# Patient Record
Sex: MALE | Race: ASIAN | NOT HISPANIC OR LATINO | URBAN - METROPOLITAN AREA
[De-identification: names, ages, dates, MRNs, and addresses within clinical notes are randomized per-mention and may not be internally consistent; named-entity substitution may affect disease eponyms.]

---

## 2016-01-28 RX ORDER — VENLAFAXINE HCL 75 MG
1 CAPSULE, EXT RELEASE 24 HR ORAL
Qty: 0 | Refills: 0 | DISCHARGE
Start: 2016-01-28

## 2017-02-08 ENCOUNTER — INPATIENT (INPATIENT)
Facility: HOSPITAL | Age: 67
LOS: 2 days | Discharge: HOME | End: 2017-02-11
Attending: INTERNAL MEDICINE | Admitting: INTERNAL MEDICINE
Payer: MEDICARE

## 2017-02-08 VITALS
SYSTOLIC BLOOD PRESSURE: 151 MMHG | HEART RATE: 105 BPM | RESPIRATION RATE: 20 BRPM | OXYGEN SATURATION: 99 % | DIASTOLIC BLOOD PRESSURE: 82 MMHG

## 2017-02-08 DIAGNOSIS — E11.9 TYPE 2 DIABETES MELLITUS WITHOUT COMPLICATIONS: ICD-10-CM

## 2017-02-08 DIAGNOSIS — K21.9 GASTRO-ESOPHAGEAL REFLUX DISEASE WITHOUT ESOPHAGITIS: ICD-10-CM

## 2017-02-08 DIAGNOSIS — F32.9 MAJOR DEPRESSIVE DISORDER, SINGLE EPISODE, UNSPECIFIED: ICD-10-CM

## 2017-02-08 DIAGNOSIS — R94.39 ABNORMAL RESULT OF OTHER CARDIOVASCULAR FUNCTION STUDY: ICD-10-CM

## 2017-02-08 DIAGNOSIS — E78.5 HYPERLIPIDEMIA, UNSPECIFIED: ICD-10-CM

## 2017-02-08 DIAGNOSIS — I65.09 OCCLUSION AND STENOSIS OF UNSPECIFIED VERTEBRAL ARTERY: Chronic | ICD-10-CM

## 2017-02-08 DIAGNOSIS — I10 ESSENTIAL (PRIMARY) HYPERTENSION: ICD-10-CM

## 2017-02-08 DIAGNOSIS — J84.9 INTERSTITIAL PULMONARY DISEASE, UNSPECIFIED: ICD-10-CM

## 2017-02-08 DIAGNOSIS — Z41.8 ENCOUNTER FOR OTHER PROCEDURES FOR PURPOSES OTHER THAN REMEDYING HEALTH STATE: ICD-10-CM

## 2017-02-08 LAB
ALBUMIN SERPL ELPH-MCNC: 4.2 G/DL — SIGNIFICANT CHANGE UP (ref 3.3–5)
ALP SERPL-CCNC: 48 U/L — SIGNIFICANT CHANGE UP (ref 40–120)
ALT FLD-CCNC: 36 U/L — SIGNIFICANT CHANGE UP (ref 4–41)
APTT BLD: 24.3 SEC — LOW (ref 27.5–37.4)
AST SERPL-CCNC: 32 U/L — SIGNIFICANT CHANGE UP (ref 4–40)
BASE EXCESS BLDV CALC-SCNC: 3.2 MMOL/L — SIGNIFICANT CHANGE UP
BASOPHILS # BLD AUTO: 0.02 K/UL — SIGNIFICANT CHANGE UP (ref 0–0.2)
BASOPHILS NFR BLD AUTO: 0.2 % — SIGNIFICANT CHANGE UP (ref 0–2)
BILIRUB SERPL-MCNC: 0.3 MG/DL — SIGNIFICANT CHANGE UP (ref 0.2–1.2)
BLOOD GAS VENOUS - CREATININE: 1.15 MG/DL — SIGNIFICANT CHANGE UP (ref 0.5–1.3)
BUN SERPL-MCNC: 19 MG/DL — SIGNIFICANT CHANGE UP (ref 7–23)
CALCIUM SERPL-MCNC: 9.2 MG/DL — SIGNIFICANT CHANGE UP (ref 8.4–10.5)
CHLORIDE BLDV-SCNC: 105 MMOL/L — SIGNIFICANT CHANGE UP (ref 96–108)
CHLORIDE SERPL-SCNC: 101 MMOL/L — SIGNIFICANT CHANGE UP (ref 98–107)
CK MB BLD-MCNC: 1.2 — SIGNIFICANT CHANGE UP (ref 0–2.5)
CK MB BLD-MCNC: 5.42 NG/ML — SIGNIFICANT CHANGE UP (ref 1–6.6)
CK SERPL-CCNC: 471 U/L — HIGH (ref 30–200)
CO2 SERPL-SCNC: 26 MMOL/L — SIGNIFICANT CHANGE UP (ref 22–31)
CREAT SERPL-MCNC: 1.21 MG/DL — SIGNIFICANT CHANGE UP (ref 0.5–1.3)
EOSINOPHIL # BLD AUTO: 0.08 K/UL — SIGNIFICANT CHANGE UP (ref 0–0.5)
EOSINOPHIL NFR BLD AUTO: 1 % — SIGNIFICANT CHANGE UP (ref 0–6)
GAS PNL BLDV: 134 MMOL/L — LOW (ref 136–146)
GLUCOSE BLDV-MCNC: 263 — HIGH (ref 70–99)
GLUCOSE SERPL-MCNC: 270 MG/DL — HIGH (ref 70–99)
HCO3 BLDV-SCNC: 27 MMOL/L — SIGNIFICANT CHANGE UP (ref 20–27)
HCT VFR BLD CALC: 33.6 % — LOW (ref 39–50)
HCT VFR BLDV CALC: 32.8 % — LOW (ref 39–51)
HGB BLD-MCNC: 10.4 G/DL — LOW (ref 13–17)
HGB BLDV-MCNC: 10.6 G/DL — LOW (ref 13–17)
IMM GRANULOCYTES NFR BLD AUTO: 0.5 % — SIGNIFICANT CHANGE UP (ref 0–1.5)
INR BLD: 0.99 — SIGNIFICANT CHANGE UP (ref 0.87–1.18)
LACTATE BLDV-MCNC: 1.8 MMOL/L — SIGNIFICANT CHANGE UP (ref 0.5–2)
LYMPHOCYTES # BLD AUTO: 1.89 K/UL — SIGNIFICANT CHANGE UP (ref 1–3.3)
LYMPHOCYTES # BLD AUTO: 23.1 % — SIGNIFICANT CHANGE UP (ref 13–44)
MCHC RBC-ENTMCNC: 20.7 PG — LOW (ref 27–34)
MCHC RBC-ENTMCNC: 31 % — LOW (ref 32–36)
MCV RBC AUTO: 66.9 FL — LOW (ref 80–100)
MONOCYTES # BLD AUTO: 0.54 K/UL — SIGNIFICANT CHANGE UP (ref 0–0.9)
MONOCYTES NFR BLD AUTO: 6.6 % — SIGNIFICANT CHANGE UP (ref 2–14)
NEUTROPHILS # BLD AUTO: 5.6 K/UL — SIGNIFICANT CHANGE UP (ref 1.8–7.4)
NEUTROPHILS NFR BLD AUTO: 68.6 % — SIGNIFICANT CHANGE UP (ref 43–77)
PCO2 BLDV: 45 MMHG — SIGNIFICANT CHANGE UP (ref 41–51)
PH BLDV: 7.41 PH — SIGNIFICANT CHANGE UP (ref 7.32–7.43)
PLATELET # BLD AUTO: 317 K/UL — SIGNIFICANT CHANGE UP (ref 150–400)
PMV BLD: 10.2 FL — SIGNIFICANT CHANGE UP (ref 7–13)
PO2 BLDV: 52 MMHG — HIGH (ref 35–40)
POTASSIUM BLDV-SCNC: 3.7 MMOL/L — SIGNIFICANT CHANGE UP (ref 3.4–4.5)
POTASSIUM SERPL-MCNC: 3.9 MMOL/L — SIGNIFICANT CHANGE UP (ref 3.5–5.3)
POTASSIUM SERPL-SCNC: 3.9 MMOL/L — SIGNIFICANT CHANGE UP (ref 3.5–5.3)
PROT SERPL-MCNC: 6.7 G/DL — SIGNIFICANT CHANGE UP (ref 6–8.3)
PROTHROM AB SERPL-ACNC: 11.3 SEC — SIGNIFICANT CHANGE UP (ref 10–13.1)
RBC # BLD: 5.02 M/UL — SIGNIFICANT CHANGE UP (ref 4.2–5.8)
RBC # FLD: 16.9 % — HIGH (ref 10.3–14.5)
SAO2 % BLDV: 83.2 % — SIGNIFICANT CHANGE UP (ref 60–85)
SODIUM SERPL-SCNC: 143 MMOL/L — SIGNIFICANT CHANGE UP (ref 135–145)
TROPONIN T SERPL-MCNC: < 0.06 NG/ML — SIGNIFICANT CHANGE UP (ref 0–0.06)
WBC # BLD: 8.17 K/UL — SIGNIFICANT CHANGE UP (ref 3.8–10.5)
WBC # FLD AUTO: 8.17 K/UL — SIGNIFICANT CHANGE UP (ref 3.8–10.5)

## 2017-02-08 RX ORDER — ASPIRIN/CALCIUM CARB/MAGNESIUM 324 MG
325 TABLET ORAL ONCE
Qty: 0 | Refills: 0 | Status: COMPLETED | OUTPATIENT
Start: 2017-02-08 | End: 2017-02-08

## 2017-02-08 RX ORDER — ASPIRIN/CALCIUM CARB/MAGNESIUM 324 MG
81 TABLET ORAL DAILY
Qty: 0 | Refills: 0 | Status: DISCONTINUED | OUTPATIENT
Start: 2017-02-08 | End: 2017-02-11

## 2017-02-08 RX ORDER — BUDESONIDE AND FORMOTEROL FUMARATE DIHYDRATE 160; 4.5 UG/1; UG/1
2 AEROSOL RESPIRATORY (INHALATION)
Qty: 0 | Refills: 0 | Status: DISCONTINUED | OUTPATIENT
Start: 2017-02-08 | End: 2017-02-11

## 2017-02-08 RX ORDER — DEXTROSE 50 % IN WATER 50 %
25 SYRINGE (ML) INTRAVENOUS ONCE
Qty: 0 | Refills: 0 | Status: DISCONTINUED | OUTPATIENT
Start: 2017-02-08 | End: 2017-02-11

## 2017-02-08 RX ORDER — DEXTROSE 50 % IN WATER 50 %
12.5 SYRINGE (ML) INTRAVENOUS ONCE
Qty: 0 | Refills: 0 | Status: DISCONTINUED | OUTPATIENT
Start: 2017-02-08 | End: 2017-02-11

## 2017-02-08 RX ORDER — CLOPIDOGREL BISULFATE 75 MG/1
75 TABLET, FILM COATED ORAL DAILY
Qty: 0 | Refills: 0 | Status: DISCONTINUED | OUTPATIENT
Start: 2017-02-08 | End: 2017-02-11

## 2017-02-08 RX ORDER — INSULIN LISPRO 100/ML
VIAL (ML) SUBCUTANEOUS
Qty: 0 | Refills: 0 | Status: DISCONTINUED | OUTPATIENT
Start: 2017-02-08 | End: 2017-02-11

## 2017-02-08 RX ORDER — SODIUM CHLORIDE 9 MG/ML
1000 INJECTION, SOLUTION INTRAVENOUS
Qty: 0 | Refills: 0 | Status: DISCONTINUED | OUTPATIENT
Start: 2017-02-08 | End: 2017-02-11

## 2017-02-08 RX ORDER — INSULIN LISPRO 100/ML
VIAL (ML) SUBCUTANEOUS AT BEDTIME
Qty: 0 | Refills: 0 | Status: DISCONTINUED | OUTPATIENT
Start: 2017-02-08 | End: 2017-02-11

## 2017-02-08 RX ORDER — ATORVASTATIN CALCIUM 80 MG/1
80 TABLET, FILM COATED ORAL AT BEDTIME
Qty: 0 | Refills: 0 | Status: DISCONTINUED | OUTPATIENT
Start: 2017-02-08 | End: 2017-02-11

## 2017-02-08 RX ORDER — GLUCAGON INJECTION, SOLUTION 0.5 MG/.1ML
1 INJECTION, SOLUTION SUBCUTANEOUS ONCE
Qty: 0 | Refills: 0 | Status: DISCONTINUED | OUTPATIENT
Start: 2017-02-08 | End: 2017-02-11

## 2017-02-08 RX ORDER — LEFLUNOMIDE 10 MG/1
20 TABLET ORAL DAILY
Qty: 0 | Refills: 0 | Status: DISCONTINUED | OUTPATIENT
Start: 2017-02-08 | End: 2017-02-11

## 2017-02-08 RX ORDER — FENOFIBRATE,MICRONIZED 130 MG
145 CAPSULE ORAL DAILY
Qty: 0 | Refills: 0 | Status: DISCONTINUED | OUTPATIENT
Start: 2017-02-08 | End: 2017-02-11

## 2017-02-08 RX ORDER — TAMSULOSIN HYDROCHLORIDE 0.4 MG/1
0.4 CAPSULE ORAL AT BEDTIME
Qty: 0 | Refills: 0 | Status: DISCONTINUED | OUTPATIENT
Start: 2017-02-08 | End: 2017-02-11

## 2017-02-08 RX ORDER — FAMOTIDINE 10 MG/ML
20 INJECTION INTRAVENOUS ONCE
Qty: 0 | Refills: 0 | Status: COMPLETED | OUTPATIENT
Start: 2017-02-08 | End: 2017-02-08

## 2017-02-08 RX ORDER — LOSARTAN POTASSIUM 100 MG/1
100 TABLET, FILM COATED ORAL DAILY
Qty: 0 | Refills: 0 | Status: DISCONTINUED | OUTPATIENT
Start: 2017-02-08 | End: 2017-02-11

## 2017-02-08 RX ORDER — AMLODIPINE BESYLATE 2.5 MG/1
2.5 TABLET ORAL DAILY
Qty: 0 | Refills: 0 | Status: DISCONTINUED | OUTPATIENT
Start: 2017-02-08 | End: 2017-02-11

## 2017-02-08 RX ORDER — PREGABALIN 225 MG/1
1000 CAPSULE ORAL DAILY
Qty: 0 | Refills: 0 | Status: DISCONTINUED | OUTPATIENT
Start: 2017-02-08 | End: 2017-02-11

## 2017-02-08 RX ORDER — HEPARIN SODIUM 5000 [USP'U]/ML
5000 INJECTION INTRAVENOUS; SUBCUTANEOUS EVERY 12 HOURS
Qty: 0 | Refills: 0 | Status: DISCONTINUED | OUTPATIENT
Start: 2017-02-08 | End: 2017-02-10

## 2017-02-08 RX ORDER — PANTOPRAZOLE SODIUM 20 MG/1
40 TABLET, DELAYED RELEASE ORAL
Qty: 0 | Refills: 0 | Status: DISCONTINUED | OUTPATIENT
Start: 2017-02-08 | End: 2017-02-11

## 2017-02-08 RX ORDER — VENLAFAXINE HCL 75 MG
75 CAPSULE, EXT RELEASE 24 HR ORAL DAILY
Qty: 0 | Refills: 0 | Status: DISCONTINUED | OUTPATIENT
Start: 2017-02-08 | End: 2017-02-11

## 2017-02-08 RX ORDER — DEXTROSE 50 % IN WATER 50 %
1 SYRINGE (ML) INTRAVENOUS ONCE
Qty: 0 | Refills: 0 | Status: DISCONTINUED | OUTPATIENT
Start: 2017-02-08 | End: 2017-02-11

## 2017-02-08 RX ADMIN — Medication 2: at 23:28

## 2017-02-08 RX ADMIN — Medication 325 MILLIGRAM(S): at 19:43

## 2017-02-08 RX ADMIN — Medication 5 MILLIGRAM(S): at 23:28

## 2017-02-08 RX ADMIN — TAMSULOSIN HYDROCHLORIDE 0.4 MILLIGRAM(S): 0.4 CAPSULE ORAL at 23:28

## 2017-02-08 RX ADMIN — FAMOTIDINE 20 MILLIGRAM(S): 10 INJECTION INTRAVENOUS at 19:43

## 2017-02-08 RX ADMIN — CLOPIDOGREL BISULFATE 75 MILLIGRAM(S): 75 TABLET, FILM COATED ORAL at 23:28

## 2017-02-08 RX ADMIN — ATORVASTATIN CALCIUM 80 MILLIGRAM(S): 80 TABLET, FILM COATED ORAL at 23:28

## 2017-02-08 NOTE — H&P ADULT. - PROBLEM SELECTOR PLAN 5
Continue with Prednisone 5mg daily, Arava and Symbicort daily   NC at 2L, continuous pulse oxygen  Consider Pulmonary consult in am with Dr. Kimball if warranted

## 2017-02-08 NOTE — H&P ADULT. - GENITOURINARY COMMENTS
Penile redness or itching Mild redness appreciated on the glans of the penis. No active drainage or bleeding noted

## 2017-02-08 NOTE — H&P ADULT. - PMH
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 Ansari  Constipation    Depression    Diabetes mellitus, type II    Dizziness    HTN (hypertension)    Hyperlipidemia    Interstitial lung disease

## 2017-02-08 NOTE — H&P ADULT. - ASSESSMENT
65 y/o M with PMH of HTN, DM, HLD, CVA x 3(s/p cerebral stents), Interstitial Lung disease presented to the ED s/p abnormal stress test from cardiologist office earlier today.

## 2017-02-08 NOTE — ED PROVIDER NOTE - ATTENDING CONTRIBUTION TO CARE
I performed a face to face evaluation of this patient and performed a history and physical examination on the patient.  I agree with the PA's history, physical examination, and plan of the patient. patient with abnormal stress test today. cv: rrr, resting comofrtably at rest. will admit for acs.

## 2017-02-08 NOTE — ED ADULT TRIAGE NOTE - CHIEF COMPLAINT QUOTE
Pt s/p stress test became tachycardiac and hypotensive with sob and lightheadedness. Pt now denies chest pain.

## 2017-02-08 NOTE — H&P ADULT. - NEGATIVE ENMT SYMPTOMS
no sinus symptoms/no ear pain/no nasal congestion/no nasal discharge/no hearing difficulty/no tinnitus/no vertigo

## 2017-02-08 NOTE — H&P ADULT. - PROBLEM SELECTOR PLAN 1
Admit to telemetry, serial EKG, serial Iain prn chest pain/SOB  f/u MD note   HgBA1C, TSH, lipid profile, CBC, CMP in am   Will make patient NPO after midnight for possible angiogram in am 2/9  Continue with Aspirin 81mg and Plavix 75mg daily

## 2017-02-08 NOTE — H&P ADULT. - NEGATIVE GASTROINTESTINAL SYMPTOMS
no nausea/no change in bowel habits/no melena/no diarrhea/no abdominal pain/no hematochezia/no vomiting/no constipation

## 2017-02-08 NOTE — H&P ADULT. - NEGATIVE NEUROLOGICAL SYMPTOMS
no focal seizures/no tremors/no vertigo/no difficulty walking/no hemiparesis/no generalized seizures/no loss of sensation/no weakness/no syncope/no transient paralysis/no loss of consciousness/no headache/no paresthesias/no confusion

## 2017-02-08 NOTE — ED PROVIDER NOTE - FAMILY HISTORY
<<-----Click on this checkbox to enter Family History Family history of asthma     Father  Still living? Unknown  Family history of heart disease, Age at diagnosis: Age Unknown

## 2017-02-08 NOTE — H&P ADULT. - HISTORY OF PRESENT ILLNESS
65 y/o M with PMH of HTN, DM, HLD, CVA x 3(s/p cerebral stents), Interstitial Lung disease presented to the ED s/p abnormal stress test from cardiologist office earlier today. As per the patient he was scheduled for a stress test at Dr. Forbes's office today and while he was on the treadmill, he developed lightheadedness and SOB. Patient stated that the tech stopped the stress test and when they checked his blood pressure "it was low". Patient stated that when he started walking on the treadmill he felt like his legs were going to give out. Patient stated that he was referred to cardiologist by his PMD because he had been having progressively worsening dyspnea on exertion for months. Patient stated that the PA and tech spoke with Dr. Forbes who decided that he should be sent to the ED for further workup. Patient denied any CP, fevers, chills, N/V/D/C, headaches, abdominal pain, melena, hematochezia, dysuria, recent travel, sick contact, pleuritic or positional chest pain. Of note patient did complain of penile pain, with itching and mild bleeding for which he went and saw a urologist and was prescribed a cream which he stated that "he has never gotten in his pharmacy".    On ED admission EKG revealed Sinus tachycardia at a rate of 102 with left anterior fascicular block, CE x 1: Trop: Negative, CK: 471, H&H: 10.4/33.6, Gluc: 270. When examined patient is resting in the stretcher and denied any current CP, SOB or lightheadedness.

## 2017-02-08 NOTE — PATIENT PROFILE ADULT. - FAMILY HISTORY
Mother  Still living? Unknown  Family history of asthma, Age at diagnosis: Age Unknown     Father  Still living? Unknown  Family history of heart disease, Age at diagnosis: Age Unknown

## 2017-02-08 NOTE — H&P ADULT. - NEGATIVE CARDIOVASCULAR SYMPTOMS
no palpitations/no orthopnea/no dyspnea on exertion/no paroxysmal nocturnal dyspnea/no peripheral edema/no chest pain

## 2017-02-08 NOTE — H&P ADULT. - RS GEN PE MLT RESP DETAILS PC
no rales/airway patent/no intercostal retractions/respirations non-labored/no chest wall tenderness/wheezes/no rhonchi/normal

## 2017-02-08 NOTE — ED PROVIDER NOTE - OBJECTIVE STATEMENT
66 year old male, PMHx of HTN, DM, HLD, CVA with cerebral stent; presents to the ED s/p abnormal stress test from Dr. Muniz's office who is also seen by Dr. Forbes. Patient states he was referred to cardiology because he had been having progressively worsening dyspnea on exertion for months. Today, he was having a stress test and he became short of breath, lightheaded, with a significant BP drop and incomplete LBBB. Patient is currently complaining only of acid reflux in which he gets when he does not take his pantoprazole (he did not take it this morning). He has had a complete resolution of the SOB, and lightheadedness. He did not experience any chest pain during the episode or at present. Denies fevers, chills, nausea, vomiting, or other complaints.

## 2017-02-08 NOTE — ED PROVIDER NOTE - CHPI ED SYMPTOMS POS
SHORTNESS OF BREATH/lightheadedness, change in ekg/PALPITATIONS SHORTNESS OF BREATH/DIZZINESS/lightheadedness, change in ekg/PALPITATIONS

## 2017-02-08 NOTE — PATIENT PROFILE ADULT. - PMH
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

## 2017-02-08 NOTE — H&P ADULT. - NEGATIVE MUSCULOSKELETAL SYMPTOMS
no arthralgia/no myalgia/no muscle cramps/no muscle weakness/no stiffness/no arthritis/no neck pain/no joint swelling

## 2017-02-09 LAB
ANISOCYTOSIS BLD QL: SLIGHT — SIGNIFICANT CHANGE UP
BUN SERPL-MCNC: 15 MG/DL — SIGNIFICANT CHANGE UP (ref 7–23)
CALCIUM SERPL-MCNC: 9.2 MG/DL — SIGNIFICANT CHANGE UP (ref 8.4–10.5)
CHLORIDE SERPL-SCNC: 98 MMOL/L — SIGNIFICANT CHANGE UP (ref 98–107)
CHOLEST SERPL-MCNC: 201 MG/DL — HIGH (ref 120–199)
CK MB BLD-MCNC: 1 — SIGNIFICANT CHANGE UP (ref 0–2.5)
CK MB BLD-MCNC: 4.14 NG/ML — SIGNIFICANT CHANGE UP (ref 1–6.6)
CK SERPL-CCNC: 311 U/L — HIGH (ref 30–200)
CK SERPL-CCNC: 395 U/L — HIGH (ref 30–200)
CO2 SERPL-SCNC: 22 MMOL/L — SIGNIFICANT CHANGE UP (ref 22–31)
CREAT SERPL-MCNC: 1.14 MG/DL — SIGNIFICANT CHANGE UP (ref 0.5–1.3)
D DIMER BLD IA.RAPID-MCNC: < 150 NG/ML — SIGNIFICANT CHANGE UP
ELLIPTOCYTES BLD QL SMEAR: SLIGHT — SIGNIFICANT CHANGE UP
FERRITIN SERPL-MCNC: 202.4 NG/ML — SIGNIFICANT CHANGE UP (ref 30–400)
GLUCOSE SERPL-MCNC: 309 MG/DL — HIGH (ref 70–99)
HBA1C BLD-MCNC: 10.4 % — HIGH (ref 4–5.6)
HCT VFR BLD CALC: 32.3 % — LOW (ref 39–50)
HDLC SERPL-MCNC: 38 MG/DL — SIGNIFICANT CHANGE UP (ref 35–55)
HGB BLD-MCNC: 9.9 G/DL — LOW (ref 13–17)
IRON SATN MFR SERPL: 384 UG/DL — SIGNIFICANT CHANGE UP (ref 155–535)
IRON SATN MFR SERPL: 69 UG/DL — SIGNIFICANT CHANGE UP (ref 45–165)
LG PLATELETS BLD QL AUTO: SLIGHT — SIGNIFICANT CHANGE UP
LIPID PNL WITH DIRECT LDL SERPL: 122 MG/DL — SIGNIFICANT CHANGE UP
MANUAL SMEAR VERIFICATION: SIGNIFICANT CHANGE UP
MCHC RBC-ENTMCNC: 20.4 PG — LOW (ref 27–34)
MCHC RBC-ENTMCNC: 30.7 % — LOW (ref 32–36)
MCV RBC AUTO: 66.6 FL — LOW (ref 80–100)
MICROCYTES BLD QL: SIGNIFICANT CHANGE UP
PLATELET # BLD AUTO: 311 K/UL — SIGNIFICANT CHANGE UP (ref 150–400)
PLATELET COUNT - ESTIMATE: NORMAL — SIGNIFICANT CHANGE UP
PMV BLD: 10.1 FL — SIGNIFICANT CHANGE UP (ref 7–13)
POIKILOCYTOSIS BLD QL AUTO: SIGNIFICANT CHANGE UP
POTASSIUM SERPL-MCNC: 4.2 MMOL/L — SIGNIFICANT CHANGE UP (ref 3.5–5.3)
POTASSIUM SERPL-SCNC: 4.2 MMOL/L — SIGNIFICANT CHANGE UP (ref 3.5–5.3)
RBC # BLD: 4.85 M/UL — SIGNIFICANT CHANGE UP (ref 4.2–5.8)
RBC # FLD: 16.7 % — HIGH (ref 10.3–14.5)
SCHISTOCYTES BLD QL AUTO: SLIGHT — SIGNIFICANT CHANGE UP
SODIUM SERPL-SCNC: 136 MMOL/L — SIGNIFICANT CHANGE UP (ref 135–145)
TRIGL SERPL-MCNC: 281 MG/DL — HIGH (ref 10–149)
TROPONIN T SERPL-MCNC: < 0.06 NG/ML — SIGNIFICANT CHANGE UP (ref 0–0.06)
TSH SERPL-MCNC: 2.27 UIU/ML — SIGNIFICANT CHANGE UP (ref 0.27–4.2)
UIBC SERPL-MCNC: 315 UG/DL — SIGNIFICANT CHANGE UP (ref 110–370)
WBC # BLD: 7.97 K/UL — SIGNIFICANT CHANGE UP (ref 3.8–10.5)
WBC # FLD AUTO: 7.97 K/UL — SIGNIFICANT CHANGE UP (ref 3.8–10.5)

## 2017-02-09 PROCEDURE — 71020: CPT | Mod: 26

## 2017-02-09 PROCEDURE — 70450 CT HEAD/BRAIN W/O DYE: CPT | Mod: 26

## 2017-02-09 RX ORDER — INSULIN GLARGINE 100 [IU]/ML
16 INJECTION, SOLUTION SUBCUTANEOUS AT BEDTIME
Qty: 0 | Refills: 0 | Status: DISCONTINUED | OUTPATIENT
Start: 2017-02-09 | End: 2017-02-11

## 2017-02-09 RX ORDER — INSULIN LISPRO 100/ML
7 VIAL (ML) SUBCUTANEOUS
Qty: 0 | Refills: 0 | Status: DISCONTINUED | OUTPATIENT
Start: 2017-02-09 | End: 2017-02-11

## 2017-02-09 RX ADMIN — Medication 4: at 13:32

## 2017-02-09 RX ADMIN — BUDESONIDE AND FORMOTEROL FUMARATE DIHYDRATE 2 PUFF(S): 160; 4.5 AEROSOL RESPIRATORY (INHALATION) at 22:15

## 2017-02-09 RX ADMIN — ATORVASTATIN CALCIUM 80 MILLIGRAM(S): 80 TABLET, FILM COATED ORAL at 22:15

## 2017-02-09 RX ADMIN — HEPARIN SODIUM 5000 UNIT(S): 5000 INJECTION INTRAVENOUS; SUBCUTANEOUS at 17:40

## 2017-02-09 RX ADMIN — PREGABALIN 1000 MICROGRAM(S): 225 CAPSULE ORAL at 13:33

## 2017-02-09 RX ADMIN — LEFLUNOMIDE 20 MILLIGRAM(S): 10 TABLET ORAL at 13:32

## 2017-02-09 RX ADMIN — Medication 1 DROP(S): at 13:33

## 2017-02-09 RX ADMIN — BUDESONIDE AND FORMOTEROL FUMARATE DIHYDRATE 2 PUFF(S): 160; 4.5 AEROSOL RESPIRATORY (INHALATION) at 09:17

## 2017-02-09 RX ADMIN — INSULIN GLARGINE 16 UNIT(S): 100 INJECTION, SOLUTION SUBCUTANEOUS at 22:15

## 2017-02-09 RX ADMIN — HEPARIN SODIUM 5000 UNIT(S): 5000 INJECTION INTRAVENOUS; SUBCUTANEOUS at 05:49

## 2017-02-09 RX ADMIN — Medication 145 MILLIGRAM(S): at 13:33

## 2017-02-09 RX ADMIN — LOSARTAN POTASSIUM 100 MILLIGRAM(S): 100 TABLET, FILM COATED ORAL at 05:49

## 2017-02-09 RX ADMIN — TAMSULOSIN HYDROCHLORIDE 0.4 MILLIGRAM(S): 0.4 CAPSULE ORAL at 22:15

## 2017-02-09 RX ADMIN — Medication 7 UNIT(S): at 17:39

## 2017-02-09 RX ADMIN — AMLODIPINE BESYLATE 2.5 MILLIGRAM(S): 2.5 TABLET ORAL at 05:49

## 2017-02-09 RX ADMIN — Medication 3: at 09:15

## 2017-02-09 RX ADMIN — Medication 81 MILLIGRAM(S): at 13:32

## 2017-02-09 RX ADMIN — PANTOPRAZOLE SODIUM 40 MILLIGRAM(S): 20 TABLET, DELAYED RELEASE ORAL at 06:28

## 2017-02-09 RX ADMIN — Medication 5 MILLIGRAM(S): at 05:49

## 2017-02-09 RX ADMIN — Medication 2: at 17:40

## 2017-02-09 RX ADMIN — CLOPIDOGREL BISULFATE 75 MILLIGRAM(S): 75 TABLET, FILM COATED ORAL at 13:33

## 2017-02-09 RX ADMIN — Medication 75 MILLIGRAM(S): at 13:33

## 2017-02-10 ENCOUNTER — TRANSCRIPTION ENCOUNTER (OUTPATIENT)
Age: 67
End: 2017-02-10

## 2017-02-10 LAB
BUN SERPL-MCNC: 16 MG/DL — SIGNIFICANT CHANGE UP (ref 7–23)
CALCIUM SERPL-MCNC: 8.7 MG/DL — SIGNIFICANT CHANGE UP (ref 8.4–10.5)
CHLORIDE SERPL-SCNC: 102 MMOL/L — SIGNIFICANT CHANGE UP (ref 98–107)
CO2 SERPL-SCNC: 23 MMOL/L — SIGNIFICANT CHANGE UP (ref 22–31)
CREAT SERPL-MCNC: 1.03 MG/DL — SIGNIFICANT CHANGE UP (ref 0.5–1.3)
GLUCOSE SERPL-MCNC: 215 MG/DL — HIGH (ref 70–99)
HCT VFR BLD CALC: 32.2 % — LOW (ref 39–50)
HGB BLD-MCNC: 10 G/DL — LOW (ref 13–17)
MCHC RBC-ENTMCNC: 20.8 PG — LOW (ref 27–34)
MCHC RBC-ENTMCNC: 31.1 % — LOW (ref 32–36)
MCV RBC AUTO: 67.1 FL — LOW (ref 80–100)
PLATELET # BLD AUTO: 306 K/UL — SIGNIFICANT CHANGE UP (ref 150–400)
PMV BLD: 10.2 FL — SIGNIFICANT CHANGE UP (ref 7–13)
POTASSIUM SERPL-MCNC: 3.6 MMOL/L — SIGNIFICANT CHANGE UP (ref 3.5–5.3)
POTASSIUM SERPL-SCNC: 3.6 MMOL/L — SIGNIFICANT CHANGE UP (ref 3.5–5.3)
RBC # BLD: 4.8 M/UL — SIGNIFICANT CHANGE UP (ref 4.2–5.8)
RBC # FLD: 16.7 % — HIGH (ref 10.3–14.5)
SODIUM SERPL-SCNC: 139 MMOL/L — SIGNIFICANT CHANGE UP (ref 135–145)
WBC # BLD: 7.7 K/UL — SIGNIFICANT CHANGE UP (ref 3.8–10.5)
WBC # FLD AUTO: 7.7 K/UL — SIGNIFICANT CHANGE UP (ref 3.8–10.5)

## 2017-02-10 RX ORDER — DIPHENHYDRAMINE HCL 50 MG
25 CAPSULE ORAL EVERY 6 HOURS
Qty: 0 | Refills: 0 | Status: DISCONTINUED | OUTPATIENT
Start: 2017-02-10 | End: 2017-02-11

## 2017-02-10 RX ORDER — SODIUM CHLORIDE 9 MG/ML
500 INJECTION INTRAMUSCULAR; INTRAVENOUS; SUBCUTANEOUS
Qty: 0 | Refills: 0 | Status: COMPLETED | OUTPATIENT
Start: 2017-02-10 | End: 2017-02-10

## 2017-02-10 RX ADMIN — Medication: at 13:36

## 2017-02-10 RX ADMIN — SODIUM CHLORIDE 75 MILLILITER(S): 9 INJECTION INTRAMUSCULAR; INTRAVENOUS; SUBCUTANEOUS at 19:43

## 2017-02-10 RX ADMIN — TAMSULOSIN HYDROCHLORIDE 0.4 MILLIGRAM(S): 0.4 CAPSULE ORAL at 22:32

## 2017-02-10 RX ADMIN — Medication 75 MILLIGRAM(S): at 20:15

## 2017-02-10 RX ADMIN — CLOPIDOGREL BISULFATE 75 MILLIGRAM(S): 75 TABLET, FILM COATED ORAL at 16:50

## 2017-02-10 RX ADMIN — LOSARTAN POTASSIUM 100 MILLIGRAM(S): 100 TABLET, FILM COATED ORAL at 20:15

## 2017-02-10 RX ADMIN — LEFLUNOMIDE 20 MILLIGRAM(S): 10 TABLET ORAL at 20:15

## 2017-02-10 RX ADMIN — Medication 145 MILLIGRAM(S): at 20:15

## 2017-02-10 RX ADMIN — Medication 25 MILLIGRAM(S): at 20:14

## 2017-02-10 RX ADMIN — HEPARIN SODIUM 5000 UNIT(S): 5000 INJECTION INTRAVENOUS; SUBCUTANEOUS at 06:10

## 2017-02-10 RX ADMIN — BUDESONIDE AND FORMOTEROL FUMARATE DIHYDRATE 2 PUFF(S): 160; 4.5 AEROSOL RESPIRATORY (INHALATION) at 10:10

## 2017-02-10 RX ADMIN — PREGABALIN 1000 MICROGRAM(S): 225 CAPSULE ORAL at 20:15

## 2017-02-10 RX ADMIN — ATORVASTATIN CALCIUM 80 MILLIGRAM(S): 80 TABLET, FILM COATED ORAL at 22:33

## 2017-02-10 RX ADMIN — Medication 81 MILLIGRAM(S): at 16:50

## 2017-02-10 RX ADMIN — Medication 2: at 10:08

## 2017-02-10 RX ADMIN — BUDESONIDE AND FORMOTEROL FUMARATE DIHYDRATE 2 PUFF(S): 160; 4.5 AEROSOL RESPIRATORY (INHALATION) at 20:15

## 2017-02-10 RX ADMIN — Medication 5 MILLIGRAM(S): at 20:15

## 2017-02-10 RX ADMIN — AMLODIPINE BESYLATE 2.5 MILLIGRAM(S): 2.5 TABLET ORAL at 20:16

## 2017-02-10 RX ADMIN — INSULIN GLARGINE 16 UNIT(S): 100 INJECTION, SOLUTION SUBCUTANEOUS at 22:32

## 2017-02-10 RX ADMIN — SODIUM CHLORIDE 75 MILLILITER(S): 9 INJECTION INTRAMUSCULAR; INTRAVENOUS; SUBCUTANEOUS at 18:36

## 2017-02-11 VITALS
DIASTOLIC BLOOD PRESSURE: 78 MMHG | OXYGEN SATURATION: 96 % | SYSTOLIC BLOOD PRESSURE: 126 MMHG | HEART RATE: 93 BPM | TEMPERATURE: 98 F | RESPIRATION RATE: 18 BRPM

## 2017-02-11 LAB
BUN SERPL-MCNC: 13 MG/DL — SIGNIFICANT CHANGE UP (ref 7–23)
CALCIUM SERPL-MCNC: 9 MG/DL — SIGNIFICANT CHANGE UP (ref 8.4–10.5)
CHLORIDE SERPL-SCNC: 100 MMOL/L — SIGNIFICANT CHANGE UP (ref 98–107)
CO2 SERPL-SCNC: 21 MMOL/L — LOW (ref 22–31)
CREAT SERPL-MCNC: 1 MG/DL — SIGNIFICANT CHANGE UP (ref 0.5–1.3)
GLUCOSE SERPL-MCNC: 230 MG/DL — HIGH (ref 70–99)
HCT VFR BLD CALC: 33.7 % — LOW (ref 39–50)
HGB BLD-MCNC: 10.5 G/DL — LOW (ref 13–17)
MCHC RBC-ENTMCNC: 21 PG — LOW (ref 27–34)
MCHC RBC-ENTMCNC: 31.2 % — LOW (ref 32–36)
MCV RBC AUTO: 67.4 FL — LOW (ref 80–100)
PLATELET # BLD AUTO: 328 K/UL — SIGNIFICANT CHANGE UP (ref 150–400)
PMV BLD: 10.1 FL — SIGNIFICANT CHANGE UP (ref 7–13)
POTASSIUM SERPL-MCNC: 4.3 MMOL/L — SIGNIFICANT CHANGE UP (ref 3.5–5.3)
POTASSIUM SERPL-SCNC: 4.3 MMOL/L — SIGNIFICANT CHANGE UP (ref 3.5–5.3)
RBC # BLD: 5 M/UL — SIGNIFICANT CHANGE UP (ref 4.2–5.8)
RBC # FLD: 17.1 % — HIGH (ref 10.3–14.5)
SODIUM SERPL-SCNC: 137 MMOL/L — SIGNIFICANT CHANGE UP (ref 135–145)
WBC # BLD: 8.08 K/UL — SIGNIFICANT CHANGE UP (ref 3.8–10.5)
WBC # FLD AUTO: 8.08 K/UL — SIGNIFICANT CHANGE UP (ref 3.8–10.5)

## 2017-02-11 RX ORDER — TAMSULOSIN HYDROCHLORIDE 0.4 MG/1
1 CAPSULE ORAL
Qty: 0 | Refills: 0 | DISCHARGE
Start: 2017-02-11

## 2017-02-11 RX ORDER — FEBUXOSTAT 40 MG/1
1 TABLET ORAL
Qty: 0 | Refills: 0 | COMMUNITY

## 2017-02-11 RX ORDER — GLYBURIDE 5 MG
1 TABLET ORAL
Qty: 0 | Refills: 0 | COMMUNITY

## 2017-02-11 RX ORDER — INSULIN GLARGINE 100 [IU]/ML
25 INJECTION, SOLUTION SUBCUTANEOUS AT BEDTIME
Qty: 0 | Refills: 0 | Status: DISCONTINUED | OUTPATIENT
Start: 2017-02-11 | End: 2017-02-11

## 2017-02-11 RX ORDER — INSULIN LISPRO 100/ML
10 VIAL (ML) SUBCUTANEOUS
Qty: 0 | Refills: 0 | Status: DISCONTINUED | OUTPATIENT
Start: 2017-02-11 | End: 2017-02-11

## 2017-02-11 RX ORDER — EZETIMIBE 10 MG/1
1 TABLET ORAL
Qty: 0 | Refills: 0 | COMMUNITY

## 2017-02-11 RX ADMIN — PANTOPRAZOLE SODIUM 40 MILLIGRAM(S): 20 TABLET, DELAYED RELEASE ORAL at 05:45

## 2017-02-11 RX ADMIN — Medication 3: at 11:13

## 2017-02-11 RX ADMIN — AMLODIPINE BESYLATE 2.5 MILLIGRAM(S): 2.5 TABLET ORAL at 05:45

## 2017-02-11 RX ADMIN — BUDESONIDE AND FORMOTEROL FUMARATE DIHYDRATE 2 PUFF(S): 160; 4.5 AEROSOL RESPIRATORY (INHALATION) at 13:19

## 2017-02-11 RX ADMIN — Medication 5 MILLIGRAM(S): at 05:45

## 2017-02-11 RX ADMIN — LOSARTAN POTASSIUM 100 MILLIGRAM(S): 100 TABLET, FILM COATED ORAL at 05:45

## 2017-02-11 RX ADMIN — Medication 10 UNIT(S): at 14:01

## 2017-02-11 RX ADMIN — CLOPIDOGREL BISULFATE 75 MILLIGRAM(S): 75 TABLET, FILM COATED ORAL at 11:22

## 2017-02-11 RX ADMIN — Medication 81 MILLIGRAM(S): at 11:23

## 2017-02-11 RX ADMIN — Medication 5: at 14:00

## 2017-02-11 RX ADMIN — PREGABALIN 1000 MICROGRAM(S): 225 CAPSULE ORAL at 11:23

## 2017-02-11 RX ADMIN — LEFLUNOMIDE 20 MILLIGRAM(S): 10 TABLET ORAL at 11:23

## 2017-02-11 RX ADMIN — Medication 145 MILLIGRAM(S): at 11:23

## 2017-02-11 RX ADMIN — Medication 75 MILLIGRAM(S): at 11:23

## 2017-02-11 NOTE — DISCHARGE NOTE ADULT - MEDICATION SUMMARY - MEDICATIONS TO STOP TAKING
I will STOP taking the medications listed below when I get home from the hospital:    metFORMIN 1000 mg oral tablet, extended release  -- 2 tab(s) by mouth once a day

## 2017-02-11 NOTE — DISCHARGE NOTE ADULT - MEDICATION SUMMARY - MEDICATIONS TO TAKE
I will START or STAY ON the medications listed below when I get home from the hospital:    predniSONE 5 mg oral tablet  -- 1 tab(s) by mouth once a day  -- Indication: For Interstitial lung disease    aspirin 81 mg oral tablet, chewable  -- 1 tab(s) by mouth once a day  -- Indication: For CAD    losartan 100 mg oral tablet  -- 1 tab(s) by mouth once a day  -- Indication: For HTN (hypertension)    tamsulosin 0.4 mg oral capsule  -- 1 cap(s) by mouth once a day (at bedtime)  -- Indication: For Urinary flow    venlafaxine 75 mg oral tablet  -- 1 tab(s) by mouth once a day  -- Indication: For Antidepressant    Tradjenta 5 mg oral tablet  -- 1 tab(s) by mouth once a day  -- Indication: For DM    glyBURIDE 5 mg oral tablet  -- 1 tab(s) by mouth 2 times a day starting on 2/12/17  -- Indication: For DM    fenofibrate 145 mg oral tablet  -- 1 tab(s) by mouth once a day  -- Indication: For Hyperlipidemia    atorvastatin 80 mg oral tablet  -- 1 tab(s) by mouth once a day (at bedtime)  -- Indication: For Hyperlipidemia    clopidogrel 75 mg oral tablet  -- 1 tab(s) by mouth once a day  -- Indication: For CAD    Symbicort 160 mcg-4.5 mcg/inh inhalation aerosol  -- 2 puff(s) inhaled 2 times a day  -- Indication: For Interstitial lung disease    amLODIPine 2.5 mg oral tablet  -- 1 tab(s) by mouth once a day  -- Indication: For HTN (hypertension)    leflunomide 20 mg oral tablet  -- 1 tab(s) by mouth once a day  -- Indication: For Immunosupressant    omeprazole 40 mg oral delayed release capsule  -- 1 cap(s) by mouth once a day  -- Indication: For GERD (gastroesophageal reflux disease)    Vitamin B-12 1000 mcg oral tablet  -- 1 tab(s) by mouth once a day  -- Indication: For vitamin

## 2017-02-11 NOTE — DISCHARGE NOTE ADULT - CARE PLAN
Principal Discharge DX:	Abnormal stress test  Secondary Diagnosis:	Diabetes mellitus, type II  Secondary Diagnosis:	HTN (hypertension) Principal Discharge DX:	Abnormal stress test  Goal:	Followup with PMD and take all medications prescribed.  Instructions for follow-up, activity and diet:	Followup with PMD and take all medications prescribed. Low salt, low fat, low cholesterol, diabetic diet.  Secondary Diagnosis:	Diabetes mellitus, type II  Goal:	Followup with PMD and take all medications prescribed.  Instructions for follow-up, activity and diet:	Followup with PMD and take all medications prescribed. Low salt, low fat, low cholesterol, diabetic diet.  Secondary Diagnosis:	HTN (hypertension)  Goal:	Followup with PMD and take all medications prescribed.  Instructions for follow-up, activity and diet:	Followup with PMD and take all medications prescribed. Low salt, low fat, low cholesterol, diabetic diet.

## 2017-02-11 NOTE — DISCHARGE NOTE ADULT - HOSPITAL COURSE
67 y/o M with PMH of HTN, DM, HLD, CVA x 3(s/p cerebral stents), Interstitial Lung disease presented to the ED s/p abnormal stress test from cardiologist office earlier today. As per the patient he was scheduled for a stress test at Dr. Forbes's office today and while he was on the treadmill, he developed lightheadedness and SOB. Patient stated that the tech stopped the stress test and when they checked his blood pressure "it was low". Patient stated that when he started walking on the treadmill he felt like his legs were going to give out. Patient stated that he was referred to cardiologist by his PMD because he had been having progressively worsening dyspnea on exertion for months. Patient stated that the PA and tech spoke with Dr. Forbes who decided that he should be sent to the ED for further workup. Patient denied any CP, fevers, chills, N/V/D/C, headaches, abdominal pain, melena, hematochezia, dysuria, recent travel, sick contact, pleuritic or positional chest pain.     Hospital Course:   Endocrine followed patient for diabetes and A1C of 10.4.  patient underwent cardiac cath which mLAD 30%, otherwise normal coronaries. RFA site stable.   Neurology consulted for vertigo.     INCOMPLETE> 65 y/o M with PMH of HTN, DM, HLD, CVA x 3(s/p cerebral stents), Interstitial Lung disease presented to the ED s/p abnormal stress test from cardiologist office earlier today. As per the patient he was scheduled for a stress test at Dr. Forbes's office today and while he was on the treadmill, he developed lightheadedness and SOB. Patient stated that the tech stopped the stress test and when they checked his blood pressure "it was low". Patient stated that when he started walking on the treadmill he felt like his legs were going to give out. Patient stated that he was referred to cardiologist by his PMD because he had been having progressively worsening dyspnea on exertion for months. Patient stated that the PA and tech spoke with Dr. Forbes who decided that he should be sent to the ED for further workup. Patient denied any CP, fevers, chills, N/V/D/C, headaches, abdominal pain, melena, hematochezia, dysuria, recent travel, sick contact, pleuritic or positional chest pain.     Hospital Course:   Endocrine followed patient for diabetes and A1C of 10.4.  patient underwent cardiac cath which mLAD 30%, otherwise normal coronaries. RFA site stable.   Neurology consulted for vertigo done.  As per attending, patient stable for discharge.

## 2017-02-11 NOTE — DISCHARGE NOTE ADULT - CARE PROVIDER_API CALL
Angelita Forbes), Cardiovascular Disease; Interventional Cardiology  [address] [facility] [address]  [place] [address]  Phone: [phone]  Fax: [phone] Angelita Forbes), Cardiovascular Disease; Interventional Cardiology  [address] [facility] [address]  [place] [address]  Phone: [phone]  Fax: [phone]    Rosemarie Adame), EndocrinologyMetabDiabetes; Internal Medicine  [address] [place] [address]  Phone: [phone]  Fax: [phone]

## 2017-02-11 NOTE — DISCHARGE NOTE ADULT - ADDITIONAL INSTRUCTIONS
Follow up with Cardiologist Dr Forbes on 2/17//17 at 3:15 pm and PMD within one week of discharge. Call for appointment. Return to ED for any concerning symptoms. Continue medications as prescribed. Low salt, low fat, low cholesterol diet. No heavy lifting x one week. No strenuous activity x 3 weeks. Monitor site of procedure and notify your doctor for any redness, swelling, discharge. No driving x 24 hours. You may shower but no baths or swimming x one week. Follow up with Cardiologist Dr Forbes on 2/17//17 at 3:15 pm, Dr Adame Endocrinologist [phone] and PMD within one week of discharge. Call for appointment. Return to ED for any concerning symptoms. Continue medications as prescribed. Low salt, low fat, low cholesterol diet. No heavy lifting x one week. No strenuous activity x 3 weeks. Monitor site of procedure and notify your doctor for any redness, swelling, discharge. No driving x 24 hours. You may shower but no baths or swimming x one week.

## 2017-02-11 NOTE — DISCHARGE NOTE ADULT - MEDICATION SUMMARY - MEDICATIONS TO CHANGE
I will SWITCH the dose or number of times a day I take the medications listed below when I get home from the hospital:    glyBURIDE 2.5 mg oral tablet  --  by mouth 2 times a day

## 2017-02-11 NOTE — DISCHARGE NOTE ADULT - PATIENT PORTAL LINK FT
“You can access the [facility] Patient Portal, offered by [facility], by registering with the following website: [email or web address]”

## 2017-02-11 NOTE — DISCHARGE NOTE ADULT - PLAN OF CARE
Followup with PMD and take all medications prescribed. Followup with PMD and take all medications prescribed. Low salt, low fat, low cholesterol, diabetic diet.

## 2017-06-22 ENCOUNTER — APPOINTMENT (OUTPATIENT)
Dept: CT IMAGING | Facility: IMAGING CENTER | Age: 67
End: 2017-06-22

## 2017-08-04 ENCOUNTER — OUTPATIENT (OUTPATIENT)
Dept: OUTPATIENT SERVICES | Facility: HOSPITAL | Age: 67
LOS: 1 days | End: 2017-08-04
Payer: MEDICARE

## 2017-08-04 ENCOUNTER — APPOINTMENT (OUTPATIENT)
Dept: CT IMAGING | Facility: IMAGING CENTER | Age: 67
End: 2017-08-04
Payer: MEDICARE

## 2017-08-04 DIAGNOSIS — Z00.8 ENCOUNTER FOR OTHER GENERAL EXAMINATION: ICD-10-CM

## 2017-08-04 DIAGNOSIS — I65.09 OCCLUSION AND STENOSIS OF UNSPECIFIED VERTEBRAL ARTERY: Chronic | ICD-10-CM

## 2017-08-04 PROCEDURE — 71250 CT THORAX DX C-: CPT | Mod: 26

## 2017-08-04 PROCEDURE — 71250 CT THORAX DX C-: CPT

## 2017-11-07 ENCOUNTER — APPOINTMENT (OUTPATIENT)
Dept: OTOLARYNGOLOGY | Facility: CLINIC | Age: 67
End: 2017-11-07

## 2018-07-01 ENCOUNTER — OUTPATIENT (OUTPATIENT)
Dept: OUTPATIENT SERVICES | Facility: HOSPITAL | Age: 68
LOS: 1 days | End: 2018-07-01
Payer: MEDICARE

## 2018-07-01 DIAGNOSIS — I65.09 OCCLUSION AND STENOSIS OF UNSPECIFIED VERTEBRAL ARTERY: Chronic | ICD-10-CM

## 2018-07-16 DIAGNOSIS — Z71.89 OTHER SPECIFIED COUNSELING: ICD-10-CM

## 2018-07-16 PROBLEM — J84.9 INTERSTITIAL PULMONARY DISEASE, UNSPECIFIED: Chronic | Status: ACTIVE | Noted: 2017-02-08

## 2019-04-01 PROCEDURE — G9005: CPT

## 2020-01-01 NOTE — H&P ADULT. - NEGATIVE OPHTHALMOLOGIC SYMPTOMS
106
no blurred vision R/no lacrimation R/no discharge R/no photophobia/no blurred vision L/no diplopia/no discharge L/no lacrimation L

## 2021-11-23 ENCOUNTER — INPATIENT (INPATIENT)
Facility: HOSPITAL | Age: 71
LOS: 2 days | Discharge: HOME | DRG: 149 | End: 2021-11-26
Attending: HOSPITALIST | Admitting: HOSPITALIST
Payer: MEDICARE

## 2021-11-23 VITALS
SYSTOLIC BLOOD PRESSURE: 101 MMHG | HEART RATE: 84 BPM | WEIGHT: 177.91 LBS | DIASTOLIC BLOOD PRESSURE: 58 MMHG | RESPIRATION RATE: 20 BRPM | HEIGHT: 67 IN | TEMPERATURE: 98 F

## 2021-11-23 DIAGNOSIS — I65.09 OCCLUSION AND STENOSIS OF UNSPECIFIED VERTEBRAL ARTERY: Chronic | ICD-10-CM

## 2021-11-23 LAB
ALBUMIN SERPL ELPH-MCNC: 4.6 G/DL — SIGNIFICANT CHANGE UP (ref 3.3–5)
ALP SERPL-CCNC: 88 U/L — SIGNIFICANT CHANGE UP (ref 40–120)
ALT FLD-CCNC: 26 U/L — SIGNIFICANT CHANGE UP (ref 10–45)
ANION GAP SERPL CALC-SCNC: 14 MMOL/L — SIGNIFICANT CHANGE UP (ref 5–17)
ANISOCYTOSIS BLD QL: SIGNIFICANT CHANGE UP
APTT BLD: 30.7 SEC — SIGNIFICANT CHANGE UP (ref 27.5–35.5)
AST SERPL-CCNC: 28 U/L — SIGNIFICANT CHANGE UP (ref 10–40)
BASE EXCESS BLDV CALC-SCNC: 2.8 MMOL/L — HIGH (ref -2–2)
BASOPHILS # BLD AUTO: 0.08 K/UL — SIGNIFICANT CHANGE UP (ref 0–0.2)
BASOPHILS NFR BLD AUTO: 0.9 % — SIGNIFICANT CHANGE UP (ref 0–2)
BILIRUB SERPL-MCNC: 0.3 MG/DL — SIGNIFICANT CHANGE UP (ref 0.2–1.2)
BUN SERPL-MCNC: 22 MG/DL — SIGNIFICANT CHANGE UP (ref 7–23)
BURR CELLS BLD QL SMEAR: PRESENT — SIGNIFICANT CHANGE UP
CA-I SERPL-SCNC: 1.19 MMOL/L — SIGNIFICANT CHANGE UP (ref 1.15–1.33)
CALCIUM SERPL-MCNC: 9.7 MG/DL — SIGNIFICANT CHANGE UP (ref 8.4–10.5)
CHLORIDE BLDV-SCNC: 98 MMOL/L — SIGNIFICANT CHANGE UP (ref 96–108)
CHLORIDE SERPL-SCNC: 98 MMOL/L — SIGNIFICANT CHANGE UP (ref 96–108)
CO2 BLDV-SCNC: 27 MMOL/L — HIGH (ref 22–26)
CO2 SERPL-SCNC: 20 MMOL/L — LOW (ref 22–31)
CREAT SERPL-MCNC: 0.99 MG/DL — SIGNIFICANT CHANGE UP (ref 0.5–1.3)
DACRYOCYTES BLD QL SMEAR: SLIGHT — SIGNIFICANT CHANGE UP
ELLIPTOCYTES BLD QL SMEAR: SLIGHT — SIGNIFICANT CHANGE UP
EOSINOPHIL # BLD AUTO: 0.23 K/UL — SIGNIFICANT CHANGE UP (ref 0–0.5)
EOSINOPHIL NFR BLD AUTO: 2.5 % — SIGNIFICANT CHANGE UP (ref 0–6)
GAS PNL BLDV: 131 MMOL/L — LOW (ref 136–145)
GAS PNL BLDV: SIGNIFICANT CHANGE UP
GAS PNL BLDV: SIGNIFICANT CHANGE UP
GLUCOSE BLDV-MCNC: 265 MG/DL — HIGH (ref 70–99)
GLUCOSE SERPL-MCNC: 255 MG/DL — HIGH (ref 70–99)
HCO3 BLDV-SCNC: 26 MMOL/L — SIGNIFICANT CHANGE UP (ref 22–29)
HCT VFR BLD CALC: 39.9 % — SIGNIFICANT CHANGE UP (ref 39–50)
HCT VFR BLDA CALC: 38 % — LOW (ref 39–51)
HGB BLD CALC-MCNC: 12.7 G/DL — SIGNIFICANT CHANGE UP (ref 12.6–17.4)
HGB BLD-MCNC: 12.5 G/DL — LOW (ref 13–17)
INR BLD: 0.91 RATIO — SIGNIFICANT CHANGE UP (ref 0.88–1.16)
LACTATE BLDV-MCNC: 1.9 MMOL/L — SIGNIFICANT CHANGE UP (ref 0.7–2)
LYMPHOCYTES # BLD AUTO: 2.9 K/UL — SIGNIFICANT CHANGE UP (ref 1–3.3)
LYMPHOCYTES # BLD AUTO: 32.2 % — SIGNIFICANT CHANGE UP (ref 13–44)
MACROCYTES BLD QL: SLIGHT — SIGNIFICANT CHANGE UP
MANUAL SMEAR VERIFICATION: SIGNIFICANT CHANGE UP
MCHC RBC-ENTMCNC: 20.6 PG — LOW (ref 27–34)
MCHC RBC-ENTMCNC: 31.3 GM/DL — LOW (ref 32–36)
MCV RBC AUTO: 65.6 FL — LOW (ref 80–100)
MICROCYTES BLD QL: SIGNIFICANT CHANGE UP
MONOCYTES # BLD AUTO: 0.38 K/UL — SIGNIFICANT CHANGE UP (ref 0–0.9)
MONOCYTES NFR BLD AUTO: 4.2 % — SIGNIFICANT CHANGE UP (ref 2–14)
NEUTROPHILS # BLD AUTO: 5.42 K/UL — SIGNIFICANT CHANGE UP (ref 1.8–7.4)
NEUTROPHILS NFR BLD AUTO: 60.2 % — SIGNIFICANT CHANGE UP (ref 43–77)
OVALOCYTES BLD QL SMEAR: SLIGHT — SIGNIFICANT CHANGE UP
PCO2 BLDV: 34 MMHG — LOW (ref 42–55)
PH BLDV: 7.49 — HIGH (ref 7.32–7.43)
PLAT MORPH BLD: NORMAL — SIGNIFICANT CHANGE UP
PLATELET # BLD AUTO: 305 K/UL — SIGNIFICANT CHANGE UP (ref 150–400)
PO2 BLDV: 75 MMHG — HIGH (ref 25–45)
POIKILOCYTOSIS BLD QL AUTO: SIGNIFICANT CHANGE UP
POLYCHROMASIA BLD QL SMEAR: SLIGHT — SIGNIFICANT CHANGE UP
POTASSIUM BLDV-SCNC: 4.1 MMOL/L — SIGNIFICANT CHANGE UP (ref 3.5–5.1)
POTASSIUM SERPL-MCNC: 4.1 MMOL/L — SIGNIFICANT CHANGE UP (ref 3.5–5.3)
POTASSIUM SERPL-SCNC: 4.1 MMOL/L — SIGNIFICANT CHANGE UP (ref 3.5–5.3)
PROT SERPL-MCNC: 7.9 G/DL — SIGNIFICANT CHANGE UP (ref 6–8.3)
PROTHROM AB SERPL-ACNC: 11 SEC — SIGNIFICANT CHANGE UP (ref 10.6–13.6)
RBC # BLD: 6.08 M/UL — HIGH (ref 4.2–5.8)
RBC # FLD: 18.7 % — HIGH (ref 10.3–14.5)
RBC BLD AUTO: ABNORMAL
SAO2 % BLDV: 96.5 % — HIGH (ref 67–88)
SARS-COV-2 RNA SPEC QL NAA+PROBE: SIGNIFICANT CHANGE UP
SCHISTOCYTES BLD QL AUTO: SLIGHT — SIGNIFICANT CHANGE UP
SODIUM SERPL-SCNC: 132 MMOL/L — LOW (ref 135–145)
TROPONIN T, HIGH SENSITIVITY RESULT: 10 NG/L — SIGNIFICANT CHANGE UP (ref 0–51)
WBC # BLD: 9 K/UL — SIGNIFICANT CHANGE UP (ref 3.8–10.5)
WBC # FLD AUTO: 9 K/UL — SIGNIFICANT CHANGE UP (ref 3.8–10.5)

## 2021-11-23 PROCEDURE — 70498 CT ANGIOGRAPHY NECK: CPT | Mod: 26,MA

## 2021-11-23 PROCEDURE — 70496 CT ANGIOGRAPHY HEAD: CPT | Mod: 26,MA

## 2021-11-23 PROCEDURE — 93010 ELECTROCARDIOGRAM REPORT: CPT

## 2021-11-23 PROCEDURE — 99220: CPT

## 2021-11-23 RX ORDER — LOSARTAN POTASSIUM 100 MG/1
100 TABLET, FILM COATED ORAL DAILY
Refills: 0 | Status: DISCONTINUED | OUTPATIENT
Start: 2021-11-23 | End: 2021-11-26

## 2021-11-23 RX ORDER — SODIUM CHLORIDE 9 MG/ML
1000 INJECTION, SOLUTION INTRAVENOUS
Refills: 0 | Status: DISCONTINUED | OUTPATIENT
Start: 2021-11-23 | End: 2021-11-26

## 2021-11-23 RX ORDER — DEXTROSE 50 % IN WATER 50 %
25 SYRINGE (ML) INTRAVENOUS ONCE
Refills: 0 | Status: DISCONTINUED | OUTPATIENT
Start: 2021-11-23 | End: 2021-11-26

## 2021-11-23 RX ORDER — MECLIZINE HCL 12.5 MG
25 TABLET ORAL ONCE
Refills: 0 | Status: COMPLETED | OUTPATIENT
Start: 2021-11-23 | End: 2021-11-23

## 2021-11-23 RX ORDER — ATORVASTATIN CALCIUM 80 MG/1
80 TABLET, FILM COATED ORAL AT BEDTIME
Refills: 0 | Status: DISCONTINUED | OUTPATIENT
Start: 2021-11-23 | End: 2021-11-26

## 2021-11-23 RX ORDER — DEXTROSE 50 % IN WATER 50 %
15 SYRINGE (ML) INTRAVENOUS ONCE
Refills: 0 | Status: DISCONTINUED | OUTPATIENT
Start: 2021-11-23 | End: 2021-11-26

## 2021-11-23 RX ORDER — GLUCAGON INJECTION, SOLUTION 0.5 MG/.1ML
1 INJECTION, SOLUTION SUBCUTANEOUS ONCE
Refills: 0 | Status: DISCONTINUED | OUTPATIENT
Start: 2021-11-23 | End: 2021-11-26

## 2021-11-23 RX ORDER — CLOPIDOGREL BISULFATE 75 MG/1
75 TABLET, FILM COATED ORAL DAILY
Refills: 0 | Status: DISCONTINUED | OUTPATIENT
Start: 2021-11-23 | End: 2021-11-26

## 2021-11-23 RX ORDER — AMLODIPINE BESYLATE 2.5 MG/1
2.5 TABLET ORAL DAILY
Refills: 0 | Status: DISCONTINUED | OUTPATIENT
Start: 2021-11-23 | End: 2021-11-26

## 2021-11-23 RX ORDER — ACETAMINOPHEN 500 MG
650 TABLET ORAL ONCE
Refills: 0 | Status: COMPLETED | OUTPATIENT
Start: 2021-11-23 | End: 2021-11-23

## 2021-11-23 RX ORDER — INSULIN LISPRO 100/ML
VIAL (ML) SUBCUTANEOUS
Refills: 0 | Status: DISCONTINUED | OUTPATIENT
Start: 2021-11-23 | End: 2021-11-26

## 2021-11-23 RX ORDER — PANTOPRAZOLE SODIUM 20 MG/1
40 TABLET, DELAYED RELEASE ORAL
Refills: 0 | Status: DISCONTINUED | OUTPATIENT
Start: 2021-11-23 | End: 2021-11-26

## 2021-11-23 RX ORDER — INSULIN LISPRO 100/ML
VIAL (ML) SUBCUTANEOUS AT BEDTIME
Refills: 0 | Status: DISCONTINUED | OUTPATIENT
Start: 2021-11-23 | End: 2021-11-26

## 2021-11-23 RX ORDER — DEXTROSE 50 % IN WATER 50 %
12.5 SYRINGE (ML) INTRAVENOUS ONCE
Refills: 0 | Status: DISCONTINUED | OUTPATIENT
Start: 2021-11-23 | End: 2021-11-26

## 2021-11-23 RX ADMIN — CLOPIDOGREL BISULFATE 75 MILLIGRAM(S): 75 TABLET, FILM COATED ORAL at 23:22

## 2021-11-23 RX ADMIN — Medication 650 MILLIGRAM(S): at 19:32

## 2021-11-23 RX ADMIN — Medication 650 MILLIGRAM(S): at 22:08

## 2021-11-23 RX ADMIN — Medication 25 MILLIGRAM(S): at 19:32

## 2021-11-23 RX ADMIN — ATORVASTATIN CALCIUM 80 MILLIGRAM(S): 80 TABLET, FILM COATED ORAL at 23:22

## 2021-11-23 NOTE — CONSULT NOTE ADULT - SUBJECTIVE AND OBJECTIVE BOX
CORTNEY SERNA  Male  MRN-[id number]    HPI: INCOMPLETE NOTE.      NIHSS:  MRS:     ROS: All negative except as mentioned in HPI.    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  Head: Normocephalic, atraumatic.  Extremities: No edema.    Neuro Exam:   MS: Alert, eyes open spontaneously. Oriented to self, age, location, month, year. Speech is fluent, not slurred. Able to name objects and repeat. Follows commands.  CN: PERRL. VFF. EOMI. V1-V3 intact. Face symmetric. Tongue midline.   Motor: All extremities antigravity without drift.   Sensory: Intact to LT throughout. No extinction.  Reflexes: 2+ throughout. Babinski absent bilaterally.   Coordination: No dysmetria on FNF or on HTS bilaterally.  Gait: Deferred.    LABS:               12.5   9.00  )-----------( 305      ( 23 Nov 2021 18:42 )             39.9     11-23    132<L>  |  98  |  22  ----------------------------<  255<H>  4.1   |  20<L>  |  0.99    Ca    9.7      23 Nov 2021 18:42    TPro  7.9  /  Alb  4.6  /  TBili  0.3  /  DBili  x   /  AST  28  /  ALT  26  /  AlkPhos  88  11-23    PT/INR - ( 23 Nov 2021 18:42 )   PT: 11.0 sec;   INR: 0.91 ratio      PTT - ( 23 Nov 2021 18:42 )  PTT:30.7 sec    RADIOLOGY:    -11/23 CTH: mild periventricular and moderate bifrontal subcortical and BILATERAL cerebellar white matter ischemic changes. 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  -11/23 CTA H: No significant vascular lesion.  -11/23 CTA N: No hemodynamically significant stenosis.           CORTNEY SERNA  Male  MRN-[id number]    HPI: 72 yo RH male with a PMHx of HTN, DM, HLD, multiple prior strokes (2015, 2016; bilateral cerebellum, no residual deficits), and bilateral vertebral artery stenosis (s/p R VA stent, on ASA/Plavix) who presents to the ED with episodic room-spinning dizziness for the past 4-5 days. LKW some time prior to that, exact time unknown. Patient states he has been experiencing short (<1 minute) episodes of room-spinning dizziness, multiple episodes per day, for the past 4-5 days. Denies nausea/vomiting. He does not think these episodes are triggered by positional changes. States this dizziness feels different from the dizziness he experienced with his prior strokes. Notes he has had a feeling of "head heaviness" and "brain fog" for the past ~5 months. Denies other symptoms including HA, tinnitus, vision changes, speech changes, numbness/tingling, weakness, cough, sore throat, congestion.    NIHSS: 0  MRS: 1    ROS: All negative except as mentioned in HPI.    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  Head: Normocephalic, atraumatic.  Extremities: No edema.    Neuro Exam:   MS: Alert, eyes open spontaneously. Oriented to self, age, location, month, year. Speech is fluent, not slurred. Able to name objects and repeat. Follows commands.  CN: PERRL. VFF. EOMI, no nystagmus. Head impulse test negative. Test of skew negative. V1-V3 intact. Face symmetric. Tongue midline.   Motor: All extremities antigravity without drift.   Sensory: Intact to LT throughout. No extinction.   Coordination: No dysmetria on FNF or on HTS bilaterally. Fine finger movements intact bilaterally.  Gait: Normal gait, normal pivot. Romberg neg. Fukuda Step Test negative.    LABS:               12.5   9.00  )-----------( 305      ( 23 Nov 2021 18:42 )             39.9     11-23    132<L>  |  98  |  22  ----------------------------<  255<H>  4.1   |  20<L>  |  0.99    Ca    9.7      23 Nov 2021 18:42    TPro  7.9  /  Alb  4.6  /  TBili  0.3  /  DBili  x   /  AST  28  /  ALT  26  /  AlkPhos  88  11-23    PT/INR - ( 23 Nov 2021 18:42 )   PT: 11.0 sec;   INR: 0.91 ratio      PTT - ( 23 Nov 2021 18:42 )  PTT:30.7 sec    RADIOLOGY:    -11/23 CTH: mild periventricular and moderate bifrontal subcortical and BILATERAL cerebellar white matter ischemic changes. 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  -11/23 CTA H: No significant vascular lesion.  -11/23 CTA N: No hemodynamically significant stenosis.

## 2021-11-23 NOTE — ED PROVIDER NOTE - ATTENDING CONTRIBUTION TO CARE
Patient with history of prior stroke and stenting of vessel in neck presenting complaining of room spinning dizziness.  Reporting has been having general feeling of being off balance for last few months, over last 3-4 days having episodes of sudden room spinning with loss of balance.  Began while he was lying in bed a few days ago, now occurring at various times, cannot identify specific triggers, doesn't seem to be related to position changes, denying associated chest pains, shortness of breath, palpitations.    A 14 point review of systems is negative except as in HPI or otherwise documented.    Exam:  General: Patient well appearing, vital signs within normal limits  HEENT: airway patent with moist mucous membranes  Cardiac: RRR S1/S2 with strong peripheral pulses  Respiratory: lungs clear without respiratory distress  GI: abdomen soft, non tender, non distended  Neuro: no gross neurologic deficits, CN II-XII intact, normal strength, sensation and coordination  Skin: warm, well perfused  Psych: normal mood and affect    Patients description of dizziness spells over last three days possibly peripheral vs central given sudden onset and resolution, however prior persisting dizziness concerning for central etiology, imaging noting old cerebellar strokes with patent vasculature - will consult neurology

## 2021-11-23 NOTE — ED CDU PROVIDER INITIAL DAY NOTE - OBJECTIVE STATEMENT
71y M pmhx HTN, DM, HLD, CVA x3 s/p cerebral stents on asa and plavix, interstitial lung dz, presents to ED c/o dizziness x 4 days. Sensation as if room is spinning. Not associated with positional changes. Today had severe episode while standing and needed assistance into chair to prevent from falling. Had another episode while seating in waiting room. Denies HA but endorses generalized head "heaviness" for last few months. Denies trauma, vision changes, chest pain.  In ED, patient had non actionable labs. CT Brain showed mild periventricular and moderate bifrontal subcortical and BILATERAL cerebellar white matter ischemic changes. 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 Pt was evaluated by Neurology who recommended CDU for MR brain w/o contrast, frequent reeval, vitals q 4hrs, tele, neuro checks.

## 2021-11-23 NOTE — ED CDU PROVIDER DISPOSITION NOTE - CLINICAL COURSE
71y M pmhx HTN, DM, HLD, CVA x3 s/p cerebral stents on asa and plavix, interstitial lung dz, presents to ED c/o dizziness x 4 days. Sensation as if room is spinning. Not associated with positional changes. Today had severe episode while standing and needed assistance into chair to prevent from falling. Had another episode while seating in waiting room. Denies HA but endorses generalized head "heaviness" for last few months. Denies trauma, vision changes, chest pain.  In ED, patient had non actionable labs. CT Brain showed mild periventricular and moderate bifrontal subcortical and BILATERAL cerebellar white matter ischemic changes. 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 Pt was evaluated by Neurology who recommended CDU for MR brain w/o contrast, frequent reeval, vitals q 4hrs, tele, neuro checks. 71y M pmhx HTN, DM, HLD, CVA x3 s/p cerebral stents on asa and plavix, interstitial lung dz, presents to ED c/o dizziness x 4 days. Sensation as if room is spinning. Not associated with positional changes. Today had severe episode while standing and needed assistance into chair to prevent from falling. Had another episode while seating in waiting room. Denies HA but endorses generalized head "heaviness" for last few months. Denies trauma, vision changes, chest pain.  In ED, patient had non actionable labs. CT Brain showed mild periventricular and moderate bifrontal subcortical and BILATERAL cerebellar white matter ischemic changes. 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 Pt was evaluated by Neurology who recommended CDU for MR brain w/o contrast, frequent reeval, vitals q 4hrs, tele, neuro checks.  Pt seen and evaluated by Neuro- MRI negative for acute infarct but pt unable to walk. Persistent dizziness and unsteady gait, unsafe discharge. Neuro recommending admission for possible subacute rehab. plan of care discussed with pt and Dr. Arauz.

## 2021-11-23 NOTE — ED CDU PROVIDER INITIAL DAY NOTE - PROGRESS NOTE DETAILS
Pt seen and evaluated by Neuro- MRI negative for acute infarct but pt unable to walk. Persistent dizziness and unsteady gait, unsafe discharge. Neuro recommending admission for possible subacute rehab. plan of care discussed with pt and Dr. Arauz.

## 2021-11-23 NOTE — ED CDU PROVIDER INITIAL DAY NOTE - ATTENDING CONTRIBUTION TO CARE
Intermittent dizziness question central vs peripheral, old posterior CVA noted on CTs - tele, neurochecks, MRI, neuro following.

## 2021-11-23 NOTE — CONSULT NOTE ADULT - ASSESSMENT
INCOMPLETE NOTE. Assessment: 72 yo RH male with a PMHx of HTN, DM, HLD, multiple prior strokes (2015, 2016; bilateral cerebellum, no residual deficits), and bilateral vertebral artery stenosis (s/p R VA stent, on ASA/Plavix) who presents to the ED with episodic room-spinning dizziness for the past 4-5 days. LKW some time prior to that, exact time unknown. Patient states he has been experiencing short (<1 minute) episodes of room-spinning dizziness, multiple episodes per day. Not associated with positional changes. Patient asymptomatic at time of neurology assessment, exam unremarkable.    Impression: Acute vestibular syndrome. Likely peripheral etiology but should rule out central cause in this patient with multiple prior cerebellar infarcts and bilateral VA stenosis, s/p R VA stent.    Recommendations:  [] CDU.  [] MRI Head w/o contrast.  [] c/w home dose ASA 81MG PO QD, Plavix 75MG PO QD.  [] c/w home dose statin, titrate for goal LDL < 70.  [] Send A1c, Lipid panel.  [] Patient can follow up with Dr. Domingo Black after discharge. Please instruct the patient to call [phone] to schedule an appointment within the next 1-2 weeks. Office is located at [address] [place] [address], [place] [address].    Patient discussed with stroke fellow Dr. Girard. Final recommendations regarding management to be confirmed upon review by stroke attending Dr. Black. Assessment: 70 yo RH male with a PMHx of HTN, DM, HLD, multiple prior strokes (2015, 2016; bilateral cerebellum, no residual deficits), and bilateral vertebral artery stenosis (s/p R VA stent, on ASA/Plavix) who presents to the ED with episodic room-spinning dizziness for the past 4-5 days. LKW some time prior to that, exact time unknown. Patient states he has been experiencing short (<1 minute) episodes of room-spinning dizziness, multiple episodes per day. Not associated with positional changes. Patient asymptomatic at time of neurology assessment, exam unremarkable.    Impression: Acute vestibular syndrome. Likely peripheral etiology but should rule out central cause in this patient with multiple prior cerebellar infarcts and bilateral VA stenosis, s/p R VA stent.    Recommendations:  [] CDU.  [] MRI Head w/o contrast.  [] c/w home dose ASA 81MG PO QD, Plavix 75MG PO QD.  [] c/w home dose statin, titrate for goal LDL < 70.  [] If dizziness reoccurs, then can trial Meclizine 25MG PO Q8HR PRN.   [] Send A1c, Lipid panel.  [] Patient can follow up with Dr. Domingo Black after discharge. Please instruct the patient to call [phone] to schedule an appointment within the next 1-2 weeks. Office is located at [address] [place] [address], [place] [address].    Patient discussed with stroke fellow Dr. Girard. Final recommendations regarding management to be confirmed upon review by stroke attending Dr. Black.

## 2021-11-23 NOTE — ED PROVIDER NOTE - RAPID ASSESSMENT
71 M with PMHx of HTN, DM, HLD, CVA x3 s/p cerebral stents, and interstitial lung dz presents with 3-4 days of dizziness described as room spinning. Reports for the past 3-4 months endorsed "head heaviness." Pt is nontoxic appearing.     Scribe Statement: Jasmin KNOX Tiffany, attest that this documentation has been prepared under the direction and in the presence of Luis Greer) 71 M with PMHx of HTN, DM, HLD, CVA x3 s/p cerebral stents, and interstitial lung dz presents with 3-4 days of dizziness described as room spinning. Reports for the past 3-4 months endorsed "head heaviness." Upon assessment, pt endorsed sudden onset episode of dizziness. Appears very uncomfortable, dizzy, sitting in chair. Will call to expedite pt's care to main ER. Not TPA candidate due to duration of 4 days.     Scribe Statement: Jasmin KNOX Tiffany, attest that this documentation has been prepared under the direction and in the presence of Luis Greer) 71 M with PMHx of HTN, DM, HLD, CVA x3 s/p cerebral stents, and interstitial lung dz presents with 3-4 days of dizziness described as room spinning. Reports for the past 3-4 months endorsed "head heaviness." Upon assessment, pt endorsed sudden onset episode of dizziness. Appears very uncomfortable, dizzy, sitting in chair. Will call to expedite pt's care to main ER. Not TPA candidate due to duration of 4 days.     ** mobile notified, pt being brought to CT for further evaluation of sx**    Scribe Statement: Jasmin KNOX Tiffany, attest that this documentation has been prepared under the direction and in the presence of Luis Greer) 71 M with PMHx of HTN, DM, HLD, CVA x3 s/p cerebral stents, and interstitial lung dz presents with 3-4 days of dizziness described as room spinning. Reports for the past 3-4 months endorsed "head heaviness." Upon assessment, pt endorsed sudden onset episode of dizziness. Appears very uncomfortable, dizzy, sitting in chair. Will call to expedite pt's care to main ER. Not TPA candidate due to duration of 4 days.     ** mobile notified, pt being brought to CT for further evaluation of sx**    Scribe Statement: I, Devika Castellanos, attest that this documentation has been prepared under the direction and in the presence of Luis Greer)  I, Dr. Greer, personally performed the service described in the documentation recorded by the scribe in my presence, and it accurately and completely records my words and actions.

## 2021-11-23 NOTE — CONSULT NOTE ADULT - ATTENDING COMMENTS
seen in ED.   Briefly  70 yo RH male with HTN, DM, HLD, multiple prior strokes (2015, 2016; bilateral cerebellum, no residual deficits), and bilateral vertebral artery stenosis (s/p R VA stent, on ASA/Plavix) who presents to the ED with episodic room-spinning dizziness for the past 4-5 days.   11/23 CTH: mild periventricular and moderate bifrontal subcortical and BILATERAL cerebellar white matter ischemic changes. 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  11/23 CTA H: No significant vascular lesion.  11/23 CTA N: No hemodynamically significant stenosis.  MRI brain with no new fidnings   A1c 7.6  LDL 53  TG elevated 235    Impression: Acute vestibular syndrome. Likely peripheral etiology on top of baseline dysequilibrium from prior strokes     Recommendations:  - likely needs admission for PT   - c/w DAPT and high dose statin for secondary stroke prevention  - check P2Y12  - no further neuro workup  - check orthostatics  - meclizine PRN  - PT/OT/VT  - check FS, glucose control <180  - GI/DVT ppx  - Counseling on diet, exercise, and medication adherence was done  - Counseling on smoking cessation and alcohol consumption offered when appropriate.  - Pain assessed and judicious use of narcotics when appropriate was discussed.    - Stroke education given when appropriate.  - Importance of fall prevention discussed.   - Differential diagnosis and plan of care discussed with patient and/or family and primary team  - Thank you for allowing me to participate in the care of this patient. Call with questions.   - spoke with u   Domingo Black MD  Vascular Neurology  Office: [phone]

## 2021-11-23 NOTE — ED CDU PROVIDER INITIAL DAY NOTE - DETAILS
71y M pmhx HTN, DM, HLD, CVA x3 s/p cerebral stents on asa and plavix, interstitial lung dz, presents to ED c/o dizziness x 4 days.  Plan: MR brain w/o contrast, frequent reeval, vitals q 4hrs, tele, neuro checks.

## 2021-11-23 NOTE — ED CDU PROVIDER DISPOSITION NOTE - NSFOLLOWUPINSTRUCTIONS_ED_ALL_ED_FT
(1) You will need to follow up with your PMD and our recommended neurologist (____) in 2-3 days for your _____. A copy of your results were given to you to bring to your appt.  (2) Continue your home medications as directed.  (3) Drink plenty of fluids to stay hydrated.  (4) Read attached discharge paperwork.  (5) Return to ER for headache, confusion, behavior/speech changes, numbness/tingling/weakness in your arms/legs, or any other concerns.

## 2021-11-23 NOTE — ED PROVIDER NOTE - OBJECTIVE STATEMENT
71y M pmhx HTN, DM, HLD, CVA x3 s/p cerebral stents on asa and plavix, interstitial lung dz, presents to ED c/o dizziness x 4 days. Sensation as if room is spinning. Not associated with positional changes. Today had severe episode while standing and needed assistance into chair to prevent from falling. Had another episode while seating in waiting room. Denies HA but endorses generalized head "heaviness" for last few months. Denies trauma, vision changes, chest pain.

## 2021-11-23 NOTE — ED PROVIDER NOTE - NSICDXFAMILYHX_GEN_ALL_CORE_FT
FAMILY HISTORY:  Father  Still living? Unknown  Family history of heart disease, Age at diagnosis: Age Unknown    Mother  Still living? Unknown  Family history of asthma, Age at diagnosis: Age Unknown

## 2021-11-23 NOTE — ED PROVIDER NOTE - PHYSICAL EXAMINATION
GEN: Pt non-toxic in NAD, A&Ox3.  PSYCH: Affect and mood appropriate.  EYES: Sclera white w/o injection, EOMI fatigable R gaze nystagmus, PERRLA.   ENT: Head NCAT. Neck supple FROM. Airway patent.  RESP: CTA b/l, no wheezes, rales, or rhonchi.   CARDIAC: RRR, clear distinct S1, S2, no appreciable murmurs.  ABD: Abdomen soft, non-tender.  MSK: Moving all extremities.  NEURO: CN 2-12 grossly intact. No focal motor or sensory deficits. Strength 5/5 throughout. Normal gait. Point to point movement intact. Heal to shin slide intact b/l.  VASC: Radial and dorsalis pedis pulses 2+ b/l. No edema or calf tenderness.  SKIN: No rashes or lesions.

## 2021-11-23 NOTE — ED PROVIDER NOTE - NSICDXPASTMEDICALHX_GEN_ALL_CORE_FT
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 Ansari    Constipation     Depression     Diabetes mellitus, type II     Dizziness     HTN (hypertension)     Hyperlipidemia     Interstitial lung disease

## 2021-11-23 NOTE — ED CDU PROVIDER DISPOSITION NOTE - ATTENDING CONTRIBUTION TO CARE
70 y/o m with pmhx HTN, HLD< CVA x 3, with cerebral stents on asa and plavix, interstitial lung dz, presented to the ED for 4 days of dizziness. Worse at times. He was treated with meclizine without relief. Denies trauma, vision changes, chest pain. Patient had CT scan which revealed mild periventricular and moderate bifrontal subcortical and BILATERAL cerebellar white matter ischemic changes. Pt was evaluated by Neurology who recommended CDU for MR brain w/o contrast. Overnight still having occ dizziness but able to sit up and ambulate. no fever no chills.  Gen.  no acute distress  HEENT:  perrl eomi. no facial asymmetry  Lungs:  b/l bs  CVS: S1S2   Abd;  soft non tender no distention  Ext: no edema or erythema  Neuro: aaox3, clear speech  MSK: strength 5/5 b/l upper and lower ext.

## 2021-11-23 NOTE — ED ADULT NURSE NOTE - OBJECTIVE STATEMENT
72 y/o male from home coming to the ED for dizziness for the past 4-5 days.  PMH DM, HTN, HLD, 2 strokes.  Pt denies deficients from strokes and states he came to ED for fear of another stroke.  Pt states dizziness with no falls or LOC in the past 5 days, pt denies CP, SOB, abd pain, fever/chills/n/v/d, Pt is A&Ox4, equal unlabored breathing, abd soft non tender, skin warm dry and intact

## 2021-11-24 DIAGNOSIS — E11.9 TYPE 2 DIABETES MELLITUS WITHOUT COMPLICATIONS: ICD-10-CM

## 2021-11-24 DIAGNOSIS — I63.9 CEREBRAL INFARCTION, UNSPECIFIED: ICD-10-CM

## 2021-11-24 DIAGNOSIS — I10 ESSENTIAL (PRIMARY) HYPERTENSION: ICD-10-CM

## 2021-11-24 DIAGNOSIS — E78.5 HYPERLIPIDEMIA, UNSPECIFIED: ICD-10-CM

## 2021-11-24 DIAGNOSIS — Z29.9 ENCOUNTER FOR PROPHYLACTIC MEASURES, UNSPECIFIED: ICD-10-CM

## 2021-11-24 DIAGNOSIS — Z79.899 OTHER LONG TERM (CURRENT) DRUG THERAPY: ICD-10-CM

## 2021-11-24 DIAGNOSIS — R42 DIZZINESS AND GIDDINESS: ICD-10-CM

## 2021-11-24 LAB
A1C WITH ESTIMATED AVERAGE GLUCOSE RESULT: 7.6 % — HIGH (ref 4–5.6)
CHOLEST SERPL-MCNC: 142 MG/DL — SIGNIFICANT CHANGE UP
ESTIMATED AVERAGE GLUCOSE: 171 MG/DL — HIGH (ref 68–114)
GLUCOSE BLDC GLUCOMTR-MCNC: 113 MG/DL — HIGH (ref 70–99)
GLUCOSE BLDC GLUCOMTR-MCNC: 153 MG/DL — HIGH (ref 70–99)
GLUCOSE BLDC GLUCOMTR-MCNC: 175 MG/DL — HIGH (ref 70–99)
GLUCOSE BLDC GLUCOMTR-MCNC: 273 MG/DL — HIGH (ref 70–99)
HDLC SERPL-MCNC: 42 MG/DL — SIGNIFICANT CHANGE UP
LIPID PNL WITH DIRECT LDL SERPL: 53 MG/DL — SIGNIFICANT CHANGE UP
NON HDL CHOLESTEROL: 100 MG/DL — SIGNIFICANT CHANGE UP
TRIGL SERPL-MCNC: 235 MG/DL — HIGH

## 2021-11-24 PROCEDURE — 99217: CPT

## 2021-11-24 PROCEDURE — 99223 1ST HOSP IP/OBS HIGH 75: CPT

## 2021-11-24 PROCEDURE — 70551 MRI BRAIN STEM W/O DYE: CPT | Mod: 26,MA

## 2021-11-24 RX ORDER — LEFLUNOMIDE 10 MG/1
1 TABLET ORAL
Qty: 0 | Refills: 0 | DISCHARGE

## 2021-11-24 RX ORDER — ASPIRIN/CALCIUM CARB/MAGNESIUM 324 MG
81 TABLET ORAL DAILY
Refills: 0 | Status: DISCONTINUED | OUTPATIENT
Start: 2021-11-24 | End: 2021-11-26

## 2021-11-24 RX ORDER — DIAZEPAM 5 MG
5 TABLET ORAL ONCE
Refills: 0 | Status: DISCONTINUED | OUTPATIENT
Start: 2021-11-24 | End: 2021-11-24

## 2021-11-24 RX ORDER — AMLODIPINE BESYLATE 2.5 MG/1
1 TABLET ORAL
Qty: 0 | Refills: 0 | DISCHARGE

## 2021-11-24 RX ORDER — PREGABALIN 225 MG/1
1000 CAPSULE ORAL DAILY
Refills: 0 | Status: DISCONTINUED | OUTPATIENT
Start: 2021-11-24 | End: 2021-11-26

## 2021-11-24 RX ORDER — ACETAMINOPHEN 500 MG
650 TABLET ORAL EVERY 6 HOURS
Refills: 0 | Status: DISCONTINUED | OUTPATIENT
Start: 2021-11-24 | End: 2021-11-26

## 2021-11-24 RX ORDER — SODIUM CHLORIDE 9 MG/ML
1000 INJECTION INTRAMUSCULAR; INTRAVENOUS; SUBCUTANEOUS
Refills: 0 | Status: DISCONTINUED | OUTPATIENT
Start: 2021-11-24 | End: 2021-11-25

## 2021-11-24 RX ORDER — ENOXAPARIN SODIUM 100 MG/ML
40 INJECTION SUBCUTANEOUS DAILY
Refills: 0 | Status: DISCONTINUED | OUTPATIENT
Start: 2021-11-24 | End: 2021-11-26

## 2021-11-24 RX ORDER — PREGABALIN 225 MG/1
1 CAPSULE ORAL
Qty: 0 | Refills: 0 | DISCHARGE

## 2021-11-24 RX ORDER — MECLIZINE HCL 12.5 MG
25 TABLET ORAL EVERY 8 HOURS
Refills: 0 | Status: DISCONTINUED | OUTPATIENT
Start: 2021-11-24 | End: 2021-11-26

## 2021-11-24 RX ORDER — OMEPRAZOLE 10 MG/1
1 CAPSULE, DELAYED RELEASE ORAL
Qty: 0 | Refills: 0 | DISCHARGE

## 2021-11-24 RX ORDER — INSULIN GLARGINE 100 [IU]/ML
15 INJECTION, SOLUTION SUBCUTANEOUS AT BEDTIME
Refills: 0 | Status: DISCONTINUED | OUTPATIENT
Start: 2021-11-24 | End: 2021-11-26

## 2021-11-24 RX ORDER — GLYBURIDE 5 MG
1 TABLET ORAL
Qty: 0 | Refills: 0 | DISCHARGE

## 2021-11-24 RX ADMIN — PANTOPRAZOLE SODIUM 40 MILLIGRAM(S): 20 TABLET, DELAYED RELEASE ORAL at 07:51

## 2021-11-24 RX ADMIN — Medication 5 MILLIGRAM(S): at 08:33

## 2021-11-24 RX ADMIN — Medication 3: at 12:43

## 2021-11-24 RX ADMIN — INSULIN GLARGINE 15 UNIT(S): 100 INJECTION, SOLUTION SUBCUTANEOUS at 21:53

## 2021-11-24 RX ADMIN — Medication 81 MILLIGRAM(S): at 17:40

## 2021-11-24 RX ADMIN — SODIUM CHLORIDE 75 MILLILITER(S): 9 INJECTION INTRAMUSCULAR; INTRAVENOUS; SUBCUTANEOUS at 16:53

## 2021-11-24 RX ADMIN — ATORVASTATIN CALCIUM 80 MILLIGRAM(S): 80 TABLET, FILM COATED ORAL at 21:53

## 2021-11-24 RX ADMIN — CLOPIDOGREL BISULFATE 75 MILLIGRAM(S): 75 TABLET, FILM COATED ORAL at 11:34

## 2021-11-24 RX ADMIN — Medication 25 MILLIGRAM(S): at 21:53

## 2021-11-24 RX ADMIN — Medication 25 MILLIGRAM(S): at 04:14

## 2021-11-24 RX ADMIN — Medication 25 MILLIGRAM(S): at 13:38

## 2021-11-24 RX ADMIN — Medication 1: at 09:48

## 2021-11-24 RX ADMIN — LOSARTAN POTASSIUM 100 MILLIGRAM(S): 100 TABLET, FILM COATED ORAL at 06:11

## 2021-11-24 RX ADMIN — ENOXAPARIN SODIUM 40 MILLIGRAM(S): 100 INJECTION SUBCUTANEOUS at 17:40

## 2021-11-24 RX ADMIN — AMLODIPINE BESYLATE 2.5 MILLIGRAM(S): 2.5 TABLET ORAL at 06:11

## 2021-11-24 NOTE — H&P ADULT - HISTORY OF PRESENT ILLNESS
71M PMHx of HTN, DM2, HLD, multiple prior strokes (2015, 2016; bilateral cerebellum, no residual deficits), and bilateral vertebral artery stenosis (s/p R VA stent, on ASA/Plavix) who presents to the ED with  room-spinning dizziness for the past 4-5 days. Pt reports he has been experiencing these episodes for the past 5 days. Reports every time he moves his head he feels dizzy. Reports this dizziness feels different from the dizziness he experienced with his prior strokes. Denies chest pain, sob, abdominal pain, headaches, fevers, chills, visual changes. Denies recent change in meds or diet. No sick contacts or travel history.

## 2021-11-24 NOTE — H&P ADULT - ASSESSMENT
71M PMHx of HTN, DM2, HLD, multiple prior strokes (2015, 2016; bilateral cerebellum, no residual deficits), and bilateral vertebral artery stenosis (s/p R VA stent, on ASA/Plavix) who presents to the ED with  room-spinning dizziness for the past 4-5 days. Likely found to have BPPV

## 2021-11-24 NOTE — ED CDU PROVIDER SUBSEQUENT DAY NOTE - HISTORY
CDU PROGRESS NOTE PA JAYY: Pt resting comfortably, feeling well without complaint. NAD, VSS. No events on telemetry. Pt is aox3, no focal neuro deficits on exam. Will continue to monitor, MRI brain w/o pending in am.

## 2021-11-24 NOTE — H&P ADULT - PROBLEM SELECTOR PLAN 1
Pt with acute vestibular syndrome, likely peripheral etiology on top of baseline dysequilibrium from prior strokes   Likely in the setting of BPPV  MRI brain: Age-appropriate involutional change and microvascular ischemic disease  involving the periventricular and subcortical white matter. 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 No evidence of acute infarction.  Trial of meclazine prn   Check orthostatics   Trops negative   EKG without st/t changes   PT eval

## 2021-11-24 NOTE — H&P ADULT - PROBLEM SELECTOR PLAN 6
Home meds verified with pharmacy   asprin 81mg, plavix 75mg, crestor 40mg, jardiance 25mg, pepcid 20mg bid, clobetasol bid, voltaren gel, norvasc 5mg, b12, ezetimibe 10mg, losartan 100mg

## 2021-11-24 NOTE — ED CDU PROVIDER SUBSEQUENT DAY NOTE - ENMT NEGATIVE STATEMENT, MLM
Ears: no ear pain and no hearing problems. Nose: no nasal congestion and no nasal drainage. Mouth/Throat: no dysphagia, no hoarseness and no throat pain. Neck: no lumps, no pain, no stiffness and no swollen glands None

## 2021-11-24 NOTE — ED ADULT NURSE REASSESSMENT NOTE - NS ED NURSE REASSESS COMMENT FT1
1822- patient had a sudden episodes of dizzy spell as witnessed by Dr. Greer. Stat labs done & iv for ct scan head.
Patient c/o of dizziness when sat up to ambulate to bathroom. Patient given urinal instead for safety. ACP made aware, PO Valium given per MAR. Patient transported to MRI via stretcher. Safety maintained.
Patient in NAD, VSS. Patient c/o dizziness when moving. No other complaints at this time. Will continue to monitor. Safety maintained.
Pt. resting in bed comfortably. No neuro deficits noted. Pt. ambulated to bathroom with no issues. While ambulating back to bed, pt. started to complain of dizziness, with the sensation of the room spinning. JAEL De La Torre at bedside assessing pt. PA ordered meclizine for dizziness. Medication administered as ordered.
Received pt. from ED nurse Stephanie. Pt. A&Ox4 resting in bed comfortably. Neuro check intact, no deficits noted. Pt. not complaining of dizziness at this time. VS stable, see flow sheet. Pt. ambulated to bathroom with minimal assistance, steady gait observed. Pt. NSR on tele monitor.

## 2021-11-25 LAB
ALBUMIN SERPL ELPH-MCNC: 4.1 G/DL — SIGNIFICANT CHANGE UP (ref 3.3–5)
ALP SERPL-CCNC: 75 U/L — SIGNIFICANT CHANGE UP (ref 40–120)
ALT FLD-CCNC: 21 U/L — SIGNIFICANT CHANGE UP (ref 10–45)
ANION GAP SERPL CALC-SCNC: 13 MMOL/L — SIGNIFICANT CHANGE UP (ref 5–17)
AST SERPL-CCNC: 21 U/L — SIGNIFICANT CHANGE UP (ref 10–40)
BILIRUB SERPL-MCNC: 0.2 MG/DL — SIGNIFICANT CHANGE UP (ref 0.2–1.2)
BUN SERPL-MCNC: 27 MG/DL — HIGH (ref 7–23)
CALCIUM SERPL-MCNC: 9 MG/DL — SIGNIFICANT CHANGE UP (ref 8.4–10.5)
CHLORIDE SERPL-SCNC: 97 MMOL/L — SIGNIFICANT CHANGE UP (ref 96–108)
CO2 SERPL-SCNC: 24 MMOL/L — SIGNIFICANT CHANGE UP (ref 22–31)
COVID-19 NUCLEOCAPSID GAM AB INTERP: POSITIVE
COVID-19 NUCLEOCAPSID TOTAL GAM ANTIBODY RESULT: 3.34 INDEX — HIGH
COVID-19 SPIKE DOMAIN AB INTERP: POSITIVE
COVID-19 SPIKE DOMAIN ANTIBODY RESULT: >250 U/ML — HIGH
CREAT SERPL-MCNC: 1.16 MG/DL — SIGNIFICANT CHANGE UP (ref 0.5–1.3)
GLUCOSE BLDC GLUCOMTR-MCNC: 121 MG/DL — HIGH (ref 70–99)
GLUCOSE BLDC GLUCOMTR-MCNC: 123 MG/DL — HIGH (ref 70–99)
GLUCOSE BLDC GLUCOMTR-MCNC: 191 MG/DL — HIGH (ref 70–99)
GLUCOSE BLDC GLUCOMTR-MCNC: 212 MG/DL — HIGH (ref 70–99)
GLUCOSE SERPL-MCNC: 130 MG/DL — HIGH (ref 70–99)
HCT VFR BLD CALC: 38.4 % — LOW (ref 39–50)
HCV AB S/CO SERPL IA: 0.12 S/CO — SIGNIFICANT CHANGE UP (ref 0–0.99)
HCV AB SERPL-IMP: SIGNIFICANT CHANGE UP
HGB BLD-MCNC: 11.9 G/DL — LOW (ref 13–17)
MAGNESIUM SERPL-MCNC: 2 MG/DL — SIGNIFICANT CHANGE UP (ref 1.6–2.6)
MCHC RBC-ENTMCNC: 20.3 PG — LOW (ref 27–34)
MCHC RBC-ENTMCNC: 31 GM/DL — LOW (ref 32–36)
MCV RBC AUTO: 65.6 FL — LOW (ref 80–100)
NRBC # BLD: 0 /100 WBCS — SIGNIFICANT CHANGE UP (ref 0–0)
PA ADP PRP-ACNC: 174 PRU — LOW (ref 194–417)
PHOSPHATE SERPL-MCNC: 3.1 MG/DL — SIGNIFICANT CHANGE UP (ref 2.5–4.5)
PLATELET # BLD AUTO: 289 K/UL — SIGNIFICANT CHANGE UP (ref 150–400)
POTASSIUM SERPL-MCNC: 3.5 MMOL/L — SIGNIFICANT CHANGE UP (ref 3.5–5.3)
POTASSIUM SERPL-SCNC: 3.5 MMOL/L — SIGNIFICANT CHANGE UP (ref 3.5–5.3)
PROT SERPL-MCNC: 7 G/DL — SIGNIFICANT CHANGE UP (ref 6–8.3)
RBC # BLD: 5.85 M/UL — HIGH (ref 4.2–5.8)
RBC # FLD: 18.4 % — HIGH (ref 10.3–14.5)
SARS-COV-2 IGG+IGM SERPL QL IA: 3.34 INDEX — HIGH
SARS-COV-2 IGG+IGM SERPL QL IA: >250 U/ML — HIGH
SARS-COV-2 IGG+IGM SERPL QL IA: POSITIVE
SARS-COV-2 IGG+IGM SERPL QL IA: POSITIVE
SODIUM SERPL-SCNC: 134 MMOL/L — LOW (ref 135–145)
TSH SERPL-MCNC: 3.6 UIU/ML — SIGNIFICANT CHANGE UP (ref 0.27–4.2)
WBC # BLD: 8.8 K/UL — SIGNIFICANT CHANGE UP (ref 3.8–10.5)
WBC # FLD AUTO: 8.8 K/UL — SIGNIFICANT CHANGE UP (ref 3.8–10.5)

## 2021-11-25 PROCEDURE — 99233 SBSQ HOSP IP/OBS HIGH 50: CPT

## 2021-11-25 RX ORDER — SODIUM CHLORIDE 9 MG/ML
1000 INJECTION INTRAMUSCULAR; INTRAVENOUS; SUBCUTANEOUS
Refills: 0 | Status: DISCONTINUED | OUTPATIENT
Start: 2021-11-25 | End: 2021-11-26

## 2021-11-25 RX ADMIN — Medication 25 MILLIGRAM(S): at 05:28

## 2021-11-25 RX ADMIN — SODIUM CHLORIDE 75 MILLILITER(S): 9 INJECTION INTRAMUSCULAR; INTRAVENOUS; SUBCUTANEOUS at 11:47

## 2021-11-25 RX ADMIN — Medication 2: at 11:47

## 2021-11-25 RX ADMIN — ENOXAPARIN SODIUM 40 MILLIGRAM(S): 100 INJECTION SUBCUTANEOUS at 11:46

## 2021-11-25 RX ADMIN — AMLODIPINE BESYLATE 2.5 MILLIGRAM(S): 2.5 TABLET ORAL at 05:28

## 2021-11-25 RX ADMIN — Medication 25 MILLIGRAM(S): at 14:12

## 2021-11-25 RX ADMIN — CLOPIDOGREL BISULFATE 75 MILLIGRAM(S): 75 TABLET, FILM COATED ORAL at 11:48

## 2021-11-25 RX ADMIN — Medication 81 MILLIGRAM(S): at 11:47

## 2021-11-25 RX ADMIN — LOSARTAN POTASSIUM 100 MILLIGRAM(S): 100 TABLET, FILM COATED ORAL at 05:28

## 2021-11-25 RX ADMIN — ATORVASTATIN CALCIUM 80 MILLIGRAM(S): 80 TABLET, FILM COATED ORAL at 22:27

## 2021-11-25 RX ADMIN — INSULIN GLARGINE 15 UNIT(S): 100 INJECTION, SOLUTION SUBCUTANEOUS at 22:25

## 2021-11-25 RX ADMIN — SODIUM CHLORIDE 75 MILLILITER(S): 9 INJECTION INTRAMUSCULAR; INTRAVENOUS; SUBCUTANEOUS at 22:27

## 2021-11-25 RX ADMIN — PANTOPRAZOLE SODIUM 40 MILLIGRAM(S): 20 TABLET, DELAYED RELEASE ORAL at 05:28

## 2021-11-25 RX ADMIN — Medication 25 MILLIGRAM(S): at 22:27

## 2021-11-25 RX ADMIN — PREGABALIN 1000 MICROGRAM(S): 225 CAPSULE ORAL at 11:47

## 2021-11-25 RX ADMIN — Medication 1: at 16:44

## 2021-11-25 NOTE — PHYSICAL THERAPY INITIAL EVALUATION ADULT - PERTINENT HX OF CURRENT PROBLEM, REHAB EVAL
71 y.o. M PMH HTN, DM2, HLD, multiple prior strokes (2015, 2016; bilateral cerebellum, no residual deficits), & b/l vertebral artery stenosis (s/p R VA stent) who p/w room-spinning dizziness for past 4-5 days. Reports every time he moves his head he feels dizzy. Feels different from the dizziness he experienced with his prior strokes.

## 2021-11-25 NOTE — PHYSICAL THERAPY INITIAL EVALUATION ADULT - GAIT TRAINING, PT EVAL
3. GOAL: In 3 weeks, pt will be able to ambulate 150 ft w/ least restrictive AD or no AD independently.

## 2021-11-25 NOTE — PHYSICAL THERAPY INITIAL EVALUATION ADULT - ADDITIONAL COMMENTS
Pt resides in a pvt home w/ family, no stairs to negotiate. PTA pt was independent w/ all functional mobility & did not use an AD for ambulation

## 2021-11-26 ENCOUNTER — TRANSCRIPTION ENCOUNTER (OUTPATIENT)
Age: 71
End: 2021-11-26

## 2021-11-26 VITALS
HEART RATE: 81 BPM | DIASTOLIC BLOOD PRESSURE: 73 MMHG | RESPIRATION RATE: 18 BRPM | TEMPERATURE: 98 F | SYSTOLIC BLOOD PRESSURE: 124 MMHG | OXYGEN SATURATION: 98 %

## 2021-11-26 LAB
GLUCOSE BLDC GLUCOMTR-MCNC: 111 MG/DL — HIGH (ref 70–99)
GLUCOSE BLDC GLUCOMTR-MCNC: 132 MG/DL — HIGH (ref 70–99)
GLUCOSE BLDC GLUCOMTR-MCNC: 155 MG/DL — HIGH (ref 70–99)

## 2021-11-26 PROCEDURE — 83735 ASSAY OF MAGNESIUM: CPT

## 2021-11-26 PROCEDURE — U0003: CPT

## 2021-11-26 PROCEDURE — 82330 ASSAY OF CALCIUM: CPT

## 2021-11-26 PROCEDURE — 93005 ELECTROCARDIOGRAM TRACING: CPT

## 2021-11-26 PROCEDURE — 99285 EMERGENCY DEPT VISIT HI MDM: CPT

## 2021-11-26 PROCEDURE — 86769 SARS-COV-2 COVID-19 ANTIBODY: CPT

## 2021-11-26 PROCEDURE — 84100 ASSAY OF PHOSPHORUS: CPT

## 2021-11-26 PROCEDURE — 85014 HEMATOCRIT: CPT

## 2021-11-26 PROCEDURE — 82435 ASSAY OF BLOOD CHLORIDE: CPT

## 2021-11-26 PROCEDURE — 36415 COLL VENOUS BLD VENIPUNCTURE: CPT

## 2021-11-26 PROCEDURE — 85730 THROMBOPLASTIN TIME PARTIAL: CPT

## 2021-11-26 PROCEDURE — 80061 LIPID PANEL: CPT

## 2021-11-26 PROCEDURE — 86803 HEPATITIS C AB TEST: CPT

## 2021-11-26 PROCEDURE — U0005: CPT

## 2021-11-26 PROCEDURE — 80053 COMPREHEN METABOLIC PANEL: CPT

## 2021-11-26 PROCEDURE — 85018 HEMOGLOBIN: CPT

## 2021-11-26 PROCEDURE — 85576 BLOOD PLATELET AGGREGATION: CPT

## 2021-11-26 PROCEDURE — 84443 ASSAY THYROID STIM HORMONE: CPT

## 2021-11-26 PROCEDURE — 85610 PROTHROMBIN TIME: CPT

## 2021-11-26 PROCEDURE — 85025 COMPLETE CBC W/AUTO DIFF WBC: CPT

## 2021-11-26 PROCEDURE — 83605 ASSAY OF LACTIC ACID: CPT

## 2021-11-26 PROCEDURE — 97161 PT EVAL LOW COMPLEX 20 MIN: CPT

## 2021-11-26 PROCEDURE — 82803 BLOOD GASES ANY COMBINATION: CPT

## 2021-11-26 PROCEDURE — 84132 ASSAY OF SERUM POTASSIUM: CPT

## 2021-11-26 PROCEDURE — 99239 HOSP IP/OBS DSCHRG MGMT >30: CPT

## 2021-11-26 PROCEDURE — 70498 CT ANGIOGRAPHY NECK: CPT | Mod: MA

## 2021-11-26 PROCEDURE — 70450 CT HEAD/BRAIN W/O DYE: CPT | Mod: MA

## 2021-11-26 PROCEDURE — G0378: CPT

## 2021-11-26 PROCEDURE — 84484 ASSAY OF TROPONIN QUANT: CPT

## 2021-11-26 PROCEDURE — 70496 CT ANGIOGRAPHY HEAD: CPT | Mod: MA

## 2021-11-26 PROCEDURE — 84295 ASSAY OF SERUM SODIUM: CPT

## 2021-11-26 PROCEDURE — 70551 MRI BRAIN STEM W/O DYE: CPT | Mod: MA

## 2021-11-26 PROCEDURE — 82947 ASSAY GLUCOSE BLOOD QUANT: CPT

## 2021-11-26 PROCEDURE — 83036 HEMOGLOBIN GLYCOSYLATED A1C: CPT

## 2021-11-26 PROCEDURE — 82962 GLUCOSE BLOOD TEST: CPT

## 2021-11-26 RX ORDER — MECLIZINE HCL 12.5 MG
1 TABLET ORAL
Qty: 90 | Refills: 0
Start: 2021-11-26 | End: 2021-12-25

## 2021-11-26 RX ORDER — LINAGLIPTIN 5 MG/1
1 TABLET, FILM COATED ORAL
Qty: 0 | Refills: 0 | DISCHARGE

## 2021-11-26 RX ORDER — LOSARTAN POTASSIUM 100 MG/1
1 TABLET, FILM COATED ORAL
Qty: 0 | Refills: 0 | DISCHARGE
Start: 2021-11-26

## 2021-11-26 RX ORDER — AMLODIPINE BESYLATE 2.5 MG/1
1 TABLET ORAL
Qty: 0 | Refills: 0 | DISCHARGE

## 2021-11-26 RX ORDER — BUDESONIDE AND FORMOTEROL FUMARATE DIHYDRATE 160; 4.5 UG/1; UG/1
2 AEROSOL RESPIRATORY (INHALATION)
Qty: 0 | Refills: 0 | DISCHARGE

## 2021-11-26 RX ORDER — CHLORTHALIDONE 50 MG
1 TABLET ORAL
Qty: 0 | Refills: 0 | DISCHARGE

## 2021-11-26 RX ORDER — CHLORHEXIDINE GLUCONATE 213 G/1000ML
1 SOLUTION TOPICAL DAILY
Refills: 0 | Status: DISCONTINUED | OUTPATIENT
Start: 2021-11-26 | End: 2021-11-26

## 2021-11-26 RX ORDER — AMLODIPINE BESYLATE 2.5 MG/1
1 TABLET ORAL
Qty: 30 | Refills: 0
Start: 2021-11-26 | End: 2021-12-25

## 2021-11-26 RX ADMIN — SODIUM CHLORIDE 75 MILLILITER(S): 9 INJECTION INTRAMUSCULAR; INTRAVENOUS; SUBCUTANEOUS at 05:54

## 2021-11-26 RX ADMIN — Medication 81 MILLIGRAM(S): at 12:18

## 2021-11-26 RX ADMIN — Medication 25 MILLIGRAM(S): at 05:55

## 2021-11-26 RX ADMIN — AMLODIPINE BESYLATE 2.5 MILLIGRAM(S): 2.5 TABLET ORAL at 05:55

## 2021-11-26 RX ADMIN — PREGABALIN 1000 MICROGRAM(S): 225 CAPSULE ORAL at 14:33

## 2021-11-26 RX ADMIN — LOSARTAN POTASSIUM 100 MILLIGRAM(S): 100 TABLET, FILM COATED ORAL at 05:55

## 2021-11-26 RX ADMIN — Medication 1: at 12:17

## 2021-11-26 RX ADMIN — CLOPIDOGREL BISULFATE 75 MILLIGRAM(S): 75 TABLET, FILM COATED ORAL at 12:18

## 2021-11-26 RX ADMIN — PANTOPRAZOLE SODIUM 40 MILLIGRAM(S): 20 TABLET, DELAYED RELEASE ORAL at 05:55

## 2021-11-26 RX ADMIN — Medication 25 MILLIGRAM(S): at 14:34

## 2021-11-26 RX ADMIN — ENOXAPARIN SODIUM 40 MILLIGRAM(S): 100 INJECTION SUBCUTANEOUS at 12:18

## 2021-11-26 RX ADMIN — SODIUM CHLORIDE 75 MILLILITER(S): 9 INJECTION INTRAMUSCULAR; INTRAVENOUS; SUBCUTANEOUS at 05:56

## 2021-11-26 NOTE — DISCHARGE NOTE NURSING/CASE MANAGEMENT/SOCIAL WORK - NSSCTYPOFSERV_GEN_ALL_CORE
Nurse and physical therapy to arrange visit to your home physical therapy to arrange visit to your home

## 2021-11-26 NOTE — PROGRESS NOTE ADULT - PROBLEM SELECTOR PLAN 2
C/w asprin 81mg and plavix 75mg qd. Check P2Y12  Lipitor 80mg   Elevated triglycerides  No further workup recommended by neuro  Close outpatient follow up with neurologist
C/w asprin 81mg and plavix 75mg qd. Check P2Y12  Lipitor 80mg   Elevated triglycerides  No further workup recommended by neuro  Close outpatient follow up with neurologist

## 2021-11-26 NOTE — DISCHARGE NOTE PROVIDER - NSDCCPCAREPLAN_GEN_ALL_CORE_FT
PRINCIPAL DISCHARGE DIAGNOSIS  Diagnosis: Dizziness  Assessment and Plan of Treatment: resolved, take Meclizine as prescribed , follow up with Neurologist and PCP      SECONDARY DISCHARGE DIAGNOSES  Diagnosis: Diabetes mellitus  Assessment and Plan of Treatment: controlled, cont current oral home meds, follow up with PCP    Diagnosis: Hypertension  Assessment and Plan of Treatment: controlled, Amlodipine dose decreased, follow up with PCP

## 2021-11-26 NOTE — DISCHARGE NOTE NURSING/CASE MANAGEMENT/SOCIAL WORK - PATIENT PORTAL LINK FT
You can access the FollowMyHealth Patient Portal offered by [facility] by registering at the following website: [email or web address]. By joining [employer]’s FollowMyHealth portal, you will also be able to view your health information using other applications (apps) compatible with our system.

## 2021-11-26 NOTE — DISCHARGE NOTE PROVIDER - CARE PROVIDER_API CALL
Domingo Black)  Neurology; Vascular Neurology  [address] [place] [address]  [place] [address]  Phone: [phone]  Fax: [phone]  Follow Up Time:     Roxanne Choudhury)  Internal Medicine  [address], [address]  [place] [address]  Phone: [phone]  Fax: [phone]  Follow Up Time:

## 2021-11-26 NOTE — PROGRESS NOTE ADULT - PROBLEM SELECTOR PLAN 6
Home meds verified with pharmacy   asprin 81mg, plavix 75mg, crestor 40mg, jardiance 25mg, pepcid 20mg bid, clobetasol bid, voltaren gel, norvasc 5mg, b12, ezetimibe 10mg, losartan 100mg
Home meds verified with pharmacy   asprin 81mg, plavix 75mg, crestor 40mg, jardiance 25mg, pepcid 20mg bid, clobetasol bid, voltaren gel, norvasc 5mg, b12, ezetimibe 10mg, losartan 100mg
[place] Instructions: By selecting yes to the question below the MAUC number will be added into the note.  This will be calculated automatically based on the diagnosis chosen, the size entered, the body zone selected (H,M,L) and the specific indications you chose. You will also have the option to override the Mohs AUC if you disagree with the automatically calculated number and this option is found in the Case Summary tab.

## 2021-11-26 NOTE — PROGRESS NOTE ADULT - PROBLEM SELECTOR PLAN 5
ISS   Continue lantus 15uqhs   A1C 7.6  Monitor FS
ISS   Continue lantus 15uqhs   A1C 7.6  Monitor FS

## 2021-11-26 NOTE — DISCHARGE NOTE PROVIDER - NSDCMRMEDTOKEN_GEN_ALL_CORE_FT
Amaryl 4 mg oral tablet: 2 tab(s) orally once a day in the morning  amLODIPine 5 mg oral tablet: 1 tab(s) orally once a day  aspirin 81 mg oral tablet, chewable: 1 tab(s) orally once a day  chlorthalidone 25 mg oral tablet: 1 tab(s) orally once a day  clopidogrel 75 mg oral tablet: 1 tab(s) orally once a day  Crestor 40 mg oral tablet: 1 tab(s) orally once a day  Jardiance 25 mg oral tablet: 1 tab(s) orally once a day (in the morning)  losartan 100 mg oral tablet: 1 tab(s) orally once a day  Pepcid 20 mg oral tablet: 1 tab(s) orally 2 times a day  Rolling walker:   Symbicort 160 mcg-4.5 mcg/inh inhalation aerosol: 2 puff(s) inhaled 2 times a day, As Needed  Tradjenta 5 mg oral tablet: 1 tab(s) orally once a day  venlafaxine 75 mg oral capsule, extended release: 1 cap(s) orally once a day    NOTE: [facility] LAST FILLED 11/2020 WITH NEW SCRIPTS ON HOLD - PATIENT STATES HE IS TAKING  Vestibular Rehab:   Vitamin B-12 1000 mcg oral tablet: 1 tab(s) orally once a day  Vitamin D3 50 mcg (2000 intl units) oral capsule: 1 cap(s) orally once a day  Whom to Concern with :   Zetia 10 mg oral tablet: 1 tab(s) orally once a day   Amaryl 4 mg oral tablet: 2 tab(s) orally once a day in the morning  amLODIPine 2.5 mg oral tablet: 1 tab(s) orally once a day  aspirin 81 mg oral tablet, chewable: 1 tab(s) orally once a day  clopidogrel 75 mg oral tablet: 1 tab(s) orally once a day  Crestor 40 mg oral tablet: 1 tab(s) orally once a day  Jardiance 25 mg oral tablet: 1 tab(s) orally once a day (in the morning)  losartan 100 mg oral tablet: 1 tab(s) orally once a day  meclizine 25 mg oral tablet: 1 tab(s) orally every 8 hours  Pepcid 20 mg oral tablet: 1 tab(s) orally 2 times a day  Vitamin B-12 1000 mcg oral tablet: 1 tab(s) orally once a day  Vitamin D3 50 mcg (2000 intl units) oral capsule: 1 cap(s) orally once a day  Zetia 10 mg oral tablet: 1 tab(s) orally once a day

## 2021-11-26 NOTE — DISCHARGE NOTE PROVIDER - HOSPITAL COURSE
71 M with PMHx of HTN, DM, HLD, CVA x3 s/p cerebral stents, and interstitial lung dz presents with 3-4 days of dizziness described as room spinning. Reports for the past 3-4 months endorsed "head heaviness." Upon assessment, pt endorsed sudden onset episode of dizziness. Appears very uncomfortable, dizzy, sitting in chair. Will call to expedite pt's care to main ER. Not TPA candidate due to duration of 4 days.   He was seen by Neurology and PT who recomm home PT. He is hemodynamically stable to be discharged home today, spoke to Attending. 71 M with PMHx of HTN, DM, HLD, CVA x3 s/p cerebral stents, and interstitial lung dz presents with 3-4 days of dizziness described as room spinning. Reports for the past 3-4 months endorsed "head heaviness." Upon assessment, pt endorsed sudden onset episode of dizziness. Appears very uncomfortable, dizzy, sitting in chair. Will call to expedite pt's care to main ER. Not TPA candidate due to duration of 4 days.   He was seen by Neurology and PT who recomm home PT. He is hemodynamically stable to be discharged home today, spoke to Attending.    Attending Addendum  Discharge Diagnoses  1) Orthostatic Hypotension - dizziness  2) DM  3) h/o CVA  4) ILD

## 2021-11-26 NOTE — PROGRESS NOTE ADULT - PROBLEM SELECTOR PLAN 1
Pt with acute vestibular syndrome, likely peripheral etiology on top of baseline dysequilibrium from prior strokes   Likely in the setting of BPPV  MRI brain: Age-appropriate involutional change and microvascular ischemic disease  involving the periventricular and subcortical white matter. 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 No evidence of acute infarction.    Patient reports no improvement in symptoms, he is anxious that this could be recurring stroke. MRI report and neuro recs discussed.   Continue Trial of meclazine  Orthostatic VS + per verbal report from PT/nursing. Patient symptomatic. Compression stockings to LE, abdominal binder. Gentle hydration with IVF. Recheck orthostats.   Lifestyle modifications discussed  Trops negative   EKG without st/t changes   PT eval: impaired balance. rec Home PT  Outpatient vestibular therapy and close follow up with PCP
Pt with acute vestibular syndrome, likely peripheral etiology on top of baseline dysequilibrium from prior strokes   Likely in the setting of BPPV  MRI brain: Age-appropriate involutional change and microvascular ischemic disease  involving the periventricular and subcortical white matter. 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 No evidence of acute infarction.    Patient reports no improvement in symptoms, he is anxious that this could be recurring stroke. MRI report and neuro recs discussed.   Continue Trial of meclazine  Orthostatic VS + per verbal report from PT/nursing. Patient symptomatic. Compression stockings to LE, abdominal binder. Orthostatics now neg after gentle hydration with IVF    Lifestyle modifications discussed by prior physician  Trops negative   EKG without st/t changes   PT eval: impaired balance. rec Home PT  Outpatient vestibular therapy and close follow up with PCP

## 2021-11-26 NOTE — PROGRESS NOTE ADULT - ASSESSMENT
72 yo RH male with HTN, DM, HLD, multiple prior strokes (2015, 2016; bilateral cerebellum, no residual deficits), and bilateral vertebral artery stenosis (s/p R VA stent, on ASA/Plavix) who presents to the ED with episodic room-spinning dizziness for the past 4-5 days.   11/23 CTH: mild periventricular and moderate bifrontal subcortical and BILATERAL cerebellar white matter ischemic changes. 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  11/23 CTA H: No significant vascular lesion.  11/23 CTA N: No hemodynamically significant stenosis.  MRI brain with no new fidnings   A1c 7.6  LDL 53  TG elevated 235  orthostatics + 11/25, improved 11/26  P2Y12 174 okay   Impression: Acute vestibular syndrome. Likely peripheral etiology on top of baseline dysequilibrium from prior strokes . was orthostatic now improved     Recommendations:  - c/w DAPT and high dose statin for secondary stroke prevention  - no further neuro workup  - check orthostatics + 11/25, 11/26 neg/improved   - meclizine PRN  - PT/OT/VT  - check FS, glucose control <180  - GI/DVT ppx  - Counseling on diet, exercise, and medication adherence was done  - Counseling on smoking cessation and alcohol consumption offered when appropriate.  - Pain assessed and judicious use of narcotics when appropriate was discussed.    - Stroke education given when appropriate.  - Importance of fall prevention discussed.   - Differential diagnosis and plan of care discussed with patient and/or family and primary team  - Thank you for allowing me to participate in the care of this patient. Call with questions.   Domingo Black MD  Vascular Neurology  
70 yo RH male with HTN, DM, HLD, multiple prior strokes (2015, 2016; bilateral cerebellum, no residual deficits), and bilateral vertebral artery stenosis (s/p R VA stent, on ASA/Plavix) who presents to the ED with episodic room-spinning dizziness for the past 4-5 days.   11/23 CTH: mild periventricular and moderate bifrontal subcortical and BILATERAL cerebellar white matter ischemic changes. 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  11/23 CTA H: No significant vascular lesion.  11/23 CTA N: No hemodynamically significant stenosis.  MRI brain with no new fidnings   A1c 7.6  LDL 53  TG elevated 235    Impression: Acute vestibular syndrome. Likely peripheral etiology on top of baseline dysequilibrium from prior strokes     Recommendations:  - c/w DAPT and high dose statin for secondary stroke prevention  - check P2Y12  - no further neuro workup  - check orthostatics  - meclizine PRN  - PT/OT/VT  - check FS, glucose control <180  - GI/DVT ppx  - Counseling on diet, exercise, and medication adherence was done  - Counseling on smoking cessation and alcohol consumption offered when appropriate.  - Pain assessed and judicious use of narcotics when appropriate was discussed.    - Stroke education given when appropriate.  - Importance of fall prevention discussed.   - Differential diagnosis and plan of care discussed with patient and/or family and primary team  - Thank you for allowing me to participate in the care of this patient. Call with questions.   Domingo Black MD  Vascular Neurology  
71M PMHx of HTN, DM2, HLD, multiple prior strokes (2015, 2016; bilateral cerebellum, no residual deficits), and bilateral vertebral artery stenosis (s/p R VA stent, on ASA/Plavix) who presents to the ED with  room-spinning dizziness for the past 4-5 days. Likely found to have BPPV  
71M PMHx of HTN, DM2, HLD, multiple prior strokes (2015, 2016; bilateral cerebellum, no residual deficits), and bilateral vertebral artery stenosis (s/p R VA stent, on ASA/Plavix) who presents to the ED with  room-spinning dizziness for the past 4-5 days. Likely found to have BPPV

## 2021-11-26 NOTE — DISCHARGE NOTE PROVIDER - CARE PROVIDERS DIRECT ADDRESSES
,[email or web address]

## 2021-11-26 NOTE — PROGRESS NOTE ADULT - PROVIDER SPECIALTY LIST ADULT
Called and reviewed the results of MRI.    Will need to scheduled a flex sig. WIll have office arrange    
Neurology
Neurology
Hospitalist
Hospitalist

## 2021-11-26 NOTE — PROGRESS NOTE ADULT - SUBJECTIVE AND OBJECTIVE BOX
Neurology Progress Note    S: Patient seen and examined. No new events overnight. patient denied CP, SOB, HA or pain.     Medication:  acetaminophen     Tablet .. 650 milliGRAM(s) Oral every 6 hours PRN  amLODIPine   Tablet 2.5 milliGRAM(s) Oral daily  aspirin enteric coated 81 milliGRAM(s) Oral daily  atorvastatin 80 milliGRAM(s) Oral at bedtime  clopidogrel Tablet 75 milliGRAM(s) Oral daily  cyanocobalamin 1000 MICROGram(s) Oral daily  dextrose 40% Gel 15 Gram(s) Oral once  dextrose 5%. 1000 milliLiter(s) IV Continuous <Continuous>  dextrose 5%. 1000 milliLiter(s) IV Continuous <Continuous>  dextrose 50% Injectable 25 Gram(s) IV Push once  dextrose 50% Injectable 12.5 Gram(s) IV Push once  dextrose 50% Injectable 25 Gram(s) IV Push once  enoxaparin Injectable 40 milliGRAM(s) SubCutaneous daily  glucagon  Injectable 1 milliGRAM(s) IntraMuscular once  insulin glargine Injectable (LANTUS) 15 Unit(s) SubCutaneous at bedtime  insulin lispro (ADMELOG) corrective regimen sliding scale   SubCutaneous three times a day before meals  insulin lispro (ADMELOG) corrective regimen sliding scale   SubCutaneous at bedtime  losartan 100 milliGRAM(s) Oral daily  meclizine 25 milliGRAM(s) Oral every 8 hours  pantoprazole    Tablet 40 milliGRAM(s) Oral before breakfast  sodium chloride 0.9%. 1000 milliLiter(s) IV Continuous <Continuous>      Vitals:  Vital Signs Last 24 Hrs  T(C): 36.9 (25 Nov 2021 09:40), Max: 36.9 (24 Nov 2021 16:29)  T(F): 98.4 (25 Nov 2021 09:40), Max: 98.5 (24 Nov 2021 16:29)  HR: 99 (25 Nov 2021 09:40) (76 - 99)  BP: 135/67 (25 Nov 2021 09:40) (99/65 - 135/71)  BP(mean): --  RR: 18 (25 Nov 2021 09:40) (18 - 18)  SpO2: 99% (25 Nov 2021 09:40) (94% - 99%)    General Exam:   General Appearance: Appropriately dressed and in no acute distress       Head: Normocephalic, atraumatic and no dysmorphic features  Ear, Nose, and Throat: Moist mucous membranes  CVS: S1S2+  Resp: No SOB, no wheeze or rhonchi  Abd: soft NTND  Extremities: No edema, no cyanosis  Skin: No bruises, no rashes     Neurological Exam:  Mental Status: Awake, alert and oriented x 3.  Able to follow simple and complex verbal commands. Able to name and repeat. fluent speech. No obvious aphasia or dysarthria noted.   Cranial Nerves: PERRL, EOMI, VFFC, sensation V1-V3 intact,  no obvious facial asymmetry , equal elevation of palate, scm/trap 5/5, tongue is midline on protrusion. no obvious papilledema on fundoscopic exam. Hearing is grossly intact.   Motor: Normal bulk, tone and strength throughout. Fine finger movements were intact and symmetric. no tremors or drift noted.    Sensation: Intact to light touch and pinprick throughout. no right/left confusion. no extinction to tactile on DSS.    Reflexes: 1+ throughout at biceps, brachioradialis, triceps, patellars and ankles bilaterally and equal. No clonus. R toe and L toe were both downgoing.  Coordination: No dysmetria on FNF or HKS  Gait: deferred     I personally reviewed the below data/images/labs:      CBC Full  -  ( 25 Nov 2021 06:37 )  WBC Count : 8.80 K/uL  RBC Count : 5.85 M/uL  Hemoglobin : 11.9 g/dL  Hematocrit : 38.4 %  Platelet Count - Automated : 289 K/uL  Mean Cell Volume : 65.6 fl  Mean Cell Hemoglobin : 20.3 pg  Mean Cell Hemoglobin Concentration : 31.0 gm/dL  Auto Neutrophil # : x  Auto Lymphocyte # : x  Auto Monocyte # : x  Auto Eosinophil # : x  Auto Basophil # : x  Auto Neutrophil % : x  Auto Lymphocyte % : x  Auto Monocyte % : x  Auto Eosinophil % : x  Auto Basophil % : x    11-25    134<L>  |  97  |  27<H>  ----------------------------<  130<H>  3.5   |  24  |  1.16    Ca    9.0      25 Nov 2021 06:37  Phos  3.1     11-25  Mg     2.0     11-25    TPro  7.0  /  Alb  4.1  /  TBili  0.2  /  DBili  x   /  AST  21  /  ALT  21  /  AlkPhos  75  11-25    LIVER FUNCTIONS - ( 25 Nov 2021 06:37 )  Alb: 4.1 g/dL / Pro: 7.0 g/dL / ALK PHOS: 75 U/L / ALT: 21 U/L / AST: 21 U/L / GGT: x           PT/INR - ( 23 Nov 2021 18:42 )   PT: 11.0 sec;   INR: 0.91 ratio         PTT - ( 23 Nov 2021 18:42 )  PTT:30.7 sec    < from: CT Angio Head w/ IV Cont (11.23.21 @ 18:46) >    EXAM:  CT ANGIO BRAIN (W)AW IC                          EXAM:  CT ANGIO NECK (W)AW IC                          EXAM:  CT BRAIN                            PROCEDURE DATE:  11/23/2021            INTERPRETATION:  Examinations were performed on this patient:  1. CT Angiography of the carotid arteries with IV contrast  2. CT Angiography of the intracranial circulation with IV contrast  3. CT Head without contrast      CLINICAL INFORMATION:  Carotid stenosis. Intracranial stenosis/aneurysm. TIA/stroke. 4 days of dizziness    TECHNIQUE:   Preceding intravenous contrast contiguous axial 4 mm sections were obtained through the head. CT angiography images at .5 mm thickness were acquired from the aortic arch to the vertex of the skull.   Images were acquired during rapid bolus intravenous administration of 90 mL of Omnipaque 350 contrast/ 10 mL discarded.  This data set was reconstructed axial 1 mm sections. Post processing maximum intensity projection angiographic reconstruction of images was performed.  This data set was reconstructed and reviewed in 2 mm sagittal and coronal reformatted images to evaluate carotid morphology and intracranial vessels.   The magnitude of stenosis was determined using NASCET criteria.   This scan was performedusing automatic exposure control (radiation dose reduction software) to obtain a diagnostic image quality scan with patient dose as low as reasonably achievable.    FINDINGS:   No previous examinations are available for review.    Focal noncalcified plaque is seen within the aortic arch. Mild plaque noted at the origin of the LEFT common carotid artery.    The carotid circulation shows minimal plaque at the bifurcations without hemodynamically significant stenosis.   The RIGHT vertebral artery isdominant. The LEFT vertebral artery is markedly hypoplastic.    The intracranial circulation is intact without aneurysm, vascular malformation or abnormal vessel termination.  Incidental fenestration of the LEFT anterior communicating artery is noted, an anatomic variant.    The brain demonstrates mild periventricular and moderate bifrontal subcortical and BILATERAL cerebellar white matter ischemic changes. 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  No acute cerebral cortical infarct is seen.  No intracranial hemorrhage is found.  The ventricles, sulci and basal cisterns appear unremarkable.    The orbits are unremarkable.  The paranasal sinuses are clear.  The nasal cavity remains intact.  The nasopharynx is symmetric.  The central skull base, petrous temporal bones and calvarium remain intact.        IMPRESSION:        1.   Right carotid system:  No hemodynamically significant stenosis.        2.   Left carotid system:  No hemodynamically significant stenosis.        3.  Intracranial circulation:  No significant vascular lesion.        4.   Brain:  mild periventricular and moderate bifrontal subcortical and BILATERAL cerebellar white matter ischemic changes. 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    --- End of Report ---                RIKKI ABDUL MD; Attending Radiologist  This document has been electronically signed. Nov 23 2021  6:59PM    < end of copied text >  < from: MR Head No Cont (11.24.21 @ 09:34) >    EXAM:  MR BRAIN                            PROCEDURE DATE:  11/24/2021            INTERPRETATION:  INDICATIONS:  Dizzy for 5 days.    TECHNIQUE:  Multiplanar imaging was performed using T1 weighted, T2 weighted and FLAIR sequences.  Diffusion weighted and SWI images were obtained.    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 Microvascular ischemic changes involving the periventricular and subcortical white matter  Extra-axial:  No mass or collection.  Visualized sinuses:  Normal.  Visualized mastoids:  Normal.  Calvarium:  Normal.  Carotid flow voids:  Present.  Miscellaneous:  Bilateral cataract surgery    IMPRESSION:  Age-appropriate involutional change and microvascular ischemic disease  involving the periventricular and subcortical white matter. 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 No evidence of acute infarction.    --- End of Report ---                ANTONETTE REAL MD; Attending Radiologist  This document has been electronically signed. Nov 24 2021  9:43AM    < end of copied text >  
Neurology Progress Note    S: Patient seen and examined. No new events overnight. patient denied CP, SOB, HA or pain. still dizzy at times     Medication:  MEDICATIONS  (STANDING):  amLODIPine   Tablet 2.5 milliGRAM(s) Oral daily  aspirin enteric coated 81 milliGRAM(s) Oral daily  atorvastatin 80 milliGRAM(s) Oral at bedtime  chlorhexidine 2% Cloths 1 Application(s) Topical daily  clopidogrel Tablet 75 milliGRAM(s) Oral daily  cyanocobalamin 1000 MICROGram(s) Oral daily  dextrose 40% Gel 15 Gram(s) Oral once  dextrose 5%. 1000 milliLiter(s) (50 mL/Hr) IV Continuous <Continuous>  dextrose 5%. 1000 milliLiter(s) (100 mL/Hr) IV Continuous <Continuous>  dextrose 50% Injectable 25 Gram(s) IV Push once  dextrose 50% Injectable 12.5 Gram(s) IV Push once  dextrose 50% Injectable 25 Gram(s) IV Push once  enoxaparin Injectable 40 milliGRAM(s) SubCutaneous daily  glucagon  Injectable 1 milliGRAM(s) IntraMuscular once  insulin glargine Injectable (LANTUS) 15 Unit(s) SubCutaneous at bedtime  insulin lispro (ADMELOG) corrective regimen sliding scale   SubCutaneous three times a day before meals  insulin lispro (ADMELOG) corrective regimen sliding scale   SubCutaneous at bedtime  losartan 100 milliGRAM(s) Oral daily  meclizine 25 milliGRAM(s) Oral every 8 hours  pantoprazole    Tablet 40 milliGRAM(s) Oral before breakfast  sodium chloride 0.9%. 1000 milliLiter(s) (75 mL/Hr) IV Continuous <Continuous>    MEDICATIONS  (PRN):  acetaminophen     Tablet .. 650 milliGRAM(s) Oral every 6 hours PRN Temp greater or equal to 38C (100.4F), Mild Pain (1 - 3)      Vitals:  Vital Signs Last 24 Hrs  T(C): 36.9 (11-26-21 @ 05:05), Max: 37.5 (11-25-21 @ 15:40)  T(F): 98.4 (11-26-21 @ 05:05), Max: 99.5 (11-25-21 @ 15:40)  HR: 86 (11-26-21 @ 05:05) (82 - 88)  BP: 124/73 (11-26-21 @ 05:05) (117/85 - 137/76)  BP(mean): --  RR: 17 (11-26-21 @ 05:05) (17 - 18)  SpO2: 96% (11-26-21 @ 05:05) (96% - 97%)    Orthostatic VS  11-26-21 @ 05:05  Lying BP: 124/73 HR: 86  Sitting BP: 133/79 HR: 99  Standing BP: 126/77 HR: 99  Site: --  Mode: --  Orthostatic VS  11-25-21 @ 14:43  Lying BP: 130/65 HR: 88  Sitting BP: 115/70 HR: 93  Standing BP: 112/76 HR: 101  Site: upper left arm  Mode: electronic      General Exam:   General Appearance: Appropriately dressed and in no acute distress       Head: Normocephalic, atraumatic and no dysmorphic features  Ear, Nose, and Throat: Moist mucous membranes  CVS: S1S2+  Resp: No SOB, no wheeze or rhonchi  Abd: soft NTND  Extremities: No edema, no cyanosis  Skin: No bruises, no rashes     Neurological Exam:  Mental Status: Awake, alert and oriented x 3.  Able to follow simple and complex verbal commands. Able to name and repeat. fluent speech. No obvious aphasia or dysarthria noted.   Cranial Nerves: PERRL, EOMI, VFFC, sensation V1-V3 intact,  no obvious facial asymmetry , equal elevation of palate, scm/trap 5/5, tongue is midline on protrusion. no obvious papilledema on fundoscopic exam. Hearing is grossly intact.   Motor: Normal bulk, tone and strength throughout. Fine finger movements were intact and symmetric. no tremors or drift noted.    Sensation: Intact to light touch and pinprick throughout. no right/left confusion. no extinction to tactile on DSS.    Reflexes: 1+ throughout at biceps, brachioradialis, triceps, patellars and ankles bilaterally and equal. No clonus. R toe and L toe were both downgoing.  Coordination: No dysmetria on FNF or HKS  Gait: deferred     I personally reviewed the below data/images/labs:                            11.9   8.80  )-----------( 289      ( 25 Nov 2021 06:37 )             38.4     11-25    134<L>  |  97  |  27<H>  ----------------------------<  130<H>  3.5   |  24  |  1.16    Ca    9.0      25 Nov 2021 06:37  Phos  3.1     11-25  Mg     2.0     11-25    TPro  7.0  /  Alb  4.1  /  TBili  0.2  /  DBili  x   /  AST  21  /  ALT  21  /  AlkPhos  75  11-25    LIVER FUNCTIONS - ( 25 Nov 2021 06:37 )  Alb: 4.1 g/dL / Pro: 7.0 g/dL / ALK PHOS: 75 U/L / ALT: 21 U/L / AST: 21 U/L / GGT: x           PT/INR - ( 23 Nov 2021 18:42 )   PT: 11.0 sec;   INR: 0.91 ratio         PTT - ( 23 Nov 2021 18:42 )  PTT:30.7 sec    < from: CT Angio Head w/ IV Cont (11.23.21 @ 18:46) >    EXAM:  CT ANGIO BRAIN (W)AW IC                          EXAM:  CT ANGIO NECK (W)AW IC                          EXAM:  CT BRAIN                            PROCEDURE DATE:  11/23/2021            INTERPRETATION:  Examinations were performed on this patient:  1. CT Angiography of the carotid arteries with IV contrast  2. CT Angiography of the intracranial circulation with IV contrast  3. CT Head without contrast      CLINICAL INFORMATION:  Carotid stenosis. Intracranial stenosis/aneurysm. TIA/stroke. 4 days of dizziness    TECHNIQUE:   Preceding intravenous contrast contiguous axial 4 mm sections were obtained through the head. CT angiography images at .5 mm thickness were acquired from the aortic arch to the vertex of the skull.   Images were acquired during rapid bolus intravenous administration of 90 mL of Omnipaque 350 contrast/ 10 mL discarded.  This data set was reconstructed axial 1 mm sections. Post processing maximum intensity projection angiographic reconstruction of images was performed.  This data set was reconstructed and reviewed in 2 mm sagittal and coronal reformatted images to evaluate carotid morphology and intracranial vessels.   The magnitude of stenosis was determined using NASCET criteria.   This scan was performedusing automatic exposure control (radiation dose reduction software) to obtain a diagnostic image quality scan with patient dose as low as reasonably achievable.    FINDINGS:   No previous examinations are available for review.    Focal noncalcified plaque is seen within the aortic arch. Mild plaque noted at the origin of the LEFT common carotid artery.    The carotid circulation shows minimal plaque at the bifurcations without hemodynamically significant stenosis.   The RIGHT vertebral artery isdominant. The LEFT vertebral artery is markedly hypoplastic.    The intracranial circulation is intact without aneurysm, vascular malformation or abnormal vessel termination.  Incidental fenestration of the LEFT anterior communicating artery is noted, an anatomic variant.    The brain demonstrates mild periventricular and moderate bifrontal subcortical and BILATERAL cerebellar white matter ischemic changes. 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  No acute cerebral cortical infarct is seen.  No intracranial hemorrhage is found.  The ventricles, sulci and basal cisterns appear unremarkable.    The orbits are unremarkable.  The paranasal sinuses are clear.  The nasal cavity remains intact.  The nasopharynx is symmetric.  The central skull base, petrous temporal bones and calvarium remain intact.        IMPRESSION:        1.   Right carotid system:  No hemodynamically significant stenosis.        2.   Left carotid system:  No hemodynamically significant stenosis.        3.  Intracranial circulation:  No significant vascular lesion.        4.   Brain:  mild periventricular and moderate bifrontal subcortical and BILATERAL cerebellar white matter ischemic changes. 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    --- End of Report ---                RIKKI ABDUL MD; Attending Radiologist  This document has been electronically signed. Nov 23 2021  6:59PM    < end of copied text >  < from: MR Head No Cont (11.24.21 @ 09:34) >    EXAM:  MR BRAIN                            PROCEDURE DATE:  11/24/2021            INTERPRETATION:  INDICATIONS:  Dizzy for 5 days.    TECHNIQUE:  Multiplanar imaging was performed using T1 weighted, T2 weighted and FLAIR sequences.  Diffusion weighted and SWI images were obtained.    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 Microvascular ischemic changes involving the periventricular and subcortical white matter  Extra-axial:  No mass or collection.  Visualized sinuses:  Normal.  Visualized mastoids:  Normal.  Calvarium:  Normal.  Carotid flow voids:  Present.  Miscellaneous:  Bilateral cataract surgery    IMPRESSION:  Age-appropriate involutional change and microvascular ischemic disease  involving the periventricular and subcortical white matter. 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 No evidence of acute infarction.    --- End of Report ---                ANTONETTE REAL MD; Attending Radiologist  This document has been electronically signed. Nov 24 2021  9:43AM    < end of copied text >  
[facility] Division of Hospital Medicine  Miguel A Isaac MD  Pager (M-F, 0D-5N): [phone]  Other Times:  [phone]    Patient is a 71y old  Male who presents with a chief complaint of Dizziness (25 Nov 2021 08:32)      SUBJECTIVE / OVERNIGHT EVENTS:    Patient was examined this morning. Discussed in gold. Continues to be symptomatic - reports dizziness, spinning sensation, intermittent blurry vision and inability to walk.     ADDITIONAL REVIEW OF SYSTEMS: rest of 10 point ROS neg    MEDICATIONS  (STANDING):  amLODIPine   Tablet 2.5 milliGRAM(s) Oral daily  aspirin enteric coated 81 milliGRAM(s) Oral daily  atorvastatin 80 milliGRAM(s) Oral at bedtime  clopidogrel Tablet 75 milliGRAM(s) Oral daily  cyanocobalamin 1000 MICROGram(s) Oral daily  dextrose 40% Gel 15 Gram(s) Oral once  dextrose 5%. 1000 milliLiter(s) (50 mL/Hr) IV Continuous <Continuous>  dextrose 5%. 1000 milliLiter(s) (100 mL/Hr) IV Continuous <Continuous>  dextrose 50% Injectable 25 Gram(s) IV Push once  dextrose 50% Injectable 12.5 Gram(s) IV Push once  dextrose 50% Injectable 25 Gram(s) IV Push once  enoxaparin Injectable 40 milliGRAM(s) SubCutaneous daily  glucagon  Injectable 1 milliGRAM(s) IntraMuscular once  insulin glargine Injectable (LANTUS) 15 Unit(s) SubCutaneous at bedtime  insulin lispro (ADMELOG) corrective regimen sliding scale   SubCutaneous three times a day before meals  insulin lispro (ADMELOG) corrective regimen sliding scale   SubCutaneous at bedtime  losartan 100 milliGRAM(s) Oral daily  meclizine 25 milliGRAM(s) Oral every 8 hours  pantoprazole    Tablet 40 milliGRAM(s) Oral before breakfast  sodium chloride 0.9%. 1000 milliLiter(s) (75 mL/Hr) IV Continuous <Continuous>    MEDICATIONS  (PRN):  acetaminophen     Tablet .. 650 milliGRAM(s) Oral every 6 hours PRN Temp greater or equal to 38C (100.4F), Mild Pain (1 - 3)      CAPILLARY BLOOD GLUCOSE      POCT Blood Glucose.: 212 mg/dL (25 Nov 2021 11:26)  POCT Blood Glucose.: 121 mg/dL (25 Nov 2021 07:29)  POCT Blood Glucose.: 175 mg/dL (24 Nov 2021 21:39)  POCT Blood Glucose.: 113 mg/dL (24 Nov 2021 19:40)    I&O's Summary    24 Nov 2021 07:01  -  25 Nov 2021 07:00  --------------------------------------------------------  IN: 0 mL / OUT: 1250 mL / NET: -1250 mL    25 Nov 2021 07:01  -  25 Nov 2021 15:57  --------------------------------------------------------  IN: 540 mL / OUT: 300 mL / NET: 240 mL        PHYSICAL EXAM:  Vital Signs Last 24 Hrs  T(C): 37.5 (25 Nov 2021 15:40), Max: 37.5 (25 Nov 2021 15:40)  T(F): 99.5 (25 Nov 2021 15:40), Max: 99.5 (25 Nov 2021 15:40)  HR: 82 (25 Nov 2021 15:40) (76 - 99)  BP: 117/85 (25 Nov 2021 15:40) (110/53 - 135/71)  BP(mean): --  RR: 18 (25 Nov 2021 15:40) (18 - 18)  SpO2: 97% (25 Nov 2021 15:40) (94% - 99%)      CONSTITUTIONAL: NAD, well-developed, well-groomed  EYES: PERRL; conjunctiva and sclera clear  ENMT: Moist oral mucosa,   NECK: Supple, no palpable masses;   RESPIRATORY: Normal respiratory effort; lungs are clear to auscultation bilaterally  CARDIOVASCULAR: Regular rate and rhythm, normal S1 and S2, no murmur/rub/gallop; No lower extremity edema; Peripheral pulses are 2+ bilaterally  ABDOMEN: Nontender to palpation, normoactive bowel sounds, no rebound/guarding;   MUSCULOSKELETAL: no clubbing or cyanosis of digits; no joint swelling or tenderness to palpation  PSYCH: A+O to person, place, and time; affect appropriate  NEUROLOGY: CN 2-12 are intact and symmetric; no gross sensory deficits. Reporting dizziness with change in head position from side to side   SKIN: No rashes; no palpable lesions      LABS:                        11.9   8.80  )-----------( 289      ( 25 Nov 2021 06:37 )             38.4     11-25    134<L>  |  97  |  27<H>  ----------------------------<  130<H>  3.5   |  24  |  1.16    Ca    9.0      25 Nov 2021 06:37  Phos  3.1     11-25  Mg     2.0     11-25    TPro  7.0  /  Alb  4.1  /  TBili  0.2  /  DBili  x   /  AST  21  /  ALT  21  /  AlkPhos  75  11-25    PT/INR - ( 23 Nov 2021 18:42 )   PT: 11.0 sec;   INR: 0.91 ratio         PTT - ( 23 Nov 2021 18:42 )  PTT:30.7 sec            RADIOLOGY & ADDITIONAL TESTS:  Results Reviewed:   Imaging Personally Reviewed:  Electrocardiogram Personally Reviewed:    COORDINATION OF CARE:  Care Discussed with Consultants/Other Providers [Y/N]:  Prior or Outpatient Records Reviewed [Y/N]:  
[facility] Division of Hospital Medicine  Clark Kaur MD  Pager (M-F, 2M-7Q): [phone]  Other Times:  [phone]    Patient is a 71y old  Male who presents with a chief complaint of Dizziness (26 Nov 2021 07:59)    SUBJECTIVE / OVERNIGHT EVENTS: feels less dizzy. questions answered  ADDITIONAL REVIEW OF SYSTEMS:    MEDICATIONS  (STANDING):  amLODIPine   Tablet 2.5 milliGRAM(s) Oral daily  aspirin enteric coated 81 milliGRAM(s) Oral daily  atorvastatin 80 milliGRAM(s) Oral at bedtime  chlorhexidine 2% Cloths 1 Application(s) Topical daily  clopidogrel Tablet 75 milliGRAM(s) Oral daily  cyanocobalamin 1000 MICROGram(s) Oral daily  dextrose 40% Gel 15 Gram(s) Oral once  dextrose 5%. 1000 milliLiter(s) (50 mL/Hr) IV Continuous <Continuous>  dextrose 5%. 1000 milliLiter(s) (100 mL/Hr) IV Continuous <Continuous>  dextrose 50% Injectable 25 Gram(s) IV Push once  dextrose 50% Injectable 12.5 Gram(s) IV Push once  dextrose 50% Injectable 25 Gram(s) IV Push once  enoxaparin Injectable 40 milliGRAM(s) SubCutaneous daily  glucagon  Injectable 1 milliGRAM(s) IntraMuscular once  insulin glargine Injectable (LANTUS) 15 Unit(s) SubCutaneous at bedtime  insulin lispro (ADMELOG) corrective regimen sliding scale   SubCutaneous three times a day before meals  insulin lispro (ADMELOG) corrective regimen sliding scale   SubCutaneous at bedtime  losartan 100 milliGRAM(s) Oral daily  meclizine 25 milliGRAM(s) Oral every 8 hours  pantoprazole    Tablet 40 milliGRAM(s) Oral before breakfast  sodium chloride 0.9%. 1000 milliLiter(s) (75 mL/Hr) IV Continuous <Continuous>    MEDICATIONS  (PRN):  acetaminophen     Tablet .. 650 milliGRAM(s) Oral every 6 hours PRN Temp greater or equal to 38C (100.4F), Mild Pain (1 - 3)      CAPILLARY BLOOD GLUCOSE      POCT Blood Glucose.: 155 mg/dL (26 Nov 2021 11:54)  POCT Blood Glucose.: 111 mg/dL (26 Nov 2021 07:42)  POCT Blood Glucose.: 123 mg/dL (25 Nov 2021 21:48)  POCT Blood Glucose.: 191 mg/dL (25 Nov 2021 16:29)    I&O's Summary    25 Nov 2021 07:01  -  26 Nov 2021 07:00  --------------------------------------------------------  IN: 1680 mL / OUT: 2300 mL / NET: -620 mL    26 Nov 2021 07:01  -  26 Nov 2021 13:16  --------------------------------------------------------  IN: 200 mL / OUT: 500 mL / NET: -300 mL    PHYSICAL EXAM:  Vital Signs Last 24 Hrs  T(C): 36.5 (26 Nov 2021 10:21), Max: 37.5 (25 Nov 2021 15:40)  T(F): 97.7 (26 Nov 2021 10:21), Max: 99.5 (25 Nov 2021 15:40)  HR: 83 (26 Nov 2021 10:21) (82 - 88)  BP: 118/72 (26 Nov 2021 10:21) (117/85 - 137/76)  BP(mean): --  RR: 18 (26 Nov 2021 10:21) (17 - 18)  SpO2: 98% (26 Nov 2021 10:21) (96% - 98%)  CONSTITUTIONAL: NAD, well-developed, well-groomed  EYES: PERRL; conjunctiva and sclera clear  ENMT: Moist oral mucosa,   NECK: Supple, no palpable masses;   RESPIRATORY: Normal respiratory effort; lungs are clear to auscultation bilaterally  CARDIOVASCULAR: Regular rate and rhythm, normal S1 and S2, no murmur/rub/gallop; No lower extremity edema; Peripheral pulses are 2+ bilaterally  ABDOMEN: Nontender to palpation, normoactive bowel sounds, no rebound/guarding;   MUSCULOSKELETAL: no clubbing or cyanosis of digits; no joint swelling or tenderness to palpation  PSYCH: A+O to person, place, and time; affect appropriate  NEUROLOGY: CN 2-12 are intact and symmetric; no gross sensory deficits. Reporting dizziness with change in head position from side to side   SKIN: No rashes; no palpable lesions    LABS:                        11.9   8.80  )-----------( 289      ( 25 Nov 2021 06:37 )             38.4     11-25    134<L>  |  97  |  27<H>  ----------------------------<  130<H>  3.5   |  24  |  1.16    Ca    9.0      25 Nov 2021 06:37  Phos  3.1     11-25  Mg     2.0     11-25    TPro  7.0  /  Alb  4.1  /  TBili  0.2  /  DBili  x   /  AST  21  /  ALT  21  /  AlkPhos  75  11-25    RADIOLOGY & ADDITIONAL TESTS:  Results Reviewed:   Imaging Personally Reviewed:  Electrocardiogram Personally Reviewed:    COORDINATION OF CARE: neuro apprec  Care Discussed with Consultants/Other Providers [Y/N]:  Prior or Outpatient Records Reviewed [Y/N]:

## 2022-07-25 NOTE — PATIENT PROFILE ADULT. - PRO PAIN EXPRESSION
Goal Outcome Evaluation:    Reason for Admission: Multiple ischemic strokes    Cognitive/Mentation: Alert, seems to be mostly oriented, answers yes/no with head shaking, does use notepad to express needs, patient is aware he is in the hospital.     Neuros/CMS: Significant left weakness (LUE 1/5, LLE 2/5), L droop, L neglect. Did not make any attempts to speak or make noise this evening.     VS: Stable on room air  Tele: n/a  GI: BS active, small BM this evening - incontinent.  : Large urine output, Cortez in place  Pulmonary: LS Clear, uses oral suctioning independently. Loose, nonproductive cough.  Pain: Denies    Drains/Lines: L hand PIV  Skin: Intact. Left upper gum with small amount of bleeding  Activity: Bedrest/Lift, q2h turns.  Diet: NPO. Tube feeding.      Therapies recs: TCU  Discharge: Pending placement    Aggression Stoplight Tool: Yellow    End of shift summary: Stable this shift, no changes. Tube feeding increased to goal 60cc/hr at 1600.      none

## 2023-10-16 ENCOUNTER — INPATIENT (INPATIENT)
Facility: HOSPITAL | Age: 73
LOS: 5 days | Discharge: HOME | DRG: 35 | End: 2023-10-22
Attending: NEUROLOGICAL SURGERY | Admitting: PSYCHIATRY & NEUROLOGY
Payer: MEDICARE

## 2023-10-16 VITALS
OXYGEN SATURATION: 98 % | SYSTOLIC BLOOD PRESSURE: 155 MMHG | WEIGHT: 164.91 LBS | RESPIRATION RATE: 16 BRPM | HEART RATE: 102 BPM | HEIGHT: 66 IN | TEMPERATURE: 98 F | DIASTOLIC BLOOD PRESSURE: 91 MMHG

## 2023-10-16 DIAGNOSIS — I65.09 OCCLUSION AND STENOSIS OF UNSPECIFIED VERTEBRAL ARTERY: Chronic | ICD-10-CM

## 2023-10-16 LAB
ALBUMIN SERPL ELPH-MCNC: 4.7 G/DL — SIGNIFICANT CHANGE UP (ref 3.3–5)
ALBUMIN SERPL ELPH-MCNC: 4.7 G/DL — SIGNIFICANT CHANGE UP (ref 3.3–5)
ALP SERPL-CCNC: 97 U/L — SIGNIFICANT CHANGE UP (ref 40–120)
ALP SERPL-CCNC: 97 U/L — SIGNIFICANT CHANGE UP (ref 40–120)
ALT FLD-CCNC: 23 U/L — SIGNIFICANT CHANGE UP (ref 10–45)
ALT FLD-CCNC: 23 U/L — SIGNIFICANT CHANGE UP (ref 10–45)
ANION GAP SERPL CALC-SCNC: 13 MMOL/L — SIGNIFICANT CHANGE UP (ref 5–17)
ANION GAP SERPL CALC-SCNC: 13 MMOL/L — SIGNIFICANT CHANGE UP (ref 5–17)
APTT BLD: 31.5 SEC — SIGNIFICANT CHANGE UP (ref 24.5–35.6)
APTT BLD: 31.5 SEC — SIGNIFICANT CHANGE UP (ref 24.5–35.6)
AST SERPL-CCNC: 18 U/L — SIGNIFICANT CHANGE UP (ref 10–40)
AST SERPL-CCNC: 18 U/L — SIGNIFICANT CHANGE UP (ref 10–40)
BASOPHILS # BLD AUTO: 0 K/UL — SIGNIFICANT CHANGE UP (ref 0–0.2)
BASOPHILS # BLD AUTO: 0 K/UL — SIGNIFICANT CHANGE UP (ref 0–0.2)
BASOPHILS NFR BLD AUTO: 0 % — SIGNIFICANT CHANGE UP (ref 0–2)
BASOPHILS NFR BLD AUTO: 0 % — SIGNIFICANT CHANGE UP (ref 0–2)
BILIRUB SERPL-MCNC: 0.3 MG/DL — SIGNIFICANT CHANGE UP (ref 0.2–1.2)
BILIRUB SERPL-MCNC: 0.3 MG/DL — SIGNIFICANT CHANGE UP (ref 0.2–1.2)
BUN SERPL-MCNC: 20 MG/DL — SIGNIFICANT CHANGE UP (ref 7–23)
BUN SERPL-MCNC: 20 MG/DL — SIGNIFICANT CHANGE UP (ref 7–23)
BURR CELLS BLD QL SMEAR: PRESENT — SIGNIFICANT CHANGE UP
BURR CELLS BLD QL SMEAR: PRESENT — SIGNIFICANT CHANGE UP
CALCIUM SERPL-MCNC: 9.7 MG/DL — SIGNIFICANT CHANGE UP (ref 8.4–10.5)
CALCIUM SERPL-MCNC: 9.7 MG/DL — SIGNIFICANT CHANGE UP (ref 8.4–10.5)
CHLORIDE SERPL-SCNC: 105 MMOL/L — SIGNIFICANT CHANGE UP (ref 96–108)
CHLORIDE SERPL-SCNC: 105 MMOL/L — SIGNIFICANT CHANGE UP (ref 96–108)
CO2 SERPL-SCNC: 25 MMOL/L — SIGNIFICANT CHANGE UP (ref 22–31)
CO2 SERPL-SCNC: 25 MMOL/L — SIGNIFICANT CHANGE UP (ref 22–31)
CREAT SERPL-MCNC: 0.94 MG/DL — SIGNIFICANT CHANGE UP (ref 0.5–1.3)
CREAT SERPL-MCNC: 0.94 MG/DL — SIGNIFICANT CHANGE UP (ref 0.5–1.3)
DACRYOCYTES BLD QL SMEAR: SLIGHT — SIGNIFICANT CHANGE UP
DACRYOCYTES BLD QL SMEAR: SLIGHT — SIGNIFICANT CHANGE UP
EGFR: 85 ML/MIN/1.73M2 — SIGNIFICANT CHANGE UP
EGFR: 85 ML/MIN/1.73M2 — SIGNIFICANT CHANGE UP
ELLIPTOCYTES BLD QL SMEAR: SLIGHT — SIGNIFICANT CHANGE UP
ELLIPTOCYTES BLD QL SMEAR: SLIGHT — SIGNIFICANT CHANGE UP
EOSINOPHIL # BLD AUTO: 0.57 K/UL — HIGH (ref 0–0.5)
EOSINOPHIL # BLD AUTO: 0.57 K/UL — HIGH (ref 0–0.5)
EOSINOPHIL NFR BLD AUTO: 5.1 % — SIGNIFICANT CHANGE UP (ref 0–6)
EOSINOPHIL NFR BLD AUTO: 5.1 % — SIGNIFICANT CHANGE UP (ref 0–6)
GLUCOSE SERPL-MCNC: 114 MG/DL — HIGH (ref 70–99)
GLUCOSE SERPL-MCNC: 114 MG/DL — HIGH (ref 70–99)
HCT VFR BLD CALC: 44 % — SIGNIFICANT CHANGE UP (ref 39–50)
HCT VFR BLD CALC: 44 % — SIGNIFICANT CHANGE UP (ref 39–50)
HGB BLD-MCNC: 13.4 G/DL — SIGNIFICANT CHANGE UP (ref 13–17)
HGB BLD-MCNC: 13.4 G/DL — SIGNIFICANT CHANGE UP (ref 13–17)
INR BLD: 0.97 RATIO — SIGNIFICANT CHANGE UP (ref 0.85–1.18)
INR BLD: 0.97 RATIO — SIGNIFICANT CHANGE UP (ref 0.85–1.18)
LYMPHOCYTES # BLD AUTO: 3.57 K/UL — HIGH (ref 1–3.3)
LYMPHOCYTES # BLD AUTO: 3.57 K/UL — HIGH (ref 1–3.3)
LYMPHOCYTES # BLD AUTO: 32.2 % — SIGNIFICANT CHANGE UP (ref 13–44)
LYMPHOCYTES # BLD AUTO: 32.2 % — SIGNIFICANT CHANGE UP (ref 13–44)
MANUAL SMEAR VERIFICATION: SIGNIFICANT CHANGE UP
MANUAL SMEAR VERIFICATION: SIGNIFICANT CHANGE UP
MCHC RBC-ENTMCNC: 20.4 PG — LOW (ref 27–34)
MCHC RBC-ENTMCNC: 20.4 PG — LOW (ref 27–34)
MCHC RBC-ENTMCNC: 30.5 GM/DL — LOW (ref 32–36)
MCHC RBC-ENTMCNC: 30.5 GM/DL — LOW (ref 32–36)
MCV RBC AUTO: 66.9 FL — LOW (ref 80–100)
MCV RBC AUTO: 66.9 FL — LOW (ref 80–100)
MONOCYTES # BLD AUTO: 0.38 K/UL — SIGNIFICANT CHANGE UP (ref 0–0.9)
MONOCYTES # BLD AUTO: 0.38 K/UL — SIGNIFICANT CHANGE UP (ref 0–0.9)
MONOCYTES NFR BLD AUTO: 3.4 % — SIGNIFICANT CHANGE UP (ref 2–14)
MONOCYTES NFR BLD AUTO: 3.4 % — SIGNIFICANT CHANGE UP (ref 2–14)
NEUTROPHILS # BLD AUTO: 6.58 K/UL — SIGNIFICANT CHANGE UP (ref 1.8–7.4)
NEUTROPHILS # BLD AUTO: 6.58 K/UL — SIGNIFICANT CHANGE UP (ref 1.8–7.4)
NEUTROPHILS NFR BLD AUTO: 59.3 % — SIGNIFICANT CHANGE UP (ref 43–77)
NEUTROPHILS NFR BLD AUTO: 59.3 % — SIGNIFICANT CHANGE UP (ref 43–77)
OVALOCYTES BLD QL SMEAR: SLIGHT — SIGNIFICANT CHANGE UP
OVALOCYTES BLD QL SMEAR: SLIGHT — SIGNIFICANT CHANGE UP
PLAT MORPH BLD: NORMAL — SIGNIFICANT CHANGE UP
PLAT MORPH BLD: NORMAL — SIGNIFICANT CHANGE UP
PLATELET # BLD AUTO: 344 K/UL — SIGNIFICANT CHANGE UP (ref 150–400)
PLATELET # BLD AUTO: 344 K/UL — SIGNIFICANT CHANGE UP (ref 150–400)
POIKILOCYTOSIS BLD QL AUTO: SLIGHT — SIGNIFICANT CHANGE UP
POIKILOCYTOSIS BLD QL AUTO: SLIGHT — SIGNIFICANT CHANGE UP
POLYCHROMASIA BLD QL SMEAR: SLIGHT — SIGNIFICANT CHANGE UP
POLYCHROMASIA BLD QL SMEAR: SLIGHT — SIGNIFICANT CHANGE UP
POTASSIUM SERPL-MCNC: 4.3 MMOL/L — SIGNIFICANT CHANGE UP (ref 3.5–5.3)
POTASSIUM SERPL-MCNC: 4.3 MMOL/L — SIGNIFICANT CHANGE UP (ref 3.5–5.3)
POTASSIUM SERPL-SCNC: 4.3 MMOL/L — SIGNIFICANT CHANGE UP (ref 3.5–5.3)
POTASSIUM SERPL-SCNC: 4.3 MMOL/L — SIGNIFICANT CHANGE UP (ref 3.5–5.3)
PROT SERPL-MCNC: 7.9 G/DL — SIGNIFICANT CHANGE UP (ref 6–8.3)
PROT SERPL-MCNC: 7.9 G/DL — SIGNIFICANT CHANGE UP (ref 6–8.3)
PROTHROM AB SERPL-ACNC: 10.2 SEC — SIGNIFICANT CHANGE UP (ref 9.5–13)
PROTHROM AB SERPL-ACNC: 10.2 SEC — SIGNIFICANT CHANGE UP (ref 9.5–13)
RBC # BLD: 6.58 M/UL — HIGH (ref 4.2–5.8)
RBC # BLD: 6.58 M/UL — HIGH (ref 4.2–5.8)
RBC # FLD: 18.6 % — HIGH (ref 10.3–14.5)
RBC # FLD: 18.6 % — HIGH (ref 10.3–14.5)
RBC BLD AUTO: ABNORMAL
RBC BLD AUTO: ABNORMAL
SCHISTOCYTES BLD QL AUTO: SLIGHT — SIGNIFICANT CHANGE UP
SCHISTOCYTES BLD QL AUTO: SLIGHT — SIGNIFICANT CHANGE UP
SODIUM SERPL-SCNC: 143 MMOL/L — SIGNIFICANT CHANGE UP (ref 135–145)
SODIUM SERPL-SCNC: 143 MMOL/L — SIGNIFICANT CHANGE UP (ref 135–145)
TARGETS BLD QL SMEAR: SLIGHT — SIGNIFICANT CHANGE UP
TARGETS BLD QL SMEAR: SLIGHT — SIGNIFICANT CHANGE UP
WBC # BLD: 11.1 K/UL — HIGH (ref 3.8–10.5)
WBC # BLD: 11.1 K/UL — HIGH (ref 3.8–10.5)
WBC # FLD AUTO: 11.1 K/UL — HIGH (ref 3.8–10.5)
WBC # FLD AUTO: 11.1 K/UL — HIGH (ref 3.8–10.5)

## 2023-10-16 PROCEDURE — 70450 CT HEAD/BRAIN W/O DYE: CPT | Mod: 26,MH,XU

## 2023-10-16 PROCEDURE — 70496 CT ANGIOGRAPHY HEAD: CPT | Mod: 26,MH

## 2023-10-16 PROCEDURE — 99285 EMERGENCY DEPT VISIT HI MDM: CPT

## 2023-10-16 PROCEDURE — 70498 CT ANGIOGRAPHY NECK: CPT | Mod: 26,MH

## 2023-10-16 NOTE — ED ADULT NURSE NOTE - OBJECTIVE STATEMENT
71y/o male walked into ED a&ox4 c/o visual changes. Patient reports change in vision to right eye xmonths. States "it feels like im looking through an x-ray and the bottom part of my eye is almost black". States this has been going on since December of last year and has been getting worse. Went to eye doctor recently who sent him here for further eval. Denies headache, changes in speech, weakness, numbness/tingling, dizziness, cp, sob or any other symptoms. Neuro intact. PERRL. Full ROM, equal in strength b/l.

## 2023-10-16 NOTE — ED PROVIDER NOTE - RAPID ASSESSMENT
72-year-old gentleman with a history of diabetes, prior CVA.  rheumatoid arthritis, hypertension presents to the emergency department at the advice of his ophthalmologist for evaluation of a possible stroke.  Patient is reporting over the last 2 to 3 weeks he has noted significant visual changes on the right side he feels that there is dark spots in his visual field.  Per the ophthalmology note patient with exam findings concerning for embolic retinal ischemia.  Advised to report to the emergency department for stroke evaluation. patient with no focal weakness/numbness/tingling. no trouble walking or talking. 72-year-old gentleman with a history of diabetes, prior CVA.  rheumatoid arthritis, hypertension presents to the emergency department at the advice of his ophthalmologist for evaluation of a possible stroke.  Patient is reporting over the last 2 to 3 weeks he has noted significant visual changes on the right side he feels that there is dark spots in his visual field.  Per the ophthalmology note patient with exam findings concerning for embolic retinal ischemia.  Advised to report to the emergency department for stroke evaluation. patient with no focal weakness/numbness/tingling. no trouble walking or talking.    I was the supervising attending and available for real-time consultation. -Mia Stephen MD (Attending) 72-year-old gentleman with a history of diabetes, prior CVA.  rheumatoid arthritis, hypertension presents to the emergency department at the advice of his ophthalmologist for evaluation of a possible stroke.  Patient is reporting over the last 2 to 3 weeks he has noted significant visual changes on the right side he feels that there is dark spots in his visual field.  Per the ophthalmology note patient with exam findings concerning for embolic retinal ischemia.  Advised to report to the emergency department for stroke evaluation. patient with no focal weakness/numbness/tingling. no trouble walking or talking.    Assessment above performed by the QPA. I was the supervising attending and available for real-time consultation. -Mia Stephen MD (Attending)

## 2023-10-16 NOTE — ED PROVIDER NOTE - PHYSICAL EXAMINATION
Gen: AAOx3, non-toxic  Head: NCAT  HEENT: EOMI, oral mucosa moist, normal conjunctiva. No conjunctival discharge. OD 20/25, OS 20/20  Lung: CTAB, no respiratory distress, no wheezes/rhonchi/rales B/L,   CV: RRR, no murmurs, rubs or gallops  Abd: soft, NTND, no guarding, no CVA tenderness  MSK: no visible deformities  Neuro: No focal sensory or motor deficits. Muscle strength testing intact, sensation intact.   Skin: Warm, well perfused, no rash  Psych: normal affect.

## 2023-10-16 NOTE — ED ADULT NURSE NOTE - NSFALLUNIVINTERV_ED_ALL_ED
Bed/Stretcher in lowest position, wheels locked, appropriate side rails in place/Call bell, personal items and telephone in reach/Instruct patient to call for assistance before getting out of bed/chair/stretcher/Non-slip footwear applied when patient is off stretcher/[place] to call system/Physically safe environment - no spills, clutter or unnecessary equipment/Purposeful proactive rounding/Room/bathroom lighting operational, light cord in reach

## 2023-10-16 NOTE — ED PROVIDER NOTE - OTHER FINDINGS
ECG recorded at 2156 independently interpreted by me at 2204 and shows normal sinus rhythm left axis deviation, right bundle branch block no ST elevation.  Compared with ECG 11/2021, no interval changes.

## 2023-10-16 NOTE — ED ADULT NURSE NOTE - NS ED NURSE LEVEL OF CONSCIOUSNESS ORIENTATION
ERROR FOR ASSIGN TO JENNIFER Cavanaugh, MARIA C  04/20/17 1457     Oriented - self; Oriented - place; Oriented - time

## 2023-10-16 NOTE — ED ADULT TRIAGE NOTE - CHIEF COMPLAINT QUOTE
20 days of right sided peripheral vision loss sent in by MD Lawrence, patient states the symptoms have not gotten worse but was sent in to r/o a possible stroke   Denies numbness/tingling, speech changes

## 2023-10-16 NOTE — ED PROVIDER NOTE - CLINICAL SUMMARY MEDICAL DECISION MAKING FREE TEXT BOX
71 yo M w PMHx of DM, CVA, RA, HTN, presents to ED sent by opthamologist for potential stroke. Pt states for the last 3 weeks has been having R sided eye deficits with decreased vision and dark spots in visual field. Pt saw the optho today and reported concerns for embolic retinal ischemia, and told to report to ED for stroke evaluation. Denies any eye pain, discharge. Denies fevers, chills, nausea, vomiting, dizziness, chest pain, SOB, abdominal pain, dysuria, hematuria. VSS. Clinically stable. Physical exam remarkable for 20/25 OD, 20/20 OS, no focal neuro deficits, motor strength testing and sensation intact. Suspicion for CRVO vs. central retinal artery occlusion. Will get cbc, cmp, ct angio head/neck, consult neuro/optho

## 2023-10-16 NOTE — ED PROVIDER NOTE - OBJECTIVE STATEMENT
71 yo M w PMHx of DM, CVA, RA, HTN, presents to ED sent by opthamologist for potential stroke. Pt states for the last 3 weeks has been having R sided eye deficits with decreased vision and dark spots in visual field. Pt saw the optho today and reported concerns for embolic retinal ischemia, and told to report to ED for stroke evaluation. Denies any eye pain, discharge. Denies fevers, chills, nausea, vomiting, dizziness, chest pain, SOB, abdominal pain, dysuria, hematuria.

## 2023-10-16 NOTE — ED PROVIDER NOTE - ATTENDING CONTRIBUTION TO CARE
Attending MD Dejesus:  I personally have seen and examined this patient. I have performed a substantive portion of the visit including all aspects of the medical decision making.  Resident note reviewed and agree on plan of care and except where noted.      History of hypertension diabetes hyperlipidemia multiple prior CVAs in 2015 and 2016 on aspirin and Plavix currently presenting for evaluation of 3 to 4 weeks of right eye vision loss.  Patient states that he was seen at an outpatient ophthalmologists (Luis Lawrence) last week Wednesday and was told that he needed to present to the emergency department for stroke work-up at that time however he was unable to due to family obligations.  He presents today for evaluation.  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  Advised of the increased risk of repeat CVA in view of today's eye findings and I urged him to immediately go to the [facility] stroke center to be evaluated.  He needs carotid Doppler, echocardiogram and further embolic work-up".  Patient denies any other symptoms specifically no head pain no double vision no speech difficulty no numbness or tingling of the upper or lower extremities.    Patient's vital signs are nonactionable.  He is sitting in the stretcher in no apparent distress.  Awake and alert. Symmetric eyebrow raise, symmetric eyelid closure. PERRL b/l, EOMI b/l, symmetric smile, tongue midline.  5/5  strength bilaterally, 5/5 elbow flexion bilaterally, 5/5 elbow extension bilaterally, 5/5 shoulder shrug b/l.  5/5 plantar and dorsiflexion b/l, 5/5 knee flexion and extension b/l, 5/5 hip flexion and extension b/l. Sensation intact and symmetric grossly to light touch throughout face and bilateral upper and lower extremities,  Finger to nose normal bilaterally. Steady gait.    Plan for CT angiogram head and neck neurology consultation for CVA work-up.      *The above represents an initial assessment/impression. Please refer to progress notes for potential changes in patient clinical course*

## 2023-10-17 DIAGNOSIS — I65.29 OCCLUSION AND STENOSIS OF UNSPECIFIED CAROTID ARTERY: ICD-10-CM

## 2023-10-17 LAB
A1C WITH ESTIMATED AVERAGE GLUCOSE RESULT: 7.4 % — HIGH (ref 4–5.6)
A1C WITH ESTIMATED AVERAGE GLUCOSE RESULT: 7.4 % — HIGH (ref 4–5.6)
ANION GAP SERPL CALC-SCNC: 11 MMOL/L — SIGNIFICANT CHANGE UP (ref 5–17)
ANION GAP SERPL CALC-SCNC: 11 MMOL/L — SIGNIFICANT CHANGE UP (ref 5–17)
BUN SERPL-MCNC: 18 MG/DL — SIGNIFICANT CHANGE UP (ref 7–23)
BUN SERPL-MCNC: 18 MG/DL — SIGNIFICANT CHANGE UP (ref 7–23)
CALCIUM SERPL-MCNC: 9.2 MG/DL — SIGNIFICANT CHANGE UP (ref 8.4–10.5)
CALCIUM SERPL-MCNC: 9.2 MG/DL — SIGNIFICANT CHANGE UP (ref 8.4–10.5)
CHLORIDE SERPL-SCNC: 106 MMOL/L — SIGNIFICANT CHANGE UP (ref 96–108)
CHLORIDE SERPL-SCNC: 106 MMOL/L — SIGNIFICANT CHANGE UP (ref 96–108)
CHOLEST SERPL-MCNC: 141 MG/DL — SIGNIFICANT CHANGE UP
CHOLEST SERPL-MCNC: 141 MG/DL — SIGNIFICANT CHANGE UP
CO2 SERPL-SCNC: 24 MMOL/L — SIGNIFICANT CHANGE UP (ref 22–31)
CO2 SERPL-SCNC: 24 MMOL/L — SIGNIFICANT CHANGE UP (ref 22–31)
CREAT SERPL-MCNC: 0.92 MG/DL — SIGNIFICANT CHANGE UP (ref 0.5–1.3)
CREAT SERPL-MCNC: 0.92 MG/DL — SIGNIFICANT CHANGE UP (ref 0.5–1.3)
EGFR: 87 ML/MIN/1.73M2 — SIGNIFICANT CHANGE UP
EGFR: 87 ML/MIN/1.73M2 — SIGNIFICANT CHANGE UP
ESTIMATED AVERAGE GLUCOSE: 166 MG/DL — HIGH (ref 68–114)
ESTIMATED AVERAGE GLUCOSE: 166 MG/DL — HIGH (ref 68–114)
GLUCOSE BLDC GLUCOMTR-MCNC: 113 MG/DL — HIGH (ref 70–99)
GLUCOSE BLDC GLUCOMTR-MCNC: 113 MG/DL — HIGH (ref 70–99)
GLUCOSE BLDC GLUCOMTR-MCNC: 119 MG/DL — HIGH (ref 70–99)
GLUCOSE BLDC GLUCOMTR-MCNC: 119 MG/DL — HIGH (ref 70–99)
GLUCOSE BLDC GLUCOMTR-MCNC: 123 MG/DL — HIGH (ref 70–99)
GLUCOSE BLDC GLUCOMTR-MCNC: 123 MG/DL — HIGH (ref 70–99)
GLUCOSE BLDC GLUCOMTR-MCNC: 161 MG/DL — HIGH (ref 70–99)
GLUCOSE BLDC GLUCOMTR-MCNC: 161 MG/DL — HIGH (ref 70–99)
GLUCOSE SERPL-MCNC: 108 MG/DL — HIGH (ref 70–99)
GLUCOSE SERPL-MCNC: 108 MG/DL — HIGH (ref 70–99)
HCT VFR BLD CALC: 39 % — SIGNIFICANT CHANGE UP (ref 39–50)
HCT VFR BLD CALC: 39 % — SIGNIFICANT CHANGE UP (ref 39–50)
HDLC SERPL-MCNC: 47 MG/DL — SIGNIFICANT CHANGE UP
HDLC SERPL-MCNC: 47 MG/DL — SIGNIFICANT CHANGE UP
HGB BLD-MCNC: 12 G/DL — LOW (ref 13–17)
HGB BLD-MCNC: 12 G/DL — LOW (ref 13–17)
LIPID PNL WITH DIRECT LDL SERPL: 65 MG/DL — SIGNIFICANT CHANGE UP
LIPID PNL WITH DIRECT LDL SERPL: 65 MG/DL — SIGNIFICANT CHANGE UP
MCHC RBC-ENTMCNC: 20.2 PG — LOW (ref 27–34)
MCHC RBC-ENTMCNC: 20.2 PG — LOW (ref 27–34)
MCHC RBC-ENTMCNC: 30.8 GM/DL — LOW (ref 32–36)
MCHC RBC-ENTMCNC: 30.8 GM/DL — LOW (ref 32–36)
MCV RBC AUTO: 65.7 FL — LOW (ref 80–100)
MCV RBC AUTO: 65.7 FL — LOW (ref 80–100)
NON HDL CHOLESTEROL: 95 MG/DL — SIGNIFICANT CHANGE UP
NON HDL CHOLESTEROL: 95 MG/DL — SIGNIFICANT CHANGE UP
NRBC # BLD: 0 /100 WBCS — SIGNIFICANT CHANGE UP (ref 0–0)
NRBC # BLD: 0 /100 WBCS — SIGNIFICANT CHANGE UP (ref 0–0)
PLATELET # BLD AUTO: 287 K/UL — SIGNIFICANT CHANGE UP (ref 150–400)
PLATELET # BLD AUTO: 287 K/UL — SIGNIFICANT CHANGE UP (ref 150–400)
POTASSIUM SERPL-MCNC: 4.2 MMOL/L — SIGNIFICANT CHANGE UP (ref 3.5–5.3)
POTASSIUM SERPL-MCNC: 4.2 MMOL/L — SIGNIFICANT CHANGE UP (ref 3.5–5.3)
POTASSIUM SERPL-SCNC: 4.2 MMOL/L — SIGNIFICANT CHANGE UP (ref 3.5–5.3)
POTASSIUM SERPL-SCNC: 4.2 MMOL/L — SIGNIFICANT CHANGE UP (ref 3.5–5.3)
RBC # BLD: 5.94 M/UL — HIGH (ref 4.2–5.8)
RBC # BLD: 5.94 M/UL — HIGH (ref 4.2–5.8)
RBC # FLD: 17.9 % — HIGH (ref 10.3–14.5)
RBC # FLD: 17.9 % — HIGH (ref 10.3–14.5)
SODIUM SERPL-SCNC: 141 MMOL/L — SIGNIFICANT CHANGE UP (ref 135–145)
SODIUM SERPL-SCNC: 141 MMOL/L — SIGNIFICANT CHANGE UP (ref 135–145)
TRIGL SERPL-MCNC: 179 MG/DL — HIGH
TRIGL SERPL-MCNC: 179 MG/DL — HIGH
WBC # BLD: 8.05 K/UL — SIGNIFICANT CHANGE UP (ref 3.8–10.5)
WBC # BLD: 8.05 K/UL — SIGNIFICANT CHANGE UP (ref 3.8–10.5)
WBC # FLD AUTO: 8.05 K/UL — SIGNIFICANT CHANGE UP (ref 3.8–10.5)
WBC # FLD AUTO: 8.05 K/UL — SIGNIFICANT CHANGE UP (ref 3.8–10.5)

## 2023-10-17 PROCEDURE — 93880 EXTRACRANIAL BILAT STUDY: CPT | Mod: 26

## 2023-10-17 PROCEDURE — 99221 1ST HOSP IP/OBS SF/LOW 40: CPT

## 2023-10-17 PROCEDURE — 99223 1ST HOSP IP/OBS HIGH 75: CPT

## 2023-10-17 PROCEDURE — 93306 TTE W/DOPPLER COMPLETE: CPT | Mod: 26

## 2023-10-17 RX ORDER — TICAGRELOR 90 MG/1
90 TABLET ORAL ONCE
Refills: 0 | Status: COMPLETED | OUTPATIENT
Start: 2023-10-17 | End: 2023-10-17

## 2023-10-17 RX ORDER — DEXTROSE 50 % IN WATER 50 %
15 SYRINGE (ML) INTRAVENOUS ONCE
Refills: 0 | Status: DISCONTINUED | OUTPATIENT
Start: 2023-10-17 | End: 2023-10-22

## 2023-10-17 RX ORDER — INSULIN LISPRO 100/ML
VIAL (ML) SUBCUTANEOUS AT BEDTIME
Refills: 0 | Status: DISCONTINUED | OUTPATIENT
Start: 2023-10-17 | End: 2023-10-22

## 2023-10-17 RX ORDER — VENLAFAXINE HCL 75 MG
37.5 CAPSULE, EXT RELEASE 24 HR ORAL DAILY
Refills: 0 | Status: DISCONTINUED | OUTPATIENT
Start: 2023-10-17 | End: 2023-10-22

## 2023-10-17 RX ORDER — ATORVASTATIN CALCIUM 80 MG/1
80 TABLET, FILM COATED ORAL AT BEDTIME
Refills: 0 | Status: DISCONTINUED | OUTPATIENT
Start: 2023-10-17 | End: 2023-10-18

## 2023-10-17 RX ORDER — INSULIN LISPRO 100/ML
VIAL (ML) SUBCUTANEOUS
Refills: 0 | Status: DISCONTINUED | OUTPATIENT
Start: 2023-10-17 | End: 2023-10-22

## 2023-10-17 RX ORDER — PANTOPRAZOLE SODIUM 20 MG/1
40 TABLET, DELAYED RELEASE ORAL
Refills: 0 | Status: DISCONTINUED | OUTPATIENT
Start: 2023-10-17 | End: 2023-10-22

## 2023-10-17 RX ORDER — TICAGRELOR 90 MG/1
180 TABLET ORAL ONCE
Refills: 0 | Status: DISCONTINUED | OUTPATIENT
Start: 2023-10-17 | End: 2023-10-17

## 2023-10-17 RX ORDER — PREGABALIN 225 MG/1
1000 CAPSULE ORAL DAILY
Refills: 0 | Status: DISCONTINUED | OUTPATIENT
Start: 2023-10-17 | End: 2023-10-22

## 2023-10-17 RX ORDER — ASPIRIN/CALCIUM CARB/MAGNESIUM 324 MG
81 TABLET ORAL DAILY
Refills: 0 | Status: DISCONTINUED | OUTPATIENT
Start: 2023-10-17 | End: 2023-10-19

## 2023-10-17 RX ORDER — VENLAFAXINE HCL 75 MG
1 CAPSULE, EXT RELEASE 24 HR ORAL
Refills: 0 | DISCHARGE

## 2023-10-17 RX ORDER — LEFLUNOMIDE 10 MG/1
1 TABLET ORAL
Refills: 0 | DISCHARGE

## 2023-10-17 RX ORDER — DEXTROSE 50 % IN WATER 50 %
25 SYRINGE (ML) INTRAVENOUS ONCE
Refills: 0 | Status: DISCONTINUED | OUTPATIENT
Start: 2023-10-17 | End: 2023-10-22

## 2023-10-17 RX ORDER — SODIUM CHLORIDE 9 MG/ML
1000 INJECTION, SOLUTION INTRAVENOUS
Refills: 0 | Status: DISCONTINUED | OUTPATIENT
Start: 2023-10-17 | End: 2023-10-22

## 2023-10-17 RX ORDER — ROSUVASTATIN CALCIUM 5 MG/1
1 TABLET ORAL
Qty: 0 | Refills: 0 | DISCHARGE

## 2023-10-17 RX ORDER — OMEPRAZOLE 10 MG/1
1 CAPSULE, DELAYED RELEASE ORAL
Refills: 0 | DISCHARGE

## 2023-10-17 RX ORDER — GLIMEPIRIDE 1 MG
2 TABLET ORAL
Qty: 0 | Refills: 0 | DISCHARGE

## 2023-10-17 RX ORDER — EMPAGLIFLOZIN 10 MG/1
1 TABLET, FILM COATED ORAL
Qty: 0 | Refills: 0 | DISCHARGE

## 2023-10-17 RX ORDER — HYDROXYZINE HCL 10 MG
1 TABLET ORAL
Refills: 0 | DISCHARGE

## 2023-10-17 RX ORDER — FAMOTIDINE 10 MG/ML
1 INJECTION INTRAVENOUS
Refills: 0 | DISCHARGE

## 2023-10-17 RX ORDER — FAMOTIDINE 10 MG/ML
1 INJECTION INTRAVENOUS
Qty: 0 | Refills: 0 | DISCHARGE

## 2023-10-17 RX ORDER — EZETIMIBE 10 MG/1
1 TABLET ORAL
Qty: 0 | Refills: 0 | DISCHARGE

## 2023-10-17 RX ORDER — GLIMEPIRIDE 1 MG
2 TABLET ORAL
Refills: 0 | DISCHARGE

## 2023-10-17 RX ORDER — HYDROXYZINE HCL 10 MG
50 TABLET ORAL DAILY
Refills: 0 | Status: DISCONTINUED | OUTPATIENT
Start: 2023-10-17 | End: 2023-10-22

## 2023-10-17 RX ORDER — GLUCAGON INJECTION, SOLUTION 0.5 MG/.1ML
1 INJECTION, SOLUTION SUBCUTANEOUS ONCE
Refills: 0 | Status: DISCONTINUED | OUTPATIENT
Start: 2023-10-17 | End: 2023-10-22

## 2023-10-17 RX ORDER — FAMOTIDINE 10 MG/ML
20 INJECTION INTRAVENOUS DAILY
Refills: 0 | Status: DISCONTINUED | OUTPATIENT
Start: 2023-10-17 | End: 2023-10-22

## 2023-10-17 RX ORDER — LINAGLIPTIN 5 MG/1
1 TABLET, FILM COATED ORAL
Refills: 0 | DISCHARGE

## 2023-10-17 RX ORDER — ENOXAPARIN SODIUM 100 MG/ML
40 INJECTION SUBCUTANEOUS EVERY 24 HOURS
Refills: 0 | Status: DISCONTINUED | OUTPATIENT
Start: 2023-10-17 | End: 2023-10-22

## 2023-10-17 RX ORDER — TICAGRELOR 90 MG/1
90 TABLET ORAL EVERY 12 HOURS
Refills: 0 | Status: DISCONTINUED | OUTPATIENT
Start: 2023-10-17 | End: 2023-10-19

## 2023-10-17 RX ORDER — EMPAGLIFLOZIN 10 MG/1
1 TABLET, FILM COATED ORAL
Refills: 0 | DISCHARGE

## 2023-10-17 RX ORDER — FOLIC ACID 0.8 MG
1 TABLET ORAL DAILY
Refills: 0 | Status: DISCONTINUED | OUTPATIENT
Start: 2023-10-17 | End: 2023-10-22

## 2023-10-17 RX ORDER — CHOLECALCIFEROL (VITAMIN D3) 125 MCG
1 CAPSULE ORAL
Qty: 0 | Refills: 0 | DISCHARGE

## 2023-10-17 RX ORDER — SITAGLIPTIN 50 MG/1
1 TABLET, FILM COATED ORAL
Refills: 0 | DISCHARGE

## 2023-10-17 RX ORDER — DEXTROSE 50 % IN WATER 50 %
12.5 SYRINGE (ML) INTRAVENOUS ONCE
Refills: 0 | Status: DISCONTINUED | OUTPATIENT
Start: 2023-10-17 | End: 2023-10-22

## 2023-10-17 RX ADMIN — Medication 37.5 MILLIGRAM(S): at 12:43

## 2023-10-17 RX ADMIN — Medication 81 MILLIGRAM(S): at 12:46

## 2023-10-17 RX ADMIN — PREGABALIN 1000 MICROGRAM(S): 225 CAPSULE ORAL at 12:54

## 2023-10-17 RX ADMIN — Medication 1 MILLIGRAM(S): at 12:47

## 2023-10-17 RX ADMIN — Medication 50 MILLIGRAM(S): at 12:43

## 2023-10-17 RX ADMIN — PANTOPRAZOLE SODIUM 40 MILLIGRAM(S): 20 TABLET, DELAYED RELEASE ORAL at 05:51

## 2023-10-17 RX ADMIN — ATORVASTATIN CALCIUM 80 MILLIGRAM(S): 80 TABLET, FILM COATED ORAL at 21:28

## 2023-10-17 RX ADMIN — TICAGRELOR 90 MILLIGRAM(S): 90 TABLET ORAL at 15:13

## 2023-10-17 RX ADMIN — Medication 1: at 11:48

## 2023-10-17 RX ADMIN — ENOXAPARIN SODIUM 40 MILLIGRAM(S): 100 INJECTION SUBCUTANEOUS at 05:51

## 2023-10-17 RX ADMIN — FAMOTIDINE 20 MILLIGRAM(S): 10 INJECTION INTRAVENOUS at 12:47

## 2023-10-17 RX ADMIN — TICAGRELOR 90 MILLIGRAM(S): 90 TABLET ORAL at 03:41

## 2023-10-17 NOTE — H&P ADULT - NSHPLABSRESULTS_GEN_ALL_CORE
< from: CT Head No Cont (10.16.23 @ 21:51) >    Noncontrast CT Head: No acute intracranal hemorrhage, mass effect, or   evidence of acute vascular territorial infarct. Ifclinical symptoms   persist or worsen, more sensitive evaluation with brain MRI may be   obtained, if no contraindications exist.    CTA Neck: Severe (greater than 70%) luminal stenosis of the proximal   right internal carotid artery by NASCET criteria, secondary to   predominantly noncalcified plaque. Redemonstration of nonopacification of   the proximal and mid left vertebral artery with intermittent   opacification of the mid to distal cervical left vertebral artery.    Noncalcified atherosclerotic plaque along the aortic arch, protruding   into the lumen, similar to prior examination.    CTA Head: No proximal large vessel occlusion. Stable moderate and severe   stenoses of the left vertebral artery.      < end of copied text >

## 2023-10-17 NOTE — PROGRESS NOTE ADULT - SUBJECTIVE AND OBJECTIVE BOX
[facility] DEPARTMENT OF OPHTHALMOLOGY  ------------------------------------------------------------------------------  Suzy Gonzales MD, PGY-3  Contact: TEAMS  ------------------------------------------------------------------------------    Interval History: No acute events overnight. Today patient denying changes in vision.   MEDICATIONS  (STANDING):  aspirin  chewable 81 milliGRAM(s) Oral daily  atorvastatin 80 milliGRAM(s) Oral at bedtime  cyanocobalamin 1000 MICROGram(s) Oral daily  dextrose 5%. 1000 milliLiter(s) (100 mL/Hr) IV Continuous <Continuous>  dextrose 5%. 1000 milliLiter(s) (50 mL/Hr) IV Continuous <Continuous>  dextrose 50% Injectable 25 Gram(s) IV Push once  dextrose 50% Injectable 25 Gram(s) IV Push once  dextrose 50% Injectable 12.5 Gram(s) IV Push once  enoxaparin Injectable 40 milliGRAM(s) SubCutaneous every 24 hours  famotidine    Tablet 20 milliGRAM(s) Oral daily  folic acid 1 milliGRAM(s) Oral daily  glucagon  Injectable 1 milliGRAM(s) IntraMuscular once  hydrOXYzine hydrochloride 50 milliGRAM(s) Oral daily  insulin lispro (ADMELOG) corrective regimen sliding scale   SubCutaneous three times a day before meals  insulin lispro (ADMELOG) corrective regimen sliding scale   SubCutaneous at bedtime  pantoprazole    Tablet 40 milliGRAM(s) Oral before breakfast  ticagrelor 90 milliGRAM(s) Oral every 12 hours  venlafaxine 37.5 milliGRAM(s) Oral daily    MEDICATIONS  (PRN):  artificial  tears Solution 1 Drop(s) Both EYES three times a day PRN for dry eyes  dextrose Oral Gel 15 Gram(s) Oral once PRN Blood Glucose LESS THAN 70 milliGRAM(s)/deciliter      VITALS: T(C): 37.1 (10-17-23 @ 12:29)  T(F): 98.7 (10-17-23 @ 12:29), Max: 98.8 (10-16-23 @ 18:32)  HR: 76 (10-17-23 @ 12:29) (75 - 102)  BP: 155/75 (10-17-23 @ 12:29) (144/84 - 160/82)  RR:  (16 - 18)  SpO2:  (95% - 100%)  Wt(kg): --  General: AAO x 3, appropriate mood and affect    Ophthalmology Exam:  Visual acuity (sc): 20/20- OD, 20/20 OS   Pupils: PERRLA OU. Trace APD OD  Ttono: 12 OD, 12 OS  Extraocular movements (EOMs): Full OU, no pain, no diplopia  Confrontational Visual Field (CVF): Full OD, Full OS using moving red target.  Color Plates: 12/12 OD, 12 OS    Pen Light Exam (PLE)  External: Flat OU  Lids/Lashes/Lacrimal Ducts: Flat OU    Sclera/Conjunctiva: W+Q OU  Cornea: Cl OU  Anterior Chamber: D+F OU    Iris: Flat OU, no NVI   Lens: PCIOL OU    Fundus Exam: dilated with 1% tropicamide and 2.5% phenylephrine  Approval obtained from primary team for dilation  Patient aware that pupils can remained dilated for at least 4-6 hours  Exam performed with 20D lens    Vitreous: Caldwell ring OD. Floaters OS.   Disc, cup/disc: sharp and pink, 0.4 OU. No disc pallor OU   Macula: Large CWS inferiorly OD, small splinter hemorrhage OD. OS wnl.   Vessels: Embolus noted in distal vasculature temporally OD. OS wnl.   Periphery: Few, small scattered nasal/perpapillary splinter hemorrhages OD. No overt retina ischemia OD. OS wnl.    [facility] DEPARTMENT OF OPHTHALMOLOGY  ------------------------------------------------------------------------------  Suzy Gonzales MD, PGY-3  Contact: TEAMS  ------------------------------------------------------------------------------    Interval History: No acute events overnight. Today patient denying changes in vision.     MEDICATIONS  (STANDING):  aspirin  chewable 81 milliGRAM(s) Oral daily  atorvastatin 80 milliGRAM(s) Oral at bedtime  cyanocobalamin 1000 MICROGram(s) Oral daily  dextrose 5%. 1000 milliLiter(s) (100 mL/Hr) IV Continuous <Continuous>  dextrose 5%. 1000 milliLiter(s) (50 mL/Hr) IV Continuous <Continuous>  dextrose 50% Injectable 25 Gram(s) IV Push once  dextrose 50% Injectable 25 Gram(s) IV Push once  dextrose 50% Injectable 12.5 Gram(s) IV Push once  enoxaparin Injectable 40 milliGRAM(s) SubCutaneous every 24 hours  famotidine    Tablet 20 milliGRAM(s) Oral daily  folic acid 1 milliGRAM(s) Oral daily  glucagon  Injectable 1 milliGRAM(s) IntraMuscular once  hydrOXYzine hydrochloride 50 milliGRAM(s) Oral daily  insulin lispro (ADMELOG) corrective regimen sliding scale   SubCutaneous three times a day before meals  insulin lispro (ADMELOG) corrective regimen sliding scale   SubCutaneous at bedtime  pantoprazole    Tablet 40 milliGRAM(s) Oral before breakfast  ticagrelor 90 milliGRAM(s) Oral every 12 hours  venlafaxine 37.5 milliGRAM(s) Oral daily    MEDICATIONS  (PRN):  artificial  tears Solution 1 Drop(s) Both EYES three times a day PRN for dry eyes  dextrose Oral Gel 15 Gram(s) Oral once PRN Blood Glucose LESS THAN 70 milliGRAM(s)/deciliter      VITALS: T(C): 37.1 (10-17-23 @ 12:29)  T(F): 98.7 (10-17-23 @ 12:29), Max: 98.8 (10-16-23 @ 18:32)  HR: 76 (10-17-23 @ 12:29) (75 - 102)  BP: 155/75 (10-17-23 @ 12:29) (144/84 - 160/82)  RR:  (16 - 18)  SpO2:  (95% - 100%)  Wt(kg): --  General: AAO x 3, appropriate mood and affect    Ophthalmology Exam:  Visual acuity (sc): 20/20- OD, 20/20 OS   Pupils: PERRLA OU. Trace APD OD  Ttono: 12 OD, 12 OS  Extraocular movements (EOMs): Full OU, no pain, no diplopia  Confrontational Visual Field (CVF): Full OD, Full OS using moving red target.  Color Plates: 12/12 OD, 12 OS    Pen Light Exam (PLE)  External: Flat OU  Lids/Lashes/Lacrimal Ducts: Flat OU    Sclera/Conjunctiva: W+Q OU  Cornea: Cl OU  Anterior Chamber: D+F OU    Iris: Flat OU, no NVI   Lens: PCIOL OU    Fundus Exam: dilated with 1% tropicamide and 2.5% phenylephrine  Approval obtained from primary team for dilation  Patient aware that pupils can remained dilated for at least 4-6 hours  Exam performed with 20D lens    Vitreous: Caldwell ring OD. Floaters OS.   Disc, cup/disc: sharp and pink, 0.4 OU. No disc pallor OU   Macula: Large CWS inferiorly OD, small splinter hemorrhage OD. OS wnl.   Vessels: Embolus noted in distal vasculature temporally OD. OS wnl.   Periphery: Few, small scattered nasal/perpapillary splinter hemorrhages OD. No overt retina ischemia OD. OS wnl.     < from: CT Head No Cont (10.16.23 @ 21:51) >  IMPRESSION:    Noncontrast CT Head: No acute intracranal hemorrhage, mass effect, or   evidence of acute vascular territorial infarct. Ifclinical symptoms   persist or worsen, more sensitive evaluation with brain MRI may be   obtained, if no contraindications exist.    CTA Neck: Severe (greater than 70%) luminal stenosis of the proximal   right internal carotid artery by NASCET criteria, secondary to   predominantly noncalcified plaque. Redemonstration of nonopacification of   the proximal and mid left vertebral artery with intermittent   opacification of the mid to distal cervical left vertebral artery.    Noncalcified atherosclerotic plaque along the aortic arch, protruding   into the lumen, similar to prior examination.    CTA Head: No proximal large vessel occlusion. Stable moderate and severe   stenoses of the left vertebral artery.    --- End of Report ---      < end of copied text >

## 2023-10-17 NOTE — CONSULT NOTE ADULT - SUBJECTIVE AND OBJECTIVE BOX
[facility] DEPARTMENT OF OPHTHALMOLOGY - INITIAL ADULT CONSULT  ----------------------------------------------------------------------------------------------------  Teresa Prajapati MD, PGY-2  -------------------------------------------------------------------------------------------------    HPI:  71 y/o RHM PMHx prior b/l cerebellar infarcts with no residual deficitds, b/l VA stenosis (s/p R VA stent at [facility] in 2016 on ASA/Plavix), DM2, HLD, HTN, cataracts in both eyes (2012) depression, rheumatoid arthritis presenting from outpatient optho office for right eye vision changes. About 3 weeks ago, patient gradually started noticing that his right eye's lower visual field is seeing dark. Denies HA, weakness, numbness, dizziness. Went to see optho Dr. Dawit Lawrence. who performed formal visual field testing in the office, and documented (paperwork provided by patient) that there is partial inferior altitudinal defect nasally and possible small SN defect. Said no diabetic retinopathy in both eyes. However, believes the right eye visual field deficit found on VF testing is possibly due to embolic retinal ischemia and recommended patient come to [facility] for stroke work up. At the time of neurological exam, patient was not complaining of significant visual field loss, and on visual field testing, did not have gross visual field cut.     Of note, neurology had seen patient in 11/2021 for acute vestibular syndrome. Neuroimaging at the time did not show new findings, including MRI brain.       Interval History: Patient reports that last December, he started notable visual changes in the right eye that were unimproved with glasses. However, over the past 3-4 weeks, he developed dark area in visual field inferiorly in the right eye. Denies any headache, jaw claudication, scalp tenderness, or preceding episodes of transient visual loss.     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 No flashes, floaters, FBS, erythema, discharge, double vision, OU  Neuro: No tremors  Cardiovascular: No chest pain, palpitations  Respiratory: No SOB, no cough  GI: No nausea, vomiting, abdominal pain  : No dysuria  Skin: no rash  Psych: no depression  Endocrine: no polyuria, polydipsia  Heme/lymph: no swelling    VITALS: T(C): 36.9 (10-17-23 @ 01:37)  T(F): 98.5 (10-17-23 @ 01:37), Max: 98.8 (10-16-23 @ 18:32)  HR: 82 (10-17-23 @ 01:37) (82 - 102)  BP: 150/86 (10-17-23 @ 01:37) (144/84 - 155/91)  RR:  (16 - 18)  SpO2:  (95% - 99%)  Wt(kg): --  General: AAO x 3, appropriate mood and affect    Ophthalmology Exam:  Visual acuity (sc): 20/30 OD, 20/20 OS   Pupils: PERRLA OU. Trace APD OD  Ttono: 10 OD, 12 OS  Extraocular movements (EOMs): Full OU, no pain, no diplopia  Confrontational Visual Field (CVF): Full OU (however, with noted fluctuation superonasally OD).  Color Plates: 12/12 OU    Pen Light Exam (PLE)  External: Flat OU  Lids/Lashes/Lacrimal Ducts: Flat OU    Sclera/Conjunctiva: W+Q OU  Cornea: Cl OU  Anterior Chamber: D+F OU    Iris: Flat OU, no NVI   Lens: PCIOL OU    Fundus Exam: dilated with 1% tropicamide and 2.5% phenylephrine  Approval obtained from primary team for dilation  Patient aware that pupils can remained dilated for at least 4-6 hours  Exam performed with 20D lens    Vitreous: Caldwell ring OD. Floaters OS.   Disc, cup/disc: sharp and pink, 0.4 OU. No disc pallor OD.   Macula: Large CWS involving inferior arcade with small flame hemorrhage OD. OS wnl.   Vessels: Embolus noted in distal vasculature temporally. OS wnl.   Periphery: Few, small scattered nasal flame hemorrhages OD. No overt retina ischemia OD. OS wnl.     Labs/Imaging:  < from: CT Head No Cont (10.16.23 @ 21:51) >    IMPRESSION:    Noncontrast CT Head: No acute intracranal hemorrhage, mass effect, or   evidence of acute vascular territorial infarct. Ifclinical symptoms   persist or worsen, more sensitive evaluation with brain MRI may be   obtained, if no contraindications exist.    CTA Neck: Severe (greater than 70%) luminal stenosis of the proximal   right internal carotid artery by NASCET criteria, secondary to   predominantly noncalcified plaque. Redemonstration of nonopacification of   the proximal and mid left vertebral artery with intermittent   opacification of the mid to distal cervical left vertebral artery.    Noncalcified atherosclerotic plaque along the aortic arch, protruding   into the lumen, similar to prior examination.    CTA Head: No proximal large vessel occlusion. Stable moderate and severe   stenoses of the left vertebral artery.    --- End of Report ---    < end of copied text >     [facility] DEPARTMENT OF OPHTHALMOLOGY - INITIAL ADULT CONSULT  ----------------------------------------------------------------------------------------------------  Teresa Prajapati MD, PGY-2  -------------------------------------------------------------------------------------------------    HPI:  71 y/o RHM PMHx prior b/l cerebellar infarcts with no residual deficitds, b/l VA stenosis (s/p R VA stent at [facility] in 2016 on ASA/Plavix), DM2, HLD, HTN, cataracts in both eyes (2012) depression, rheumatoid arthritis presenting from outpatient optho office for right eye vision changes. About 3 weeks ago, patient gradually started noticing that his right eye's lower visual field is seeing dark. Denies HA, weakness, numbness, dizziness. Went to see optho Dr. Dawit Lawrence. who performed formal visual field testing in the office, and documented (paperwork provided by patient) that there is partial inferior altitudinal defect nasally and possible small SN defect. Said no diabetic retinopathy in both eyes. However, believes the right eye visual field deficit found on VF testing is possibly due to embolic retinal ischemia and recommended patient come to [facility] for stroke work up. At the time of neurological exam, patient was not complaining of significant visual field loss, and on visual field testing, did not have gross visual field cut.     Of note, neurology had seen patient in 11/2021 for acute vestibular syndrome. Neuroimaging at the time did not show new findings, including MRI brain.       Interval History: Patient reports that last December, he started notable visual changes in the right eye that were unimproved with glasses. However, over the past 3-4 weeks, he developed dark area in visual field inferiorly in the right eye. Denies any headache, jaw claudication, scalp tenderness, or preceding episodes of transient visual loss.     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 No flashes, floaters, FBS, erythema, discharge, double vision, OU  Neuro: No tremors  Cardiovascular: No chest pain, palpitations  Respiratory: No SOB, no cough  GI: No nausea, vomiting, abdominal pain  : No dysuria  Skin: no rash  Psych: no depression  Endocrine: no polyuria, polydipsia  Heme/lymph: no swelling    VITALS: T(C): 36.9 (10-17-23 @ 01:37)  T(F): 98.5 (10-17-23 @ 01:37), Max: 98.8 (10-16-23 @ 18:32)  HR: 82 (10-17-23 @ 01:37) (82 - 102)  BP: 150/86 (10-17-23 @ 01:37) (144/84 - 155/91)  RR:  (16 - 18)  SpO2:  (95% - 99%)  Wt(kg): --  General: AAO x 3, appropriate mood and affect    Ophthalmology Exam:  Visual acuity (sc): 20/30 OD, 20/20 OS   Pupils: PERRLA OU. Trace APD OD  Ttono: 10 OD, 12 OS  Extraocular movements (EOMs): Full OU, no pain, no diplopia  Confrontational Visual Field (CVF): Full OU (however, with noted fluctuation superonasally OD).  Color Plates: 12/12 OU    Pen Light Exam (PLE)  External: Flat OU  Lids/Lashes/Lacrimal Ducts: Flat OU    Sclera/Conjunctiva: W+Q OU  Cornea: Cl OU  Anterior Chamber: D+F OU    Iris: Flat OU, no NVI   Lens: PCIOL OU    Fundus Exam: dilated with 1% tropicamide and 2.5% phenylephrine  Approval obtained from primary team for dilation  Patient aware that pupils can remained dilated for at least 4-6 hours  Exam performed with 20D lens    Vitreous: Caldwell ring OD. Floaters OS.   Disc, cup/disc: sharp and pink, 0.4 OU. No disc pallor OD.   Macula: Large CWS involving inferior arcade with small splinter hemorrhage OD. OS wnl.   Vessels: Embolus noted in distal vasculature temporally. OS wnl.   Periphery: Few, small scattered nasal/perpapillary splinter hemorrhages OD. No overt retina ischemia OD. OS wnl.     Labs/Imaging:  < from: CT Head No Cont (10.16.23 @ 21:51) >    IMPRESSION:    Noncontrast CT Head: No acute intracranal hemorrhage, mass effect, or   evidence of acute vascular territorial infarct. Ifclinical symptoms   persist or worsen, more sensitive evaluation with brain MRI may be   obtained, if no contraindications exist.    CTA Neck: Severe (greater than 70%) luminal stenosis of the proximal   right internal carotid artery by NASCET criteria, secondary to   predominantly noncalcified plaque. Redemonstration of nonopacification of   the proximal and mid left vertebral artery with intermittent   opacification of the mid to distal cervical left vertebral artery.    Noncalcified atherosclerotic plaque along the aortic arch, protruding   into the lumen, similar to prior examination.    CTA Head: No proximal large vessel occlusion. Stable moderate and severe   stenoses of the left vertebral artery.    --- End of Report ---    < end of copied text >

## 2023-10-17 NOTE — SPEECH LANGUAGE PATHOLOGY EVALUATION - SLP DIAGNOSIS
Pt presents with grossly functional receptive/expressive language skills, speech/voice, and cognitive-linguistic skills. No ST services appear warranted at this time.

## 2023-10-17 NOTE — OCCUPATIONAL THERAPY INITIAL EVALUATION ADULT - PERTINENT HX OF CURRENT PROBLEM, REHAB EVAL
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 Reports periods of blackness in R lower visual field. CTA w/ severe (70%) BHANU stenosis. Exam: AOx3, RONDON 5/5. SILT. No appreciated visual field cut.  -MR brain pending  -ARU/PRU  -pre-op for R carotid artery stent on Thursday, 10/19/23

## 2023-10-17 NOTE — PHYSICAL THERAPY INITIAL EVALUATION ADULT - PERTINENT HX OF CURRENT PROBLEM, REHAB EVAL
73 y/o male with multiple vascular risk factors presenting with month long hx of right visual disturbances, particular describing seeing black in right lower visual fields. Saw optho outpatient, performed formal visual field testing, confirming right partial inferior altitudinal defect nasally and possible small SN defect. Recommended to come to [facility] for embolic work up, was found to have R visual field cut 2/2 to emboli from BHANU. CTH: (-) for acute changes. CTA neck: Severe (greater than 70%) luminal stenosis of the proximal right internal carotid artery by NASCET criteria, secondary to predominantly noncalcified plaque. Readministration of nonopacification of the proximal and mid left vertebral artery with intermittent opacification of the mid to distal cervical left vertebral artery. Noncalcified atherosclerotic plaque along the aortic arch, protruding into the lumen, similar to prior examination. CTA Head: No proximal large vessel occlusion. Stable moderate and severe stenoses of the left vertebral artery. TTE: Normal LV systolic function w/ EF: 52%, normal RV function, mild aortic stenosis, no LV thrombus.

## 2023-10-17 NOTE — SPEECH LANGUAGE PATHOLOGY EVALUATION - COMMENTS
Opthalmology consult - 71 y/o RHM PMHx prior b/l cerebellar infarcts with no residual deficitds, b/l VA stenosis (s/p R VA stent at [facility] in 2016 on ASA/Plavix), DM2, HLD, HTN, cataracts in both eyes (2012) depression, rheumatoid arthritis presenting from outpatient optho office for right eye vision changes. About 3 weeks ago, patient gradually started noticing that his right eye's lower visual field is seeing dark. Denies HA, weakness, numbness, dizziness. Went to see optho Dr. Dawit Lawrence. who performed formal visual field testing in the office, and documented (paperwork provided by patient) that there is partial inferior altitudinal defect nasally and possible small SN defect. Said no diabetic retinopathy in both eyes. However, believes the right eye visual field deficit found on VF testing is possibly due to embolic retinal ischemia and recommended patient come to [facility] for stroke work up. At the time of neurological exam, patient was not complaining of significant visual field loss, and on visual field testing, did not have gross visual field cut. 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 Differential also includes ocular ischemic syndrome. -Recommend full stroke work-up, including TTE and MRI brain & orbits w/ and w/o contrast    10/16: CT Head - Noncontrast CT Head: No acute intracranal hemorrhage, mass effect, or   evidence of acute vascular territorial infarct. Ifclinical symptoms   persist or worsen, more sensitive evaluation with brain MRI may be   obtained, if no contraindications exist.    Impression per Neuro: Right visual field cut 2/2 to emboli from BHANU. No ST services appear warranted at this time  Discussed recommendation with Neuro JAEL Peña Adequate vocal quality with appropriate volume during conversational dyad. Visual impairment does not negatively communication, reading, or writing skills Expressive language skills deemed WFL. Pt able to express complex thoughts and ideas with accurate grammatical and syntactical form. Intact at the sentence level. Cognitive-linguistic skills intact. Pt demonstrates good sustained attention/concentration throughout exam. Memory intact. Pt able to verbally problem-solve hypothetical scenarios independently. Pt verbally sequences activities of daily living independently. Clock organization accurate with good spacing of numbers and adequate placement of hands given a specific time.  Subtest 6: Understanding Medicine Labels of Assessment of Language-Related Functional Activities (SOUTH) performed. The patient scored 9 out of 10. This indicates a:   - high probability of independent functioning on this task  Neuro team made aware of findings Pt does not utilize AAC/gestures as primary means of communication Receptive language skills intact. Pt answers questions relating to a complex paragraph without difficulty. Articulatory precision deemed WFL. Good speech intelligibility during known and unknown contexts. Intact at the simple paragraph level

## 2023-10-17 NOTE — CONSULT NOTE ADULT - ASSESSMENT
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     #Visual Loss, Right Eye   -Patient reporting 3-4 weeks of darkening of inferior visual field OD. Seen by private outpatient ophthalmologist who performed VF testing that showed possible inferior altitudinal defect OD with potential superonasal defect. Patient subsequently sent to ED for stroke work-up given concern for potential atheroembolic disease  -GCA ROS negative   -VA 20/30 OD, 20/20 OS. IOP wnl OU. PERRLA OU with trace APD OD. CVF grossly full OD, with noted fluctuation in superonasal quadrant. CVF full OS. Color plates full/symmetric.   -Anterior segment exam negative for NVI  -DFE significant for large CWS involving inferior arcade OD with small flame hemorrhage. Also noted to have few, peripheral nasal hemorr  #   - Findings and plan discussed with patient and primary team.    Patient seen and discussed with  ***    Outpatient follow-up: Patient should follow-up with his/her ophthalmologist or with [facility] Department of Ophthalmology at the address below     [address] [place] [address]. [address]  [place] [address]  [phone]     Assessment and Recommendations  71 y/o RHM PMHx prior b/l cerebellar infarcts with no residual deficitds, b/l VA stenosis (s/p R VA stent at [facility] in 2016 on ASA/Plavix), DM2, HLD, HTN, cataracts in both eyes (2012) depression, rheumatoid arthritis sent to ED by outpatient ophthalmologist for stroke work-up given 3-4 weeks of visual changes OD with noted inferior altitudinal defect OD on VF testing. Ophthalmology consulted for further evaluation, found to have cotton-wool spot, few scattered flame hemorrhages, and distal embolus OD concerning for carotid artery stenosis with subsequent atheroembolic disease and NAION.       #Visual Loss, Right Eye   -Patient reporting 3-4 weeks of darkening of inferior visual field OD. Seen by private outpatient ophthalmologist who performed VF testing that showed possible inferior altitudinal defect OD with potential superonasal defect. Patient subsequently sent to ED for stroke work-up given concern for potential atheroembolic disease  -GCA ROS negative   -VA 20/30 OD, 20/20 OS. IOP wnl OU. PERRLA OU with trace APD OD. CVF grossly full OD, with noted fluctuation in superonasal quadrant. CVF full OS. Color plates full/symmetric.   -Anterior segment exam negative for NVI  -DFE significant for large CWS involving inferior arcade OD with small flame hemorrhage. Also noted to have few, peripheral nasal flame hemorrhages OD. Embolus noted in distal, temporal vessel OD. No surrounding overt retinal whitening or ischemia. OS wnl.   -CTA significant for severe luminal stenosis of the proximal right ICA   -Overall, patient's VF abnormality, trace APD, and embolus noted on fundus examination OD concerning for atheroembolic disease with subsequent NAION. Additionally, patient with noted retinopathy OD involving cotton-wool spot and few flame hemorrhages. Given asymmetry of retinopathy, suspect potentially related to ocular ischemic syndrome given known R-ICA stenosis, vs secondary to HTN.   -Recommend full stroke work-up with remainder of care per neurology   -Ophthalmology to follow       Outpatient follow-up: Patient should follow-up with his/her ophthalmologist or with [facility] Department of Ophthalmology at the address below     [address] [place] [address]. [address]  [place] [address]  [phone]     Assessment and Recommendations  71 y/o RHM PMHx prior b/l cerebellar infarcts with no residual deficitds, b/l VA stenosis (s/p R VA stent at [facility] in 2016 on ASA/Plavix), DM2, HLD, HTN, cataracts in both eyes (2012) depression, rheumatoid arthritis sent to ED by outpatient ophthalmologist for stroke work-up given 3-4 weeks of visual changes OD with noted inferior altitudinal defect OD on VF testing. Ophthalmology consulted for further evaluation, found to have cotton-wool spot, few scattered flame hemorrhages, and distal embolus OD concerning for carotid artery stenosis with subsequent atheroembolic disease and NAION.       #Visual Loss, Right Eye   -Patient reporting 3-4 weeks of darkening of inferior visual field OD. Seen by private outpatient ophthalmologist who performed VF testing that showed possible inferior altitudinal defect OD with potential superonasal defect. Patient subsequently sent to ED for stroke work-up given concern for potential atheroembolic disease  -GCA ROS negative   -VA 20/30 OD, 20/20 OS. IOP wnl OU. PERRLA OU with trace APD OD. CVF grossly full OD, with noted fluctuation in superonasal quadrant. CVF full OS. Color plates full/symmetric.   -Anterior segment exam negative for NVI  -DFE significant for large CWS involving inferior arcade OD with small flame hemorrhage. Also noted to have few, peripheral nasal flame hemorrhages OD. Embolus noted in distal, temporal vessel OD. No surrounding overt retinal whitening or ischemia. OS wnl.   -CTA significant for severe luminal stenosis of the proximal right ICA   -Overall, patient's VF abnormality, trace APD, and embolus noted on fundus examination OD concerning for atheroembolic disease with subsequent NAION. Additionally, patient with noted retinopathy OD involving cotton-wool spot and few flame hemorrhages. Given asymmetry of retinopathy, suspect potentially related to ocular ischemic syndrome given known R-ICA stenosis, vs secondary to HTN.   -Recommend full stroke work-up, including TTE and MRI brain & orbits w/ and w/o contrast  -Remainder of care per neurology   -Ophthalmology to follow       Outpatient follow-up: Patient should follow-up with his/her ophthalmologist or with [facility] Department of Ophthalmology at the address below     [address] [place] [address]. [address]  [place] [address]  [phone]     Assessment and Recommendations  71 y/o RHM PMHx prior b/l cerebellar infarcts with no residual deficitds, b/l VA stenosis (s/p R VA stent at [facility] in 2016 on ASA/Plavix), DM2, HLD, HTN, cataracts in both eyes (2012) depression, rheumatoid arthritis sent to ED by outpatient ophthalmologist for stroke work-up given 3-4 weeks of visual changes OD with noted inferior altitudinal defect OD on VF testing. Ophthalmology consulted for further evaluation, found to have cotton-wool spot, few scattered flame hemorrhages, and distal embolus OD concerning for carotid artery stenosis with subsequent atheroembolic disease and NAION.       #Visual Loss, Right Eye   -Patient reporting 3-4 weeks of darkening of inferior visual field OD. Seen by private outpatient ophthalmologist who performed VF testing that showed possible inferior altitudinal defect OD with potential superonasal defect. Patient subsequently sent to ED for stroke work-up given concern for potential atheroembolic disease  -GCA ROS negative   -VA 20/30 OD, 20/20 OS. IOP wnl OU. PERRLA OU with trace APD OD. CVF grossly full OD, with noted fluctuation in superonasal quadrant. CVF full OS. Color plates full/symmetric.   -Anterior segment exam negative for NVI  -DFE significant for large CWS involving inferior arcade OD with small flame hemorrhage. Also noted to have few, peripheral nasal flame hemorrhages OD. Embolus noted in distal, temporal vessel OD. No surrounding overt retinal whitening or ischemia. OS wnl.   -CTA significant for severe luminal stenosis of the proximal right ICA   -Overall, patient's VF abnormality, trace APD, and embolus noted on fundus examination OD concerning for atheroembolic disease with subsequent NAION. Additionally, patient with noted retinopathy OD involving cotton-wool spot and few flame hemorrhages. Given asymmetry of retinopathy, suspect potentially related to ocular ischemic syndrome given known R-ICA stenosis, vs secondary to HTN.   -Recommend full stroke work-up, including TTE and MRI brain & orbits w/ and w/o contrast  -Remainder of care per neurology and primary team  -Ophthalmology to follow       Outpatient follow-up: Patient should follow-up with his/her ophthalmologist or with [facility] Department of Ophthalmology at the address below     [address] [place] [address]. [address]  [place] [address]  [phone]     Assessment and Recommendations  73 y/o RHM PMHx prior b/l cerebellar infarcts with no residual deficitds, b/l VA stenosis (s/p R VA stent at [facility] in 2016 on ASA/Plavix), DM2, HLD, HTN, cataracts in both eyes (2012) depression, rheumatoid arthritis sent to ED by outpatient ophthalmologist for stroke work-up given 3-4 weeks of visual changes OD with noted inferior altitudinal defect OD on VF testing. Ophthalmology consulted for further evaluation, found to have cotton-wool spot, few scattered flame hemorrhages, and distal embolus OD concerning for carotid artery stenosis with subsequent atheroembolic disease and NAION.       #Visual Loss, Right Eye   -Patient reporting 3-4 weeks of darkening of inferior visual field OD. Seen by private outpatient ophthalmologist who performed VF testing that showed possible inferior altitudinal defect OD with potential superonasal defect. Patient subsequently sent to ED for stroke work-up given concern for potential atheroembolic disease  -GCA ROS negative   -VA 20/30 OD, 20/20 OS. IOP wnl OU. PERRLA OU with trace APD OD. CVF grossly full OD, with noted fluctuation in superonasal quadrant. CVF full OS. Color plates full/symmetric.   -Anterior segment exam negative for NVI  -DFE significant for large CWS involving inferior arcade OD with small splinter hemorrhage. Also noted to have few, peripheral nasal/perpapillary splinter hemorrhages OD. Embolus noted in distal, temporal vessel OD. No surrounding overt retinal whitening or ischemia. OS wnl.   -CTA significant for severe luminal stenosis of the proximal right ICA   -Overall, patient's VF abnormality, trace APD, cotton wool spot, splinter hemorrhages, and embolus noted on fundus examination OD in conjunction with know, severe R-ICA stenosis concerning for atheroembolic disease with subsequent NAION. Differential also includes ocular ischemic syndrome. -Recommend full stroke work-up, including TTE and MRI brain & orbits w/ and w/o contrast  -Remainder of care per neurology and primary team  -Ophthalmology to follow       Outpatient follow-up: Patient should follow-up with his/her ophthalmologist or with [facility] Department of Ophthalmology at the address below     [address]. [address]  [place] [address]  [phone]

## 2023-10-17 NOTE — CONSULT NOTE ADULT - ASSESSMENT
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 Reports periods of blackness in R lower visual field. CTA w/ severe (70%) BHANU stenosis. Exam: AOx3, RONDON 5/5. SILT. No appreciated visual field cut.  -MR brain pending  -ARU/PRU  -pre-op for R carotid artery stent on Thursday, 10/19/23  -please document medical clearance for procedure

## 2023-10-17 NOTE — SPEECH LANGUAGE PATHOLOGY EVALUATION - SLP GENERAL OBSERVATIONS
Pt encountered bedside, AA&Ox4, pleasant and cooperative. Pt follows mx-step commands and answers complex yes/no questions independently.

## 2023-10-17 NOTE — H&P ADULT - ATTENDING COMMENTS
I reviewed available diagnostic studies, and reviewed images personally. I agree with resident's history, exam, orders placed, and plan of care. Medical issues needing to be addressed include: R monocular vision impairment and vision blurring, suspected BRAO/AION, hx of stroke, RA, hx of cataracts s/p surgery. CTA noted severe R ICA stenosis, which is worse compared to prior imaging. NSGY c/s for possible intervention. Otherwise exam stable and pt doing well. MRI pending. I reviewed available diagnostic studies, and reviewed images personally. I agree with resident's history, exam, orders placed, and plan of care. Medical issues needing to be addressed include: R monocular vision impairment and vision blurring, suspected BRAO/NAION, hx of stroke, RA, hx of cataracts s/p surgery. CTA noted severe R ICA stenosis, which is worse compared to prior imaging. NSGY c/s for possible intervention. Otherwise exam stable and pt doing well. MRI pending.

## 2023-10-17 NOTE — SPEECH LANGUAGE PATHOLOGY EVALUATION - H & P REVIEW
73 y/o RHM PMHx prior b/l cerebellar infarcts with no residual deficitds, b/l VA stenosis (s/p R VA stent at [facility] in 2016 on ASA/Plavix), DM2, HLD, HTN, cataracts in both eyes (2012) depression, rheumatoid arthritis presenting from outpatient optho office for right eye vision changes. About 3 weeks ago, patient gradually started noticing that his right eye's lower visual field is seeing dark. Denies HA, weakness, numbness, dizziness. Went to see optho Dr. Dawit Lawrence. who performed formal visual field testing in the office, and documented (paperwork provided by patient) that there is partial inferior altitudinal defect nasally and possible small SN defect. Said no diabetic retinopathy in both eyes. However, believes the right eye visual field deficit found on VF testing is possibly due to embolic retinal ischemia and recommended patient come to [facility] for stroke work up. At the time of neurological exam, patient was not complaining of significant visual field loss, and on visual field testing, did not have gross visual field cut./yes

## 2023-10-17 NOTE — OCCUPATIONAL THERAPY INITIAL EVALUATION ADULT - ADDITIONAL COMMENTS
Pt reports he resides with family +steps, PTA pt was independent in all adl's  and functional, mobility Pt reports he resides with son and family in a [place]  No steps PTA pt was independent in all adl's  and functional, mobility

## 2023-10-17 NOTE — H&P ADULT - ASSESSMENT
71 y/o male with multiple vascular risk factors presenting with month long hx of right visual disturbances, particular describing seeing black in right lower visual fields. Saw optho outpatient, performed formal visual field testing, confirming right partial inferior altitudinal defect nasally and possible small SN defect. Recommended to come to [facility] for embolic work up. Neuro exam did not reveal gross visual field cuts. However, at the time of exam, patient denying visual difficulties. On neuroimaging, new high grade (70%) BHANU stenosis from prior imaging. B/l VA stenosis stable as well as atherosclerotic plaque in aortic arch.     Impression: Right visual field cut 2/2 to emboli from BHANU.     Recommendations:   [] Admit to stroke floor   []  c/w Aspirin 81   [] DC Plavix. Start Brilinta 90 mg bid. Give one dose of Brilinta tonight.   []  Atorvastatin 80, titrate to LDL<70  []  MRI brain w/o contrast   [] Carotid Dopplers   [] TTE and telemetry   []  DVT prophylaxis   []  Telemonitoring;  Neurochecks q4  []  Permissive HTN up to 180   []  Please send HbA1C and Lipid Panel  []  PT/OT evaluation  []  NPO until clears Dysphagia screen, otherwise Swallow evaluation  []  Stroke education provided     Case d/w stroke fellow under supervision of stroke attending.  71 y/o male with multiple vascular risk factors presenting with month long hx of right visual disturbances, particular describing seeing black in right lower visual fields. Saw optho outpatient, performed formal visual field testing, confirming right partial inferior altitudinal defect nasally and possible small SN defect. Recommended to come to [facility] for embolic work up. Neuro exam did not reveal gross visual field cuts. However, at the time of exam, patient denying visual difficulties. On neuroimaging, new high grade (70%) BHANU stenosis from prior imaging. B/l VA stenosis stable as well as atherosclerotic plaque in aortic arch.     Impression: Right visual field cut 2/2 to emboli from BHANU.     Recommendations:   [] Admit to stroke floor   []  c/w Aspirin 81   [] DC Plavix. Start Brilinta 90 mg bid. Give one dose of Brilinta tonight.   []  Atorvastatin 80, titrate to LDL<70  []  MRI brain w/o contrast   [] Carotid Dopplers   [] TTE and telemetry   []  DVT prophylaxis   []  Telemonitoring;  Neurochecks q4  []  Permissive HTN up to 180   []  Please send HbA1C and Lipid Panel  []  PT/OT evaluation  []  NPO until clears Dysphagia screen, otherwise Swallow evaluation  []  Stroke education provided   [] Optho consulted in the ED. F/u recommendations and findings     Case d/w stroke fellow under supervision of stroke attending.

## 2023-10-17 NOTE — PROGRESS NOTE ADULT - ASSESSMENT
Assessment and Recommendations  73 y/o RHM PMHx prior b/l cerebellar infarcts with no residual deficitds, b/l VA stenosis (s/p R VA stent at [facility] in 2016 on ASA/Plavix), DM2, HLD, HTN, cataracts in both eyes (2012) depression, rheumatoid arthritis sent to ED by outpatient ophthalmologist for stroke work-up given 3-4 weeks of visual changes OD with noted inferior altitudinal defect OD on VF testing. Ophthalmology consulted for further evaluation, found to have cotton-wool spot, few scattered flame hemorrhages, and distal embolus OD concerning for carotid artery stenosis with subsequent atheroembolic disease and NAION.       #Visual Loss, Right Eye   -Patient reporting 3-4 weeks of darkening of inferior visual field OD. Seen by private outpatient ophthalmologist who performed VF testing that showed possible inferior altitudinal defect OD with potential superonasal defect. Patient subsequently sent to ED for stroke work-up given concern for potential atheroembolic disease  -GCA ROS negative   -VA 20/20- OD, 20/20 OS. IOP wnl OU. PERRLA OU with trace APD OD. CVF grossly full OD. Color plates full/symmetric.   -Anterior segment exam negative for NVI  -DFE significant for large CWS in right macula, few peripheral nasal/perpapillary splinter hemorrhages OD. Embolus noted in distal, temporal vessel OD. No surrounding overt retinal whitening or ischemia. OS wnl.   -CTA significant for severe luminal stenosis of the proximal right ICA   -Overall, patient's VF abnormality, trace APD, cotton wool spot, splinter hemorrhages, and embolus noted on fundus examination OD in conjunction with know, severe R-ICA stenosis concerning for atheroembolic disease with subsequent NAION. Differential also includes ocular ischemic syndrome.  - Appreciate full stroke work-up, including TTE and MRI brain & orbits w/ and w/o contrast  - Recommend ESR and CRP despite negative GCA ROS given vascular embolus and patient age  -Remainder of care per neurology and primary team      DW Dr. Whatley, attending.      Outpatient follow-up: Patient should follow-up with his/her ophthalmologist or with [facility] Department of Ophthalmology at the address below     [address] [place] [address]. [address]  [place] [address]  [phone]     Assessment and Recommendations  71 y/o RHM PMHx prior b/l cerebellar infarcts with no residual deficitds, b/l VA stenosis (s/p R VA stent at [facility] in 2016 on ASA/Plavix), DM2, HLD, HTN, cataracts in both eyes (2012) depression, rheumatoid arthritis sent to ED by outpatient ophthalmologist for stroke work-up given 3-4 weeks of visual changes OD with noted inferior altitudinal defect OD on VF testing. Ophthalmology consulted for further evaluation, found to have cotton-wool spot, few scattered flame hemorrhages, and distal embolus OD concerning for carotid artery stenosis with subsequent atheroembolic disease and NAION.       #Visual Loss, Right Eye   -Patient reporting 3-4 weeks of darkening of inferior visual field OD. Seen by private outpatient ophthalmologist who performed VF testing that showed possible inferior altitudinal defect OD with potential superonasal defect. Patient subsequently sent to ED for stroke work-up given concern for potential atheroembolic disease  -GCA ROS negative   -VA 20/20- OD, 20/20 OS. IOP wnl OU. PERRLA OU with trace APD OD. CVF grossly full OD. Color plates full/symmetric.   -Anterior segment exam negative for NVI  -DFE significant for large CWS in right macula, few peripheral nasal/perpapillary splinter hemorrhages OD. Embolus noted in distal, temporal vessel OD. No surrounding overt retinal whitening or ischemia. OS wnl.   -CTA significant for severe luminal stenosis of the proximal right ICA   -Overall, patient's VF abnormality, trace APD, cotton wool spot, splinter hemorrhages, and embolus noted on fundus examination OD in conjunction with know, severe R-ICA stenosis concerning for atheroembolic disease with subsequent NAION. Differential also includes ocular ischemic syndrome.  - Appreciate full stroke work-up, including TTE and MRI brain & orbits w/ and w/o contrast  - Recommend ESR and CRP despite negative GCA ROS given vascular embolus and patient age  -Remainder of care per neurology and primary team      DW Dr. Whatley, attending.      Outpatient follow-up: Patient should follow-up with his/her ophthalmologist or with [facility] Department of Ophthalmology at the address below     [address] [place] [address]. [address]  [place] [address]  [phone]

## 2023-10-17 NOTE — H&P ADULT - NSHPPHYSICALEXAM_GEN_ALL_CORE
Neurological Exam:  Mental Status: Orientated to self, date and place.  Attention intact.  No dysarthria. Speech fluent.  Cranial Nerves:   PERRL, EOMI, VFF, no nystagmus.    CN V1-3 intact to light touch .  No facial asymmetry.  Hearing intact to finger rub bilaterally.  Tongue, uvula and palate midline.  Sternocleidomastoid and Trapezius intact bilaterally.    Motor:   Tone: normal.                  Strength:     [] Upper extremity                      Delt       Bicep    Tricep                                                  R         5/5        5/5        5/5       5/5                                               L          5/5        5/5        5/5       5/5  [] Lower extremity                       HF          KE          KF        DF         PF                                               R        5/5        5/5        5/5       5/5       5/5                                               L         5/5        5/5       5/5       5/5        5/5  Pronator drift: none                 Dysmetria: None to finger-nose-finger     Sensation: intact to light touch, pinprick    Deep Tendon Reflexes:     Did not assess given focused neurological exam.   Gait: FRANCIS given c/f safety

## 2023-10-18 DIAGNOSIS — I10 ESSENTIAL (PRIMARY) HYPERTENSION: ICD-10-CM

## 2023-10-18 DIAGNOSIS — E11.9 TYPE 2 DIABETES MELLITUS WITHOUT COMPLICATIONS: ICD-10-CM

## 2023-10-18 DIAGNOSIS — I63.9 CEREBRAL INFARCTION, UNSPECIFIED: ICD-10-CM

## 2023-10-18 LAB
ANION GAP SERPL CALC-SCNC: 12 MMOL/L — SIGNIFICANT CHANGE UP (ref 5–17)
ANION GAP SERPL CALC-SCNC: 12 MMOL/L — SIGNIFICANT CHANGE UP (ref 5–17)
BLD GP AB SCN SERPL QL: NEGATIVE — SIGNIFICANT CHANGE UP
BLD GP AB SCN SERPL QL: NEGATIVE — SIGNIFICANT CHANGE UP
BUN SERPL-MCNC: 16 MG/DL — SIGNIFICANT CHANGE UP (ref 7–23)
BUN SERPL-MCNC: 16 MG/DL — SIGNIFICANT CHANGE UP (ref 7–23)
CALCIUM SERPL-MCNC: 9.1 MG/DL — SIGNIFICANT CHANGE UP (ref 8.4–10.5)
CALCIUM SERPL-MCNC: 9.1 MG/DL — SIGNIFICANT CHANGE UP (ref 8.4–10.5)
CHLORIDE SERPL-SCNC: 103 MMOL/L — SIGNIFICANT CHANGE UP (ref 96–108)
CHLORIDE SERPL-SCNC: 103 MMOL/L — SIGNIFICANT CHANGE UP (ref 96–108)
CO2 SERPL-SCNC: 23 MMOL/L — SIGNIFICANT CHANGE UP (ref 22–31)
CO2 SERPL-SCNC: 23 MMOL/L — SIGNIFICANT CHANGE UP (ref 22–31)
CREAT SERPL-MCNC: 0.92 MG/DL — SIGNIFICANT CHANGE UP (ref 0.5–1.3)
CREAT SERPL-MCNC: 0.92 MG/DL — SIGNIFICANT CHANGE UP (ref 0.5–1.3)
CRP SERPL-MCNC: <3 MG/L — SIGNIFICANT CHANGE UP (ref 0–4)
CRP SERPL-MCNC: <3 MG/L — SIGNIFICANT CHANGE UP (ref 0–4)
EGFR: 87 ML/MIN/1.73M2 — SIGNIFICANT CHANGE UP
EGFR: 87 ML/MIN/1.73M2 — SIGNIFICANT CHANGE UP
ERYTHROCYTE [SEDIMENTATION RATE] IN BLOOD: 19 MM/HR — SIGNIFICANT CHANGE UP (ref 0–20)
ERYTHROCYTE [SEDIMENTATION RATE] IN BLOOD: 19 MM/HR — SIGNIFICANT CHANGE UP (ref 0–20)
GLUCOSE BLDC GLUCOMTR-MCNC: 129 MG/DL — HIGH (ref 70–99)
GLUCOSE BLDC GLUCOMTR-MCNC: 129 MG/DL — HIGH (ref 70–99)
GLUCOSE BLDC GLUCOMTR-MCNC: 137 MG/DL — HIGH (ref 70–99)
GLUCOSE BLDC GLUCOMTR-MCNC: 137 MG/DL — HIGH (ref 70–99)
GLUCOSE BLDC GLUCOMTR-MCNC: 144 MG/DL — HIGH (ref 70–99)
GLUCOSE BLDC GLUCOMTR-MCNC: 144 MG/DL — HIGH (ref 70–99)
GLUCOSE BLDC GLUCOMTR-MCNC: 150 MG/DL — HIGH (ref 70–99)
GLUCOSE BLDC GLUCOMTR-MCNC: 150 MG/DL — HIGH (ref 70–99)
GLUCOSE SERPL-MCNC: 127 MG/DL — HIGH (ref 70–99)
GLUCOSE SERPL-MCNC: 127 MG/DL — HIGH (ref 70–99)
HCT VFR BLD CALC: 40.5 % — SIGNIFICANT CHANGE UP (ref 39–50)
HCT VFR BLD CALC: 40.5 % — SIGNIFICANT CHANGE UP (ref 39–50)
HGB BLD-MCNC: 12.4 G/DL — LOW (ref 13–17)
HGB BLD-MCNC: 12.4 G/DL — LOW (ref 13–17)
MCHC RBC-ENTMCNC: 20.1 PG — LOW (ref 27–34)
MCHC RBC-ENTMCNC: 20.1 PG — LOW (ref 27–34)
MCHC RBC-ENTMCNC: 30.6 GM/DL — LOW (ref 32–36)
MCHC RBC-ENTMCNC: 30.6 GM/DL — LOW (ref 32–36)
MCV RBC AUTO: 65.6 FL — LOW (ref 80–100)
MCV RBC AUTO: 65.6 FL — LOW (ref 80–100)
NRBC # BLD: 0 /100 WBCS — SIGNIFICANT CHANGE UP (ref 0–0)
NRBC # BLD: 0 /100 WBCS — SIGNIFICANT CHANGE UP (ref 0–0)
PA ADP PRP-ACNC: 35 PRU — LOW (ref 194–417)
PA ADP PRP-ACNC: 35 PRU — LOW (ref 194–417)
PLATELET # BLD AUTO: 274 K/UL — SIGNIFICANT CHANGE UP (ref 150–400)
PLATELET # BLD AUTO: 274 K/UL — SIGNIFICANT CHANGE UP (ref 150–400)
PLATELET RESPONSE ASPIRIN RESULT: 385 ARU — SIGNIFICANT CHANGE UP
PLATELET RESPONSE ASPIRIN RESULT: 385 ARU — SIGNIFICANT CHANGE UP
POTASSIUM SERPL-MCNC: 3.7 MMOL/L — SIGNIFICANT CHANGE UP (ref 3.5–5.3)
POTASSIUM SERPL-MCNC: 3.7 MMOL/L — SIGNIFICANT CHANGE UP (ref 3.5–5.3)
POTASSIUM SERPL-SCNC: 3.7 MMOL/L — SIGNIFICANT CHANGE UP (ref 3.5–5.3)
POTASSIUM SERPL-SCNC: 3.7 MMOL/L — SIGNIFICANT CHANGE UP (ref 3.5–5.3)
RBC # BLD: 6.17 M/UL — HIGH (ref 4.2–5.8)
RBC # BLD: 6.17 M/UL — HIGH (ref 4.2–5.8)
RBC # FLD: 17.8 % — HIGH (ref 10.3–14.5)
RBC # FLD: 17.8 % — HIGH (ref 10.3–14.5)
RH IG SCN BLD-IMP: NEGATIVE — SIGNIFICANT CHANGE UP
RH IG SCN BLD-IMP: NEGATIVE — SIGNIFICANT CHANGE UP
SODIUM SERPL-SCNC: 138 MMOL/L — SIGNIFICANT CHANGE UP (ref 135–145)
SODIUM SERPL-SCNC: 138 MMOL/L — SIGNIFICANT CHANGE UP (ref 135–145)
WBC # BLD: 7.96 K/UL — SIGNIFICANT CHANGE UP (ref 3.8–10.5)
WBC # BLD: 7.96 K/UL — SIGNIFICANT CHANGE UP (ref 3.8–10.5)
WBC # FLD AUTO: 7.96 K/UL — SIGNIFICANT CHANGE UP (ref 3.8–10.5)
WBC # FLD AUTO: 7.96 K/UL — SIGNIFICANT CHANGE UP (ref 3.8–10.5)

## 2023-10-18 PROCEDURE — 70553 MRI BRAIN STEM W/O & W/DYE: CPT | Mod: 26

## 2023-10-18 PROCEDURE — 70543 MRI ORBT/FAC/NCK W/O &W/DYE: CPT | Mod: 26

## 2023-10-18 PROCEDURE — 99232 SBSQ HOSP IP/OBS MODERATE 35: CPT

## 2023-10-18 PROCEDURE — 99233 SBSQ HOSP IP/OBS HIGH 50: CPT

## 2023-10-18 RX ORDER — ATORVASTATIN CALCIUM 80 MG/1
20 TABLET, FILM COATED ORAL AT BEDTIME
Refills: 0 | Status: DISCONTINUED | OUTPATIENT
Start: 2023-10-18 | End: 2023-10-22

## 2023-10-18 RX ORDER — SODIUM CHLORIDE 9 MG/ML
1000 INJECTION INTRAMUSCULAR; INTRAVENOUS; SUBCUTANEOUS
Refills: 0 | Status: DISCONTINUED | OUTPATIENT
Start: 2023-10-18 | End: 2023-10-19

## 2023-10-18 RX ORDER — SODIUM CHLORIDE 9 MG/ML
1000 INJECTION INTRAMUSCULAR; INTRAVENOUS; SUBCUTANEOUS
Refills: 0 | Status: DISCONTINUED | OUTPATIENT
Start: 2023-10-18 | End: 2023-10-18

## 2023-10-18 RX ADMIN — ENOXAPARIN SODIUM 40 MILLIGRAM(S): 100 INJECTION SUBCUTANEOUS at 05:50

## 2023-10-18 RX ADMIN — PANTOPRAZOLE SODIUM 40 MILLIGRAM(S): 20 TABLET, DELAYED RELEASE ORAL at 05:50

## 2023-10-18 RX ADMIN — TICAGRELOR 90 MILLIGRAM(S): 90 TABLET ORAL at 17:09

## 2023-10-18 RX ADMIN — Medication 1 MILLIGRAM(S): at 13:00

## 2023-10-18 RX ADMIN — ATORVASTATIN CALCIUM 20 MILLIGRAM(S): 80 TABLET, FILM COATED ORAL at 21:34

## 2023-10-18 RX ADMIN — FAMOTIDINE 20 MILLIGRAM(S): 10 INJECTION INTRAVENOUS at 13:00

## 2023-10-18 RX ADMIN — Medication 50 MILLIGRAM(S): at 13:01

## 2023-10-18 RX ADMIN — TICAGRELOR 90 MILLIGRAM(S): 90 TABLET ORAL at 03:02

## 2023-10-18 RX ADMIN — Medication 81 MILLIGRAM(S): at 13:01

## 2023-10-18 RX ADMIN — PREGABALIN 1000 MICROGRAM(S): 225 CAPSULE ORAL at 13:00

## 2023-10-18 RX ADMIN — Medication 37.5 MILLIGRAM(S): at 13:01

## 2023-10-18 NOTE — CONSULT NOTE ADULT - ASSESSMENT
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 Reports periods of blackness in R lower visual field. CTA w/ severe (70%) BHANU stenosis. Exam: AOx3, RONDON 5/5. SILT. No appreciated visual field cut.

## 2023-10-18 NOTE — PROGRESS NOTE ADULT - NS ATTEND AMEND GEN_ALL_CORE FT
I saw and examined the patient, reviewed diagnostic studies, and reviewed images personally. I agree with PA/np’s history, exam, orders placed, and plan of care. Medical issues needing to be addressed include: R monocular vision impairment and vision blurring, suspected BRAO/NAION, hx of stroke, RA, hx of cataracts s/p surgery. CTA noted severe R ICA stenosis, which is worse compared to prior imaging. NSGY planning stent tomorrow. Exam stable. MRI brain and orbits still pending. I saw and examined the patient, reviewed diagnostic studies, and reviewed images personally. I agree with PA/np’s history, exam, orders placed, and plan of care. Medical issues needing to be addressed include: R monocular vision impairment and vision blurring, suspected BRAO/NAION, hx of stroke, RA, hx of cataracts s/p surgery. CTA noted severe R ICA stenosis, which is worse compared to prior imaging. NSGY planning stent tomorrow. Exam stable. MRI brain and orbits still pending. OK for home statin dose of 20mg, pt LDL at goal <70.

## 2023-10-18 NOTE — PROGRESS NOTE ADULT - ASSESSMENT
ASSESSMENT:     NEURO: Continue close monitoring for neurologic deterioration, permissive HTN, titrate statin to LDL goal less than 70, MRI Brain w/o, MRA Head w/o and Neck w/contrast. Physical therapy/OT/Speech eval/treatment.     ANTITHROMBOTIC THERAPY:     PULMONARY: CXR clear, protecting airway, saturating well     CARDIOVASCULAR: check TTE, cardiac monitoring                              SBP goal:     GASTROINTESTINAL:  dysphagia screen       Diet:     RENAL: BUN/Cr stable, good urine output      Na Goal: Greater than 135     [place]:    HEMATOLOGY: H/H stable, Platelets normal      DVT ppx: Heparin s.c [] LMWH []     ID: afebrile, no leukocytosis     OTHER:     DISPOSITION: Rehab or home depending on PT eval once stable and workup is complete      CORE MEASURES:        Admission NIHSS:      TPA: [] YES [] NO      LDL/HDL:     Depression Screen:      Statin Therapy:     Dysphagia Screen: [] PASS [] FAIL     Smoking [] YES [] NO      Afib [] YES [] NO     Stroke Education [] YES [] NO ASSESSMENT:   71 y/o male with multiple vascular risk factors presenting with month long hx of right visual disturbances, particular describing seeing black in right lower visual fields. Saw optho outpatient, performed formal visual field testing, confirming right partial inferior altitudinal defect nasally and possible small SN defect. Recommended to come to [facility] for embolic work up. Neuro exam did not reveal gross visual field cuts. However, at the time of exam, patient denying visual difficulties. On neuroimaging, new high grade (70%) BHANU stenosis from prior imaging. B/l VA stenosis stable as well as atherosclerotic plaque in aortic arch.     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     NEURO: Continue close monitoring for neurologic deterioration, permissive HTN, titrate statin to LDL goal less than 70, MRI Brain w/o, MRA Head w/o and Neck w/contrast. Physical therapy/OT/Speech eval/treatment. NPO at midnight     ANTITHROMBOTIC THERAPY: Aspirin 81mg PO Qd, Brilinta 90mg PO BID      PULMONARY: CXR clear, protecting airway, saturating well     CARDIOVASCULAR: check TTE, cardiac monitoring                              SBP goal: Up to     GASTROINTESTINAL:  dysphagia screen       Diet: Regular CC diet. NPO before midnight     RENAL: BUN/Cr stable, good urine output      Na Goal: Greater than 135     Oreilly: none    HEMATOLOGY: H/H stable, Platelets normal      DVT ppx: LMWH [x]     ID: afebrile, no leukocytosis     OTHER:     DISPOSITION: home once stable and workup is complete      CORE MEASURES:        Admission NIHSS: 0     TPA: [] YES [x] NO      LDL/HDL: 65     Depression Screen: No depression      Statin Therapy: Atorvastatin 20mg PO Qhs. No need for high dose statins because he is at goal      Dysphagia Screen: [x] PASS [] FAIL     Smoking [] YES [x] NO      Afib [] YES [x] NO     Stroke Education [x] YES [] NO

## 2023-10-18 NOTE — CONSULT NOTE ADULT - SUBJECTIVE AND OBJECTIVE BOX
CHIEF COMPLAINT:    HISTORY OF PRESENT ILLNESS:  71 y/o RHM PMHx prior b/l cerebellar infarcts with no residual deficitds, b/l VA stenosis (s/p R VA stent at [facility] in 2016 on ASA/Plavix), DM2, HLD, HTN, cataracts in both eyes (2012) depression, rheumatoid arthritis presenting from outpatient optho office for right eye vision changes. About 3 weeks ago, patient gradually started noticing that his right eye's lower visual field is seeing dark. Denies HA, weakness, numbness, dizziness. Went to see optho Dr. Dawit Lawrence. who performed formal visual field testing in the office, and documented (paperwork provided by patient) that there is partial inferior altitudinal defect nasally and possible small SN defect. Said no diabetic retinopathy in both eyes. However, believes the right eye visual field deficit found on VF testing is possibly due to embolic retinal ischemia and recommended patient come to [facility] for stroke work up. At the time of neurological exam, patient was not complaining of significant visual field loss, and on visual field testing, did not have gross visual field cut.   Of note, neurology had seen patient in 11/2021 for acute vestibular syndrome. Neuroimaging at the time did not show new findings, including MRI brain.   NIHSS:0  preMRS:0   NO known cardiac issues. 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 Ansari      Depression      Interstitial lung disease      Vertebral artery stenosis              MEDICATIONS:  aspirin  chewable 81 milliGRAM(s) Oral daily  enoxaparin Injectable 40 milliGRAM(s) SubCutaneous every 24 hours  ticagrelor 90 milliGRAM(s) Oral every 12 hours        hydrOXYzine hydrochloride 50 milliGRAM(s) Oral daily  venlafaxine 37.5 milliGRAM(s) Oral daily    famotidine    Tablet 20 milliGRAM(s) Oral daily  pantoprazole    Tablet 40 milliGRAM(s) Oral before breakfast    atorvastatin 80 milliGRAM(s) Oral at bedtime  dextrose 50% Injectable 25 Gram(s) IV Push once  dextrose 50% Injectable 12.5 Gram(s) IV Push once  dextrose 50% Injectable 25 Gram(s) IV Push once  dextrose Oral Gel 15 Gram(s) Oral once PRN  glucagon  Injectable 1 milliGRAM(s) IntraMuscular once  insulin lispro (ADMELOG) corrective regimen sliding scale   SubCutaneous three times a day before meals  insulin lispro (ADMELOG) corrective regimen sliding scale   SubCutaneous at bedtime    artificial  tears Solution 1 Drop(s) Both EYES three times a day PRN  cyanocobalamin 1000 MICROGram(s) Oral daily  dextrose 5%. 1000 milliLiter(s) IV Continuous <Continuous>  dextrose 5%. 1000 milliLiter(s) IV Continuous <Continuous>  folic acid 1 milliGRAM(s) Oral daily      FAMILY HISTORY:  Family history of asthma (Mother)    Family history of heart disease (Father)  father passed from heart attack        SOCIAL HISTORY:    [ ] Non-smoker  [ ] Smoker  [ ] Alcohol    Allergies    No Known Allergies    Intolerances      REVIEW OF SYSTEMS:  CONSTITUTIONAL: No fever, weight loss, o+fatigue  EYES: No eye pain, visual disturbances, or discharge  ENMT:  No difficulty hearing, tinnitus, vertigo; No sinus or throat pain  NECK: No pain or stiffness  RESPIRATORY: No cough, wheezing, chills or hemoptysis; No Shortness of Breath  CARDIOVASCULAR: No chest pain, palpitations, passing out, dizziness, or leg swelling  GASTROINTESTINAL: No abdominal or epigastric pain. No nausea, vomiting, or hematemesis; No diarrhea or constipation. No melena or hematochezia.  GENITOURINARY: No dysuria, frequency, hematuria, or incontinence  NEUROLOGICAL: No headaches, memory loss, loss of strength, numbness, or tremors  SKIN: No itching, burning, rashes, or lesions   LYMPH Nodes: No enlarged glands  ENDOCRINE: No heat or cold intolerance; No hair loss  MUSCULOSKELETAL: No joint pain or swelling; No muscle, back, or extremity pain  PSYCHIATRIC: No depression, anxiety, mood swings, or difficulty sleeping  HEME/LYMPH: No easy bruising, or bleeding gums  ALLERY AND IMMUNOLOGIC: No hives or eczema	    [ ] All others negative	  [ ] Unable to obtain    PHYSICAL EXAM:  T(C): 36.6 (10-18-23 @ 04:52), Max: 37.1 (10-17-23 @ 12:29)  HR: 65 (10-18-23 @ 04:52) (64 - 85)  BP: 140/84 (10-18-23 @ 04:52) (126/78 - 160/82)  RR: 18 (10-18-23 @ 04:52) (18 - 18)  SpO2: 97% (10-18-23 @ 04:52) (96% - 99%)  Wt(kg): --  I&O's Summary    Appearance: NAD	  HEENT:   Normal oral mucosa, PERRL, EOMI	  Lymphatic: No lymphadenopathy  Cardiovascular: Normal S1 S2, No JVD, No murmurs, No edema  Respiratory: Lungs clear to auscultation	  Psychiatry: A & O x 3, Mood & affect appropriate  Gastrointestinal:  Soft, Non-tender, + BS	  Skin: No rashes, No ecchymoses, No cyanosis	  NEUROLOGICAL EXAM:  Mental status: Awake, alert, oriented x3, no aphasia, no neglect, normal memory   Cranial Nerves: No facial asymmetry, no nystagmus, no dysarthria,  tongue midline  Motor exam: Normal tone, no drift, 5/5 RUE, 5/5 RLE, 5/5 LUE, 5/5 LLE, normal fine finger movements.  Sensation: Intact to light touch   Coordination/ Gait: No dysmetria, OCTAVIO intact and symmetric bilaterally  Extremities: Normal range of motion, No clubbing, cyanosis or edema  Vascular: Peripheral pulses palpable 2+ bilaterally    TELEMETRY: 	SR     ECG:  	  RADIOLOGY:  < from: CT Head No Cont (10.16.23 @ 21:51) >    ACC: [id number] EXAM:  CT ANGIO BRAIN (W)AW IC   ORDERED BY:  BERHANE HAN     ACC: [id number] EXAM:  CT ANGIO NECK (W)AW IC   ORDERED BY:  BERHANE HAN     ACC: [id number] EXAM:  CT BRAIN   ORDERED BY:  BERHANE HAN     PROCEDURE DATE:  10/16/2023          INTERPRETATION:  CLINICAL HISTORY: Stroke workup.    TECHNIQUE:  Noncontrast head CT was followed by CT images acquired through the  neck   and head  during the arterial phase.  Three-dimensional MIP reformats were generated.    CONTRAST/COMPLICATIONS:  IV Contrast: NONE (accession [id number]), IV contrast documented in   unlinked concurrent exam (accession [id number]), Omnipaque 300 (accession   [id number])  70 cc administered   30 cc discarded  Oral Contrast: NONE  Complications: None reported at time of study completion    COMPARISON STUDY: None.    FINDINGS:    NONCONTRAST CT HEAD:    There is no acute intracranial hemorrhage, mass effect, midline shift,   extra-axial collection, hydrocephalus, or evidence of acute vascular   territorial infarction. Mild patchy hypodensities within the   periventricular and subcortical white matter, although nonspecific,   likely reflect chronic microvascular disease. Cerebral volume loss   contributes to prominence of the ventricles and sulci. Chronic right   inferior cerebellar infarct.    The visualized paranasal sinuses and mastoid air cells are clear. Status   post bilateral ocular lens replacement. Visualized osseous structures are   intact.    CT ANGIOGRAPHY NECK:    Thoracic aorta and branch vessels: Patent. Partially imaged probably   noncalcified atheromatous plaque, projecting into the aortic lumen.  No   flow-limiting stenosis.  No evidence of dissection.    Right carotid system: Patent. Predominantly noncalcified atherosclerotic   plaque at the internal carotid artery origin contributing to severe   (approximately 78%) luminal  stenosis using NASCET criteria.  No evidence   of dissection.    Left carotid system: Patent. Mild atherosclerosis at the proximal   internal carotid artery.  No hemodynamically significant stenosis using   NASCET criteria.  No evidence of dissection.    Vertebral arteries: Dominant right vertebral artery is patent throughout   its cervical course. Left A1 and proximal V2 segmentsare unopacified   with intermittent opacification of the distal V2 and V3 segments, similar   to prior examination.    Soft tissues of the neck: Unremarkable.    Visualized spine: Multilevel degenerative change.    Visualized upper chest: Unremarkable.    CT ANGIOGRAPHY BRAIN:    Internal carotid arteries: Patent bilaterally.  No flow limiting stenosis.    Anterior cerebral arteries: Patent bilaterally without flow limiting   stenosis.    Middle cerebral arteries: Patent bilaterally without flow limiting    stenosis.    Anterior communicating artery: Visualized, partially duplicated on the   left.    Right posterior communicating artery: Visualized.    Left posterior communicating artery: Visualized.    Posterior cerebral arteries: Patent bilaterally without stenosis.    Vertebrobasilar: Similar moderate and severe stenoses of the left   intradural vertebral artery. Right intradural vertebral artery stent   Patent without stenosis. The distal vertebral arteries are similar in   caliber.  Bilateral posterior inferior cerebellar arteries, bilateral   anterior inferior cerebellar arteries and bilateral superior cerebellar   arteries are visualized.    Vascular lesions: No evidence of intracranial aneurysm within limits of   CTA technique.  Tiny aneurysms may be beyond the resolution of this   examination.    Dural venous sinuses: Grossly patent.    IMPRESSION:    Noncontrast CT Head: No acute intracranal hemorrhage, mass effect, or   evidence of acute vascular territorial infarct. Ifclinical symptoms   persist or worsen, more sensitive evaluation with brain MRI may be   obtained, if no contraindications exist.    CTA Neck: Severe (greater than 70%) luminal stenosis of the proximal   right internal carotid artery by NASCET criteria, secondary to   predominantly noncalcified plaque. Redemonstration of nonopacification of   the proximal and mid left vertebral artery with intermittent   opacification of the mid to distal cervical left vertebral artery.    Noncalcified atherosclerotic plaque along the aortic arch, protruding   into the lumen, similar to prior examination.    CTA Head: No proximal large vessel occlusion. Stable moderate and severe   stenoses of the left vertebral artery.    --- End of Report ---          < end of copied text >  < from: MR Head No Cont (11.24.21 @ 09:34) >  EXAM:  MR BRAIN                            PROCEDURE DATE:  11/24/2021            INTERPRETATION:  INDICATIONS:  Dizzy for 5 days.    TECHNIQUE:  Multiplanar imaging was performed using T1 weighted, T2 weighted and FLAIR sequences.  Diffusion weighted and SWI images were obtained.    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 Microvascular ischemic changes involving the periventricular and subcortical white matter  Extra-axial:  No mass or collection.  Visualized sinuses:  Normal.  Visualized mastoids:  Normal.  Calvarium:  Normal.  Carotid flow voids:  Present.  Miscellaneous:  Bilateral cataract surgery    IMPRESSION:  Age-appropriate involutional change and microvascular ischemic disease  involving the periventricular and subcortical white matter. 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 No evidence of acute infarction.    --- End of Report ---      < end of copied text >  < from: TTE W or WO Ultrasound Enhancing Agent (10.17.23 @ 08:23) >    TRANSTHORACIC ECHOCARDIOGRAM REPORT  ________________________________________________________________________________                                      _______       Pt. Name:       CORTNEY SERNA   Study Date:    10/17/2023  MRN:            [id number]   YOB: 1950  Accession #:    [id number]    Age:           72 years  Account#:       [id number] Gender:        M  Heart Rate:                  Height:        65.00 in (165.10 cm)  Rhythm:                      Weight:        164.00 lb (74.39 kg)  Blood Pressure: 157/92 mmHg  BSA/BMI:       1.82 m² / 27.29 kg/m²  ________________________________________________________________________________________  Referring Physician:    BRITTANI GODDARD  Interpreting Physician: Chago Cartagena M.D.  Primary Sonographer:    Yudi Nolan RDCS    CPT:                ECHO TTE WITH CON COMP W DOPP - .m;DEFINITY ECHO                      CONTRAST PER ML - .m;DEFINITY ECHO CONTRAST PER ML                      WASTED - .m  Indication(s):      Cerebral infarction, unspecified - I63.9  Procedure:          Transthoracic echocardiogram with 2-D, M-mode and complete                      spectral and color flow Doppler.  Ordering Location:  [facility]  Contrast Injection: Verbal consent was obtained for injection of Ultrasonic                      Enhancing Agent following a discussion of risks and                      benefits.                      Endocardial visualization enhanced with 2 ml of Definity                      Ultrasound enhancing agent (Lot#:[id number] Discarded Dose:8ml).  UEA Reaction:       Patient had no adverse reaction after injection of                      Ultrasound Enhancing Agent.  Study Information:  Image quality for this study is less than ideal.    _______________________________________________________________________________________     CONCLUSIONS:      1. Left ventricular systolic function is normal with an ejection fraction of 52 % by Rivera's method of disks.   2. Normal right ventricular cavity size, wall thickness, and systolic function. Tricuspid annular plane systolic excursion (TAPSE) is 2.2 cm (normal >=1.7 cm).   3. No pericardial effusion seen.   4. Mild aortic stenosis.   5. No prior echocardiogram is available for comparison.   6. There is no evidence of a left ventricular thrombus.    ________________________________________________________________________________________    < end of copied text >    OTHER: 	  	  LABS:	 	    CARDIAC MARKERS:                                  12.4   7.96  )-----------( 274      ( 18 Oct 2023 05:57 )             40.5     10-18    138  |  103  |  16  ----------------------------<  127<H>  3.7   |  23  |  0.92    Ca    9.1      18 Oct 2023 05:57    TPro  7.9  /  Alb  4.7  /  TBili  0.3  /  DBili  x   /  AST  18  /  ALT  23  /  AlkPhos  97  10-16    proBNP:   Lipid Profile:   HgA1c:   TSH:

## 2023-10-18 NOTE — CONSULT NOTE ADULT - ASSESSMENT
Patient is a 72 year old male with a PMHx of prior B/L cerebellar infarcts, without any residual deficits, B/L VA stenosis (s/p R VA stent at [facility] in 2016 on ASA/Plavix), Type II DM, HLD, HTN, cataracts in both eyes (2012), depression, rheumatoid arthritis who presented from his outpatient opthomologists' office for right eye visual changes. Per chart review, about 3-4 weeks ago, patient reportedly began to notice gradually that his right eye's lower visual field is becoming/ seeing dark. Patient went to see his opthomologist, Dr. Dawit Lawrence who performed formal visual field testing in the office, and advised for patient to come in as it was reportedly noted that patient has "partial inferior altitudinal defect nasally and possible small SN defect." Patient denies history of diabetic retinopathy. Patient was sent in for stroke work up as per documentation to evaluate if right eye visual field deficit found on VF testing is possibly due to embolic retinal ischemia. Internal Medicine has been consulted on Mr. Barrera's care for medical management. Patient denies history of MI/ DVT / PE in the past. Denies any allergies. Denies headache, chest pain, palpitations, shortness of breath or dyspnea. Denies numbness or tingling.       Right Eye Visual Deficits Due to CVA  - Pt presented with 3-4 weeks of seeing "black" in his lower visual field   - CT Head/ CT Angio H/N w/ Severe > 70% BHANU stenosis, Moderate to severe stenosis of L vertebral artery   - MR head pending   - Planned for   - On ASA, Brilinta and Statin   - Monitor on tele   - Planned for right carotid artery stent placement with NSGY tomorrow, NPO at midnight   - Consider in versus outpatient CT C/A/P, Hypercoag work up, ILR placement   - Fall, Seizure, Aspiration precautions   - PT / PT / S+S   - Care per Neuro/ NSGY appreciated    Risk Stratification   - TTE w/ LVSF of 52%, Mild AS, Neg for LV thrombus or pericardial effusion   - Pt is medically optimized for stent placement. Moderate-high risk candidate. Cardiac clearance per cardiology team.     Visual changes  - MR orbits pending  - ESR/ CRP negative   - Optho eval appreciated; F/u recs    Type II DM  - A1C of 7.4  - Sliding scale   - Diet and lifestyle modifications  - Diabetic DASH diet     HTN   - BP parameter as per neurology   - Monitor BP, VS and patient closely    HLD  - C/w Statin therapy     PPX  - PPI and Lovenox     Discussed with Attending.  Patient is a 72 year old male with a PMHx of prior B/L cerebellar infarcts, without any residual deficits, B/L VA stenosis (s/p R VA stent at [facility] in 2016 on ASA/Plavix), Type II DM, HLD, HTN, cataracts in both eyes (2012), depression, rheumatoid arthritis who presented from his outpatient opthomologists' office for right eye visual changes. Per chart review, about 3-4 weeks ago, patient reportedly began to notice gradually that his right eye's lower visual field is becoming/ seeing dark. Patient went to see his opthomologist, Dr. Dawit Lawrence who performed formal visual field testing in the office, and advised for patient to come in as it was reportedly noted that patient has "partial inferior altitudinal defect nasally and possible small SN defect." Patient denies history of diabetic retinopathy. Patient was sent in for stroke work up as per documentation to evaluate if right eye visual field deficit found on VF testing is possibly due to embolic retinal ischemia. Internal Medicine has been consulted on Mr. Barrera's care for medical management. Patient denies history of MI/ DVT / PE in the past. Denies any allergies. Denies headache, chest pain, palpitations, shortness of breath or dyspnea. Denies numbness or tingling.       Right Eye Visual Deficits Due to CVA  - Pt presented with 3-4 weeks of seeing "black" in his lower visual field   - CT Head/ CT Angio H/N w/ Severe > 70% BHANU stenosis, Moderate to severe stenosis of L vertebral artery   - MR head pending   - On ASA, Brilinta and Statin   - Monitor on tele   - Planned for right carotid artery stent placement with NSGY tomorrow, NPO at midnight   - Consider in versus outpatient CT C/A/P, Hypercoag work up, ILR placement if found to be embolic on MR   - Fall, Seizure, Aspiration precautions   - PT / PT / S+S   - Care per Neuro/ NSGY appreciated    Risk Stratification   - TTE w/ LVSF of 52%, Mild AS, Neg for LV thrombus or pericardial effusion   - Pt is medically optimized for stent placement. Moderate risk candidate. Cardiac clearance per cardiology team.     Visual changes  - MR orbits pending  - ESR/ CRP negative   - Optho eval appreciated; F/u recs    Type II DM  - A1C of 7.4  - Sliding scale   - Diet and lifestyle modifications  - Diabetic DASH diet     HTN   - BP parameter as per neurology   - Monitor BP, VS and patient closely    HLD  - C/w Statin therapy     PPX  - PPI and Lovenox     Discussed with Attending.

## 2023-10-18 NOTE — PROGRESS NOTE ADULT - SUBJECTIVE AND OBJECTIVE BOX
Patient seen and examined at bedside.    --Anticoagulation--  aspirin  chewable 81 milliGRAM(s) Oral daily  enoxaparin Injectable 40 milliGRAM(s) SubCutaneous every 24 hours  ticagrelor 90 milliGRAM(s) Oral every 12 hours    T(C): 36.6 (10-18-23 @ 04:52), Max: 37.1 (10-17-23 @ 12:29)  HR: 65 (10-18-23 @ 04:52) (64 - 85)  BP: 140/84 (10-18-23 @ 04:52) (126/78 - 160/82)  RR: 18 (10-18-23 @ 04:52) (18 - 18)  SpO2: 97% (10-18-23 @ 04:52) (96% - 100%)  Wt(kg): --    Exam: AOx3, RONDON 5/5. SILT. No appreciated visual field cut.

## 2023-10-18 NOTE — CONSULT NOTE ADULT - PROBLEM SELECTOR RECOMMENDATION 9
MR brain pending  -ARU/PRU - pending, f/u   -pre-op for R carotid artery stent on Thursday, 10/19/23  Acceptable cardiac risk to proceed   METS > 4 and no anginal symptoms   DAPT   Statin

## 2023-10-18 NOTE — CONSULT NOTE ADULT - SUBJECTIVE AND OBJECTIVE BOX
Full consult note to follow.  HPI: Patient is a 72 year old male with a PMHx of prior B/L cerebellar infarcts, without any residual deficits, B/L VA stenosis (s/p R VA stent at [facility] in 2016 on ASA/Plavix), Type II DM, HLD, HTN, cataracts in both eyes (2012), depression, rheumatoid arthritis who presented from his outpatient opthomologists' office for right eye visual changes. Per chart review, about 3-4 weeks ago, patient reportedly began to notice gradually that his right eye's lower visual field is becoming/ seeing dark. Patient went to see his opthomologist, Dr. Dawit Lawrence who performed formal visual field testing in the office, and advised for patient to come in as it was reportedly noted that patient has "partial inferior altitudinal defect nasally and possible small SN defect." Patient denies history of diabetic retinopathy. Patient was sent in for stroke work up as per documentation to evaluate if right eye visual field deficit found on VF testing is possibly due to embolic retinal ischemia. Internal Medicine has been consulted on Mr. Barrera's care for medical management. Patient denies history of MI/ DVT / PE in the past. Denies any allergies. Denies headache, chest pain, palpitations, shortness of breath or dyspnea. Denies numbness or tingling.         REVIEW OF SYSTEMS:    CONSTITUTIONAL: Generalized weakness; Visual changes   EYES/ENT: Rt eye visual disturbances X 3-4 weeks, describes seeing "black" in Right lower visual fields   NECK: No pain or stiffness  RESPIRATORY: No cough, wheezing, hemoptysis; No shortness of breath  CARDIOVASCULAR: No chest pain or palpitations  GASTROINTESTINAL: No abdominal or epigastric pain. No nausea, vomiting, or hematemesis; No diarrhea or constipation. No melena or hematochezia.  GENITOURINARY: No dysuria, frequency or hematuria  NEUROLOGICAL: + Visual changes; Generalized weakness   SKIN: No itching, burning, rashes, or lesions   All other review of systems is negative unless indicated above.      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 1000 milliLiter(s) (100 mL/Hr) IV Continuous <Continuous>  dextrose 5%. 1000 milliLiter(s) (50 mL/Hr) IV Continuous <Continuous>  dextrose 50% Injectable 25 Gram(s) IV Push once  dextrose 50% Injectable 25 Gram(s) IV Push once  dextrose 50% Injectable 12.5 Gram(s) IV Push once  enoxaparin Injectable 40 milliGRAM(s) SubCutaneous every 24 hours  famotidine    Tablet 20 milliGRAM(s) Oral daily  folic acid 1 milliGRAM(s) Oral daily  glucagon  Injectable 1 milliGRAM(s) IntraMuscular once  hydrOXYzine hydrochloride 50 milliGRAM(s) Oral daily  insulin lispro (ADMELOG) corrective regimen sliding scale   SubCutaneous three times a day before meals  insulin lispro (ADMELOG) corrective regimen sliding scale   SubCutaneous at bedtime  pantoprazole    Tablet 40 milliGRAM(s) Oral before breakfast  sodium chloride 0.9%. 1000 milliLiter(s) (75 mL/Hr) IV Continuous <Continuous>  ticagrelor 90 milliGRAM(s) Oral every 12 hours  venlafaxine 37.5 milliGRAM(s) Oral daily          Allergies    No Known Allergies    Intolerances        Vital Signs Last 24 Hrs  T(C): 36.5 (18 Oct 2023 09:15), Max: 36.7 (17 Oct 2023 17:00)  T(F): 97.7 (18 Oct 2023 09:15), Max: 98.1 (17 Oct 2023 17:00)  HR: 76 (18 Oct 2023 09:15) (64 - 82)  BP: 133/77 (18 Oct 2023 09:15) (126/78 - 144/80)  BP(mean): --  RR: 20 (18 Oct 2023 09:15) (18 - 20)  SpO2: 96% (18 Oct 2023 09:15) (96% - 97%)    Parameters below as of 18 Oct 2023 09:15  Patient On (Oxygen Delivery Method): room air        Appearance: Awake, Generalized weakness, Sitting OOB TC   HEENT:   Normal oral mucosa, Eyes are open   Lymphatic: No lymphadenopathy , no edema  Cardiovascular: Normal S1 S2, No JVD, No murmurs   Respiratory: Lungs clear to auscultation, normal effort 	  Gastrointestinal:  Soft, Non-tender, + BS	  Skin: No rashes, No ecchymoses, No cyanosis, warm to touch  Musculoskeletal: Normal range of motion, normal strength  Psychiatry:  Mood & affect appropriate  Ext: No edema                            12.4   7.96  )-----------( 274      ( 18 Oct 2023 05:57 )             40.5               10-18    138  |  103  |  16  ----------------------------<  127<H>  3.7   |  23  |  0.92    Ca    9.1      18 Oct 2023 05:57    TPro  7.9  /  Alb  4.7  /  TBili  0.3  /  DBili  x   /  AST  18  /  ALT  23  /  AlkPhos  97  10-16    PT/INR - ( 16 Oct 2023 19:19 )   PT: 10.2 sec;   INR: 0.97 ratio         PTT - ( 16 Oct 2023 19:19 )  PTT:31.5 sec                   Urinalysis Basic - ( 18 Oct 2023 05:57 )    Color: x / Appearance: x / SG: x / pH: x  Gluc: 127 mg/dL / Ketone: x  / Bili: x / Urobili: x   Blood: x / Protein: x / Nitrite: x   Leuk Esterase: x / RBC: x / WBC x   Sq Epi: x / Non Sq Epi: x / Bacteria: x    < from: CT Head No Cont (10.16.23 @ 21:51) >  IMPRESSION:    Noncontrast CT Head: No acute intracranal hemorrhage, mass effect, or   evidence of acute vascular territorial infarct. Ifclinical symptoms   persist or worsen, more sensitive evaluation with brain MRI may be   obtained, if no contraindications exist.    CTA Neck: Severe (greater than 70%) luminal stenosis of the proximal   right internal carotid artery by NASCET criteria, secondary to   predominantly noncalcified plaque. Redemonstration of nonopacification of   the proximal and mid left vertebral artery with intermittent   opacification of the mid to distal cervical left vertebral artery.    Noncalcified atherosclerotic plaque along the aortic arch, protruding   into the lumen, similar to prior examination.    CTA Head: No proximal large vessel occlusion. Stable moderate and severe   stenoses of the left vertebral artery.      < end of copied text >      < from: TTE W or WO Ultrasound Enhancing Agent (10.17.23 @ 08:23) >  CONCLUSIONS:      1. Left ventricular systolic function is normal with an ejection fraction of 52 % by Rivera's method of disks.   2. Normal right ventricular cavity size, wall thickness, and systolic function. Tricuspid annular plane systolic excursion (TAPSE) is 2.2 cm (normal >=1.7 cm).   3. No pericardial effusion seen.   4. Mild aortic stenosis.   5. No prior echocardiogram is available for comparison.   6. There is no evidence of a left ventricular thrombus.    < end of copied text >        < from: VA Duplex Emma Henriquez (10.17.23 @ 09:33) >  IMPRESSION:    This examination confirms the presence of a severe, greater than 70%   stenosis of the right internal carotid artery.    A flow-limiting stenosis is not identified on the left.    < end of copied text >

## 2023-10-18 NOTE — PROGRESS NOTE ADULT - ASSESSMENT
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 Reports periods of blackness in R lower visual field. CTA w/ severe (70%) BHANU stenosis. Exam: AOx3, RONDON 5/5. SILT. No appreciated visual field cut.  -MR brain pending  -ARU/PRU - pending, f/u   -pre-op for R carotid artery stent on Thursday, 10/19/23  -please document medical/cardiac clearance for procedure

## 2023-10-18 NOTE — PROGRESS NOTE ADULT - SUBJECTIVE AND OBJECTIVE BOX
THE PATIENT WAS SEEN AND EXAMINED BY ME WITH THE HOUSESTAFF AND STROKE TEAM DURING MORNING ROUNDS.   HPI:  73 y/o RHM PMHx prior b/l cerebellar infarcts with no residual deficitds, b/l VA stenosis (s/p R VA stent at [facility] in 2016 on ASA/Plavix), DM2, HLD, HTN, cataracts in both eyes (2012) depression, rheumatoid arthritis presenting from outpatient optho office for right eye vision changes. About 3 weeks ago, patient gradually started noticing that his right eye's lower visual field is seeing dark. Denies HA, weakness, numbness, dizziness. Went to see optho Dr. Dawit Lawrence. who performed formal visual field testing in the office, and documented (paperwork provided by patient) that there is partial inferior altitudinal defect nasally and possible small SN defect. Said no diabetic retinopathy in both eyes. However, believes the right eye visual field deficit found on VF testing is possibly due to embolic retinal ischemia and recommended patient come to [facility] for stroke work up. At the time of neurological exam, patient was not complaining of significant visual field loss, and on visual field testing, did not have gross visual field cut.     Of note, neurology had seen patient in 11/2021 for acute vestibular syndrome. Neuroimaging at the time did not show new findings, including MRI brain.     NIHSS:0  preMRS:0       SUBJECTIVE: No events overnight.  No new neurologic complaints.      artificial  tears Solution 1 Drop(s) Both EYES three times a day PRN  aspirin  chewable 81 milliGRAM(s) Oral daily  atorvastatin 80 milliGRAM(s) Oral at bedtime  cyanocobalamin 1000 MICROGram(s) Oral daily  dextrose 5%. 1000 milliLiter(s) IV Continuous <Continuous>  dextrose 5%. 1000 milliLiter(s) IV Continuous <Continuous>  dextrose 50% Injectable 25 Gram(s) IV Push once  dextrose 50% Injectable 25 Gram(s) IV Push once  dextrose 50% Injectable 12.5 Gram(s) IV Push once  dextrose Oral Gel 15 Gram(s) Oral once PRN  enoxaparin Injectable 40 milliGRAM(s) SubCutaneous every 24 hours  famotidine    Tablet 20 milliGRAM(s) Oral daily  folic acid 1 milliGRAM(s) Oral daily  glucagon  Injectable 1 milliGRAM(s) IntraMuscular once  hydrOXYzine hydrochloride 50 milliGRAM(s) Oral daily  insulin lispro (ADMELOG) corrective regimen sliding scale   SubCutaneous three times a day before meals  insulin lispro (ADMELOG) corrective regimen sliding scale   SubCutaneous at bedtime  pantoprazole    Tablet 40 milliGRAM(s) Oral before breakfast  ticagrelor 90 milliGRAM(s) Oral every 12 hours  venlafaxine 37.5 milliGRAM(s) Oral daily      PHYSICAL EXAM:   Vital Signs Last 24 Hrs  T(C): 36.6 (18 Oct 2023 04:52), Max: 37.1 (17 Oct 2023 12:29)  T(F): 97.9 (18 Oct 2023 04:52), Max: 98.7 (17 Oct 2023 12:29)  HR: 65 (18 Oct 2023 04:52) (64 - 85)  BP: 140/84 (18 Oct 2023 04:52) (126/78 - 160/82)  RR: 18 (18 Oct 2023 04:52) (18 - 18)  SpO2: 97% (18 Oct 2023 04:52) (96% - 99%)    Parameters below as of 18 Oct 2023 04:52  Patient On (Oxygen Delivery Method): room air        General: No acute distress  HEENT: EOM intact, visual fields full  Abdomen: Soft, nontender, nondistended   Extremities: No edema    NEUROLOGICAL EXAM:  Mental status: Awake, alert, oriented x3, no aphasia, no neglect, normal memory   Cranial Nerves: No facial asymmetry, no nystagmus, no dysarthria,  tongue midline  Motor exam: Normal tone, no drift, 5/5 RUE, 5/5 RLE, 5/5 LUE, 5/5 LLE, normal fine finger movements.  Sensation: Intact to light touch   Coordination/ Gait: No dysmetria, OCTAVIO intact and symmetric bilaterally    LABS:                        12.4   7.96  )-----------( 274      ( 18 Oct 2023 05:57 )             40.5    10-18    138  |  103  |  16  ----------------------------<  127<H>  3.7   |  23  |  0.92    Ca    9.1      18 Oct 2023 05:57    TPro  7.9  /  Alb  4.7  /  TBili  0.3  /  DBili  x   /  AST  18  /  ALT  23  /  AlkPhos  97  10-16  PT/INR - ( 16 Oct 2023 19:19 )   PT: 10.2 sec;   INR: 0.97 ratio         PTT - ( 16 Oct 2023 19:19 )  PTT:31.5 sec      IMAGING: Reviewed by me.   10/16/23:  Noncontrast CT Head: No acute intracranal hemorrhage, mass effect, or   evidence of acute vascular territorial infarct. If clinical symptoms   persist or worsen, more sensitive evaluation with brain MRI may be   obtained, if no contraindications exist.    CTA Neck: Severe (greater than 70%) luminal stenosis of the proximal   right internal carotid artery by NASCET criteria, secondary to   predominantly noncalcified plaque. Redemonstration of nonopacification of   the proximal and mid left vertebral artery with intermittent   opacification of the mid to distal cervical left vertebral artery.    Noncalcified atherosclerotic plaque along the aortic arch, protruding   into the lumen, similar to prior examination.    CTA Head: No proximal large vessel occlusion. Stable moderate and severe   stenoses of the left vertebral artery.   THE PATIENT WAS SEEN AND EXAMINED BY ME WITH THE HOUSESTAFF AND STROKE TEAM DURING MORNING ROUNDS.   HPI:  71 y/o RHM PMHx prior b/l cerebellar infarcts with no residual deficitds, b/l VA stenosis (s/p R VA stent at [facility] in 2016 on ASA/Plavix), DM2, HLD, HTN, cataracts in both eyes (2012) depression, rheumatoid arthritis presenting from outpatient optho office for right eye vision changes. About 3 weeks ago, patient gradually started noticing that his right eye's lower visual field is seeing dark. Denies HA, weakness, numbness, dizziness. Went to see optho Dr. Dawit Lawrence. who performed formal visual field testing in the office, and documented (paperwork provided by patient) that there is partial inferior altitudinal defect nasally and possible small SN defect. Said no diabetic retinopathy in both eyes. However, believes the right eye visual field deficit found on VF testing is possibly due to embolic retinal ischemia and recommended patient come to [facility] for stroke work up. At the time of neurological exam, patient was not complaining of significant visual field loss, and on visual field testing, did not have gross visual field cut.     Of note, neurology had seen patient in 11/2021 for acute vestibular syndrome. Neuroimaging at the time did not show new findings, including MRI brain.     NIHSS:0  preMRS:0       SUBJECTIVE: No events overnight.  Was observed sitting on chair with no acute complaints. Expressed awareness that he will be undergoing a procedure with the neurosurgery team tomorrow.     artificial  tears Solution 1 Drop(s) Both EYES three times a day PRN  aspirin  chewable 81 milliGRAM(s) Oral daily  atorvastatin 80 milliGRAM(s) Oral at bedtime  cyanocobalamin 1000 MICROGram(s) Oral daily  dextrose 5%. 1000 milliLiter(s) IV Continuous <Continuous>  dextrose 5%. 1000 milliLiter(s) IV Continuous <Continuous>  dextrose 50% Injectable 25 Gram(s) IV Push once  dextrose 50% Injectable 25 Gram(s) IV Push once  dextrose 50% Injectable 12.5 Gram(s) IV Push once  dextrose Oral Gel 15 Gram(s) Oral once PRN  enoxaparin Injectable 40 milliGRAM(s) SubCutaneous every 24 hours  famotidine    Tablet 20 milliGRAM(s) Oral daily  folic acid 1 milliGRAM(s) Oral daily  glucagon  Injectable 1 milliGRAM(s) IntraMuscular once  hydrOXYzine hydrochloride 50 milliGRAM(s) Oral daily  insulin lispro (ADMELOG) corrective regimen sliding scale   SubCutaneous three times a day before meals  insulin lispro (ADMELOG) corrective regimen sliding scale   SubCutaneous at bedtime  pantoprazole    Tablet 40 milliGRAM(s) Oral before breakfast  ticagrelor 90 milliGRAM(s) Oral every 12 hours  venlafaxine 37.5 milliGRAM(s) Oral daily      PHYSICAL EXAM:   Vital Signs Last 24 Hrs  T(C): 36.6 (18 Oct 2023 04:52), Max: 37.1 (17 Oct 2023 12:29)  T(F): 97.9 (18 Oct 2023 04:52), Max: 98.7 (17 Oct 2023 12:29)  HR: 65 (18 Oct 2023 04:52) (64 - 85)  BP: 140/84 (18 Oct 2023 04:52) (126/78 - 160/82)  RR: 18 (18 Oct 2023 04:52) (18 - 18)  SpO2: 97% (18 Oct 2023 04:52) (96% - 99%)    Parameters below as of 18 Oct 2023 04:52  Patient On (Oxygen Delivery Method): room air      NEUROLOGICAL EXAM:  Mental status: Awake, alert, oriented x3, no aphasia, no neglect, normal memory   Cranial Nerves: No facial asymmetry, no nystagmus, no dysarthria,  tongue midline  Motor exam: Normal tone, no drift, 5/5 RUE, 5/5 RLE, 5/5 LUE, 5/5 LLE, normal fine finger movements.  Sensation: Intact to light touch   Coordination/ Gait: not assessed    LABS:                        12.4   7.96  )-----------( 274      ( 18 Oct 2023 05:57 )             40.5    10-18    138  |  103  |  16  ----------------------------<  127<H>  3.7   |  23  |  0.92    Ca    9.1      18 Oct 2023 05:57    TPro  7.9  /  Alb  4.7  /  TBili  0.3  /  DBili  x   /  AST  18  /  ALT  23  /  AlkPhos  97  10-16  PT/INR - ( 16 Oct 2023 19:19 )   PT: 10.2 sec;   INR: 0.97 ratio         PTT - ( 16 Oct 2023 19:19 )  PTT:31.5 sec      IMAGING: Reviewed by me.   10/16/23:  Noncontrast CT Head: No acute intracranal hemorrhage, mass effect, or   evidence of acute vascular territorial infarct. If clinical symptoms   persist or worsen, more sensitive evaluation with brain MRI may be   obtained, if no contraindications exist.    CTA Neck: Severe (greater than 70%) luminal stenosis of the proximal   right internal carotid artery by NASCET criteria, secondary to   predominantly noncalcified plaque. Redemonstration of nonopacification of   the proximal and mid left vertebral artery with intermittent   opacification of the mid to distal cervical left vertebral artery.    Noncalcified atherosclerotic plaque along the aortic arch, protruding   into the lumen, similar to prior examination.    CTA Head: No proximal large vessel occlusion. Stable moderate and severe   stenoses of the left vertebral artery.

## 2023-10-19 ENCOUNTER — APPOINTMENT (OUTPATIENT)
Dept: NEUROSURGERY | Facility: HOSPITAL | Age: 73
End: 2023-10-19

## 2023-10-19 LAB
ANION GAP SERPL CALC-SCNC: 13 MMOL/L — SIGNIFICANT CHANGE UP (ref 5–17)
ANION GAP SERPL CALC-SCNC: 13 MMOL/L — SIGNIFICANT CHANGE UP (ref 5–17)
ANION GAP SERPL CALC-SCNC: 9 MMOL/L — SIGNIFICANT CHANGE UP (ref 5–17)
ANION GAP SERPL CALC-SCNC: 9 MMOL/L — SIGNIFICANT CHANGE UP (ref 5–17)
BUN SERPL-MCNC: 15 MG/DL — SIGNIFICANT CHANGE UP (ref 7–23)
BUN SERPL-MCNC: 15 MG/DL — SIGNIFICANT CHANGE UP (ref 7–23)
BUN SERPL-MCNC: 16 MG/DL — SIGNIFICANT CHANGE UP (ref 7–23)
BUN SERPL-MCNC: 16 MG/DL — SIGNIFICANT CHANGE UP (ref 7–23)
CALCIUM SERPL-MCNC: 8.2 MG/DL — LOW (ref 8.4–10.5)
CALCIUM SERPL-MCNC: 8.2 MG/DL — LOW (ref 8.4–10.5)
CALCIUM SERPL-MCNC: 9 MG/DL — SIGNIFICANT CHANGE UP (ref 8.4–10.5)
CALCIUM SERPL-MCNC: 9 MG/DL — SIGNIFICANT CHANGE UP (ref 8.4–10.5)
CHLORIDE SERPL-SCNC: 105 MMOL/L — SIGNIFICANT CHANGE UP (ref 96–108)
CHLORIDE SERPL-SCNC: 105 MMOL/L — SIGNIFICANT CHANGE UP (ref 96–108)
CHLORIDE SERPL-SCNC: 109 MMOL/L — HIGH (ref 96–108)
CHLORIDE SERPL-SCNC: 109 MMOL/L — HIGH (ref 96–108)
CO2 SERPL-SCNC: 21 MMOL/L — LOW (ref 22–31)
CO2 SERPL-SCNC: 21 MMOL/L — LOW (ref 22–31)
CO2 SERPL-SCNC: 22 MMOL/L — SIGNIFICANT CHANGE UP (ref 22–31)
CO2 SERPL-SCNC: 22 MMOL/L — SIGNIFICANT CHANGE UP (ref 22–31)
CREAT SERPL-MCNC: 0.82 MG/DL — SIGNIFICANT CHANGE UP (ref 0.5–1.3)
CREAT SERPL-MCNC: 0.82 MG/DL — SIGNIFICANT CHANGE UP (ref 0.5–1.3)
CREAT SERPL-MCNC: 0.95 MG/DL — SIGNIFICANT CHANGE UP (ref 0.5–1.3)
CREAT SERPL-MCNC: 0.95 MG/DL — SIGNIFICANT CHANGE UP (ref 0.5–1.3)
EGFR: 84 ML/MIN/1.73M2 — SIGNIFICANT CHANGE UP
EGFR: 84 ML/MIN/1.73M2 — SIGNIFICANT CHANGE UP
EGFR: 92 ML/MIN/1.73M2 — SIGNIFICANT CHANGE UP
EGFR: 92 ML/MIN/1.73M2 — SIGNIFICANT CHANGE UP
GLUCOSE BLDC GLUCOMTR-MCNC: 104 MG/DL — HIGH (ref 70–99)
GLUCOSE BLDC GLUCOMTR-MCNC: 104 MG/DL — HIGH (ref 70–99)
GLUCOSE BLDC GLUCOMTR-MCNC: 111 MG/DL — HIGH (ref 70–99)
GLUCOSE BLDC GLUCOMTR-MCNC: 111 MG/DL — HIGH (ref 70–99)
GLUCOSE BLDC GLUCOMTR-MCNC: 146 MG/DL — HIGH (ref 70–99)
GLUCOSE BLDC GLUCOMTR-MCNC: 146 MG/DL — HIGH (ref 70–99)
GLUCOSE BLDC GLUCOMTR-MCNC: 154 MG/DL — HIGH (ref 70–99)
GLUCOSE BLDC GLUCOMTR-MCNC: 154 MG/DL — HIGH (ref 70–99)
GLUCOSE SERPL-MCNC: 114 MG/DL — HIGH (ref 70–99)
GLUCOSE SERPL-MCNC: 114 MG/DL — HIGH (ref 70–99)
GLUCOSE SERPL-MCNC: 134 MG/DL — HIGH (ref 70–99)
GLUCOSE SERPL-MCNC: 134 MG/DL — HIGH (ref 70–99)
HCT VFR BLD CALC: 34.1 % — LOW (ref 39–50)
HCT VFR BLD CALC: 34.1 % — LOW (ref 39–50)
HCT VFR BLD CALC: 38.7 % — LOW (ref 39–50)
HCT VFR BLD CALC: 38.7 % — LOW (ref 39–50)
HGB BLD-MCNC: 10.9 G/DL — LOW (ref 13–17)
HGB BLD-MCNC: 10.9 G/DL — LOW (ref 13–17)
HGB BLD-MCNC: 12.1 G/DL — LOW (ref 13–17)
HGB BLD-MCNC: 12.1 G/DL — LOW (ref 13–17)
MAGNESIUM SERPL-MCNC: 1.8 MG/DL — SIGNIFICANT CHANGE UP (ref 1.6–2.6)
MAGNESIUM SERPL-MCNC: 1.8 MG/DL — SIGNIFICANT CHANGE UP (ref 1.6–2.6)
MCHC RBC-ENTMCNC: 20.3 PG — LOW (ref 27–34)
MCHC RBC-ENTMCNC: 20.3 PG — LOW (ref 27–34)
MCHC RBC-ENTMCNC: 20.8 PG — LOW (ref 27–34)
MCHC RBC-ENTMCNC: 20.8 PG — LOW (ref 27–34)
MCHC RBC-ENTMCNC: 31.3 GM/DL — LOW (ref 32–36)
MCHC RBC-ENTMCNC: 31.3 GM/DL — LOW (ref 32–36)
MCHC RBC-ENTMCNC: 32 GM/DL — SIGNIFICANT CHANGE UP (ref 32–36)
MCHC RBC-ENTMCNC: 32 GM/DL — SIGNIFICANT CHANGE UP (ref 32–36)
MCV RBC AUTO: 64.9 FL — LOW (ref 80–100)
MCV RBC AUTO: 64.9 FL — LOW (ref 80–100)
MCV RBC AUTO: 65.1 FL — LOW (ref 80–100)
MCV RBC AUTO: 65.1 FL — LOW (ref 80–100)
NRBC # BLD: 0 /100 WBCS — SIGNIFICANT CHANGE UP (ref 0–0)
PHOSPHATE SERPL-MCNC: 4.1 MG/DL — SIGNIFICANT CHANGE UP (ref 2.5–4.5)
PHOSPHATE SERPL-MCNC: 4.1 MG/DL — SIGNIFICANT CHANGE UP (ref 2.5–4.5)
PLATELET # BLD AUTO: 272 K/UL — SIGNIFICANT CHANGE UP (ref 150–400)
PLATELET # BLD AUTO: 272 K/UL — SIGNIFICANT CHANGE UP (ref 150–400)
PLATELET # BLD AUTO: 277 K/UL — SIGNIFICANT CHANGE UP (ref 150–400)
PLATELET # BLD AUTO: 277 K/UL — SIGNIFICANT CHANGE UP (ref 150–400)
POTASSIUM SERPL-MCNC: 3.8 MMOL/L — SIGNIFICANT CHANGE UP (ref 3.5–5.3)
POTASSIUM SERPL-MCNC: 3.8 MMOL/L — SIGNIFICANT CHANGE UP (ref 3.5–5.3)
POTASSIUM SERPL-MCNC: 4.1 MMOL/L — SIGNIFICANT CHANGE UP (ref 3.5–5.3)
POTASSIUM SERPL-MCNC: 4.1 MMOL/L — SIGNIFICANT CHANGE UP (ref 3.5–5.3)
POTASSIUM SERPL-SCNC: 3.8 MMOL/L — SIGNIFICANT CHANGE UP (ref 3.5–5.3)
POTASSIUM SERPL-SCNC: 3.8 MMOL/L — SIGNIFICANT CHANGE UP (ref 3.5–5.3)
POTASSIUM SERPL-SCNC: 4.1 MMOL/L — SIGNIFICANT CHANGE UP (ref 3.5–5.3)
POTASSIUM SERPL-SCNC: 4.1 MMOL/L — SIGNIFICANT CHANGE UP (ref 3.5–5.3)
RBC # BLD: 5.24 M/UL — SIGNIFICANT CHANGE UP (ref 4.2–5.8)
RBC # BLD: 5.24 M/UL — SIGNIFICANT CHANGE UP (ref 4.2–5.8)
RBC # BLD: 5.96 M/UL — HIGH (ref 4.2–5.8)
RBC # BLD: 5.96 M/UL — HIGH (ref 4.2–5.8)
RBC # FLD: 16.9 % — HIGH (ref 10.3–14.5)
RBC # FLD: 16.9 % — HIGH (ref 10.3–14.5)
RBC # FLD: 17.5 % — HIGH (ref 10.3–14.5)
RBC # FLD: 17.5 % — HIGH (ref 10.3–14.5)
SODIUM SERPL-SCNC: 139 MMOL/L — SIGNIFICANT CHANGE UP (ref 135–145)
SODIUM SERPL-SCNC: 139 MMOL/L — SIGNIFICANT CHANGE UP (ref 135–145)
SODIUM SERPL-SCNC: 140 MMOL/L — SIGNIFICANT CHANGE UP (ref 135–145)
SODIUM SERPL-SCNC: 140 MMOL/L — SIGNIFICANT CHANGE UP (ref 135–145)
WBC # BLD: 10.22 K/UL — SIGNIFICANT CHANGE UP (ref 3.8–10.5)
WBC # BLD: 10.22 K/UL — SIGNIFICANT CHANGE UP (ref 3.8–10.5)
WBC # BLD: 9.04 K/UL — SIGNIFICANT CHANGE UP (ref 3.8–10.5)
WBC # BLD: 9.04 K/UL — SIGNIFICANT CHANGE UP (ref 3.8–10.5)
WBC # FLD AUTO: 10.22 K/UL — SIGNIFICANT CHANGE UP (ref 3.8–10.5)
WBC # FLD AUTO: 10.22 K/UL — SIGNIFICANT CHANGE UP (ref 3.8–10.5)
WBC # FLD AUTO: 9.04 K/UL — SIGNIFICANT CHANGE UP (ref 3.8–10.5)
WBC # FLD AUTO: 9.04 K/UL — SIGNIFICANT CHANGE UP (ref 3.8–10.5)

## 2023-10-19 PROCEDURE — 36223 PLACE CATH CAROTID/INOM ART: CPT | Mod: 59,LT

## 2023-10-19 PROCEDURE — 99233 SBSQ HOSP IP/OBS HIGH 50: CPT

## 2023-10-19 PROCEDURE — 37215 TRANSCATH STENT CCA W/EPS: CPT | Mod: RT

## 2023-10-19 PROCEDURE — 36226 PLACE CATH VERTEBRAL ART: CPT | Mod: RT

## 2023-10-19 RX ORDER — TICAGRELOR 90 MG/1
90 TABLET ORAL
Refills: 0 | Status: DISCONTINUED | OUTPATIENT
Start: 2023-10-19 | End: 2023-10-22

## 2023-10-19 RX ORDER — PHENYLEPHRINE HYDROCHLORIDE 10 MG/ML
0.1 INJECTION INTRAVENOUS
Qty: 40 | Refills: 0 | Status: DISCONTINUED | OUTPATIENT
Start: 2023-10-19 | End: 2023-10-20

## 2023-10-19 RX ORDER — ASPIRIN/CALCIUM CARB/MAGNESIUM 324 MG
81 TABLET ORAL
Refills: 0 | Status: DISCONTINUED | OUTPATIENT
Start: 2023-10-20 | End: 2023-10-22

## 2023-10-19 RX ADMIN — PHENYLEPHRINE HYDROCHLORIDE 2.81 MICROGRAM(S)/KG/MIN: 10 INJECTION INTRAVENOUS at 19:12

## 2023-10-19 RX ADMIN — ATORVASTATIN CALCIUM 20 MILLIGRAM(S): 80 TABLET, FILM COATED ORAL at 21:35

## 2023-10-19 RX ADMIN — TICAGRELOR 90 MILLIGRAM(S): 90 TABLET ORAL at 19:12

## 2023-10-19 RX ADMIN — FAMOTIDINE 20 MILLIGRAM(S): 10 INJECTION INTRAVENOUS at 13:11

## 2023-10-19 RX ADMIN — Medication 37.5 MILLIGRAM(S): at 13:09

## 2023-10-19 RX ADMIN — ENOXAPARIN SODIUM 40 MILLIGRAM(S): 100 INJECTION SUBCUTANEOUS at 05:36

## 2023-10-19 RX ADMIN — TICAGRELOR 90 MILLIGRAM(S): 90 TABLET ORAL at 05:36

## 2023-10-19 RX ADMIN — Medication 50 MILLIGRAM(S): at 13:09

## 2023-10-19 RX ADMIN — PREGABALIN 1000 MICROGRAM(S): 225 CAPSULE ORAL at 13:09

## 2023-10-19 RX ADMIN — Medication 81 MILLIGRAM(S): at 13:09

## 2023-10-19 RX ADMIN — Medication 1 MILLIGRAM(S): at 13:10

## 2023-10-19 NOTE — PROGRESS NOTE ADULT - SUBJECTIVE AND OBJECTIVE BOX
Name of Patient : CORTNEY SERNA  MRN: [id number]  Date of visit: 10-19-23        Subjective: Patient seen and examined. No new events except as noted.   Patient seen earlier this AM. Lying down in bed, offers no new complaints.  S/P MR yesterday. Planned for stent placement with NSGY today.   Denies chest pain, palpitations, shortness of breath or dyspnea.     REVIEW OF SYSTEMS:    CONSTITUTIONAL:  Generalized weakness   EYES/ENT: Rt eye visual disturbances X 3-4 weeks, describes seeing "black" in Right lower visual fields   NECK: No pain or stiffness  RESPIRATORY: No cough, wheezing, hemoptysis; No shortness of breath  CARDIOVASCULAR: No chest pain or palpitations  GASTROINTESTINAL: No abdominal or epigastric pain. No nausea, vomiting, or hematemesis; No diarrhea or constipation. No melena or hematochezia.  GENITOURINARY: No dysuria, frequency or hematuria  NEUROLOGICAL: + Weakness   SKIN: No itching, burning, rashes, or lesions   All other review of systems is negative unless indicated above.    MEDICATIONS:  MEDICATIONS  (STANDING):  aspirin  chewable 81 milliGRAM(s) Oral daily  atorvastatin 20 milliGRAM(s) Oral at bedtime  cyanocobalamin 1000 MICROGram(s) Oral daily  dextrose 5%. 1000 milliLiter(s) (50 mL/Hr) IV Continuous <Continuous>  dextrose 5%. 1000 milliLiter(s) (100 mL/Hr) IV Continuous <Continuous>  dextrose 50% Injectable 25 Gram(s) IV Push once  dextrose 50% Injectable 25 Gram(s) IV Push once  dextrose 50% Injectable 12.5 Gram(s) IV Push once  enoxaparin Injectable 40 milliGRAM(s) SubCutaneous every 24 hours  famotidine    Tablet 20 milliGRAM(s) Oral daily  folic acid 1 milliGRAM(s) Oral daily  glucagon  Injectable 1 milliGRAM(s) IntraMuscular once  hydrOXYzine hydrochloride 50 milliGRAM(s) Oral daily  insulin lispro (ADMELOG) corrective regimen sliding scale   SubCutaneous three times a day before meals  insulin lispro (ADMELOG) corrective regimen sliding scale   SubCutaneous at bedtime  pantoprazole    Tablet 40 milliGRAM(s) Oral before breakfast  sodium chloride 0.9%. 1000 milliLiter(s) (75 mL/Hr) IV Continuous <Continuous>  ticagrelor 90 milliGRAM(s) Oral every 12 hours  venlafaxine 37.5 milliGRAM(s) Oral daily      PHYSICAL EXAM:  T(C): 36.8 (10-19-23 @ 14:19), Max: 36.9 (10-18-23 @ 17:54)  HR: 74 (10-19-23 @ 14:19) (66 - 83)  BP: 155/76 (10-19-23 @ 14:19) (121/82 - 155/76)  RR: 18 (10-19-23 @ 14:19) (18 - 20)  SpO2: 97% (10-19-23 @ 14:19) (96% - 98%)  Wt(kg): --  I&O's Summary    18 Oct 2023 07:01  -  19 Oct 2023 07:00  --------------------------------------------------------  IN: 1300 mL / OUT: 0 mL / NET: 1300 mL    19 Oct 2023 07:01  -  19 Oct 2023 14:34  --------------------------------------------------------  IN: 0 mL / OUT: 0 mL / NET: 0 mL          Appearance: Awake, generalized weakness, lying down in bed   HEENT: Eyes are open   Lymphatic: No lymphadenopathy   Cardiovascular: Normal    Respiratory: normal effort , clear  Gastrointestinal:  Soft, Non-tender  Skin: No rashes,  warm to touch  Psychiatry:  Mood & affect appropriate  Musculoskeletal: No edema          10-18-23 @ 07:01  -  10-19-23 @ 07:00  --------------------------------------------------------  IN: 1300 mL / OUT: 0 mL / NET: 1300 mL    10-19-23 @ 07:01  -  10-19-23 @ 14:34  --------------------------------------------------------  IN: 0 mL / OUT: 0 mL / NET: 0 mL                                12.1   9.04  )-----------( 272      ( 19 Oct 2023 06:00 )             38.7               10-19    139  |  105  |  16  ----------------------------<  134<H>  3.8   |  21<L>  |  0.95    Ca    9.0      19 Oct 2023 05:59                         Urinalysis Basic - ( 19 Oct 2023 05:59 )    Color: x / Appearance: x / SG: x / pH: x  Gluc: 134 mg/dL / Ketone: x  / Bili: x / Urobili: x   Blood: x / Protein: x / Nitrite: x   Leuk Esterase: x / RBC: x / WBC x   Sq Epi: x / Non Sq Epi: x / Bacteria: x        < from: MR Orbits w/wo IV Cont (10.18.23 @ 21:48) >  IMPRESSION:    MRI brain: No evidence of acute infarct, hemorrhage, or mass lesion.   There is a chronic lacunar infarct in the medial right cerebellum and in   the left perisylvian cortex. Multiple small foci of T2/FLAIR hyperintense   signal in the periventricular and subcortical white matter of the   bilateral cerebri, suggestive of moderate chronic microvascular ischemic   changes.    MRI orbits: Unremarkable    --- End of Report ---      < end of copied text >

## 2023-10-19 NOTE — PROGRESS NOTE ADULT - SUBJECTIVE AND OBJECTIVE BOX
THE PATIENT WAS SEEN AND EXAMINED BY ME WITH THE HOUSESTAFF AND STROKE TEAM DURING MORNING ROUNDS.   HPI:  73 y/o RHM PMHx prior b/l cerebellar infarcts with no residual deficitds, b/l VA stenosis (s/p R VA stent at [facility] in 2016 on ASA/Plavix), DM2, HLD, HTN, cataracts in both eyes (2012) depression, rheumatoid arthritis presenting from outpatient optho office for right eye vision changes. About 3 weeks ago, patient gradually started noticing that his right eye's lower visual field is seeing dark. Denies HA, weakness, numbness, dizziness. Went to see optho Dr. Dawit Lawrence. who performed formal visual field testing in the office, and documented (paperwork provided by patient) that there is partial inferior altitudinal defect nasally and possible small SN defect. Said no diabetic retinopathy in both eyes. However, believes the right eye visual field deficit found on VF testing is possibly due to embolic retinal ischemia and recommended patient come to [facility] for stroke work up. At the time of neurological exam, patient was not complaining of significant visual field loss, and on visual field testing, did not have gross visual field cut.     Of note, neurology had seen patient in 11/2021 for acute vestibular syndrome. Neuroimaging at the time did not show new findings, including MRI brain.     NIHSS:0  preMRS:0       SUBJECTIVE: No events overnight.  No new neurologic complaints.      artificial  tears Solution 1 Drop(s) Both EYES three times a day PRN  aspirin  chewable 81 milliGRAM(s) Oral daily  atorvastatin 20 milliGRAM(s) Oral at bedtime  cyanocobalamin 1000 MICROGram(s) Oral daily  dextrose 5%. 1000 milliLiter(s) IV Continuous <Continuous>  dextrose 5%. 1000 milliLiter(s) IV Continuous <Continuous>  dextrose 50% Injectable 25 Gram(s) IV Push once  dextrose 50% Injectable 12.5 Gram(s) IV Push once  dextrose 50% Injectable 25 Gram(s) IV Push once  dextrose Oral Gel 15 Gram(s) Oral once PRN  enoxaparin Injectable 40 milliGRAM(s) SubCutaneous every 24 hours  famotidine    Tablet 20 milliGRAM(s) Oral daily  folic acid 1 milliGRAM(s) Oral daily  glucagon  Injectable 1 milliGRAM(s) IntraMuscular once  hydrOXYzine hydrochloride 50 milliGRAM(s) Oral daily  insulin lispro (ADMELOG) corrective regimen sliding scale   SubCutaneous three times a day before meals  insulin lispro (ADMELOG) corrective regimen sliding scale   SubCutaneous at bedtime  pantoprazole    Tablet 40 milliGRAM(s) Oral before breakfast  sodium chloride 0.9%. 1000 milliLiter(s) IV Continuous <Continuous>  ticagrelor 90 milliGRAM(s) Oral every 12 hours  venlafaxine 37.5 milliGRAM(s) Oral daily      PHYSICAL EXAM:   Vital Signs Last 24 Hrs  T(C): 36.6 (19 Oct 2023 04:43), Max: 36.9 (18 Oct 2023 17:54)  T(F): 97.8 (19 Oct 2023 04:43), Max: 98.5 (18 Oct 2023 21:37)  HR: 66 (19 Oct 2023 04:43) (66 - 83)  BP: 121/82 (19 Oct 2023 04:43) (121/82 - 153/75)  RR: 18 (19 Oct 2023 04:43) (18 - 20)  SpO2: 98% (19 Oct 2023 04:43) (96% - 98%)    Parameters below as of 19 Oct 2023 04:43  Patient On (Oxygen Delivery Method): room air        General: No acute distress  HEENT: EOM intact, visual fields full  Abdomen: Soft, nontender, nondistended   Extremities: No edema    NEUROLOGICAL EXAM:  Mental status: Awake, alert, oriented x3, no aphasia, no neglect, normal memory   Cranial Nerves: No facial asymmetry, no nystagmus, no dysarthria,  tongue midline  Motor exam: Normal tone, no drift, 5/5 RUE, 5/5 RLE, 5/5 LUE, 5/5 LLE, normal fine finger movements.  Sensation: Intact to light touch   Coordination/ Gait: No dysmetria, OCTAVIO intact and symmetric bilaterally    LABS:                        12.1   9.04  )-----------( 272      ( 19 Oct 2023 06:00 )             38.7    10-19    139  |  105  |  16  ----------------------------<  134<H>  3.8   |  21<L>  |  0.95    Ca    9.0      19 Oct 2023 05:59          IMAGING: Reviewed by me.   Carotid Duplex: This examination confirms the presence of a severe, greater than 70%   stenosis of the right internal carotid artery.    A flow-limiting stenosis is not identified on the left.    10/16/23:  Noncontrast CT Head: No acute intracranal hemorrhage, mass effect, or   evidence of acute vascular territorial infarct. If clinical symptoms   persist or worsen, more sensitive evaluation with brain MRI may be   obtained, if no contraindications exist.    CTA Neck: Severe (greater than 70%) luminal stenosis of the proximal   right internal carotid artery by NASCET criteria, secondary to   predominantly noncalcified plaque. Redemonstration of nonopacification of   the proximal and mid left vertebral artery with intermittent   opacification of the mid to distal cervical left vertebral artery.    Noncalcified atherosclerotic plaque along the aortic arch, protruding   into the lumen, similar to prior examination.    CTA Head: No proximal large vessel occlusion. Stable moderate and severe   stenoses of the left vertebral artery.

## 2023-10-19 NOTE — PROGRESS NOTE ADULT - ASSESSMENT
71 y/o male with multiple vascular risk factors presenting with month long hx of right visual disturbances, particular describing seeing black in right lower visual fields. Saw optho outpatient, performed formal visual field testing, confirming right partial inferior altitudinal defect nasally and possible small SN defect. Recommended to come to [facility] for embolic work up. Neuro exam did not reveal gross visual field cuts. However, at the time of exam, patient denying visual difficulties. On neuroimaging, new high grade (70%) BHANU stenosis from prior imaging. B/l VA stenosis stable as well as atherosclerotic plaque in aortic arch.     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     NEURO: Continue close monitoring for neurologic deterioration, permissive HTN, titrate statin to LDL goal less than 70, MRI Brain w/  done pending read, Neurosurgeruy consuslted: plan for cerebral angiogram +/- stent. Physical therapy/OT/Speech eval/treatment. NPO at midnight     ANTITHROMBOTIC THERAPY: Aspirin 81mg PO Qd, Brilinta 90mg PO BID      PULMONARY: CXR clear, protecting airway, saturating well     CARDIOVASCULAR: check TTE, cardiac monitoring                              SBP goal: Up to     GASTROINTESTINAL:  dysphagia screen- passed       Diet: Regular CC diet. NPO before midnight     RENAL: BUN/Cr stable, good urine output      Na Goal: Greater than 135     Oreilly: none    HEMATOLOGY: H/H stable, Platelets normal      DVT ppx: LMWH [x]     ID: afebrile, no leukocytosis     OTHER: Plan of care discussed with patient at bedside.     DISPOSITION: home once stable and workup is complete      CORE MEASURES:        Admission NIHSS: 0     TPA: [] YES [x] NO      LDL/HDL: 65     Depression Screen: No depression      Statin Therapy: Atorvastatin 20mg PO Qhs. No need for high dose statins because he is at goal      Dysphagia Screen: [x] PASS [] FAIL     Smoking [] YES [x] NO      Afib [] YES [x] NO     Stroke Education [x] YES [] NO    Obtain screening lower extremity venous ultrasound in patients who meet 1 or more of the following criteria as patient is high risk for DVT/PE on admission:   [] History of DVT/PE  []Hypercoagulable states (Factor V Leiden, Cancer, OCP, etc. )  []Prolonged immobility (hemiplegia/hemiparesis/post operative or any other extended immobilization)  [] Transferred from outside facility (Rehab or Long term care)  [] Age </= to 50. 73 y/o male with multiple vascular risk factors presenting with month long hx of right visual disturbances, particular describing seeing black in right lower visual fields. Saw optho outpatient, performed formal visual field testing, confirming right partial inferior altitudinal defect nasally and possible small SN defect. Recommended to come to [facility] for embolic work up. Neuro exam did not reveal gross visual field cuts. However, at the time of exam, patient denying visual difficulties. On neuroimaging, new high grade (70%) BHANU stenosis from prior imaging. B/l VA stenosis stable as well as atherosclerotic plaque in aortic arch.     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     NEURO: Continue close monitoring for neurologic deterioration, permissive HTN, titrate statin to LDL goal less than 70, MRI Brain w/  done pending read, Neurosurgeruy consuslted: plan for cerebral angiogram +/- stent. Physical therapy/OT: home with no needs    ANTITHROMBOTIC THERAPY: Aspirin 81mg PO Qd, Brilinta 90mg PO BID      PULMONARY: CXR clear, protecting airway, saturating well     CARDIOVASCULAR:  TTE: Ef 52%, There is no evidence of a left ventricular thrombus, The left atrium is normal in size,   cardiac monitoring                              SBP goal: Up to     GASTROINTESTINAL:  dysphagia screen- passed       Diet: Regular CC diet. NPO before midnight     RENAL: BUN/Cr stable, good urine output      Na Goal: Greater than 135     Oreilly: none    HEMATOLOGY: H/H stable, Platelets 272     DVT ppx: LMWH [x]     ID: afebrile, no leukocytosis     OTHER: Plan of care discussed with patient at bedside.     DISPOSITION: home once stable and workup is complete      CORE MEASURES:        Admission NIHSS: 0     TPA: [] YES [x] NO      LDL/HDL: 65     Depression Screen: No depression      Statin Therapy: Atorvastatin 20mg PO Qhs. No need for high dose statins because he is at goal      Dysphagia Screen: [x] PASS [] FAIL     Smoking [] YES [x] NO      Afib [] YES [x] NO     Stroke Education [x] YES [] NO    Obtain screening lower extremity venous ultrasound in patients who meet 1 or more of the following criteria as patient is high risk for DVT/PE on admission:   [] History of DVT/PE  []Hypercoagulable states (Factor V Leiden, Cancer, OCP, etc. )  []Prolonged immobility (hemiplegia/hemiparesis/post operative or any other extended immobilization)  [] Transferred from outside facility (Rehab or Long term care)  [] Age </= to 50.

## 2023-10-19 NOTE — PROGRESS NOTE ADULT - NS ATTEND AMEND GEN_ALL_CORE FT
I saw and examined the patient, reviewed diagnostic studies, and reviewed images personally. I agree with PA/np’s history, exam, orders placed, and plan of care. Medical issues needing to be addressed include: R monocular vision impairment and vision blurring, suspected BRAO/NAION, hx of stroke, RA, hx of cataracts s/p surgery. CTA noted severe R ICA stenosis, which is worse compared to prior imaging. OK for home statin dose of 20mg, pt LDL at goal <70. MRI (-) for acute cerebral infarction. angio today w/ possible stent placement. Exam stable and nonfocal except some persistent blurred R monocular vision changes.

## 2023-10-19 NOTE — PROGRESS NOTE ADULT - SUBJECTIVE AND OBJECTIVE BOX
Subjective: Patient seen and examined. No new events except as noted.     REVIEW OF SYSTEMS:    CONSTITUTIONAL:+ weakness, fevers or chills  EYES/ENT: No visual changes;  No vertigo or throat pain   NECK: No pain or stiffness  RESPIRATORY: No cough, wheezing, hemoptysis; No shortness of breath  CARDIOVASCULAR: No chest pain or palpitations  GASTROINTESTINAL: No abdominal or epigastric pain. No nausea, vomiting, or hematemesis; No diarrhea or constipation. No melena or hematochezia.  GENITOURINARY: No dysuria, frequency or hematuria  NEUROLOGICAL: No numbness or weakness  SKIN: No itching, burning, rashes, or lesions   All other review of systems is negative unless indicated above.    MEDICATIONS:  MEDICATIONS  (STANDING):  aspirin  chewable 81 milliGRAM(s) Oral daily  atorvastatin 20 milliGRAM(s) Oral at bedtime  cyanocobalamin 1000 MICROGram(s) Oral daily  dextrose 5%. 1000 milliLiter(s) (100 mL/Hr) IV Continuous <Continuous>  dextrose 5%. 1000 milliLiter(s) (50 mL/Hr) IV Continuous <Continuous>  dextrose 50% Injectable 25 Gram(s) IV Push once  dextrose 50% Injectable 25 Gram(s) IV Push once  dextrose 50% Injectable 12.5 Gram(s) IV Push once  enoxaparin Injectable 40 milliGRAM(s) SubCutaneous every 24 hours  famotidine    Tablet 20 milliGRAM(s) Oral daily  folic acid 1 milliGRAM(s) Oral daily  glucagon  Injectable 1 milliGRAM(s) IntraMuscular once  hydrOXYzine hydrochloride 50 milliGRAM(s) Oral daily  insulin lispro (ADMELOG) corrective regimen sliding scale   SubCutaneous three times a day before meals  insulin lispro (ADMELOG) corrective regimen sliding scale   SubCutaneous at bedtime  pantoprazole    Tablet 40 milliGRAM(s) Oral before breakfast  sodium chloride 0.9%. 1000 milliLiter(s) (75 mL/Hr) IV Continuous <Continuous>  ticagrelor 90 milliGRAM(s) Oral every 12 hours  venlafaxine 37.5 milliGRAM(s) Oral daily      PHYSICAL EXAM:  T(C): 36.6 (10-19-23 @ 09:22), Max: 36.9 (10-18-23 @ 17:54)  HR: 67 (10-19-23 @ 09:22) (66 - 83)  BP: 131/74 (10-19-23 @ 09:22) (121/82 - 153/75)  RR: 20 (10-19-23 @ 09:22) (18 - 20)  SpO2: 96% (10-19-23 @ 09:22) (96% - 98%)  Wt(kg): --  I&O's Summary    18 Oct 2023 07:01  -  19 Oct 2023 07:00  --------------------------------------------------------  IN: 1300 mL / OUT: 0 mL / NET: 1300 mL          Appearance: NAD	  HEENT:   Normal oral mucosa, PERRL, EOMI	  Lymphatic: No lymphadenopathy  Cardiovascular: Normal S1 S2, No JVD, No murmurs, No edema  Respiratory: Lungs clear to auscultation	  Psychiatry: A & O x 3, Mood & affect appropriate  Gastrointestinal:  Soft, Non-tender, + BS	  Skin: No rashes, No ecchymoses, No cyanosis	  NEUROLOGICAL EXAM:  Mental status: Awake, alert, oriented x3, no aphasia, no neglect, normal memory   Cranial Nerves: No facial asymmetry, no nystagmus, no dysarthria,  tongue midline  Motor exam: Normal tone, no drift, 5/5 RUE, 5/5 RLE, 5/5 LUE, 5/5 LLE, normal fine finger movements.  Sensation: Intact to light touch   Coordination/ Gait: No dysmetria, OCTAVIO intact and symmetric bilaterally  Extremities: Normal range of motion, No clubbing, cyanosis or edema  Vascular: Peripheral pulses palpable 2+ bilaterally    LABS:    CARDIAC MARKERS:                                12.1   9.04  )-----------( 272      ( 19 Oct 2023 06:00 )             38.7     10-19    139  |  105  |  16  ----------------------------<  134<H>  3.8   |  21<L>  |  0.95    Ca    9.0      19 Oct 2023 05:59      proBNP:   Lipid Profile:   HgA1c:   TSH:             TELEMETRY: 	 SR   ECG:  	  RADIOLOGY: < from: MR Orbits w/wo IV Cont (10.18.23 @ 21:48) >    ACC: [id number] EXAM:  MR ORBITS ONLY WAWIC   ORDERED BY: SANDRA HAY     ACC: [id number] EXAM:  MR BRAIN WAW IC   ORDERED BY: SANDRA HAY     PROCEDURE DATE:  10/18/2023          INTERPRETATION:  Exam Date: 10/18/2023 9:47 PM    MR brain and orbits with and without gadolinium    CLINICAL INFORMATION: Right visual field cut Stroke and addn't   opthalmology workup    TECHNIQUE:   Multiplanar imaging of the brain and orbits was performed   pre- and post-IV contrast.   7.5 cc of Gadavist was administered. 0 cc   was discarded    COMPARISON: CT/CTA head neck 10/16/2023 and MR brain 11/24/2021.    FINDINGS:    MRI brain:    The ventricles, sulci and basal cisterns appear unremarkable. There is no   evidence of acute infarct, hemorrhage, or mass lesion. There is a chronic   lacunar infarct in the medial right cerebellum and in the left   perisylvian cortex. Multiple small foci of T2/FLAIR hyperintense signal   in the periventricular and subcortical white matter of the bilateral   cerebri, suggestive of moderate chronic microvascular ischemic changes.   There is no evidence of abnormal enhancement. There is no evidence of   midline shift or herniation pattern. No mass effect is found in the brain.    The vertebral and internal carotid arteries demonstrate expected flow   voids indicating their patency.    The paranasal sinuses are clear.    MRI orbits:    Bilateral globes, optic nerves, extraocular muscles, and retrobulbar   regions appear unremarkable. Bilateral ophthalmic veins are unremarkable.   Optic chiasm appears unremarkable. Cavernous sinus is unremarkable.    Sella and pituitary stalk appear unremarkable.    Periorbital soft tissues are unremarkable.    IMPRESSION:    MRI brain: No evidence of acute infarct, hemorrhage, or mass lesion.   There is a chronic lacunar infarct in the medial right cerebellum and in   the left perisylvian cortex. Multiple small foci of T2/FLAIR hyperintense   signal in the periventricular and subcortical white matter of the   bilateral cerebri, suggestive of moderate chronic microvascular ischemic   changes.    MRI orbits: Unremarkable    --- End of Report ---            ALTON GUILLERMO MD; Attending Radiologist  This document has been electronically signed. Oct 19 2023  9:51AM    < end of copied text >    DIAGNOSTIC TESTING:  [ ] Echocardiogram:  [ ]  Catheterization:  [ ] Stress Test:    OTHER:

## 2023-10-19 NOTE — CHART NOTE - NSCHARTNOTEFT_GEN_A_CORE
Interventional Neuro Radiology  Pre-Procedure Note PA-C        This is a 72 year old right hand dominant male with a past medial history significant for b/l cerebellar infarcts with no residual deficits, b/l VA stenosis (s/p R VA stent at [facility] in 2016 on ASA/Plavix), DM2, HLD, HTN, cataracts in both eyes (2012) depression, rheumatoid arthritis presenting from outpatient optho office for right eye vision changes. About 3 weeks ago, patient gradually started noticing that his right eye's lower visual field is seeing dark. Denies HA, weakness, numbness, dizziness. Went to see optho Dr. Dawit Lawrence. who performed formal visual field testing in the office, and documented (paperwork provided by patient) that there is partial inferior altitudinal defect nasally and possible small SN defect. Said no diabetic retinopathy in both eyes. Patient is transported to Neuro IR for a catheter cerebral angiogram and possible carotid stent placement.       NIHSS:0  preMRS:0 (17 Oct 2023 01:05)    Allergies: No Known Allergies  PMHX: Hypertension, Diabetes mellitus, type 2, Constipation, Hyperlipidemia, Cerebral infarction, Dizziness, Chronic cough, Depression, Interstitial lung disease, Vertebral artery stenosis  PSHX:   Social History:   FAMILY HISTORY:      Current Medications: artificial  tears Solution 1 Drop(s) Both EYES three times a day PRN  aspirin  chewable 81 milliGRAM(s) Oral daily  atorvastatin 20 milliGRAM(s) Oral at bedtime  cyanocobalamin 1000 MICROGram(s) Oral daily  dextrose 5%. 1000 milliLiter(s) IV Continuous   dextrose 5%. 1000 milliLiter(s) IV Continuous   dextrose 50% Injectable 25 Gram(s) IV Push once  dextrose 50% Injectable 25 Gram(s) IV Push once  dextrose 50% Injectable 12.5 Gram(s) IV Push once  dextrose Oral Gel 15 Gram(s) Oral once PRN  enoxaparin Injectable 40 milliGRAM(s) SubCutaneous every 24 hours  famotidine    Tablet 20 milliGRAM(s) Oral daily  folic acid 1 milliGRAM(s) Oral daily  glucagon  Injectable 1 milliGRAM(s) IntraMuscular once  hydrOXYzine hydrochloride 50 milliGRAM(s) Oral daily  insulin lispro (ADMELOG) corrective regimen sliding scale   SubCutaneous three times a day before meals  insulin lispro (ADMELOG) corrective regimen sliding scale   SubCutaneous at bedtime  pantoprazole    Tablet 40 milliGRAM(s) Oral before breakfast  sodium chloride 0.9%. 1000 milliLiter(s) IV Continuous   ticagrelor 90 milliGRAM(s) Oral every 12 hours  venlafaxine 37.5 milliGRAM(s) Oral daily      Labs:                         12.1   9.04  )-----------( 272      ( 19 Oct 2023 06:00 )             38.7       10-19    139  |  105  |  16  ----------------------------<  134<H>  3.8   |  21<L>  |  0.95    Ca    9.0      19 Oct 2023 05:59    Blood Bank: B negative         Assessment/Plan:   This is a 72 year old right hand dominant right carotid stenosis, who presents to Neuro IR for a catheter cerebral angiogram and possible stent placement. Procedure, goals, risks, benefits and alternatives were discussed with patient and patient's family. All questions were answered. Risks include but are not limited to stroke, vessel injury, hemorrhage, and or right groin hematoma. Patient demonstrates understanding of all risks involved with this procedure and wishes to continue. Appropriate consent was obtained from patient and consent is in the patient's chart.

## 2023-10-19 NOTE — PROGRESS NOTE ADULT - SUBJECTIVE AND OBJECTIVE BOX
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  CTA w/ severe (70%) BHANU stenosis.       Admission Scores  GCS:   HH:   MF:   NIHSS:   RASS:    CAM-ICU:   ICH:    24 hour Events:     10/19: r CAROTID WALL STENT     Allergies    No Known Allergies    Intolerances        REVIEW OF SYSTEMS: [ ] Unable to Assess due to neurologic exam   [ ] All ROS addressed below are non-contributory, except:  Neuro: [ ] Headache [ ] Back pain [ ] Numbness [ ] Weakness [ ] Ataxia [ ] Dizziness [ ] Aphasia [ ] Dysarthria [ ] Visual disturbance  Resp: [ ] Shortness of breath/dyspnea, [ ] Orthopnea [ ] Cough  CV: [ ] Chest pain [ ] Palpitation [ ] Lightheadedness [ ] Syncope  Renal: [ ] Thirst [ ] Edema  GI: [ ] Nausea [ ] Emesis [ ] Abdominal pain [ ] Constipation [ ] Diarrhea  Hem: [ ] Hematemesis [ ] bright red blood per rectum  ID: [ ] Fever [ ] Chills [ ] Dysuria  ENT: [ ] Rhinorrhea      DEVICES:   [ ] Restraints [ ] ET tube [ ] central line [ ] arterial line [ ] du [ ] NGT/OGT [ ] EVD [ ] LD [ ] CALI/HMV [ ] Trach [ ] PEG [ ] Chest Tube     VITALS:   Vital Signs Last 24 Hrs  T(C): 36.2 (19 Oct 2023 17:15), Max: 36.9 (18 Oct 2023 21:37)  T(F): 97.2 (19 Oct 2023 17:15), Max: 98.5 (18 Oct 2023 21:37)  HR: 64 (19 Oct 2023 18:30) (64 - 77)  BP: 146/71 (19 Oct 2023 18:30) (84/50 - 155/76)  BP(mean): 102 (19 Oct 2023 18:30) (62 - 109)  RR: 17 (19 Oct 2023 18:15) (16 - 20)  SpO2: 99% (19 Oct 2023 18:30) (96% - 100%)    Parameters below as of 19 Oct 2023 18:15  Patient On (Oxygen Delivery Method): room air      CAPILLARY BLOOD GLUCOSE      POCT Blood Glucose.: 104 mg/dL (19 Oct 2023 17:46)  POCT Blood Glucose.: 154 mg/dL (19 Oct 2023 11:46)  POCT Blood Glucose.: 146 mg/dL (19 Oct 2023 07:48)  POCT Blood Glucose.: 129 mg/dL (18 Oct 2023 21:07)    I&O's Summary    18 Oct 2023 07:01  -  19 Oct 2023 07:00  --------------------------------------------------------  IN: 1300 mL / OUT: 0 mL / NET: 1300 mL    19 Oct 2023 07:01  -  19 Oct 2023 18:43  --------------------------------------------------------  IN: 0 mL / OUT: 0 mL / NET: 0 mL        Respiratory:        LABS:                        10.9   10.22 )-----------( 277      ( 19 Oct 2023 17:57 )             34.1     10-19    140  |  109<H>  |  15  ----------------------------<  114<H>  4.1   |  22  |  0.82             MEDICATION LEVELS:     IVF FLUIDS/MEDICATIONS:   MEDICATIONS  (STANDING):  atorvastatin 20 milliGRAM(s) Oral at bedtime  cyanocobalamin 1000 MICROGram(s) Oral daily  dextrose 5%. 1000 milliLiter(s) (100 mL/Hr) IV Continuous <Continuous>  dextrose 5%. 1000 milliLiter(s) (50 mL/Hr) IV Continuous <Continuous>  dextrose 50% Injectable 25 Gram(s) IV Push once  dextrose 50% Injectable 25 Gram(s) IV Push once  dextrose 50% Injectable 12.5 Gram(s) IV Push once  enoxaparin Injectable 40 milliGRAM(s) SubCutaneous every 24 hours  famotidine    Tablet 20 milliGRAM(s) Oral daily  folic acid 1 milliGRAM(s) Oral daily  glucagon  Injectable 1 milliGRAM(s) IntraMuscular once  hydrOXYzine hydrochloride 50 milliGRAM(s) Oral daily  insulin lispro (ADMELOG) corrective regimen sliding scale   SubCutaneous three times a day before meals  insulin lispro (ADMELOG) corrective regimen sliding scale   SubCutaneous at bedtime  pantoprazole    Tablet 40 milliGRAM(s) Oral before breakfast  phenylephrine    Infusion 0.1 MICROgram(s)/kG/Min (2.81 mL/Hr) IV Continuous <Continuous>  sodium chloride 0.9%. 1000 milliLiter(s) (75 mL/Hr) IV Continuous <Continuous>  ticagrelor 90 milliGRAM(s) Oral <User Schedule>  venlafaxine 37.5 milliGRAM(s) Oral daily    MEDICATIONS  (PRN):  artificial  tears Solution 1 Drop(s) Both EYES three times a day PRN for dry eyes  dextrose Oral Gel 15 Gram(s) Oral once PRN Blood Glucose LESS THAN 70 milliGRAM(s)/deciliter        IMAGING:      EXAMINATION:  PHYSICAL EXAM:    Constitutional: No Acute Distress     Neurological: Awake, alert oriented to person, place and time, Following Commands, PERRL, EOMI, No Gaze Preference, Face Symmetrical, Speech Fluent, No dysmetria, No ataxia, No nystagmus     Motor exam:          Upper extremity                         Delt     Bicep     Tricep    HG                                                 R         5/5        5/5        5/5       5/5                                               L          5/5        5/5        5/5       5/5          Lower extremity                        HF         KF        KE       DF         PF                                                  R        5/5        5/5        5/5       5/5         5/5                                               L         5/5        5/5       5/5       5/5          5/5    Pulmonary: Clear to Auscultation, No rales, No rhonchi, No wheezes     Cardiovascular: S1, S2, Regular rate and rhythm     Gastrointestinal: Soft, Non-tender, Non-distended     Extremities: No calf tenderness

## 2023-10-19 NOTE — PROGRESS NOTE ADULT - ASSESSMENT
Patient is a 72 year old male with a PMHx of prior B/L cerebellar infarcts, without any residual deficits, B/L VA stenosis (s/p R VA stent at [facility] in 2016 on ASA/Plavix), Type II DM, HLD, HTN, cataracts in both eyes (2012), depression, rheumatoid arthritis who presented from his outpatient opthomologists' office for right eye visual changes. Per chart review, about 3-4 weeks ago, patient reportedly began to notice gradually that his right eye's lower visual field is becoming/ seeing dark. Patient went to see his opthomologist, Dr. Dawit Lawrence who performed formal visual field testing in the office, and advised for patient to come in as it was reportedly noted that patient has "partial inferior altitudinal defect nasally and possible small SN defect." Patient denies history of diabetic retinopathy. Patient was sent in for stroke work up as per documentation to evaluate if right eye visual field deficit found on VF testing is possibly due to embolic retinal ischemia. Internal Medicine has been consulted on Mr. Barrera's care for medical management. Patient denies history of MI/ DVT / PE in the past. Denies any allergies. Denies headache, chest pain, palpitations, shortness of breath or dyspnea. Denies numbness or tingling.       Right Eye Visual Deficits Due to CVA  - Pt presented with 3-4 weeks of seeing "black" in his lower visual field   - CT Head/ CT Angio H/N w/ Severe > 70% BHANU stenosis, Moderate to severe stenosis of L vertebral artery   - MR head w/  Chronic lacunar infarct, moderate chronic microvascular ischemic changes  - On ASA, Brilinta and Statin   - Monitor on tele   - Planned for right carotid artery stent placement with NSGY today   - Fall, Seizure, Aspiration precautions   - PT / PT / S+S   - Care per Neuro/ NSGY appreciated    Risk Stratification   - TTE w/ LVSF of 52%, Mild AS, Neg for LV thrombus or pericardial effusion   - Pt is medically optimized for stent placement. Moderate risk candidate. Cardiac clearance per cardiology team.     Visual changes  - MR orbits read as unremarkable   - ESR/ CRP negative   - Optho eval appreciated; F/u recs    Type II DM  - A1C of 7.4  - Sliding scale   - Diet and lifestyle modifications  - Diabetic DASH diet     HTN   - BP parameters as per neurology   - Monitor BP, VS and patient closely    HLD  - C/w Statin therapy     PPX  - PPI and Lovenox     Discussed with Attending and Neuro.

## 2023-10-19 NOTE — PRE PROCEDURE NOTE - PRE PROCEDURE EVALUATION
Patient is optimized for cerebral angiogram, possible placement of right carotid artery stent with Dr. Niño     Informed consent was obtained     Exam: AOx3, RONDON 5/5. SILT. No appreciated visual field cut.

## 2023-10-19 NOTE — CHART NOTE - NSCHARTNOTEFT_GEN_A_CORE
Interventional Neuro- Radiology   Procedure Note PA-PATITO    Procedure: Selective Cerebral Angiography   Pre- Procedure Diagnosis:  Post- Procedure Diagnosis:    : Dr Dustin Niño  Fellow:    Physician Assistant: Emma Zambrano PA-C    Nurse:                    Radiologic Tech:  Anesthesiologist:  Sheath:      I/Os: EBL less than 10cc  IV fluids:     cc  Urine due to void  Contrast Omnipaque 240      cc             Vitals: BP         HR      Spo2     %          Preliminary Report:  Using a 5 Slovenian long sheath to the right groin under MAC sedation via left vertebral artery,  left internal carotid artery, left external carotid artery, right vertebral artery, right internal carotid artery, right external carotid artery a selective cerebral angiography was performed and demonstrated                       Official note to follow.  Patient tolerated procedure well, hemodynamically stable, no change in neurological status compared to baseline.  Results discussed with neuro ICU team, patient and patient's family. Right groin sheath was removed, manual compression held to hemostasis for 20 minutes, no active bleeding, no hematoma, quick clot and safeguard balloon dressing applied at Interventional Neuro- Radiology   Procedure Note PA-C    Procedure: Selective Cerebral Angiography   Pre- Procedure Diagnosis:  Post- Procedure Diagnosis:    : Dr Dustin Niño  Fellow:    Dr Diana Perkins   Physician Assistant: Emma Zambrano PA-C    Nurse:                   Maite Whitley RN               Radiologic Tech:   Av Culver LRT  Anesthesiologist:   Dr Anthony Swenson   Sheath:                 8 Italian short sheath       I/Os: EBL less than 10cc  IV fluids:     cc  Urine due to void  Contrast Omnipaque 240      cc             Vitals: BP         HR      Spo2 100%          Preliminary Report:  Using a 8 Italian short sheath to the right groin under MAC sedation via left vertebral artery,  eft internal carotid artery, left external carotid artery, right vertebral artery, right internal carotid artery, right external carotid artery a selective cerebral angiography was performed and demonstrated                       Official note to follow.  Patient tolerated procedure well, hemodynamically stable, no change in neurological status compared to baseline. Results discussed with neuro ICU team, patient and patient's family. Right groin sheath was removed, manual compression held to hemostasis for 20 minutes, no active bleeding, no hematoma, quick clot and safeguard balloon dressing applied at Interventional Neuro- Radiology   Procedure Note PA-C    Procedure: Catheter Cerebral Angiogram and right carotid wall stent placement, with Spider capture embolic protection device     Pre- Procedure Diagnosis: right carotid stenosis   Post- Procedure Diagnosis:    : Dr Dustin Niño  Fellow:    Dr Diana Perkins   Physician Assistant: Emma Zambrano PA-C    Nurse:                   Maite Whitley RN               Radiologic Tech:   Av Culver LRT  Anesthesiologist:   Dr Anthony Swenson   Sheath:                 8 Cambodian short sheath, 8 Cambodian BMX, purple select catheter        I/Os: EBL less than 10cc  IV fluids: 500cc  Urine due to void  Contrast Visi   45cc       Emerge balloon inflation 10 ATMs        Peter balloon inflation  6 ATMs       Heparin 5,ooo units      Glyco 0.2mg    Vitals: /68         HR 85    Spo2 100%          Preliminary Report:  Using a 8 Cambodian short sheath to the right groin under MAC sedation via left common carotid, right internal carotid artery, right common carotid artery a selective cerebral angiography was performed and demonstrated severe carotid stenosis. Patient underwent successful right carotid wall stent Official note to follow.  Patient tolerated procedure well, hemodynamically stable, no change in neurological status compared to baseline. Results discussed with neuro ICU team, patient and patient's family. Right groin sheath was removed, manual compression held to hemostasis for 20 minutes, no active bleeding, no hematoma, quick clot and safeguard balloon dressing applied at 1700 hours. Please keep systolic blood pressure 100 to 140. Please continue ASA 81mg and Brilinta 90mg every 12 hours. Please obtain carotid dopplers. Please remove safeguard balloon device at 2100 hours.

## 2023-10-19 NOTE — PROGRESS NOTE ADULT - ASSESSMENT
ASSESSMENT/PLAN: r. carotid stent     NEURO:  Neurocheck Q 1  Carotid dopplers in the   DAPT  Activity: [] OOB as tolerated [x] Bedrest [] PT [] OT [] PMNR    PULM:  RA  sat > 94%    CV:  SBP goal: 100-140    RENAL:  Fluids: NS at 75 ccs/hour    GI:  Diet: NPO, pending dysphagia   GI prophylaxis [] not indicated [] PPI [] other:  Bowel regimen [x] colace [x] senna [] other:    ENDO:   Goal euglycemia (-180)    HEME/ONC:  VTE prophylaxis: [] SCDs [] chemoprophylaxis [] high risk of DVT/PE on admission due to:  DAPT  Lovenox 40 mg QD    ID:  afebrile    MISC:    SOCIAL/FAMILY:  [] updated at bedside [] family meeting    CODE STATUS:  [x] Full Code [] DNR [] DNI [] Palliative/Comfort Care    DISPOSITION:  [x] ICU [] Stroke Unit [] Floor [] EMU [] RCU [] PCU    [] Patient is at high risk of neurologic deterioration/death due to:     Time seen:  Time spent: ___ [] critical care minutes

## 2023-10-20 LAB
ANION GAP SERPL CALC-SCNC: 12 MMOL/L — SIGNIFICANT CHANGE UP (ref 5–17)
ANION GAP SERPL CALC-SCNC: 12 MMOL/L — SIGNIFICANT CHANGE UP (ref 5–17)
BUN SERPL-MCNC: 15 MG/DL — SIGNIFICANT CHANGE UP (ref 7–23)
BUN SERPL-MCNC: 15 MG/DL — SIGNIFICANT CHANGE UP (ref 7–23)
CALCIUM SERPL-MCNC: 8.3 MG/DL — LOW (ref 8.4–10.5)
CALCIUM SERPL-MCNC: 8.3 MG/DL — LOW (ref 8.4–10.5)
CHLORIDE SERPL-SCNC: 105 MMOL/L — SIGNIFICANT CHANGE UP (ref 96–108)
CHLORIDE SERPL-SCNC: 105 MMOL/L — SIGNIFICANT CHANGE UP (ref 96–108)
CO2 SERPL-SCNC: 21 MMOL/L — LOW (ref 22–31)
CO2 SERPL-SCNC: 21 MMOL/L — LOW (ref 22–31)
CREAT SERPL-MCNC: 0.9 MG/DL — SIGNIFICANT CHANGE UP (ref 0.5–1.3)
CREAT SERPL-MCNC: 0.9 MG/DL — SIGNIFICANT CHANGE UP (ref 0.5–1.3)
EGFR: 90 ML/MIN/1.73M2 — SIGNIFICANT CHANGE UP
EGFR: 90 ML/MIN/1.73M2 — SIGNIFICANT CHANGE UP
GLUCOSE BLDC GLUCOMTR-MCNC: 103 MG/DL — HIGH (ref 70–99)
GLUCOSE BLDC GLUCOMTR-MCNC: 103 MG/DL — HIGH (ref 70–99)
GLUCOSE BLDC GLUCOMTR-MCNC: 142 MG/DL — HIGH (ref 70–99)
GLUCOSE BLDC GLUCOMTR-MCNC: 142 MG/DL — HIGH (ref 70–99)
GLUCOSE BLDC GLUCOMTR-MCNC: 158 MG/DL — HIGH (ref 70–99)
GLUCOSE BLDC GLUCOMTR-MCNC: 158 MG/DL — HIGH (ref 70–99)
GLUCOSE BLDC GLUCOMTR-MCNC: 164 MG/DL — HIGH (ref 70–99)
GLUCOSE BLDC GLUCOMTR-MCNC: 164 MG/DL — HIGH (ref 70–99)
GLUCOSE SERPL-MCNC: 110 MG/DL — HIGH (ref 70–99)
GLUCOSE SERPL-MCNC: 110 MG/DL — HIGH (ref 70–99)
HCT VFR BLD CALC: 32.3 % — LOW (ref 39–50)
HCT VFR BLD CALC: 32.3 % — LOW (ref 39–50)
HGB BLD-MCNC: 10.1 G/DL — LOW (ref 13–17)
HGB BLD-MCNC: 10.1 G/DL — LOW (ref 13–17)
MAGNESIUM SERPL-MCNC: 2.5 MG/DL — SIGNIFICANT CHANGE UP (ref 1.6–2.6)
MAGNESIUM SERPL-MCNC: 2.5 MG/DL — SIGNIFICANT CHANGE UP (ref 1.6–2.6)
MCHC RBC-ENTMCNC: 20.3 PG — LOW (ref 27–34)
MCHC RBC-ENTMCNC: 20.3 PG — LOW (ref 27–34)
MCHC RBC-ENTMCNC: 31.3 GM/DL — LOW (ref 32–36)
MCHC RBC-ENTMCNC: 31.3 GM/DL — LOW (ref 32–36)
MCV RBC AUTO: 65 FL — LOW (ref 80–100)
MCV RBC AUTO: 65 FL — LOW (ref 80–100)
NRBC # BLD: 0 /100 WBCS — SIGNIFICANT CHANGE UP (ref 0–0)
NRBC # BLD: 0 /100 WBCS — SIGNIFICANT CHANGE UP (ref 0–0)
PHOSPHATE SERPL-MCNC: 3.3 MG/DL — SIGNIFICANT CHANGE UP (ref 2.5–4.5)
PHOSPHATE SERPL-MCNC: 3.3 MG/DL — SIGNIFICANT CHANGE UP (ref 2.5–4.5)
PLATELET # BLD AUTO: 244 K/UL — SIGNIFICANT CHANGE UP (ref 150–400)
PLATELET # BLD AUTO: 244 K/UL — SIGNIFICANT CHANGE UP (ref 150–400)
POTASSIUM SERPL-MCNC: 3.7 MMOL/L — SIGNIFICANT CHANGE UP (ref 3.5–5.3)
POTASSIUM SERPL-MCNC: 3.7 MMOL/L — SIGNIFICANT CHANGE UP (ref 3.5–5.3)
POTASSIUM SERPL-SCNC: 3.7 MMOL/L — SIGNIFICANT CHANGE UP (ref 3.5–5.3)
POTASSIUM SERPL-SCNC: 3.7 MMOL/L — SIGNIFICANT CHANGE UP (ref 3.5–5.3)
RBC # BLD: 4.97 M/UL — SIGNIFICANT CHANGE UP (ref 4.2–5.8)
RBC # BLD: 4.97 M/UL — SIGNIFICANT CHANGE UP (ref 4.2–5.8)
RBC # FLD: 16.8 % — HIGH (ref 10.3–14.5)
RBC # FLD: 16.8 % — HIGH (ref 10.3–14.5)
SODIUM SERPL-SCNC: 138 MMOL/L — SIGNIFICANT CHANGE UP (ref 135–145)
SODIUM SERPL-SCNC: 138 MMOL/L — SIGNIFICANT CHANGE UP (ref 135–145)
WBC # BLD: 8.71 K/UL — SIGNIFICANT CHANGE UP (ref 3.8–10.5)
WBC # BLD: 8.71 K/UL — SIGNIFICANT CHANGE UP (ref 3.8–10.5)
WBC # FLD AUTO: 8.71 K/UL — SIGNIFICANT CHANGE UP (ref 3.8–10.5)
WBC # FLD AUTO: 8.71 K/UL — SIGNIFICANT CHANGE UP (ref 3.8–10.5)

## 2023-10-20 PROCEDURE — 93880 EXTRACRANIAL BILAT STUDY: CPT | Mod: 26

## 2023-10-20 RX ORDER — MIDODRINE HYDROCHLORIDE 2.5 MG/1
5 TABLET ORAL EVERY 8 HOURS
Refills: 0 | Status: DISCONTINUED | OUTPATIENT
Start: 2023-10-20 | End: 2023-10-20

## 2023-10-20 RX ORDER — SODIUM CHLORIDE 9 MG/ML
500 INJECTION INTRAMUSCULAR; INTRAVENOUS; SUBCUTANEOUS ONCE
Refills: 0 | Status: COMPLETED | OUTPATIENT
Start: 2023-10-20 | End: 2023-10-20

## 2023-10-20 RX ORDER — MAGNESIUM SULFATE 500 MG/ML
1 VIAL (ML) INJECTION ONCE
Refills: 0 | Status: COMPLETED | OUTPATIENT
Start: 2023-10-20 | End: 2023-10-20

## 2023-10-20 RX ORDER — SODIUM CHLORIDE 9 MG/ML
1000 INJECTION INTRAMUSCULAR; INTRAVENOUS; SUBCUTANEOUS ONCE
Refills: 0 | Status: COMPLETED | OUTPATIENT
Start: 2023-10-20 | End: 2023-10-20

## 2023-10-20 RX ADMIN — SODIUM CHLORIDE 1000 MILLILITER(S): 9 INJECTION INTRAMUSCULAR; INTRAVENOUS; SUBCUTANEOUS at 12:25

## 2023-10-20 RX ADMIN — TICAGRELOR 90 MILLIGRAM(S): 90 TABLET ORAL at 06:19

## 2023-10-20 RX ADMIN — Medication 37.5 MILLIGRAM(S): at 12:59

## 2023-10-20 RX ADMIN — MIDODRINE HYDROCHLORIDE 5 MILLIGRAM(S): 2.5 TABLET ORAL at 16:28

## 2023-10-20 RX ADMIN — PREGABALIN 1000 MICROGRAM(S): 225 CAPSULE ORAL at 12:58

## 2023-10-20 RX ADMIN — ENOXAPARIN SODIUM 40 MILLIGRAM(S): 100 INJECTION SUBCUTANEOUS at 06:18

## 2023-10-20 RX ADMIN — SODIUM CHLORIDE 500 MILLILITER(S): 9 INJECTION INTRAMUSCULAR; INTRAVENOUS; SUBCUTANEOUS at 07:26

## 2023-10-20 RX ADMIN — FAMOTIDINE 20 MILLIGRAM(S): 10 INJECTION INTRAVENOUS at 12:58

## 2023-10-20 RX ADMIN — PHENYLEPHRINE HYDROCHLORIDE 2.81 MICROGRAM(S)/KG/MIN: 10 INJECTION INTRAVENOUS at 06:19

## 2023-10-20 RX ADMIN — Medication 1 MILLIGRAM(S): at 12:58

## 2023-10-20 RX ADMIN — Medication 50 MILLIGRAM(S): at 12:59

## 2023-10-20 RX ADMIN — ATORVASTATIN CALCIUM 20 MILLIGRAM(S): 80 TABLET, FILM COATED ORAL at 21:33

## 2023-10-20 RX ADMIN — Medication 81 MILLIGRAM(S): at 06:19

## 2023-10-20 RX ADMIN — Medication 100 GRAM(S): at 02:52

## 2023-10-20 RX ADMIN — Medication 1: at 16:09

## 2023-10-20 RX ADMIN — SODIUM CHLORIDE 500 MILLILITER(S): 9 INJECTION INTRAMUSCULAR; INTRAVENOUS; SUBCUTANEOUS at 14:49

## 2023-10-20 RX ADMIN — TICAGRELOR 90 MILLIGRAM(S): 90 TABLET ORAL at 17:47

## 2023-10-20 RX ADMIN — PANTOPRAZOLE SODIUM 40 MILLIGRAM(S): 20 TABLET, DELAYED RELEASE ORAL at 06:18

## 2023-10-20 NOTE — PROGRESS NOTE ADULT - ASSESSMENT
Patient is a 72 year old male with a PMHx of prior B/L cerebellar infarcts, without any residual deficits, B/L VA stenosis (s/p R VA stent at [facility] in 2016 on ASA/Plavix), Type II DM, HLD, HTN, cataracts in both eyes (2012), depression, rheumatoid arthritis who presented from his outpatient opthomologists' office for right eye visual changes. Per chart review, about 3-4 weeks ago, patient reportedly began to notice gradually that his right eye's lower visual field is becoming/ seeing dark. Patient went to see his opthomologist, Dr. Dawit Lawrence who performed formal visual field testing in the office, and advised for patient to come in as it was reportedly noted that patient has "partial inferior altitudinal defect nasally and possible small SN defect." Patient denies history of diabetic retinopathy. Patient was sent in for stroke work up as per documentation to evaluate if right eye visual field deficit found on VF testing is possibly due to embolic retinal ischemia. Internal Medicine has been consulted on Mr. Barrera's care for medical management. Patient denies history of MI/ DVT / PE in the past. Denies any allergies. Denies headache, chest pain, palpitations, shortness of breath or dyspnea. Denies numbness or tingling.       Right Eye Visual Deficits Due to CVA  - Pt presented with 3-4 weeks of seeing "black" in his lower visual field   - CT Head/ CT Angio H/N w/ Severe > 70% BHANU stenosis, Moderate to severe stenosis of L vertebral artery   - MR head w/  Chronic lacunar infarct, moderate chronic microvascular ischemic changes  - On ASA, Brilinta and Statin   - Monitor on tele   - S/P Right carotid artery stent placement with NSGY   - F/u Carotid Duplex   - Fall, Seizure, Aspiration precautions   - PT / PT / S+S   - Care per Neuro/ NSGY appreciated    Risk Stratification   - TTE w/ LVSF of 52%, Mild AS, Neg for LV thrombus or pericardial effusion   - Pt is medically optimized for stent placement. Moderate risk candidate. Cardiac clearance per cardiology team.     Visual changes  - MR orbits read as unremarkable   - ESR/ CRP negative   - Optho eval appreciated; F/u recs    Type II DM  - A1C of 7.4  - Sliding scale   - Diet and lifestyle modifications  - Diabetic DASH diet     HTN   - BP parameters as per neurology   - Now hypotensive. On Phenyephrine in PACU. Monitor BP closely   - Monitor BP, VS and patient closely    HLD  - C/w Statin therapy     PPX  - PPI and Lovenox     Discussed with Attending.

## 2023-10-20 NOTE — PROGRESS NOTE ADULT - ASSESSMENT
ASSESSMENT/PLAN: r. carotid stent     NEURO:  Neurocheck Q 1  Carotid dopplers in the   DAPT  Activity: [] OOB as tolerated [x] Bedrest [] PT [] OT [] PMNR    PULM:  RA  sat > 94%    CV:  SBP goal: 100-140    RENAL:  Fluids: NS at 75 ccs/hour    GI:  Diet: NPO, pending dysphagia   GI prophylaxis [] not indicated [] PPI [] other:  Bowel regimen [x] colace [x] senna [] other:    ENDO:   Goal euglycemia (-180)    HEME/ONC:  VTE prophylaxis: [] SCDs [] chemoprophylaxis [] high risk of DVT/PE on admission due to:  DAPT  Lovenox 40 mg QD    ID:  afebrile    MISC:    SOCIAL/FAMILY:  [] updated at bedside [] family meeting    CODE STATUS:  [x] Full Code [] DNR [] DNI [] Palliative/Comfort Care    DISPOSITION:  [x] ICU [] Stroke Unit [] Floor [] EMU [] RCU [] PCU    [] Patient is at high risk of neurologic deterioration/death due to:     Time seen:  Time spent: ___ [] critical care minutes       ASSESSMENT/PLAN: r. carotid stent     NEURO:  Carotid dopplers   DAPT  Activity: [] OOB as tolerated [x] Bedrest [] PT [] OT [] PMNR    PULM:  RA  sat > 94%    CV:  SBP goal: 100-140    RENAL:  Fluids: NS at 75 ccs/hour    GI:  Diet: NPO, pending dysphagia   GI prophylaxis [] not indicated [] PPI [] other:  Bowel regimen [x] colace [x] senna [] other:    ENDO:   Goal euglycemia (-180)    HEME/ONC:  VTE prophylaxis: [] SCDs [] chemoprophylaxis [] high risk of DVT/PE on admission due to:  DAPT  Lovenox 40 mg QD    ID:  afebrile    MISC:    SOCIAL/FAMILY:  [] updated at bedside [] family meeting    CODE STATUS:  [x] Full Code [] DNR [] DNI [] Palliative/Comfort Care    DISPOSITION:  [x] ICU [] Stroke Unit [] Floor [] EMU [] RCU [] PCU

## 2023-10-20 NOTE — PROGRESS NOTE ADULT - ASSESSMENT
ASSESSMENT/PLAN: r. carotid stent     NEURO:  Carotid dopplers   DAPT  Activity: [] OOB as tolerated [x] Bedrest [] PT [] OT [] PMNR    PULM:  RA  sat > 94%    CV:  SBP goal: 100-140    RENAL:  Fluids: NS at 75 ccs/hour    GI:  Diet: NPO, pending dysphagia   GI prophylaxis [] not indicated [] PPI [] other:  Bowel regimen [x] colace [x] senna [] other:    ENDO:   Goal euglycemia (-180)    HEME/ONC:  VTE prophylaxis: [] SCDs [] chemoprophylaxis [] high risk of DVT/PE on admission due to:  DAPT  Lovenox 40 mg QD    ID:  afebrile    MISC:    SOCIAL/FAMILY:  [] updated at bedside [] family meeting    CODE STATUS:  [x] Full Code [] DNR [] DNI [] Palliative/Comfort Care    DISPOSITION:  [x] ICU [] Stroke Unit [] Floor [] EMU [] RCU [] PCU

## 2023-10-20 NOTE — PROGRESS NOTE ADULT - SUBJECTIVE AND OBJECTIVE BOX
Name of Patient : CORTNEY SERNA  MRN: [id number]  Date of visit: 10-20-23 @ 16:57      Subjective: Patient seen and examined. No new events except as noted.   Patient S/P R Carotid wall stent placement.   Hypotensive. On Pressors in PACU.       MEDICATIONS:  MEDICATIONS  (STANDING):  aspirin  chewable 81 milliGRAM(s) Oral <User Schedule>  atorvastatin 20 milliGRAM(s) Oral at bedtime  cyanocobalamin 1000 MICROGram(s) Oral daily  dextrose 5%. 1000 milliLiter(s) (100 mL/Hr) IV Continuous <Continuous>  dextrose 5%. 1000 milliLiter(s) (50 mL/Hr) IV Continuous <Continuous>  dextrose 50% Injectable 25 Gram(s) IV Push once  dextrose 50% Injectable 25 Gram(s) IV Push once  dextrose 50% Injectable 12.5 Gram(s) IV Push once  enoxaparin Injectable 40 milliGRAM(s) SubCutaneous every 24 hours  famotidine    Tablet 20 milliGRAM(s) Oral daily  folic acid 1 milliGRAM(s) Oral daily  glucagon  Injectable 1 milliGRAM(s) IntraMuscular once  hydrOXYzine hydrochloride 50 milliGRAM(s) Oral daily  insulin lispro (ADMELOG) corrective regimen sliding scale   SubCutaneous three times a day before meals  insulin lispro (ADMELOG) corrective regimen sliding scale   SubCutaneous at bedtime  midodrine 5 milliGRAM(s) Oral every 8 hours  pantoprazole    Tablet 40 milliGRAM(s) Oral before breakfast  phenylephrine    Infusion 0.1 MICROgram(s)/kG/Min (2.81 mL/Hr) IV Continuous <Continuous>  ticagrelor 90 milliGRAM(s) Oral <User Schedule>  venlafaxine 37.5 milliGRAM(s) Oral daily      PHYSICAL EXAM:  T(C): 36.5 (10-20-23 @ 13:00), Max: 36.9 (10-20-23 @ 01:00)  HR: 66 (10-20-23 @ 16:30) (50 - 77)  BP: 93/55 (10-20-23 @ 16:30) (84/50 - 150/83)  RR: 16 (10-20-23 @ 16:30) (14 - 17)  SpO2: 95% (10-20-23 @ 16:30) (95% - 100%)  Wt(kg): --  I&O's Summary    19 Oct 2023 07:01  -  20 Oct 2023 07:00  --------------------------------------------------------  IN: 768.5 mL / OUT: 400 mL / NET: 368.5 mL    20 Oct 2023 07:01  -  20 Oct 2023 16:57  --------------------------------------------------------  IN: 2251.2 mL / OUT: 1575 mL / NET: 676.2 mL          Appearance: Lying down in bed 	  HEENT:  Eyes are open   Lymphatic: No lymphadenopathy   Cardiovascular: Normal  Respiratory: normal effort , clear  Gastrointestinal:  Soft, Non-tender  Skin: No rashes,  warm to touch  Psychiatry:  Mood & affect appropriate  Musculoskeletal: No edema        10-19-23 @ 07:01  -  10-20-23 @ 07:00  --------------------------------------------------------  IN: 768.5 mL / OUT: 400 mL / NET: 368.5 mL    10-20-23 @ 07:01  -  10-20-23 @ 16:57  --------------------------------------------------------  IN: 2251.2 mL / OUT: 1575 mL / NET: 676.2 mL                                  10.1   8.71  )-----------( 244      ( 20 Oct 2023 04:52 )             32.3               10-20    138  |  105  |  15  ----------------------------<  110<H>  3.7   |  21<L>  |  0.90    Ca    8.3<L>      20 Oct 2023 04:52  Phos  3.3     10-20  Mg     2.5     10-20                         Urinalysis Basic - ( 20 Oct 2023 04:52 )    Color: x / Appearance: x / SG: x / pH: x  Gluc: 110 mg/dL / Ketone: x  / Bili: x / Urobili: x   Blood: x / Protein: x / Nitrite: x   Leuk Esterase: x / RBC: x / WBC x   Sq Epi: x / Non Sq Epi: x / Bacteria: x

## 2023-10-20 NOTE — PROGRESS NOTE ADULT - SUBJECTIVE AND OBJECTIVE BOX
Patient seen and examined at bedside.    --Anticoagulation--  aspirin  chewable 81 milliGRAM(s) Oral <User Schedule>  enoxaparin Injectable 40 milliGRAM(s) SubCutaneous every 24 hours  ticagrelor 90 milliGRAM(s) Oral <User Schedule>    T(C): 36.9 (10-20-23 @ 01:00), Max: 36.9 (10-20-23 @ 01:00)  HR: 55 (10-20-23 @ 02:45) (54 - 77)  BP: 112/59 (10-20-23 @ 02:00) (84/50 - 155/76)  RR: 16 (10-20-23 @ 02:30) (14 - 20)  SpO2: 98% (10-20-23 @ 02:45) (96% - 100%)  Wt(kg): --    Exam: AOx3, RONDON 5/5. SILT. No appreciated visual field cut.

## 2023-10-20 NOTE — PROGRESS NOTE ADULT - ASSESSMENT
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 Reports periods of blackness in R lower visual field. CTA w/ severe (70%) BHANU stenosis. Exam: AOx3, RONDON 5/5. SILT. No appreciated visual field cut.    Now s/p R carotid wall stent     Carotid dopplers in AM  continue ASA in AM   Brilinta 6pm, 6am

## 2023-10-20 NOTE — PROGRESS NOTE ADULT - SUBJECTIVE AND OBJECTIVE BOX
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  CTA w/ severe (70%) BHANU stenosis.       Admission Scores  GCS: 15  HH:   MF:   NIHSS:   RASS:    CAM-ICU:   ICH:    24 hour Events:     10/19: r CAROTID WALL STENT   10/20 still asymptomatic hypotension    Allergies    No Known Allergies    Intolerances        REVIEW OF SYSTEMS: [ ] Unable to Assess due to neurologic exam   [ ] All ROS addressed below are non-contributory, except:  Neuro: [ ] Headache [ ] Back pain [ ] Numbness [ ] Weakness [ ] Ataxia [ ] Dizziness [ ] Aphasia [ ] Dysarthria [ ] Visual disturbance  Resp: [ ] Shortness of breath/dyspnea, [ ] Orthopnea [ ] Cough  CV: [ ] Chest pain [ ] Palpitation [ ] Lightheadedness [ ] Syncope  Renal: [ ] Thirst [ ] Edema  GI: [ ] Nausea [ ] Emesis [ ] Abdominal pain [ ] Constipation [ ] Diarrhea  Hem: [ ] Hematemesis [ ] bright red blood per rectum  ID: [ ] Fever [ ] Chills [ ] Dysuria  ENT: [ ] Rhinorrhea      DEVICES:   [ ] Restraints [ ] ET tube [ ] central line [ ] arterial line [ ] du [ ] NGT/OGT [ ] EVD [ ] LD [ ] CALI/HMV [ ] Trach [ ] PEG [ ] Chest Tube     VITALS:   Vital Signs Last 24 Hrs  T(C): 36.2 (19 Oct 2023 17:15), Max: 36.9 (18 Oct 2023 21:37)  T(F): 97.2 (19 Oct 2023 17:15), Max: 98.5 (18 Oct 2023 21:37)  HR: 64 (19 Oct 2023 18:30) (64 - 77)  BP: 146/71 (19 Oct 2023 18:30) (84/50 - 155/76)  BP(mean): 102 (19 Oct 2023 18:30) (62 - 109)  RR: 17 (19 Oct 2023 18:15) (16 - 20)  SpO2: 99% (19 Oct 2023 18:30) (96% - 100%)    Parameters below as of 19 Oct 2023 18:15  Patient On (Oxygen Delivery Method): room air      CAPILLARY BLOOD GLUCOSE      POCT Blood Glucose.: 104 mg/dL (19 Oct 2023 17:46)  POCT Blood Glucose.: 154 mg/dL (19 Oct 2023 11:46)  POCT Blood Glucose.: 146 mg/dL (19 Oct 2023 07:48)  POCT Blood Glucose.: 129 mg/dL (18 Oct 2023 21:07)    I&O's Summary    18 Oct 2023 07:01  -  19 Oct 2023 07:00  --------------------------------------------------------  IN: 1300 mL / OUT: 0 mL / NET: 1300 mL    19 Oct 2023 07:01  -  19 Oct 2023 18:43  --------------------------------------------------------  IN: 0 mL / OUT: 0 mL / NET: 0 mL        Respiratory:        LABS:                        10.9   10.22 )-----------( 277      ( 19 Oct 2023 17:57 )             34.1     10-19    140  |  109<H>  |  15  ----------------------------<  114<H>  4.1   |  22  |  0.82             MEDICATION LEVELS:     IVF FLUIDS/MEDICATIONS:   MEDICATIONS  (STANDING):  atorvastatin 20 milliGRAM(s) Oral at bedtime  cyanocobalamin 1000 MICROGram(s) Oral daily  dextrose 5%. 1000 milliLiter(s) (100 mL/Hr) IV Continuous <Continuous>  dextrose 5%. 1000 milliLiter(s) (50 mL/Hr) IV Continuous <Continuous>  dextrose 50% Injectable 25 Gram(s) IV Push once  dextrose 50% Injectable 25 Gram(s) IV Push once  dextrose 50% Injectable 12.5 Gram(s) IV Push once  enoxaparin Injectable 40 milliGRAM(s) SubCutaneous every 24 hours  famotidine    Tablet 20 milliGRAM(s) Oral daily  folic acid 1 milliGRAM(s) Oral daily  glucagon  Injectable 1 milliGRAM(s) IntraMuscular once  hydrOXYzine hydrochloride 50 milliGRAM(s) Oral daily  insulin lispro (ADMELOG) corrective regimen sliding scale   SubCutaneous three times a day before meals  insulin lispro (ADMELOG) corrective regimen sliding scale   SubCutaneous at bedtime  pantoprazole    Tablet 40 milliGRAM(s) Oral before breakfast  phenylephrine    Infusion 0.1 MICROgram(s)/kG/Min (2.81 mL/Hr) IV Continuous <Continuous>  sodium chloride 0.9%. 1000 milliLiter(s) (75 mL/Hr) IV Continuous <Continuous>  ticagrelor 90 milliGRAM(s) Oral <User Schedule>  venlafaxine 37.5 milliGRAM(s) Oral daily    MEDICATIONS  (PRN):  artificial  tears Solution 1 Drop(s) Both EYES three times a day PRN for dry eyes  dextrose Oral Gel 15 Gram(s) Oral once PRN Blood Glucose LESS THAN 70 milliGRAM(s)/deciliter        IMAGING:      EXAMINATION:  PHYSICAL EXAM:    Constitutional: No Acute Distress     Neurological: Awake, alert oriented to person, place and time, Following Commands, PERRL, EOMI, No Gaze Preference, Face Symmetrical, Speech Fluent, No dysmetria, No ataxia, No nystagmus     Motor exam:          Upper extremity                         Delt     Bicep     Tricep    HG                                                 R         5/5        5/5        5/5       5/5                                               L          5/5        5/5        5/5       5/5          Lower extremity                        HF         KF        KE       DF         PF                                                  R        5/5        5/5        5/5       5/5         5/5                                               L         5/5        5/5       5/5       5/5          5/5    Pulmonary: Clear to Auscultation, No rales, No rhonchi, No wheezes     Cardiovascular: S1, S2, Regular rate and rhythm     Gastrointestinal: Soft, Non-tender, Non-distended     Extremities: No calf tenderness      HOSPITAL COURSE: This is a 72M w/ 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  CTA w/ severe (70%) BHANU stenosis.       Admission Scores  GCS: 15  HH:   MF:   NIHSS:   RASS:    CAM-ICU:   ICH:    24 hour Events:     10/19: r CAROTID WALL STENT   10/20 still asymptomatic hypotension, overnight no events, BP WNL without pressors.     Allergies    No Known Allergies    Intolerances        REVIEW OF SYSTEMS: [ ] Unable to Assess due to neurologic exam   [ ] All ROS addressed below are non-contributory, except:  Neuro: [ ] Headache [ ] Back pain [ ] Numbness [ ] Weakness [ ] Ataxia [ ] Dizziness [ ] Aphasia [ ] Dysarthria [ ] Visual disturbance  Resp: [ ] Shortness of breath/dyspnea, [ ] Orthopnea [ ] Cough  CV: [ ] Chest pain [ ] Palpitation [ ] Lightheadedness [ ] Syncope  Renal: [ ] Thirst [ ] Edema  GI: [ ] Nausea [ ] Emesis [ ] Abdominal pain [ ] Constipation [ ] Diarrhea  Hem: [ ] Hematemesis [ ] bright red blood per rectum  ID: [ ] Fever [ ] Chills [ ] Dysuria  ENT: [ ] Rhinorrhea      DEVICES:   [ ] Restraints [ ] ET tube [ ] central line [ ] arterial line [ ] du [ ] NGT/OGT [ ] EVD [ ] LD [ ] CLAI/HMV [ ] Trach [ ] PEG [ ] Chest Tube     VITALS:   Vital Signs Last 24 Hrs  T(C): 36.2 (19 Oct 2023 17:15), Max: 36.9 (18 Oct 2023 21:37)  T(F): 97.2 (19 Oct 2023 17:15), Max: 98.5 (18 Oct 2023 21:37)  HR: 64 (19 Oct 2023 18:30) (64 - 77)  BP: 146/71 (19 Oct 2023 18:30) (84/50 - 155/76)  BP(mean): 102 (19 Oct 2023 18:30) (62 - 109)  RR: 17 (19 Oct 2023 18:15) (16 - 20)  SpO2: 99% (19 Oct 2023 18:30) (96% - 100%)    Parameters below as of 19 Oct 2023 18:15  Patient On (Oxygen Delivery Method): room air      CAPILLARY BLOOD GLUCOSE      POCT Blood Glucose.: 104 mg/dL (19 Oct 2023 17:46)  POCT Blood Glucose.: 154 mg/dL (19 Oct 2023 11:46)  POCT Blood Glucose.: 146 mg/dL (19 Oct 2023 07:48)  POCT Blood Glucose.: 129 mg/dL (18 Oct 2023 21:07)    I&O's Summary    18 Oct 2023 07:01  -  19 Oct 2023 07:00  --------------------------------------------------------  IN: 1300 mL / OUT: 0 mL / NET: 1300 mL    19 Oct 2023 07:01  -  19 Oct 2023 18:43  --------------------------------------------------------  IN: 0 mL / OUT: 0 mL / NET: 0 mL        Respiratory:        LABS:                        10.9   10.22 )-----------( 277      ( 19 Oct 2023 17:57 )             34.1     10-19    140  |  109<H>  |  15  ----------------------------<  114<H>  4.1   |  22  |  0.82             MEDICATION LEVELS:     IVF FLUIDS/MEDICATIONS:   MEDICATIONS  (STANDING):  atorvastatin 20 milliGRAM(s) Oral at bedtime  cyanocobalamin 1000 MICROGram(s) Oral daily  dextrose 5%. 1000 milliLiter(s) (100 mL/Hr) IV Continuous <Continuous>  dextrose 5%. 1000 milliLiter(s) (50 mL/Hr) IV Continuous <Continuous>  dextrose 50% Injectable 25 Gram(s) IV Push once  dextrose 50% Injectable 25 Gram(s) IV Push once  dextrose 50% Injectable 12.5 Gram(s) IV Push once  enoxaparin Injectable 40 milliGRAM(s) SubCutaneous every 24 hours  famotidine    Tablet 20 milliGRAM(s) Oral daily  folic acid 1 milliGRAM(s) Oral daily  glucagon  Injectable 1 milliGRAM(s) IntraMuscular once  hydrOXYzine hydrochloride 50 milliGRAM(s) Oral daily  insulin lispro (ADMELOG) corrective regimen sliding scale   SubCutaneous three times a day before meals  insulin lispro (ADMELOG) corrective regimen sliding scale   SubCutaneous at bedtime  pantoprazole    Tablet 40 milliGRAM(s) Oral before breakfast  phenylephrine    Infusion 0.1 MICROgram(s)/kG/Min (2.81 mL/Hr) IV Continuous <Continuous>  sodium chloride 0.9%. 1000 milliLiter(s) (75 mL/Hr) IV Continuous <Continuous>  ticagrelor 90 milliGRAM(s) Oral <User Schedule>  venlafaxine 37.5 milliGRAM(s) Oral daily    MEDICATIONS  (PRN):  artificial  tears Solution 1 Drop(s) Both EYES three times a day PRN for dry eyes  dextrose Oral Gel 15 Gram(s) Oral once PRN Blood Glucose LESS THAN 70 milliGRAM(s)/deciliter        IMAGING:      EXAMINATION:  PHYSICAL EXAM:    Constitutional: No Acute Distress     Neurological: Awake, alert oriented to person, place and time, Following Commands, PERRL, EOMI, No Gaze Preference, Face Symmetrical, Speech Fluent, No dysmetria, No ataxia, No nystagmus     Motor exam:          Upper extremity                         Delt     Bicep     Tricep    HG                                                 R         5/5        5/5        5/5       5/5                                               L          5/5        5/5        5/5       5/5          Lower extremity                        HF         KF        KE       DF         PF                                                  R        5/5        5/5        5/5       5/5         5/5                                               L         5/5        5/5       5/5       5/5          5/5    Pulmonary: Clear to Auscultation, No rales, No rhonchi, No wheezes     Cardiovascular: S1, S2, Regular rate and rhythm     Gastrointestinal: Soft, Non-tender, Non-distended     Extremities: No calf tenderness

## 2023-10-20 NOTE — PROGRESS NOTE ADULT - SUBJECTIVE AND OBJECTIVE BOX
Subjective: Patient seen and examined. No new events except as noted.     REVIEW OF SYSTEMS:    CONSTITUTIONAL:+ weakness, fevers or chills  EYES/ENT: No visual changes;  No vertigo or throat pain   NECK: No pain or stiffness  RESPIRATORY: No cough, wheezing, hemoptysis; No shortness of breath  CARDIOVASCULAR: No chest pain or palpitations  GASTROINTESTINAL: No abdominal or epigastric pain. No nausea, vomiting, or hematemesis; No diarrhea or constipation. No melena or hematochezia.  GENITOURINARY: No dysuria, frequency or hematuria  NEUROLOGICAL: No numbness or weakness  SKIN: No itching, burning, rashes, or lesions   All other review of systems is negative unless indicated above.    MEDICATIONS:  MEDICATIONS  (STANDING):  aspirin  chewable 81 milliGRAM(s) Oral <User Schedule>  atorvastatin 20 milliGRAM(s) Oral at bedtime  cyanocobalamin 1000 MICROGram(s) Oral daily  dextrose 5%. 1000 milliLiter(s) (100 mL/Hr) IV Continuous <Continuous>  dextrose 5%. 1000 milliLiter(s) (50 mL/Hr) IV Continuous <Continuous>  dextrose 50% Injectable 25 Gram(s) IV Push once  dextrose 50% Injectable 25 Gram(s) IV Push once  dextrose 50% Injectable 12.5 Gram(s) IV Push once  enoxaparin Injectable 40 milliGRAM(s) SubCutaneous every 24 hours  famotidine    Tablet 20 milliGRAM(s) Oral daily  folic acid 1 milliGRAM(s) Oral daily  glucagon  Injectable 1 milliGRAM(s) IntraMuscular once  hydrOXYzine hydrochloride 50 milliGRAM(s) Oral daily  insulin lispro (ADMELOG) corrective regimen sliding scale   SubCutaneous three times a day before meals  insulin lispro (ADMELOG) corrective regimen sliding scale   SubCutaneous at bedtime  pantoprazole    Tablet 40 milliGRAM(s) Oral before breakfast  phenylephrine    Infusion 0.1 MICROgram(s)/kG/Min (2.81 mL/Hr) IV Continuous <Continuous>  sodium chloride 0.9% Bolus 1000 milliLiter(s) IV Bolus once  ticagrelor 90 milliGRAM(s) Oral <User Schedule>  venlafaxine 37.5 milliGRAM(s) Oral daily      PHYSICAL EXAM:  T(C): 36.1 (10-20-23 @ 07:30), Max: 36.9 (10-20-23 @ 01:00)  HR: 52 (10-20-23 @ 10:15) (50 - 77)  BP: 97/50 (10-20-23 @ 10:15) (84/50 - 155/76)  RR: 16 (10-20-23 @ 10:15) (14 - 20)  SpO2: 97% (10-20-23 @ 10:15) (96% - 100%)  Wt(kg): --  I&O's Summary    19 Oct 2023 07:01  -  20 Oct 2023 07:00  --------------------------------------------------------  IN: 768.5 mL / OUT: 400 mL / NET: 368.5 mL    20 Oct 2023 07:01  -  20 Oct 2023 10:29  --------------------------------------------------------  IN: 500 mL / OUT: 400 mL / NET: 100 mL      Height (cm): 167.6 (10-19 @ 15:00)  Weight (kg): 74.8 (10-19 @ 15:00)  BMI (kg/m2): 26.6 (10-19 @ 15:00)  BSA (m2): 1.84 (10-19 @ 15:00)      Appearance: NAD	  HEENT:   Normal oral mucosa, PERRL, EOMI	  Lymphatic: No lymphadenopathy  Cardiovascular: Normal S1 S2, No JVD, No murmurs, No edema  Respiratory: Lungs clear to auscultation	  Psychiatry: A & O x 3, Mood & affect appropriate  Gastrointestinal:  Soft, Non-tender, + BS	  Skin: No rashes, No ecchymoses, No cyanosis	  NEUROLOGICAL EXAM:  Mental status: Awake, alert, oriented x3, no aphasia, no neglect, normal memory   Cranial Nerves: No facial asymmetry, no nystagmus, no dysarthria,  tongue midline  Motor exam: Normal tone, no drift, 5/5 RUE, 5/5 RLE, 5/5 LUE, 5/5 LLE, normal fine finger movements.  Sensation: Intact to light touch   Coordination/ Gait: No dysmetria, OCTAVIO intact and symmetric bilaterally  Extremities: Normal range of motion, No clubbing, cyanosis or edema  Vascular: Peripheral pulses palpable 2+ bilaterally        LABS:    CARDIAC MARKERS:                                10.1   8.71  )-----------( 244      ( 20 Oct 2023 04:52 )             32.3     10-20    138  |  105  |  15  ----------------------------<  110<H>  3.7   |  21<L>  |  0.90    Ca    8.3<L>      20 Oct 2023 04:52  Phos  3.3     10-20  Mg     2.5     10-20      proBNP:   Lipid Profile:   HgA1c:   TSH:             TELEMETRY: 	    ECG:  	  RADIOLOGY:   DIAGNOSTIC TESTING:  [ ] Echocardiogram:  [ ]  Catheterization:  [ ] Stress Test:    OTHER:

## 2023-10-20 NOTE — PROGRESS NOTE ADULT - SUBJECTIVE AND OBJECTIVE BOX
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  CTA w/ severe (70%) BHANU stenosis.       Admission Scores  GCS: 15  HH:   MF:   NIHSS:   RASS:    CAM-ICU:   ICH:    24 hour Events:     10/19: r CAROTID WALL STENT   10/20     Allergies    No Known Allergies    Intolerances        REVIEW OF SYSTEMS: [ ] Unable to Assess due to neurologic exam   [ ] All ROS addressed below are non-contributory, except:  Neuro: [ ] Headache [ ] Back pain [ ] Numbness [ ] Weakness [ ] Ataxia [ ] Dizziness [ ] Aphasia [ ] Dysarthria [ ] Visual disturbance  Resp: [ ] Shortness of breath/dyspnea, [ ] Orthopnea [ ] Cough  CV: [ ] Chest pain [ ] Palpitation [ ] Lightheadedness [ ] Syncope  Renal: [ ] Thirst [ ] Edema  GI: [ ] Nausea [ ] Emesis [ ] Abdominal pain [ ] Constipation [ ] Diarrhea  Hem: [ ] Hematemesis [ ] bright red blood per rectum  ID: [ ] Fever [ ] Chills [ ] Dysuria  ENT: [ ] Rhinorrhea      DEVICES:   [ ] Restraints [ ] ET tube [ ] central line [ ] arterial line [ ] du [ ] NGT/OGT [ ] EVD [ ] LD [ ] CALI/HMV [ ] Trach [ ] PEG [ ] Chest Tube     VITALS:   Vital Signs Last 24 Hrs  T(C): 36.2 (19 Oct 2023 17:15), Max: 36.9 (18 Oct 2023 21:37)  T(F): 97.2 (19 Oct 2023 17:15), Max: 98.5 (18 Oct 2023 21:37)  HR: 64 (19 Oct 2023 18:30) (64 - 77)  BP: 146/71 (19 Oct 2023 18:30) (84/50 - 155/76)  BP(mean): 102 (19 Oct 2023 18:30) (62 - 109)  RR: 17 (19 Oct 2023 18:15) (16 - 20)  SpO2: 99% (19 Oct 2023 18:30) (96% - 100%)    Parameters below as of 19 Oct 2023 18:15  Patient On (Oxygen Delivery Method): room air      CAPILLARY BLOOD GLUCOSE      POCT Blood Glucose.: 104 mg/dL (19 Oct 2023 17:46)  POCT Blood Glucose.: 154 mg/dL (19 Oct 2023 11:46)  POCT Blood Glucose.: 146 mg/dL (19 Oct 2023 07:48)  POCT Blood Glucose.: 129 mg/dL (18 Oct 2023 21:07)    I&O's Summary    18 Oct 2023 07:01  -  19 Oct 2023 07:00  --------------------------------------------------------  IN: 1300 mL / OUT: 0 mL / NET: 1300 mL    19 Oct 2023 07:01  -  19 Oct 2023 18:43  --------------------------------------------------------  IN: 0 mL / OUT: 0 mL / NET: 0 mL        Respiratory:        LABS:                        10.9   10.22 )-----------( 277      ( 19 Oct 2023 17:57 )             34.1     10-19    140  |  109<H>  |  15  ----------------------------<  114<H>  4.1   |  22  |  0.82             MEDICATION LEVELS:     IVF FLUIDS/MEDICATIONS:   MEDICATIONS  (STANDING):  atorvastatin 20 milliGRAM(s) Oral at bedtime  cyanocobalamin 1000 MICROGram(s) Oral daily  dextrose 5%. 1000 milliLiter(s) (100 mL/Hr) IV Continuous <Continuous>  dextrose 5%. 1000 milliLiter(s) (50 mL/Hr) IV Continuous <Continuous>  dextrose 50% Injectable 25 Gram(s) IV Push once  dextrose 50% Injectable 25 Gram(s) IV Push once  dextrose 50% Injectable 12.5 Gram(s) IV Push once  enoxaparin Injectable 40 milliGRAM(s) SubCutaneous every 24 hours  famotidine    Tablet 20 milliGRAM(s) Oral daily  folic acid 1 milliGRAM(s) Oral daily  glucagon  Injectable 1 milliGRAM(s) IntraMuscular once  hydrOXYzine hydrochloride 50 milliGRAM(s) Oral daily  insulin lispro (ADMELOG) corrective regimen sliding scale   SubCutaneous three times a day before meals  insulin lispro (ADMELOG) corrective regimen sliding scale   SubCutaneous at bedtime  pantoprazole    Tablet 40 milliGRAM(s) Oral before breakfast  phenylephrine    Infusion 0.1 MICROgram(s)/kG/Min (2.81 mL/Hr) IV Continuous <Continuous>  sodium chloride 0.9%. 1000 milliLiter(s) (75 mL/Hr) IV Continuous <Continuous>  ticagrelor 90 milliGRAM(s) Oral <User Schedule>  venlafaxine 37.5 milliGRAM(s) Oral daily    MEDICATIONS  (PRN):  artificial  tears Solution 1 Drop(s) Both EYES three times a day PRN for dry eyes  dextrose Oral Gel 15 Gram(s) Oral once PRN Blood Glucose LESS THAN 70 milliGRAM(s)/deciliter        IMAGING:      EXAMINATION:  PHYSICAL EXAM:    Constitutional: No Acute Distress     Neurological: Awake, alert oriented to person, place and time, Following Commands, PERRL, EOMI, No Gaze Preference, Face Symmetrical, Speech Fluent, No dysmetria, No ataxia, No nystagmus     Motor exam:          Upper extremity                         Delt     Bicep     Tricep    HG                                                 R         5/5        5/5        5/5       5/5                                               L          5/5        5/5        5/5       5/5          Lower extremity                        HF         KF        KE       DF         PF                                                  R        5/5        5/5        5/5       5/5         5/5                                               L         5/5        5/5       5/5       5/5          5/5    Pulmonary: Clear to Auscultation, No rales, No rhonchi, No wheezes     Cardiovascular: S1, S2, Regular rate and rhythm     Gastrointestinal: Soft, Non-tender, Non-distended     Extremities: No calf tenderness      HOSPITAL COURSE: This is a 72M w/ 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  CTA w/ severe (70%) BHANU stenosis.       Admission Scores  GCS: 15  HH:   MF:   NIHSS:   RASS:    CAM-ICU:   ICH:    24 hour Events:     10/19: r CAROTID WALL STENT   10/20 still asymptomatic hypotension    Allergies    No Known Allergies    Intolerances        REVIEW OF SYSTEMS: [ ] Unable to Assess due to neurologic exam   [ ] All ROS addressed below are non-contributory, except:  Neuro: [ ] Headache [ ] Back pain [ ] Numbness [ ] Weakness [ ] Ataxia [ ] Dizziness [ ] Aphasia [ ] Dysarthria [ ] Visual disturbance  Resp: [ ] Shortness of breath/dyspnea, [ ] Orthopnea [ ] Cough  CV: [ ] Chest pain [ ] Palpitation [ ] Lightheadedness [ ] Syncope  Renal: [ ] Thirst [ ] Edema  GI: [ ] Nausea [ ] Emesis [ ] Abdominal pain [ ] Constipation [ ] Diarrhea  Hem: [ ] Hematemesis [ ] bright red blood per rectum  ID: [ ] Fever [ ] Chills [ ] Dysuria  ENT: [ ] Rhinorrhea      DEVICES:   [ ] Restraints [ ] ET tube [ ] central line [ ] arterial line [ ] du [ ] NGT/OGT [ ] EVD [ ] LD [ ] CALI/HMV [ ] Trach [ ] PEG [ ] Chest Tube     VITALS:   Vital Signs Last 24 Hrs  T(C): 36.2 (19 Oct 2023 17:15), Max: 36.9 (18 Oct 2023 21:37)  T(F): 97.2 (19 Oct 2023 17:15), Max: 98.5 (18 Oct 2023 21:37)  HR: 64 (19 Oct 2023 18:30) (64 - 77)  BP: 146/71 (19 Oct 2023 18:30) (84/50 - 155/76)  BP(mean): 102 (19 Oct 2023 18:30) (62 - 109)  RR: 17 (19 Oct 2023 18:15) (16 - 20)  SpO2: 99% (19 Oct 2023 18:30) (96% - 100%)    Parameters below as of 19 Oct 2023 18:15  Patient On (Oxygen Delivery Method): room air      CAPILLARY BLOOD GLUCOSE      POCT Blood Glucose.: 104 mg/dL (19 Oct 2023 17:46)  POCT Blood Glucose.: 154 mg/dL (19 Oct 2023 11:46)  POCT Blood Glucose.: 146 mg/dL (19 Oct 2023 07:48)  POCT Blood Glucose.: 129 mg/dL (18 Oct 2023 21:07)    I&O's Summary    18 Oct 2023 07:01  -  19 Oct 2023 07:00  --------------------------------------------------------  IN: 1300 mL / OUT: 0 mL / NET: 1300 mL    19 Oct 2023 07:01  -  19 Oct 2023 18:43  --------------------------------------------------------  IN: 0 mL / OUT: 0 mL / NET: 0 mL        Respiratory:        LABS:                        10.9   10.22 )-----------( 277      ( 19 Oct 2023 17:57 )             34.1     10-19    140  |  109<H>  |  15  ----------------------------<  114<H>  4.1   |  22  |  0.82             MEDICATION LEVELS:     IVF FLUIDS/MEDICATIONS:   MEDICATIONS  (STANDING):  atorvastatin 20 milliGRAM(s) Oral at bedtime  cyanocobalamin 1000 MICROGram(s) Oral daily  dextrose 5%. 1000 milliLiter(s) (100 mL/Hr) IV Continuous <Continuous>  dextrose 5%. 1000 milliLiter(s) (50 mL/Hr) IV Continuous <Continuous>  dextrose 50% Injectable 25 Gram(s) IV Push once  dextrose 50% Injectable 25 Gram(s) IV Push once  dextrose 50% Injectable 12.5 Gram(s) IV Push once  enoxaparin Injectable 40 milliGRAM(s) SubCutaneous every 24 hours  famotidine    Tablet 20 milliGRAM(s) Oral daily  folic acid 1 milliGRAM(s) Oral daily  glucagon  Injectable 1 milliGRAM(s) IntraMuscular once  hydrOXYzine hydrochloride 50 milliGRAM(s) Oral daily  insulin lispro (ADMELOG) corrective regimen sliding scale   SubCutaneous three times a day before meals  insulin lispro (ADMELOG) corrective regimen sliding scale   SubCutaneous at bedtime  pantoprazole    Tablet 40 milliGRAM(s) Oral before breakfast  phenylephrine    Infusion 0.1 MICROgram(s)/kG/Min (2.81 mL/Hr) IV Continuous <Continuous>  sodium chloride 0.9%. 1000 milliLiter(s) (75 mL/Hr) IV Continuous <Continuous>  ticagrelor 90 milliGRAM(s) Oral <User Schedule>  venlafaxine 37.5 milliGRAM(s) Oral daily    MEDICATIONS  (PRN):  artificial  tears Solution 1 Drop(s) Both EYES three times a day PRN for dry eyes  dextrose Oral Gel 15 Gram(s) Oral once PRN Blood Glucose LESS THAN 70 milliGRAM(s)/deciliter        IMAGING:      EXAMINATION:  PHYSICAL EXAM:    Constitutional: No Acute Distress     Neurological: Awake, alert oriented to person, place and time, Following Commands, PERRL, EOMI, No Gaze Preference, Face Symmetrical, Speech Fluent, No dysmetria, No ataxia, No nystagmus     Motor exam:          Upper extremity                         Delt     Bicep     Tricep    HG                                                 R         5/5        5/5        5/5       5/5                                               L          5/5        5/5        5/5       5/5          Lower extremity                        HF         KF        KE       DF         PF                                                  R        5/5        5/5        5/5       5/5         5/5                                               L         5/5        5/5       5/5       5/5          5/5    Pulmonary: Clear to Auscultation, No rales, No rhonchi, No wheezes     Cardiovascular: S1, S2, Regular rate and rhythm     Gastrointestinal: Soft, Non-tender, Non-distended     Extremities: No calf tenderness

## 2023-10-21 ENCOUNTER — TRANSCRIPTION ENCOUNTER (OUTPATIENT)
Age: 73
End: 2023-10-21

## 2023-10-21 LAB
GLUCOSE BLDC GLUCOMTR-MCNC: 122 MG/DL — HIGH (ref 70–99)
GLUCOSE BLDC GLUCOMTR-MCNC: 122 MG/DL — HIGH (ref 70–99)
GLUCOSE BLDC GLUCOMTR-MCNC: 132 MG/DL — HIGH (ref 70–99)
GLUCOSE BLDC GLUCOMTR-MCNC: 132 MG/DL — HIGH (ref 70–99)
GLUCOSE BLDC GLUCOMTR-MCNC: 160 MG/DL — HIGH (ref 70–99)
GLUCOSE BLDC GLUCOMTR-MCNC: 160 MG/DL — HIGH (ref 70–99)
GLUCOSE BLDC GLUCOMTR-MCNC: 181 MG/DL — HIGH (ref 70–99)
GLUCOSE BLDC GLUCOMTR-MCNC: 181 MG/DL — HIGH (ref 70–99)

## 2023-10-21 PROCEDURE — 99233 SBSQ HOSP IP/OBS HIGH 50: CPT

## 2023-10-21 PROCEDURE — 99232 SBSQ HOSP IP/OBS MODERATE 35: CPT

## 2023-10-21 RX ORDER — SENNA PLUS 8.6 MG/1
1 TABLET ORAL AT BEDTIME
Refills: 0 | Status: DISCONTINUED | OUTPATIENT
Start: 2023-10-21 | End: 2023-10-22

## 2023-10-21 RX ORDER — SENNA PLUS 8.6 MG/1
1 TABLET ORAL ONCE
Refills: 0 | Status: DISCONTINUED | OUTPATIENT
Start: 2023-10-21 | End: 2023-10-21

## 2023-10-21 RX ORDER — TICAGRELOR 90 MG/1
1 TABLET ORAL
Qty: 60 | Refills: 0
Start: 2023-10-21 | End: 2023-11-19

## 2023-10-21 RX ORDER — CHLORHEXIDINE GLUCONATE 213 G/1000ML
1 SOLUTION TOPICAL
Refills: 0 | Status: DISCONTINUED | OUTPATIENT
Start: 2023-10-21 | End: 2023-10-22

## 2023-10-21 RX ADMIN — PANTOPRAZOLE SODIUM 40 MILLIGRAM(S): 20 TABLET, DELAYED RELEASE ORAL at 05:13

## 2023-10-21 RX ADMIN — ENOXAPARIN SODIUM 40 MILLIGRAM(S): 100 INJECTION SUBCUTANEOUS at 05:13

## 2023-10-21 RX ADMIN — TICAGRELOR 90 MILLIGRAM(S): 90 TABLET ORAL at 17:26

## 2023-10-21 RX ADMIN — Medication 37.5 MILLIGRAM(S): at 11:56

## 2023-10-21 RX ADMIN — PREGABALIN 1000 MICROGRAM(S): 225 CAPSULE ORAL at 11:56

## 2023-10-21 RX ADMIN — Medication 50 MILLIGRAM(S): at 11:55

## 2023-10-21 RX ADMIN — Medication 1 MILLIGRAM(S): at 11:56

## 2023-10-21 RX ADMIN — ATORVASTATIN CALCIUM 20 MILLIGRAM(S): 80 TABLET, FILM COATED ORAL at 21:47

## 2023-10-21 RX ADMIN — FAMOTIDINE 20 MILLIGRAM(S): 10 INJECTION INTRAVENOUS at 11:56

## 2023-10-21 RX ADMIN — SENNA PLUS 1 TABLET(S): 8.6 TABLET ORAL at 21:47

## 2023-10-21 RX ADMIN — Medication 81 MILLIGRAM(S): at 06:13

## 2023-10-21 RX ADMIN — TICAGRELOR 90 MILLIGRAM(S): 90 TABLET ORAL at 05:13

## 2023-10-21 NOTE — PROGRESS NOTE ADULT - SUBJECTIVE AND OBJECTIVE BOX
Patient seen and examined at bedside.    --Anticoagulation--  aspirin  chewable 81 milliGRAM(s) Oral <User Schedule>  enoxaparin Injectable 40 milliGRAM(s) SubCutaneous every 24 hours  ticagrelor 90 milliGRAM(s) Oral <User Schedule>    T(C): 36.7 (10-20-23 @ 23:00), Max: 36.9 (10-20-23 @ 01:00)  HR: 65 (10-21-23 @ 00:00) (50 - 94)  BP: 93/55 (10-20-23 @ 16:30) (93/55 - 146/72)  RR: 19 (10-21-23 @ 00:00) (14 - 21)  SpO2: 97% (10-21-23 @ 00:00) (92% - 100%)  Wt(kg): --    Exam: intact

## 2023-10-21 NOTE — DISCHARGE NOTE PROVIDER - CARE PROVIDER_API CALL
Dustin Niño  Neurosurgery  [address], [address]  [place] [address]  Phone: [phone]  Fax: [phone]  Follow Up Time:     Deanna Wilson  Neurology  [address], [address]  [place] [address]  Phone: [phone]  Fax: [phone]  Follow Up Time:    Dustin Niño  Neurosurgery  [address], [address]  [place] [address]  Phone: [phone]  Fax: [phone]  Follow Up Time:     Deanna Wilson  Neurology  [address], [address]  [place] [address]  Phone: [phone]  Fax: [phone]  Follow Up Time:     Haim Jain  Cardiology  [address], [address]  [place] [address]  Phone: [phone]  Fax: [phone]  Follow Up Time:

## 2023-10-21 NOTE — PROGRESS NOTE ADULT - SUBJECTIVE AND OBJECTIVE BOX
Subjective: Patient seen and examined. No new events except as noted.     REVIEW OF SYSTEMS:    CONSTITUTIONAL: No weakness, fevers or chills  EYES/ENT: No visual changes;  No vertigo or throat pain   NECK: No pain or stiffness  RESPIRATORY: No cough, wheezing, hemoptysis; No shortness of breath  CARDIOVASCULAR: No chest pain or palpitations  GASTROINTESTINAL: No abdominal or epigastric pain. No nausea, vomiting, or hematemesis; No diarrhea or constipation. No melena or hematochezia.  GENITOURINARY: No dysuria, frequency or hematuria  NEUROLOGICAL: No numbness or weakness  SKIN: No itching, burning, rashes, or lesions   All other review of systems is negative unless indicated above.    MEDICATIONS:  MEDICATIONS  (STANDING):  aspirin  chewable 81 milliGRAM(s) Oral <User Schedule>  atorvastatin 20 milliGRAM(s) Oral at bedtime  chlorhexidine 4% Liquid 1 Application(s) Topical <User Schedule>  cyanocobalamin 1000 MICROGram(s) Oral daily  dextrose 5%. 1000 milliLiter(s) (100 mL/Hr) IV Continuous <Continuous>  dextrose 5%. 1000 milliLiter(s) (50 mL/Hr) IV Continuous <Continuous>  dextrose 50% Injectable 25 Gram(s) IV Push once  dextrose 50% Injectable 25 Gram(s) IV Push once  dextrose 50% Injectable 12.5 Gram(s) IV Push once  enoxaparin Injectable 40 milliGRAM(s) SubCutaneous every 24 hours  famotidine    Tablet 20 milliGRAM(s) Oral daily  folic acid 1 milliGRAM(s) Oral daily  glucagon  Injectable 1 milliGRAM(s) IntraMuscular once  hydrOXYzine hydrochloride 50 milliGRAM(s) Oral daily  insulin lispro (ADMELOG) corrective regimen sliding scale   SubCutaneous three times a day before meals  insulin lispro (ADMELOG) corrective regimen sliding scale   SubCutaneous at bedtime  pantoprazole    Tablet 40 milliGRAM(s) Oral before breakfast  senna 1 Tablet(s) Oral at bedtime  ticagrelor 90 milliGRAM(s) Oral <User Schedule>  venlafaxine 37.5 milliGRAM(s) Oral daily      PHYSICAL EXAM:  T(C): 36.7 (10-21-23 @ 17:07), Max: 36.9 (10-21-23 @ 03:00)  HR: 57 (10-21-23 @ 17:07) (53 - 94)  BP: 136/64 (10-21-23 @ 17:07) (106/68 - 136/64)  RR: 18 (10-21-23 @ 17:07) (12 - 21)  SpO2: 99% (10-21-23 @ 17:07) (92% - 99%)  Wt(kg): --  I&O's Summary    20 Oct 2023 07:01  -  21 Oct 2023 07:00  --------------------------------------------------------  IN: 3331.2 mL / OUT: 3475 mL / NET: -143.8 mL    21 Oct 2023 07:01  -  21 Oct 2023 20:26  --------------------------------------------------------  IN: 300 mL / OUT: 400 mL / NET: -100 mL          Appearance: Normal	  HEENT:   Normal oral mucosa, PERRL, EOMI	  Lymphatic: No lymphadenopathy , no edema  Cardiovascular: Normal S1 S2, No JVD, No murmurs , Peripheral pulses palpable 2+ bilaterally  Respiratory: Lungs clear to auscultation, normal effort 	  Gastrointestinal:  Soft, Non-tender, + BS	  Skin: No rashes, No ecchymoses, No cyanosis, warm to touch  Musculoskeletal: Normal range of motion, normal strength  Psychiatry:  Mood & affect appropriate  Ext: No edema      LABS:    CARDIAC MARKERS:                                10.1   8.71  )-----------( 244      ( 20 Oct 2023 04:52 )             32.3     10-20    138  |  105  |  15  ----------------------------<  110<H>  3.7   |  21<L>  |  0.90    Ca    8.3<L>      20 Oct 2023 04:52  Phos  3.3     10-20  Mg     2.5     10-20      proBNP:   Lipid Profile:   HgA1c:   TSH:             TELEMETRY: 	    ECG:  	  RADIOLOGY:   DIAGNOSTIC TESTING:  [ ] Echocardiogram:  [ ]  Catheterization:  [ ] Stress Test:    OTHER:

## 2023-10-21 NOTE — PROGRESS NOTE ADULT - ASSESSMENT
ASSESSMENT/PLAN: r. carotid stent     NEURO:  Carotid dopplers   DAPT  Activity: [] OOB as tolerated [x] Bedrest [] PT [] OT [] PMNR    PULM:  RA  sat > 94%    CV:  SBP goal: 100-160  got only one dose of midodrine, medardo does not need    RENAL:  IVL    GI:  Diet: NPO, pending dysphagia   GI prophylaxis [] not indicated [] PPI [] other:  Bowel regimen [x] colace [x] senna [] other:    ENDO:   Goal euglycemia (-180)    HEME/ONC:  VTE prophylaxis: [x] SCDs [x] chemoprophylaxis [] high risk of DVT/PE on admission due to:  DAPT  Lovenox 40 mg QD    ID:  afebrile    MISC:    SOCIAL/FAMILY:  [] updated at bedside [] family meeting    CODE STATUS:  [x] Full Code [] DNR [] DNI [] Palliative/Comfort Care    DISPOSITION:  [] ICU [] Stroke Unit [x] Floor [] EMU [] RCU [] PCU

## 2023-10-21 NOTE — PROGRESS NOTE ADULT - ASSESSMENT
Patient is a 72 year old male with a PMHx of prior B/L cerebellar infarcts, without any residual deficits, B/L VA stenosis (s/p R VA stent at [facility] in 2016 on ASA/Plavix), Type II DM, HLD, HTN, cataracts in both eyes (2012), depression, rheumatoid arthritis who presented from his outpatient opthomologists' office for right eye visual changes. Per chart review, about 3-4 weeks ago, patient reportedly began to notice gradually that his right eye's lower visual field is becoming/ seeing dark. Patient went to see his opthomologist, Dr. Dawit Lawrence who performed formal visual field testing in the office, and advised for patient to come in as it was reportedly noted that patient has "partial inferior altitudinal defect nasally and possible small SN defect." Patient denies history of diabetic retinopathy. Patient was sent in for stroke work up as per documentation to evaluate if right eye visual field deficit found on VF testing is possibly due to embolic retinal ischemia. Internal Medicine has been consulted on Mr. Barrera's care for medical management. Patient denies history of MI/ DVT / PE in the past. Denies any allergies. Denies headache, chest pain, palpitations, shortness of breath or dyspnea. Denies numbness or tingling.       Right Eye Visual Deficits Due to CVA  - Pt presented with 3-4 weeks of seeing "black" in his lower visual field   - CT Head/ CT Angio H/N w/ Severe > 70% BHANU stenosis, Moderate to severe stenosis of L vertebral artery   - MR head w/  Chronic lacunar infarct, moderate chronic microvascular ischemic changes  - On ASA, Brilinta and Statin   - Monitor on tele   - S/P Right carotid artery stent placement with NSGY   - F/u Carotid Duplex   - Fall, Seizure, Aspiration precautions   - PT / PT / S+S   - Care per Neuro/ NSGY appreciated    Risk Stratification   - TTE w/ LVSF of 52%, Mild AS, Neg for LV thrombus or pericardial effusion   - Pt is medically optimized for stent placement. Moderate risk candidate. Cardiac clearance per cardiology team.     Visual changes  - MR orbits read as unremarkable   - ESR/ CRP negative   - Optho eval appreciated; F/u recs    Type II DM  - A1C of 7.4  - Sliding scale   - Diet and lifestyle modifications  - Diabetic DASH diet     HTN   - BP parameters as per neurology   - Now hypotensive. On Phenyephrine in PACU. Monitor BP closely   - Monitor BP, VS and patient closely    HLD  - C/w Statin therapy     PPX  - PPI and Lovenox

## 2023-10-21 NOTE — DISCHARGE NOTE PROVIDER - NSDCMRMEDTOKEN_GEN_ALL_CORE_FT
25 mg Losartan Potassium: 1 tab qd po  amLODIPine 2.5 mg oral tablet: 1 tab(s) orally once a day  aspirin 81 mg oral tablet, chewable: 1 tab(s) orally once a day  Brilinta (ticagrelor) 90 mg oral tablet: 1 tab(s) orally 2 times a day  Calcium 600-Vit D3 500 Softgel 600 mg (1500 mg)-500 unit: 1 tab qd po  clopidogrel 75 mg oral tablet: 1 tab(s) orally once a day  famotidine 20 mg oral tablet: 1 tab(s) orally once a day  folic acid 1 mg oral tablet: 1 tab(s) orally  glimepiride 1 mg oral tablet: 2 tab(s) orally once a day  hydrOXYzine hydrochloride 50 mg oral tablet: 1 tab(s) orally once a day  Januvia 25 mg oral tablet: 1 tab(s) orally once a day  Jardiance 10 mg oral tablet: 1 tab(s) orally once a day  leflunomide 20 mg oral tablet: 1 tab(s) orally  Lipitor 20 mg oral tablet: 1 tab(s) orally  Lubricant Eye Drops ophthalmic solution: 1 drop(s) in each eye prn as needed for  dry eyes  omeprazole 40 mg oral delayed release capsule: 1 cap(s) orally once a day  Tradjenta 5 mg oral tablet: 1 tab(s) orally once a day  venlafaxine 37.5 mg oral tablet: 1 tab(s) orally once a day  Vitamin B-12 1000 mcg oral tablet: 1 tab(s) orally once a day   aspirin 81 mg oral tablet, chewable: 1 tab(s) orally once a day  Brilinta (ticagrelor) 90 mg oral tablet: 1 tab(s) orally 2 times a day  Calcium 600-Vit D3 500 Softgel 600 mg (1500 mg)-500 unit: 1 tab qd po  famotidine 20 mg oral tablet: 1 tab(s) orally once a day  folic acid 1 mg oral tablet: 1 tab(s) orally once a day  glimepiride 1 mg oral tablet: 2 tab(s) orally once a day  hydrOXYzine hydrochloride 50 mg oral tablet: 1 tab(s) orally once a day  Januvia 25 mg oral tablet: 1 tab(s) orally once a day  Jardiance 10 mg oral tablet: 1 tab(s) orally once a day  leflunomide 20 mg oral tablet: 1 tab(s) orally  Lipitor 20 mg oral tablet: 1 tab(s) orally once a day  Lubricant Eye Drops ophthalmic solution: 1 drop(s) in each eye prn as needed for  dry eyes  omeprazole 40 mg oral delayed release capsule: 1 cap(s) orally once a day  polyethylene glycol 3350 oral powder for reconstitution: 17 gram(s) orally 2 times a day  senna leaf extract oral tablet: 1 tab(s) orally once a day (at bedtime)  Tradjenta 5 mg oral tablet: 1 tab(s) orally once a day  venlafaxine 37.5 mg oral tablet: 1 tab(s) orally once a day  Vitamin B-12 1000 mcg oral tablet: 1 tab(s) orally once a day

## 2023-10-21 NOTE — DISCHARGE NOTE PROVIDER - PROVIDER TOKENS
PROVIDER:[TOKEN:[[id number]]],PROVIDER:[TOKEN:[[id number]]] PROVIDER:[TOKEN:[[id number]]],PROVIDER:[TOKEN:[[id number]]],PROVIDER:[TOKEN:[[id number]]]

## 2023-10-21 NOTE — PROGRESS NOTE ADULT - SUBJECTIVE AND OBJECTIVE BOX
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 Ansari      Depression      Interstitial lung disease      Vertebral artery stenosis        FAMILY HISTORY:  Family history of asthma (Mother)    Family history of heart disease (Father)  father passed from heart attack        MEDICATIONS (HOME):  Home Medications:  25 mg Losartan Potassium: 1 tab qd po (17 Oct 2023 02:20)  aspirin 81 mg oral tablet, chewable: 1 tab(s) orally once a day (24 Nov 2021 14:41)  Calcium 600-Vit D3 500 Softgel 600 mg (1500 mg)-500 unit: 1 tab qd po (17 Oct 2023 02:20)  clopidogrel 75 mg oral tablet: 1 tab(s) orally once a day (24 Nov 2021 14:41)  famotidine 20 mg oral tablet: 1 tab(s) orally once a day (17 Oct 2023 02:24)  folic acid 1 mg oral tablet: 1 tab(s) orally (17 Oct 2023 02:20)  glimepiride 1 mg oral tablet: 2 tab(s) orally once a day (17 Oct 2023 02:21)  hydrOXYzine hydrochloride 50 mg oral tablet: 1 tab(s) orally once a day (17 Oct 2023 02:38)  Januvia 25 mg oral tablet: 1 tab(s) orally once a day (17 Oct 2023 02:26)  Jardiance 10 mg oral tablet: 1 tab(s) orally once a day (17 Oct 2023 02:22)  leflunomide 20 mg oral tablet: 1 tab(s) orally (17 Oct 2023 02:20)  Lipitor 20 mg oral tablet: 1 tab(s) orally (17 Oct 2023 02:20)  Lubricant Eye Drops ophthalmic solution: 1 drop(s) in each eye prn as needed for  dry eyes (17 Oct 2023 02:27)  omeprazole 40 mg oral delayed release capsule: 1 cap(s) orally once a day (17 Oct 2023 02:28)  Tradjenta 5 mg oral tablet: 1 tab(s) orally once a day (17 Oct 2023 02:29)  venlafaxine 37.5 mg oral tablet: 1 tab(s) orally once a day (17 Oct 2023 02:29)  Vitamin B-12 1000 mcg oral tablet: 1 tab(s) orally once a day (24 Nov 2021 14:41)    MEDICATIONS  (STANDING):  aspirin  chewable 81 milliGRAM(s) Oral <User Schedule>  atorvastatin 20 milliGRAM(s) Oral at bedtime  chlorhexidine 4% Liquid 1 Application(s) Topical <User Schedule>  cyanocobalamin 1000 MICROGram(s) Oral daily  dextrose 5%. 1000 milliLiter(s) (50 mL/Hr) IV Continuous <Continuous>  dextrose 5%. 1000 milliLiter(s) (100 mL/Hr) IV Continuous <Continuous>  dextrose 50% Injectable 25 Gram(s) IV Push once  dextrose 50% Injectable 25 Gram(s) IV Push once  dextrose 50% Injectable 12.5 Gram(s) IV Push once  enoxaparin Injectable 40 milliGRAM(s) SubCutaneous every 24 hours  famotidine    Tablet 20 milliGRAM(s) Oral daily  folic acid 1 milliGRAM(s) Oral daily  glucagon  Injectable 1 milliGRAM(s) IntraMuscular once  hydrOXYzine hydrochloride 50 milliGRAM(s) Oral daily  insulin lispro (ADMELOG) corrective regimen sliding scale   SubCutaneous three times a day before meals  insulin lispro (ADMELOG) corrective regimen sliding scale   SubCutaneous at bedtime  pantoprazole    Tablet 40 milliGRAM(s) Oral before breakfast  senna 1 Tablet(s) Oral at bedtime  ticagrelor 90 milliGRAM(s) Oral <User Schedule>  venlafaxine 37.5 milliGRAM(s) Oral daily    MEDICATIONS  (PRN):  artificial  tears Solution 1 Drop(s) Both EYES three times a day PRN for dry eyes  dextrose Oral Gel 15 Gram(s) Oral once PRN Blood Glucose LESS THAN 70 milliGRAM(s)/deciliter    ALLERGIES/INTOLERANCES:  Allergies  No Known Allergies    Intolerances    VITALS & EXAMINATION:  Vital Signs Last 24 Hrs  T(C): 36.8 (21 Oct 2023 07:00), Max: 36.9 (20 Oct 2023 19:00)  T(F): 98.3 (21 Oct 2023 07:00), Max: 98.4 (20 Oct 2023 19:00)  HR: 53 (21 Oct 2023 10:00) (53 - 94)  BP: 93/55 (20 Oct 2023 16:30) (93/55 - 146/72)  BP(mean): 70 (20 Oct 2023 16:30) (69 - 103)  RR: 14 (21 Oct 2023 10:00) (14 - 21)  SpO2: 97% (21 Oct 2023 10:00) (92% - 100%)    Parameters below as of 21 Oct 2023 10:00  Patient On (Oxygen Delivery Method): room air        General: No acute distress  HEENT: EOM intact, visual fields full  Abdomen: Soft, nontender, nondistended   Extremities: No edema    NEUROLOGICAL EXAM:  Mental status: Awake, alert, oriented x3, no aphasia, no neglect, normal memory   Cranial Nerves: No facial asymmetry, no nystagmus, no dysarthria,  tongue midline  Motor exam: Normal tone, no drift, 5/5 RUE, 5/5 RLE, 5/5 LUE, 5/5 LLE, normal fine finger movements.  Sensation: Intact to light touch   Coordination/ Gait: No dysmetria, OCTAVIO intact and symmetric bilaterally    LABORATORY:  CBC                       10.1   8.71  )-----------( 244      ( 20 Oct 2023 04:52 )             32.3     Chem 10-20    138  |  105  |  15  ----------------------------<  110<H>  3.7   |  21<L>  |  0.90    Ca    8.3<L>      20 Oct 2023 04:52  Phos  3.3     10-20  Mg     2.5     10-20      LFTs   Coagulopathy   Lipid Panel 10-17 Chol 141 LDL -- HDL 47 Trig 179<H>  A1c   Cardiac enzymes     U/A Urinalysis Basic - ( 20 Oct 2023 04:52 )    Color: x / Appearance: x / SG: x / pH: x  Gluc: 110 mg/dL / Ketone: x  / Bili: x / Urobili: x   Blood: x / Protein: x / Nitrite: x   Leuk Esterase: x / RBC: x / WBC x   Sq Epi: x / Non Sq Epi: x / Bacteria: x        STUDIES & IMAGING:  Studies (EKG, EEG, EMG, etc):       IMAGING: Reviewed by me.   Carotid Duplex: This examination confirms the presence of a severe, greater than 70%   stenosis of the right internal carotid artery.    A flow-limiting stenosis is not identified on the left.    10/16/23:  Noncontrast CT Head: No acute intracranal hemorrhage, mass effect, or   evidence of acute vascular territorial infarct. If clinical symptoms   persist or worsen, more sensitive evaluation with brain MRI may be   obtained, if no contraindications exist.    CTA Neck: Severe (greater than 70%) luminal stenosis of the proximal   right internal carotid artery by NASCET criteria, secondary to   predominantly noncalcified plaque. Redemonstration of nonopacification of   the proximal and mid left vertebral artery with intermittent   opacification of the mid to distal cervical left vertebral artery.    Noncalcified atherosclerotic plaque along the aortic arch, protruding   into the lumen, similar to prior examination.    CTA Head: No proximal large vessel occlusion. Stable moderate and severe   stenoses of the left vertebral artery.

## 2023-10-21 NOTE — PROGRESS NOTE ADULT - ASSESSMENT
73 y/o male with multiple vascular risk factors presenting with month long hx of right visual disturbances, particular describing seeing black in right lower visual fields. Saw optho outpatient, performed formal visual field testing, confirming right partial inferior altitudinal defect nasally and possible small SN defect. Recommended to come to [facility] for embolic work up. Neuro exam did not reveal gross visual field cuts. However, at the time of exam, patient denying visual difficulties. On neuroimaging, new high grade (70%) BHANU stenosis from prior imaging. B/l VA stenosis stable as well as atherosclerotic plaque in aortic arch.     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     RECOMMENDATION  Patient now POD1 from ICA stent placement.   Seen and evaluated by neurology team   OK for discharge from neurology perspective as per neurosurgery determination.

## 2023-10-21 NOTE — DISCHARGE NOTE PROVIDER - NSDCCPCAREPLAN_GEN_ALL_CORE_FT
PRINCIPAL DISCHARGE DIAGNOSIS  Diagnosis: Internal carotid artery stenosis  Assessment and Plan of Treatment: s/p angio: rt carotid stent 10/19/23  Please make an appointment and follow up with Dr. Niño in 1-2 weeks after discharge from the hospital. Please continue Aspirin 81mg daily and Brilinta 90mg twice a day - these medications are important for your right carotid stent. Aspirin helps thin your blood. Side effects of aspirin: brusing, easy bleeding abdominal pain. Brilinta helps thin the blood.  It is important to continue this. Side effects: bleeding, nosebleeds, easy bruising. You may shower however please DO NOT soak in the bath as you had a right groin puncture from angiography. For any leaking / bleeding from puncture site, please come to the hospital. You may take Tylenol (Acetaminophen) as needed for pain control. DO NOT take any NSAIDs (Advil, Aleve, Motrin, Ibuprofen) as these medications can put you at risk for bleeding after a procedure. Please DO NOT do any heavy lifting, bending, or twisting. Please make an appointment and follow up with your primary care doctor in 1-2 weeks after discharge from the hospital.      SECONDARY DISCHARGE DIAGNOSES  Diagnosis: CVA (cerebrovascular accident)  Assessment and Plan of Treatment: Please make an appointment and follow up with Dr. Wilson in 1-2 weeks after discharge.    Diagnosis: HTN (hypertension)  Assessment and Plan of Treatment: Please DO NOT restart your blood pressure medication - Norvasc and Losartan. You had low blood pressures after the procedure, it has since normalized and you did not need any blood pressure medications while in the hospital. Please make an appointment and follow up with your primary care provider in 1-2 week for continued monitoring of your blood pressures.    Diagnosis: Diabetes  Assessment and Plan of Treatment: Your A1c was 7.4, you may continue your diabetes medications - Januvia, Glimepiride, Jardiance and Tradjenta. Please continue a low sugar, low carb diet. Please make an appointment and follow up with your primary care provider in 1-2 weeks.    Diagnosis: Hyperlipidemia  Assessment and Plan of Treatment: Please continue Lipitor as prescribed. Please make an appointment and follow up with your primary care provider in 1-2 weeks after discharge.     PRINCIPAL DISCHARGE DIAGNOSIS  Diagnosis: Internal carotid artery stenosis  Assessment and Plan of Treatment: s/p angio: rt carotid stent 10/19/23  Please make an appointment and follow up with Dr. Niño in 1-2 weeks after discharge from the hospital. Please continue Aspirin 81mg daily and Brilinta 90mg twice a day - these medications are important for your right carotid stent. Aspirin helps thin your blood. Side effects of aspirin: brusing, easy bleeding abdominal pain. Brilinta helps thin the blood.  It is important to continue this. Side effects: bleeding, nosebleeds, easy bruising. You may shower however please DO NOT soak in the bath as you had a right groin puncture from angiography. For any leaking / bleeding from puncture site, please come to the hospital. You may take Tylenol (Acetaminophen) as needed for pain control. DO NOT take any NSAIDs (Advil, Aleve, Motrin, Ibuprofen) as these medications can put you at risk for bleeding after a procedure. Please DO NOT do any heavy lifting, bending, or twisting. Please make an appointment and follow up with your primary care doctor in 1-2 weeks after discharge from the hospital.      SECONDARY DISCHARGE DIAGNOSES  Diagnosis: CVA (cerebrovascular accident)  Assessment and Plan of Treatment: Please make an appointment and follow up with Dr. Wilson in 1-2 weeks after discharge.    Diagnosis: HTN (hypertension)  Assessment and Plan of Treatment: Please DO NOT restart your blood pressure medication - Norvasc and Losartan. You had low blood pressures after the procedure, it has since normalized and you did not need any blood pressure medications while in the hospital. Please make an appointment and follow up with your primary care provider / cardiologist (Dr. Jain) in 1-2 week for continued monitoring of your blood pressures.    Diagnosis: Diabetes  Assessment and Plan of Treatment: Your A1c was 7.4, you may continue your diabetes medications - Januvia, Glimepiride, Jardiance and Tradjenta. Please continue a low sugar, low carb diet. Please make an appointment and follow up with your primary care provider in 1-2 weeks.    Diagnosis: Hyperlipidemia  Assessment and Plan of Treatment: Please continue Lipitor as prescribed. Please make an appointment and follow up with your primary care provider in 1-2 weeks after discharge.    Diagnosis: Vision loss, right eye  Assessment and Plan of Treatment: Please make an appointment and follow up with your opthalmologist or can make an appointment with [facility] Department of Opthalmology [address] [address], [facility] [phone]

## 2023-10-21 NOTE — PROGRESS NOTE ADULT - SUBJECTIVE AND OBJECTIVE BOX
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  CTA w/ severe (70%) BHANU stenosis.       Admission Scores  GCS: 15  HH:   MF:   NIHSS:   RASS:    CAM-ICU:   ICH:    24 hour Events:     10/19: r CAROTID WALL STENT   10/20 still asymptomatic hypotension, overnight no events, BP WNL without pressors.   10/21     Allergies    No Known Allergies    Intolerances        REVIEW OF SYSTEMS: [ ] Unable to Assess due to neurologic exam   [ ] All ROS addressed below are non-contributory, except:  Neuro: [ ] Headache [ ] Back pain [ ] Numbness [ ] Weakness [ ] Ataxia [ ] Dizziness [ ] Aphasia [ ] Dysarthria [ ] Visual disturbance  Resp: [ ] Shortness of breath/dyspnea, [ ] Orthopnea [ ] Cough  CV: [ ] Chest pain [ ] Palpitation [ ] Lightheadedness [ ] Syncope  Renal: [ ] Thirst [ ] Edema  GI: [ ] Nausea [ ] Emesis [ ] Abdominal pain [ ] Constipation [ ] Diarrhea  Hem: [ ] Hematemesis [ ] bright red blood per rectum  ID: [ ] Fever [ ] Chills [ ] Dysuria  ENT: [ ] Rhinorrhea      DEVICES:   [ ] Restraints [ ] ET tube [ ] central line [ ] arterial line [ ] du [ ] NGT/OGT [ ] EVD [ ] LD [ ] CALI/HMV [ ] Trach [ ] PEG [ ] Chest Tube     VITALS:   Vital Signs Last 24 Hrs  T(C): 36.2 (19 Oct 2023 17:15), Max: 36.9 (18 Oct 2023 21:37)  T(F): 97.2 (19 Oct 2023 17:15), Max: 98.5 (18 Oct 2023 21:37)  HR: 64 (19 Oct 2023 18:30) (64 - 77)  BP: 146/71 (19 Oct 2023 18:30) (84/50 - 155/76)  BP(mean): 102 (19 Oct 2023 18:30) (62 - 109)  RR: 17 (19 Oct 2023 18:15) (16 - 20)  SpO2: 99% (19 Oct 2023 18:30) (96% - 100%)    Parameters below as of 19 Oct 2023 18:15  Patient On (Oxygen Delivery Method): room air      CAPILLARY BLOOD GLUCOSE      POCT Blood Glucose.: 104 mg/dL (19 Oct 2023 17:46)  POCT Blood Glucose.: 154 mg/dL (19 Oct 2023 11:46)  POCT Blood Glucose.: 146 mg/dL (19 Oct 2023 07:48)  POCT Blood Glucose.: 129 mg/dL (18 Oct 2023 21:07)    I&O's Summary    18 Oct 2023 07:01  -  19 Oct 2023 07:00  --------------------------------------------------------  IN: 1300 mL / OUT: 0 mL / NET: 1300 mL    19 Oct 2023 07:01  -  19 Oct 2023 18:43  --------------------------------------------------------  IN: 0 mL / OUT: 0 mL / NET: 0 mL        Respiratory:        LABS:                        10.9   10.22 )-----------( 277      ( 19 Oct 2023 17:57 )             34.1     10-19    140  |  109<H>  |  15  ----------------------------<  114<H>  4.1   |  22  |  0.82             MEDICATION LEVELS:     IVF FLUIDS/MEDICATIONS:   MEDICATIONS  (STANDING):  atorvastatin 20 milliGRAM(s) Oral at bedtime  cyanocobalamin 1000 MICROGram(s) Oral daily  dextrose 5%. 1000 milliLiter(s) (100 mL/Hr) IV Continuous <Continuous>  dextrose 5%. 1000 milliLiter(s) (50 mL/Hr) IV Continuous <Continuous>  dextrose 50% Injectable 25 Gram(s) IV Push once  dextrose 50% Injectable 25 Gram(s) IV Push once  dextrose 50% Injectable 12.5 Gram(s) IV Push once  enoxaparin Injectable 40 milliGRAM(s) SubCutaneous every 24 hours  famotidine    Tablet 20 milliGRAM(s) Oral daily  folic acid 1 milliGRAM(s) Oral daily  glucagon  Injectable 1 milliGRAM(s) IntraMuscular once  hydrOXYzine hydrochloride 50 milliGRAM(s) Oral daily  insulin lispro (ADMELOG) corrective regimen sliding scale   SubCutaneous three times a day before meals  insulin lispro (ADMELOG) corrective regimen sliding scale   SubCutaneous at bedtime  pantoprazole    Tablet 40 milliGRAM(s) Oral before breakfast  phenylephrine    Infusion 0.1 MICROgram(s)/kG/Min (2.81 mL/Hr) IV Continuous <Continuous>  sodium chloride 0.9%. 1000 milliLiter(s) (75 mL/Hr) IV Continuous <Continuous>  ticagrelor 90 milliGRAM(s) Oral <User Schedule>  venlafaxine 37.5 milliGRAM(s) Oral daily    MEDICATIONS  (PRN):  artificial  tears Solution 1 Drop(s) Both EYES three times a day PRN for dry eyes  dextrose Oral Gel 15 Gram(s) Oral once PRN Blood Glucose LESS THAN 70 milliGRAM(s)/deciliter        IMAGING:      EXAMINATION:  PHYSICAL EXAM:    Constitutional: No Acute Distress     Neurological: Awake, alert oriented to person, place and time, Following Commands, PERRL, EOMI, No Gaze Preference, Face Symmetrical, Speech Fluent, No dysmetria, No ataxia, No nystagmus     Motor exam:          Upper extremity                         Delt     Bicep     Tricep    HG                                                 R         5/5        5/5        5/5       5/5                                               L          5/5        5/5        5/5       5/5          Lower extremity                        HF         KF        KE       DF         PF                                                  R        5/5        5/5        5/5       5/5         5/5                                               L         5/5        5/5       5/5       5/5          5/5    Pulmonary: Clear to Auscultation, No rales, No rhonchi, No wheezes     Cardiovascular: S1, S2, Regular rate and rhythm     Gastrointestinal: Soft, Non-tender, Non-distended     Extremities: No calf tenderness      HOSPITAL COURSE: This is a 72M w/ 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  CTA w/ severe (70%) BHANU stenosis.       Admission Scores  GCS: 15  HH:   MF:   NIHSS:   RASS:    CAM-ICU:   ICH:    24 hour Events:     10/19: r CAROTID WALL STENT   10/20 still asymptomatic hypotension, overnight no events, BP WNL without pressors.   10/21 stable     Allergies    No Known Allergies    Intolerances        REVIEW OF SYSTEMS: [ ] Unable to Assess due to neurologic exam   [ ] All ROS addressed below are non-contributory, except:  Neuro: [ ] Headache [ ] Back pain [ ] Numbness [ ] Weakness [ ] Ataxia [ ] Dizziness [ ] Aphasia [ ] Dysarthria [ ] Visual disturbance  Resp: [ ] Shortness of breath/dyspnea, [ ] Orthopnea [ ] Cough  CV: [ ] Chest pain [ ] Palpitation [ ] Lightheadedness [ ] Syncope  Renal: [ ] Thirst [ ] Edema  GI: [ ] Nausea [ ] Emesis [ ] Abdominal pain [ ] Constipation [ ] Diarrhea  Hem: [ ] Hematemesis [ ] bright red blood per rectum  ID: [ ] Fever [ ] Chills [ ] Dysuria  ENT: [ ] Rhinorrhea      DEVICES:   [ ] Restraints [ ] ET tube [ ] central line [ ] arterial line [ ] du [ ] NGT/OGT [ ] EVD [ ] LD [ ] CALI/HMV [ ] Trach [ ] PEG [ ] Chest Tube     VITALS:   Vital Signs Last 24 Hrs  T(C): 36.2 (19 Oct 2023 17:15), Max: 36.9 (18 Oct 2023 21:37)  T(F): 97.2 (19 Oct 2023 17:15), Max: 98.5 (18 Oct 2023 21:37)  HR: 64 (19 Oct 2023 18:30) (64 - 77)  BP: 146/71 (19 Oct 2023 18:30) (84/50 - 155/76)  BP(mean): 102 (19 Oct 2023 18:30) (62 - 109)  RR: 17 (19 Oct 2023 18:15) (16 - 20)  SpO2: 99% (19 Oct 2023 18:30) (96% - 100%)    Parameters below as of 19 Oct 2023 18:15  Patient On (Oxygen Delivery Method): room air      CAPILLARY BLOOD GLUCOSE      POCT Blood Glucose.: 104 mg/dL (19 Oct 2023 17:46)  POCT Blood Glucose.: 154 mg/dL (19 Oct 2023 11:46)  POCT Blood Glucose.: 146 mg/dL (19 Oct 2023 07:48)  POCT Blood Glucose.: 129 mg/dL (18 Oct 2023 21:07)    I&O's Summary    18 Oct 2023 07:01  -  19 Oct 2023 07:00  --------------------------------------------------------  IN: 1300 mL / OUT: 0 mL / NET: 1300 mL    19 Oct 2023 07:01  -  19 Oct 2023 18:43  --------------------------------------------------------  IN: 0 mL / OUT: 0 mL / NET: 0 mL        Respiratory:        LABS:                        10.9   10.22 )-----------( 277      ( 19 Oct 2023 17:57 )             34.1     10-19    140  |  109<H>  |  15  ----------------------------<  114<H>  4.1   |  22  |  0.82             MEDICATION LEVELS:     IVF FLUIDS/MEDICATIONS:   MEDICATIONS  (STANDING):  atorvastatin 20 milliGRAM(s) Oral at bedtime  cyanocobalamin 1000 MICROGram(s) Oral daily  dextrose 5%. 1000 milliLiter(s) (100 mL/Hr) IV Continuous <Continuous>  dextrose 5%. 1000 milliLiter(s) (50 mL/Hr) IV Continuous <Continuous>  dextrose 50% Injectable 25 Gram(s) IV Push once  dextrose 50% Injectable 25 Gram(s) IV Push once  dextrose 50% Injectable 12.5 Gram(s) IV Push once  enoxaparin Injectable 40 milliGRAM(s) SubCutaneous every 24 hours  famotidine    Tablet 20 milliGRAM(s) Oral daily  folic acid 1 milliGRAM(s) Oral daily  glucagon  Injectable 1 milliGRAM(s) IntraMuscular once  hydrOXYzine hydrochloride 50 milliGRAM(s) Oral daily  insulin lispro (ADMELOG) corrective regimen sliding scale   SubCutaneous three times a day before meals  insulin lispro (ADMELOG) corrective regimen sliding scale   SubCutaneous at bedtime  pantoprazole    Tablet 40 milliGRAM(s) Oral before breakfast  phenylephrine    Infusion 0.1 MICROgram(s)/kG/Min (2.81 mL/Hr) IV Continuous <Continuous>  sodium chloride 0.9%. 1000 milliLiter(s) (75 mL/Hr) IV Continuous <Continuous>  ticagrelor 90 milliGRAM(s) Oral <User Schedule>  venlafaxine 37.5 milliGRAM(s) Oral daily    MEDICATIONS  (PRN):  artificial  tears Solution 1 Drop(s) Both EYES three times a day PRN for dry eyes  dextrose Oral Gel 15 Gram(s) Oral once PRN Blood Glucose LESS THAN 70 milliGRAM(s)/deciliter        IMAGING:      EXAMINATION:  PHYSICAL EXAM:    Constitutional: No Acute Distress     Neurological: Awake, alert oriented to person, place and time, Following Commands, PERRL, EOMI, No Gaze Preference, Face Symmetrical, Speech Fluent, No dysmetria, No ataxia, No nystagmus     Motor exam:          Upper extremity                         Delt     Bicep     Tricep    HG                                                 R         5/5        5/5        5/5       5/5                                               L          5/5        5/5        5/5       5/5          Lower extremity                        HF         KF        KE       DF         PF                                                  R        5/5        5/5        5/5       5/5         5/5                                               L         5/5        5/5       5/5       5/5          5/5    Pulmonary: Clear to Auscultation, No rales, No rhonchi, No wheezes     Cardiovascular: S1, S2, Regular rate and rhythm     Gastrointestinal: Soft, Non-tender, Non-distended     Extremities: No calf tenderness

## 2023-10-21 NOTE — DISCHARGE NOTE PROVIDER - NSDCCAREPROVSEEN_GEN_ALL_CORE_FT
prescriptions at [address] [place] [address], [facility] [id number] Dustin Niño

## 2023-10-21 NOTE — CHART NOTE - NSCHARTNOTEFT_GEN_A_CORE
Patient now POD1 from ICA stent placement.   Seen and evaluated by neurology team   OK for discharge from neurology perspective as per neurosurgery determination

## 2023-10-21 NOTE — PROGRESS NOTE ADULT - ASSESSMENT
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 Reports periods of blackness in R lower visual field. CTA w/ severe (70%) BHANU stenosis. Exam: AOx3, RONDON 5/5. SILT. No appreciated visual field cut.    Now s/p R carotid wall stent     possible floor in am  continue ASA in AM   need auth for brilinta before dc  Baroreceptor stim

## 2023-10-21 NOTE — DISCHARGE NOTE PROVIDER - HOSPITAL COURSE
71 y/o M PMHx prior b/l cerebellar infarcts with no residual deficits, b/l VA stenosis (s/p R VA stent at [facility] in 2016 previously on ASA/Plavix), DM2, HLD, HTN, cataracts in both eye (2012), depression, presented to outpatient optho office for right eye vision changes. After a full evaluation, patient was sent to [facility] for concern for retinal ischemia. On CTA patient was seen to have new high grade (70%) BHANU stenosis as well as re-visualized B/L VA stenosis. Patient was taken off his home dose plavix and placed on Brilinta 90mg BID. Patient underwent right carotid artery wall stent placement via angiography on 10/19/2023. Post operatively patient was bradycardiac and hypotensive most likely due to baroreceptor stimulation during his procedure and was placed on phenylephrine drip to help augment his blood pressure and transferred to the NSCU for further management. He had Carotid dopplers done on 10/20/23 which show patency through his stent. Patient was weaned off the phenyephrine drip on 10/20/23 and was observed in NSCU overnight. Patient was transferred to floor on 10/21/23 and discharged on 10/21/23. 71 y/o M PMHx prior b/l cerebellar infarcts with no residual deficits, b/l VA stenosis (s/p R VA stent at [facility] in 2016 previously on ASA/Plavix), DM2, HLD, HTN, cataracts in both eye (2012), depression, presented to outpatient optho office for right eye vision changes. After a full evaluation, patient was sent to [facility] for concern for retinal ischemia. On CTA patient was seen to have new high grade (70%) BHANU stenosis as well as re-visualized B/L VA stenosis. Patient was taken off his home dose plavix and placed on Brilinta 90mg BID. Patient underwent right carotid artery wall stent placement via angiography on 10/19/2023. Post operatively patient was bradycardiac and hypotensive most likely due to baroreceptor stimulation during his procedure and was placed on phenylephrine drip to help augment his blood pressure and transferred to the NSCU for further management. He had Carotid dopplers done on 10/20/23 which show patency through his stent. Patient was weaned off the phenyephrine drip on 10/20/23 and was observed in NSCU overnight. Patient was transferred to floor on 10/21/23. Physical Therapy and Occupational Therapy evaluated patient and deemed him no skilled needs, though he did qualify for home care / visiting nurse. On day of discharge patient is neurologically and hemodynamically stable.     More than 30 minutes were spent educating the patient and family regarding condition, medications, follow up plans, signs and symptoms to be concerned with, preparing paperwork, and questions answered regarding discharge.

## 2023-10-21 NOTE — DISCHARGE NOTE PROVIDER - NSDCFUADDINST_GEN_ALL_CORE_FT
Please make all necessary appointments and follow up. Please DO NOT take any NSAIDs (Advil, Aleve, Motrin, Ibuprofen) until cleared by your Neurosurgeon. Please DO NOT do any heavy lifting, bending, twisting and straining. Please come to the emergency room for any of the following: altered mental status, seizures, pain uncontrolled by pain medications, fevers, leaking / bleeding from your right groin puncture, chest pain and shortness of breath.

## 2023-10-22 ENCOUNTER — TRANSCRIPTION ENCOUNTER (OUTPATIENT)
Age: 73
End: 2023-10-22

## 2023-10-22 VITALS
RESPIRATION RATE: 19 BRPM | SYSTOLIC BLOOD PRESSURE: 124 MMHG | OXYGEN SATURATION: 98 % | TEMPERATURE: 99 F | DIASTOLIC BLOOD PRESSURE: 72 MMHG | HEART RATE: 74 BPM

## 2023-10-22 LAB
ANION GAP SERPL CALC-SCNC: 12 MMOL/L — SIGNIFICANT CHANGE UP (ref 5–17)
ANION GAP SERPL CALC-SCNC: 12 MMOL/L — SIGNIFICANT CHANGE UP (ref 5–17)
BUN SERPL-MCNC: 14 MG/DL — SIGNIFICANT CHANGE UP (ref 7–23)
BUN SERPL-MCNC: 14 MG/DL — SIGNIFICANT CHANGE UP (ref 7–23)
CALCIUM SERPL-MCNC: 9 MG/DL — SIGNIFICANT CHANGE UP (ref 8.4–10.5)
CALCIUM SERPL-MCNC: 9 MG/DL — SIGNIFICANT CHANGE UP (ref 8.4–10.5)
CHLORIDE SERPL-SCNC: 106 MMOL/L — SIGNIFICANT CHANGE UP (ref 96–108)
CHLORIDE SERPL-SCNC: 106 MMOL/L — SIGNIFICANT CHANGE UP (ref 96–108)
CO2 SERPL-SCNC: 22 MMOL/L — SIGNIFICANT CHANGE UP (ref 22–31)
CO2 SERPL-SCNC: 22 MMOL/L — SIGNIFICANT CHANGE UP (ref 22–31)
CREAT SERPL-MCNC: 1 MG/DL — SIGNIFICANT CHANGE UP (ref 0.5–1.3)
CREAT SERPL-MCNC: 1 MG/DL — SIGNIFICANT CHANGE UP (ref 0.5–1.3)
EGFR: 79 ML/MIN/1.73M2 — SIGNIFICANT CHANGE UP
EGFR: 79 ML/MIN/1.73M2 — SIGNIFICANT CHANGE UP
GLUCOSE BLDC GLUCOMTR-MCNC: 129 MG/DL — HIGH (ref 70–99)
GLUCOSE BLDC GLUCOMTR-MCNC: 129 MG/DL — HIGH (ref 70–99)
GLUCOSE BLDC GLUCOMTR-MCNC: 210 MG/DL — HIGH (ref 70–99)
GLUCOSE BLDC GLUCOMTR-MCNC: 210 MG/DL — HIGH (ref 70–99)
GLUCOSE SERPL-MCNC: 129 MG/DL — HIGH (ref 70–99)
GLUCOSE SERPL-MCNC: 129 MG/DL — HIGH (ref 70–99)
HCT VFR BLD CALC: 31.7 % — LOW (ref 39–50)
HCT VFR BLD CALC: 31.7 % — LOW (ref 39–50)
HGB BLD-MCNC: 10.1 G/DL — LOW (ref 13–17)
HGB BLD-MCNC: 10.1 G/DL — LOW (ref 13–17)
MAGNESIUM SERPL-MCNC: 1.8 MG/DL — SIGNIFICANT CHANGE UP (ref 1.6–2.6)
MAGNESIUM SERPL-MCNC: 1.8 MG/DL — SIGNIFICANT CHANGE UP (ref 1.6–2.6)
MCHC RBC-ENTMCNC: 20.4 PG — LOW (ref 27–34)
MCHC RBC-ENTMCNC: 20.4 PG — LOW (ref 27–34)
MCHC RBC-ENTMCNC: 31.9 GM/DL — LOW (ref 32–36)
MCHC RBC-ENTMCNC: 31.9 GM/DL — LOW (ref 32–36)
MCV RBC AUTO: 63.9 FL — LOW (ref 80–100)
MCV RBC AUTO: 63.9 FL — LOW (ref 80–100)
NRBC # BLD: 0 /100 WBCS — SIGNIFICANT CHANGE UP (ref 0–0)
NRBC # BLD: 0 /100 WBCS — SIGNIFICANT CHANGE UP (ref 0–0)
PHOSPHATE SERPL-MCNC: 3.2 MG/DL — SIGNIFICANT CHANGE UP (ref 2.5–4.5)
PHOSPHATE SERPL-MCNC: 3.2 MG/DL — SIGNIFICANT CHANGE UP (ref 2.5–4.5)
PLATELET # BLD AUTO: 211 K/UL — SIGNIFICANT CHANGE UP (ref 150–400)
PLATELET # BLD AUTO: 211 K/UL — SIGNIFICANT CHANGE UP (ref 150–400)
POTASSIUM SERPL-MCNC: 3.6 MMOL/L — SIGNIFICANT CHANGE UP (ref 3.5–5.3)
POTASSIUM SERPL-MCNC: 3.6 MMOL/L — SIGNIFICANT CHANGE UP (ref 3.5–5.3)
POTASSIUM SERPL-SCNC: 3.6 MMOL/L — SIGNIFICANT CHANGE UP (ref 3.5–5.3)
POTASSIUM SERPL-SCNC: 3.6 MMOL/L — SIGNIFICANT CHANGE UP (ref 3.5–5.3)
RBC # BLD: 4.96 M/UL — SIGNIFICANT CHANGE UP (ref 4.2–5.8)
RBC # BLD: 4.96 M/UL — SIGNIFICANT CHANGE UP (ref 4.2–5.8)
RBC # FLD: 16.8 % — HIGH (ref 10.3–14.5)
RBC # FLD: 16.8 % — HIGH (ref 10.3–14.5)
SODIUM SERPL-SCNC: 140 MMOL/L — SIGNIFICANT CHANGE UP (ref 135–145)
SODIUM SERPL-SCNC: 140 MMOL/L — SIGNIFICANT CHANGE UP (ref 135–145)
WBC # BLD: 8.59 K/UL — SIGNIFICANT CHANGE UP (ref 3.8–10.5)
WBC # BLD: 8.59 K/UL — SIGNIFICANT CHANGE UP (ref 3.8–10.5)
WBC # FLD AUTO: 8.59 K/UL — SIGNIFICANT CHANGE UP (ref 3.8–10.5)
WBC # FLD AUTO: 8.59 K/UL — SIGNIFICANT CHANGE UP (ref 3.8–10.5)

## 2023-10-22 PROCEDURE — 99232 SBSQ HOSP IP/OBS MODERATE 35: CPT

## 2023-10-22 RX ORDER — POLYETHYLENE GLYCOL 3350 17 G/17G
17 POWDER, FOR SOLUTION ORAL
Qty: 0 | Refills: 0 | DISCHARGE
Start: 2023-10-22

## 2023-10-22 RX ORDER — ATORVASTATIN CALCIUM 80 MG/1
1 TABLET, FILM COATED ORAL
Qty: 0 | Refills: 0 | DISCHARGE

## 2023-10-22 RX ORDER — POLYETHYLENE GLYCOL 3350 17 G/17G
17 POWDER, FOR SOLUTION ORAL
Refills: 0 | Status: DISCONTINUED | OUTPATIENT
Start: 2023-10-22 | End: 2023-10-22

## 2023-10-22 RX ORDER — FOLIC ACID 0.8 MG
1 TABLET ORAL
Qty: 0 | Refills: 0 | DISCHARGE

## 2023-10-22 RX ORDER — SENNA PLUS 8.6 MG/1
1 TABLET ORAL
Qty: 0 | Refills: 0 | DISCHARGE
Start: 2023-10-22

## 2023-10-22 RX ADMIN — POLYETHYLENE GLYCOL 3350 17 GRAM(S): 17 POWDER, FOR SOLUTION ORAL at 08:20

## 2023-10-22 RX ADMIN — PANTOPRAZOLE SODIUM 40 MILLIGRAM(S): 20 TABLET, DELAYED RELEASE ORAL at 05:45

## 2023-10-22 RX ADMIN — Medication 37.5 MILLIGRAM(S): at 11:08

## 2023-10-22 RX ADMIN — TICAGRELOR 90 MILLIGRAM(S): 90 TABLET ORAL at 05:44

## 2023-10-22 RX ADMIN — FAMOTIDINE 20 MILLIGRAM(S): 10 INJECTION INTRAVENOUS at 11:08

## 2023-10-22 RX ADMIN — Medication 81 MILLIGRAM(S): at 05:45

## 2023-10-22 RX ADMIN — ENOXAPARIN SODIUM 40 MILLIGRAM(S): 100 INJECTION SUBCUTANEOUS at 05:45

## 2023-10-22 RX ADMIN — PREGABALIN 1000 MICROGRAM(S): 225 CAPSULE ORAL at 11:08

## 2023-10-22 RX ADMIN — Medication 1 MILLIGRAM(S): at 11:08

## 2023-10-22 RX ADMIN — Medication 50 MILLIGRAM(S): at 11:08

## 2023-10-22 NOTE — PROGRESS NOTE ADULT - ASSESSMENT
ASSESSMENT AND PLAN: 73 y/o RHM PMHx prior b/l cerebellar infarcts with no residual deficitds, b/l VA stenosis (s/p R VA stent at [facility] in 2016 on ASA/Plavix), DM2, HLD, HTN, cataracts in both eyes (2012) depression, rheumatoid arthritis presenting from outpatient optho office for right eye vision changes. About 3 weeks ago, patient gradually started noticing that his right eye's lower visual field is seeing dark. Denies HA, weakness, numbness, dizziness. Went to see optho Dr. Dawit Lawrence. who performed formal visual field testing in the office, and documented (paperwork provided by patient) that there is partial inferior altitudinal defect nasally and possible small SN defect. Said no diabetic retinopathy in both eyes. However, believes the right eye visual field deficit found on VF testing is possibly due to embolic retinal ischemia and recommended patient come to [facility] for stroke work up. At the time of neurological exam, patient was not complaining of significant visual field loss, and on visual field testing, did not have gross visual field cut. Of note, neurology had seen patient in 11/2021 for acute vestibular syndrome. Neuroimaging at the time did not show new findings, including MRI brain.     NIHSS:0  preMRS:0    s/p angio: right carotid wall stent placement 10/19/23    NEURO:   - Continue neuro checks q 4  - Continue Aspirin 81mg daily and Brilinta 90mg BID - Brilinta sent to [address], spoke to son Dwaine this morning and he knows he must  prescription prior to pharmacy closing at 4PM. Stressed the importance of his father at risk for further strokes if he does not take this medication, he understood and agreed. I also spoke with patient about the importance of continuing these medications for he which he understood.  - Carotid Dopplers 10/20 - show patency of right internal carotid artery stent, left vertebral artery occlusion  - Continue Atarax and Effexor for anxiety  - Spoke with patient and son regarding follow up after discharge with Dr. Niño and Dr. Wilson (Neurology)  - Appreciate Stroke Neurology following patient  - PT/OT - no skilled needs, CM set up home care - visiting RN for patient    PULM:   - On room air, O2Sat>96%  - Incentive spirometry    CV:  - -160  - Post angio had labile blood pressures that required phenylephrine drip, patient's hypotension has since resolved on 10/20, off pressors since  - Discussed with patient and son regarding not restarting his home medications of Norvasc and Losartan as his blood pressures has been stable without those medications.   - 10/17 TTE: EF 52%, left vent systolic fxn, normal right vent systolic fxn, mild AS, no evidence of left vent thrombus  - Continue Lipitor for HLD  - Appreciate Cardiology following patient     ENDO:   - A1c 7.4, on ISS, CCD, fingersticks stable    HEME/ONC:             10/22 CBC post-angio anemia, stable         DVT ppx: SCDs, SQL    RENAL:   - IVL  - 10/22 BMP stable  - No du, voiding    ID:   - Afebrile    GI:    - Continue oral diet  - Continue protonix and pepcid (home meds)  - Continue Folic Acid and Cyanocobalamin  - Continue senna and miralax    MISC:   - Appreciate Opthalmology following for right eye vision loss - to follow up with outpatient Opthalmology  - Continue Artificial Tears     Appreciate Medicine following for co-medical management.     More than 30 minutes were spent educating the patient and son Dwaine Barrera ([phone]) regarding condition, medications, follow up plans, signs and symptoms to be concerned with, preparing paperwork, and questions answered regarding discharge.     DISCHARGE PLANNING:   PT/OT no skilled needs, CM set up for visiting RN    Plan to be discussed w/ Dr. Niño  [id number]

## 2023-10-22 NOTE — PROGRESS NOTE ADULT - PROBLEM SELECTOR PLAN 1
s/p R carotid artery stent   DAPT   Statin.
pre-op for R carotid artery stent on Thursday, 10/19/23  Acceptable cardiac risk to proceed   METS > 4 and no anginal symptoms   DAPT   Statin.
s/p R carotid artery stent   DAPT   Statin.
s/p R carotid artery stent   DAPT   Statin.

## 2023-10-22 NOTE — PROGRESS NOTE ADULT - SUBJECTIVE AND OBJECTIVE BOX
Name of Patient : CORTNEY SERNA  MRN: [id number]  Date of visit: 10-22-23       Subjective: Patient seen and examined. No new events except as noted.     REVIEW OF SYSTEMS:    CONSTITUTIONAL: No weakness, fevers or chills  EYES/ENT: No visual changes;  No vertigo or throat pain   NECK: No pain or stiffness  RESPIRATORY: No cough, wheezing, hemoptysis; No shortness of breath  CARDIOVASCULAR: No chest pain or palpitations  GASTROINTESTINAL: No abdominal or epigastric pain. No nausea, vomiting, or hematemesis; No diarrhea or constipation. No melena or hematochezia.  GENITOURINARY: No dysuria, frequency or hematuria  NEUROLOGICAL: No numbness or weakness  SKIN: No itching, burning, rashes, or lesions   All other review of systems is negative unless indicated above.    MEDICATIONS:  MEDICATIONS  (STANDING):  aspirin  chewable 81 milliGRAM(s) Oral <User Schedule>  atorvastatin 20 milliGRAM(s) Oral at bedtime  chlorhexidine 4% Liquid 1 Application(s) Topical <User Schedule>  cyanocobalamin 1000 MICROGram(s) Oral daily  dextrose 5%. 1000 milliLiter(s) (50 mL/Hr) IV Continuous <Continuous>  dextrose 5%. 1000 milliLiter(s) (100 mL/Hr) IV Continuous <Continuous>  dextrose 50% Injectable 25 Gram(s) IV Push once  dextrose 50% Injectable 25 Gram(s) IV Push once  dextrose 50% Injectable 12.5 Gram(s) IV Push once  enoxaparin Injectable 40 milliGRAM(s) SubCutaneous every 24 hours  famotidine    Tablet 20 milliGRAM(s) Oral daily  folic acid 1 milliGRAM(s) Oral daily  glucagon  Injectable 1 milliGRAM(s) IntraMuscular once  hydrOXYzine hydrochloride 50 milliGRAM(s) Oral daily  insulin lispro (ADMELOG) corrective regimen sliding scale   SubCutaneous three times a day before meals  insulin lispro (ADMELOG) corrective regimen sliding scale   SubCutaneous at bedtime  pantoprazole    Tablet 40 milliGRAM(s) Oral before breakfast  polyethylene glycol 3350 17 Gram(s) Oral two times a day  senna 1 Tablet(s) Oral at bedtime  ticagrelor 90 milliGRAM(s) Oral <User Schedule>  venlafaxine 37.5 milliGRAM(s) Oral daily      PHYSICAL EXAM:  T(C): 37.1 (10-22-23 @ 09:00), Max: 37.1 (10-22-23 @ 09:00)  HR: 74 (10-22-23 @ 09:00) (69 - 78)  BP: 124/72 (10-22-23 @ 09:00) (105/68 - 133/74)  RR: 19 (10-22-23 @ 09:00) (18 - 19)  SpO2: 98% (10-22-23 @ 09:00) (96% - 98%)  Wt(kg): --  I&O's Summary    21 Oct 2023 07:01  -  22 Oct 2023 07:00  --------------------------------------------------------  IN: 300 mL / OUT: 400 mL / NET: -100 mL      Appearance: Normal	  HEENT:  PERRLA   Lymphatic: No lymphadenopathy   Cardiovascular: Normal S1 S2, no JVD  Respiratory: normal effort , clear  Gastrointestinal:  Soft, Non-tender  Skin: No rashes,  warm to touch  Psychiatry:  Mood & affect appropriate  Musculuskeletal: No edema    recent labs, Imaging and EKGs personally reviewed       10-21-23 @ 07:01  -  10-22-23 @ 07:00  --------------------------------------------------------  IN: 300 mL / OUT: 400 mL / NET: -100 mL                            10.1   8.59  )-----------( 211      ( 22 Oct 2023 06:11 )             31.7               10-22    140  |  106  |  14  ----------------------------<  129<H>  3.6   |  22  |  1.00    Ca    9.0      22 Oct 2023 06:11  Phos  3.2     10-22  Mg     1.8     10-22                         Urinalysis Basic - ( 22 Oct 2023 06:11 )    Color: x / Appearance: x / SG: x / pH: x  Gluc: 129 mg/dL / Ketone: x  / Bili: x / Urobili: x   Blood: x / Protein: x / Nitrite: x   Leuk Esterase: x / RBC: x / WBC x   Sq Epi: x / Non Sq Epi: x / Bacteria: x

## 2023-10-22 NOTE — PROGRESS NOTE ADULT - SUBJECTIVE AND OBJECTIVE BOX
Subjective: Patient seen and examined. No new events except as noted.     REVIEW OF SYSTEMS:    CONSTITUTIONAL: + weakness, fevers or chills  EYES/ENT: No visual changes;  No vertigo or throat pain   NECK: No pain or stiffness  RESPIRATORY: No cough, wheezing, hemoptysis; No shortness of breath  CARDIOVASCULAR: No chest pain or palpitations  GASTROINTESTINAL: No abdominal or epigastric pain. No nausea, vomiting, or hematemesis; No diarrhea or constipation. No melena or hematochezia.  GENITOURINARY: No dysuria, frequency or hematuria  NEUROLOGICAL: No numbness or weakness  SKIN: No itching, burning, rashes, or lesions   All other review of systems is negative unless indicated above.    MEDICATIONS:  MEDICATIONS  (STANDING):  aspirin  chewable 81 milliGRAM(s) Oral <User Schedule>  atorvastatin 20 milliGRAM(s) Oral at bedtime  chlorhexidine 4% Liquid 1 Application(s) Topical <User Schedule>  cyanocobalamin 1000 MICROGram(s) Oral daily  dextrose 5%. 1000 milliLiter(s) (100 mL/Hr) IV Continuous <Continuous>  dextrose 5%. 1000 milliLiter(s) (50 mL/Hr) IV Continuous <Continuous>  dextrose 50% Injectable 25 Gram(s) IV Push once  dextrose 50% Injectable 12.5 Gram(s) IV Push once  dextrose 50% Injectable 25 Gram(s) IV Push once  enoxaparin Injectable 40 milliGRAM(s) SubCutaneous every 24 hours  famotidine    Tablet 20 milliGRAM(s) Oral daily  folic acid 1 milliGRAM(s) Oral daily  glucagon  Injectable 1 milliGRAM(s) IntraMuscular once  hydrOXYzine hydrochloride 50 milliGRAM(s) Oral daily  insulin lispro (ADMELOG) corrective regimen sliding scale   SubCutaneous three times a day before meals  insulin lispro (ADMELOG) corrective regimen sliding scale   SubCutaneous at bedtime  pantoprazole    Tablet 40 milliGRAM(s) Oral before breakfast  polyethylene glycol 3350 17 Gram(s) Oral two times a day  senna 1 Tablet(s) Oral at bedtime  ticagrelor 90 milliGRAM(s) Oral <User Schedule>  venlafaxine 37.5 milliGRAM(s) Oral daily      PHYSICAL EXAM:  T(C): 37 (10-22-23 @ 05:39), Max: 37 (10-22-23 @ 05:39)  HR: 69 (10-22-23 @ 05:39) (53 - 78)  BP: 105/68 (10-22-23 @ 05:39) (105/68 - 136/64)  RR: 18 (10-22-23 @ 05:39) (12 - 18)  SpO2: 96% (10-22-23 @ 05:39) (96% - 99%)  Wt(kg): --  I&O's Summary    21 Oct 2023 07:01  -  22 Oct 2023 07:00  --------------------------------------------------------  IN: 300 mL / OUT: 400 mL / NET: -100 mL          Appearance: Normal	  HEENT:   Normal oral mucosa, PERRL, EOMI	  Lymphatic: No lymphadenopathy , no edema  Cardiovascular: Normal S1 S2, No JVD, No murmurs , Peripheral pulses palpable 2+ bilaterally  Respiratory: Lungs clear to auscultation, normal effort 	  Gastrointestinal:  Soft, Non-tender, + BS	  Skin: No rashes, No ecchymoses, No cyanosis, warm to touch  Musculoskeletal: Normal range of motion, normal strength  Psychiatry:  Mood & affect appropriate  Ext: No edema      LABS:    CARDIAC MARKERS:                                10.1   8.59  )-----------( 211      ( 22 Oct 2023 06:11 )             31.7     10-22    140  |  106  |  14  ----------------------------<  129<H>  3.6   |  22  |  1.00    Ca    9.0      22 Oct 2023 06:11  Phos  3.2     10-22  Mg     1.8     10-22      proBNP:   Lipid Profile:   HgA1c:   TSH:             TELEMETRY: 	    ECG:  	  RADIOLOGY:   DIAGNOSTIC TESTING:  [ ] Echocardiogram:  [ ]  Catheterization:  [ ] Stress Test:    OTHER:

## 2023-10-22 NOTE — PROGRESS NOTE ADULT - REASON FOR ADMISSION
right eye stroke
right eye stroke
right carotid artery stent
right eye stroke

## 2023-10-22 NOTE — DISCHARGE NOTE NURSING/CASE MANAGEMENT/SOCIAL WORK - PATIENT PORTAL LINK FT
You can access the FollowMyHealth Patient Portal offered by [facility] by registering at the following website: [email or web address]. By joining [facility]’s FollowMyHealth portal, you will also be able to view your health information using other applications (apps) compatible with our system.

## 2023-10-22 NOTE — PROGRESS NOTE ADULT - SUBJECTIVE AND OBJECTIVE BOX
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 About 3 weeks ago, patient gradually started noticing that his right eye's lower visual field is seeing dark. Denies HA, weakness, numbness, dizziness. Went to see optho Dr. Dawit Lawrence. who performed formal visual field testing in the office, and documented (paperwork provided by patient) that there is partial inferior altitudinal defect nasally and possible small SN defect. Said no diabetic retinopathy in both eyes. 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 At the time of neurological exam, patient was not complaining of significant visual field loss, and on visual field testing, did not have gross visual field cut.     Of note, neurology had seen patient in 11/2021 for acute vestibular syndrome. Neuroimaging at the time did not show new findings, including MRI brain.     NIHSS:0  preMRS:0 (17 Oct 2023 01:05)      OVERNIGHT EVENTS:     Vital Signs Last 24 Hrs  T(C): 37 (22 Oct 2023 05:39), Max: 37 (22 Oct 2023 05:39)  T(F): 98.6 (22 Oct 2023 05:39), Max: 98.6 (22 Oct 2023 05:39)  HR: 69 (22 Oct 2023 05:39) (53 - 78)  BP: 105/68 (22 Oct 2023 05:39) (105/68 - 136/64)  BP(mean): 79 (21 Oct 2023 14:11) (79 - 79)  RR: 18 (22 Oct 2023 05:39) (12 - 18)  SpO2: 96% (22 Oct 2023 05:39) (96% - 99%)    Parameters below as of 22 Oct 2023 05:39  Patient On (Oxygen Delivery Method): room air        PHYSICAL EXAM:    General: No Acute Distress     Neurological:     Pulmonary: Clear to Auscultation, No Rales, No Rhonchi, No Wheezes     Cardiovascular: S1, S2, Regular Rate and Rhythm     Gastrointestinal: Soft, Nontender, Nondistended     Incision:     LABS:                        10.1   8.59  )-----------( 211      ( 22 Oct 2023 06:11 )             31.7    10-22    140  |  106  |  14  ----------------------------<  129<H>  3.6   |  22  |  1.00    Ca    9.0      22 Oct 2023 06:11  Phos  3.2     10-22  Mg     1.8     10-22          10-21 @ 07:01  -  10-22 @ 07:00  --------------------------------------------------------  IN: 300 mL / OUT: 400 mL / NET: -100 mL      DRAINS:     MEDICATIONS:  Antibiotics:    Neuro:  hydrOXYzine hydrochloride 50 milliGRAM(s) Oral daily  venlafaxine 37.5 milliGRAM(s) Oral daily    Cardiac:    Pulm:    GI/:  famotidine    Tablet 20 milliGRAM(s) Oral daily  pantoprazole    Tablet 40 milliGRAM(s) Oral before breakfast  polyethylene glycol 3350 17 Gram(s) Oral two times a day  senna 1 Tablet(s) Oral at bedtime    Other:   artificial  tears Solution 1 Drop(s) Both EYES three times a day PRN for dry eyes  aspirin  chewable 81 milliGRAM(s) Oral <User Schedule>  atorvastatin 20 milliGRAM(s) Oral at bedtime  chlorhexidine 4% Liquid 1 Application(s) Topical <User Schedule>  cyanocobalamin 1000 MICROGram(s) Oral daily  dextrose 5%. 1000 milliLiter(s) IV Continuous <Continuous>  dextrose 5%. 1000 milliLiter(s) IV Continuous <Continuous>  dextrose 50% Injectable 25 Gram(s) IV Push once  dextrose 50% Injectable 12.5 Gram(s) IV Push once  dextrose 50% Injectable 25 Gram(s) IV Push once  dextrose Oral Gel 15 Gram(s) Oral once PRN Blood Glucose LESS THAN 70 milliGRAM(s)/deciliter  enoxaparin Injectable 40 milliGRAM(s) SubCutaneous every 24 hours  folic acid 1 milliGRAM(s) Oral daily  glucagon  Injectable 1 milliGRAM(s) IntraMuscular once  insulin lispro (ADMELOG) corrective regimen sliding scale   SubCutaneous three times a day before meals  insulin lispro (ADMELOG) corrective regimen sliding scale   SubCutaneous at bedtime  ticagrelor 90 milliGRAM(s) Oral <User Schedule>    DIET: [] Regular [] CCD [] Renal [] Puree [] Dysphagia [] Tube Feeds:     IMAGING:    SUBJECTIVE: HPI:  73 y/o RHM PMHx prior b/l cerebellar infarcts with no residual deficitds, b/l VA stenosis (s/p R VA stent at [facility] in 2016 on ASA/Plavix), DM2, HLD, HTN, cataracts in both eyes (2012) depression, rheumatoid arthritis presenting from outpatient optho office for right eye vision changes. About 3 weeks ago, patient gradually started noticing that his right eye's lower visual field is seeing dark. Denies HA, weakness, numbness, dizziness. Went to see optho Dr. Dawit Lawrence. who performed formal visual field testing in the office, and documented (paperwork provided by patient) that there is partial inferior altitudinal defect nasally and possible small SN defect. Said no diabetic retinopathy in both eyes. 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 At the time of neurological exam, patient was not complaining of significant visual field loss, and on visual field testing, did not have gross visual field cut.     Of note, neurology had seen patient in 11/2021 for acute vestibular syndrome. Neuroimaging at the time did not show new findings, including MRI brain.     NIHSS:0  preMRS:0 (17 Oct 2023 01:05)      OVERNIGHT EVENTS: No acute events overnight, patient seen and evaluated at bedside. Comfortable, spoke with him regarding his home situation, his wife has dementia and he takes care of her, however does live with his son Robert and currently his younger son Dwaine is here is here to help take care of him and her as he recovers. He reports that Dwaine will be the one to pick him up today. I confirmed with his son Dwaine Bruce [phone] that he will be the one to  patient which he agreed with and stressed the importance of his father taking Aspirin and Brilinta, I also told him to  his fathers prescriptions (Brilinta) at [address] and that it closes at 4pm, that he must  the prescription today before it closes as it is important for his father stent placement, he understood and agreed.     Vital Signs Last 24 Hrs  T(C): 37 (22 Oct 2023 05:39), Max: 37 (22 Oct 2023 05:39)  T(F): 98.6 (22 Oct 2023 05:39), Max: 98.6 (22 Oct 2023 05:39)  HR: 69 (22 Oct 2023 05:39) (53 - 78)  BP: 105/68 (22 Oct 2023 05:39) (105/68 - 136/64)  BP(mean): 79 (21 Oct 2023 14:11) (79 - 79)  RR: 18 (22 Oct 2023 05:39) (12 - 18)  SpO2: 96% (22 Oct 2023 05:39) (96% - 99%)    Parameters below as of 22 Oct 2023 05:39  Patient On (Oxygen Delivery Method): room air        PHYSICAL EXAM:    General: No Acute Distress     Neurological: Awake, OX3, PERRL, EOMI, following commands, uppers 5/5, no drift, lowers 5/5, SILT    Pulmonary: Clear to Auscultation, No Rales, No Rhonchi, No Wheezes     Cardiovascular: S1, S2, Regular Rate and Rhythm     Gastrointestinal: Soft, Nontender on palpation, not distended, BS x 4    Incision: right groin puncture soft / C/D/I    LABS:                        10.1   8.59  )-----------( 211      ( 22 Oct 2023 06:11 )             31.7    10-22    140  |  106  |  14  ----------------------------<  129<H>  3.6   |  22  |  1.00    Ca    9.0      22 Oct 2023 06:11  Phos  3.2     10-22  Mg     1.8     10-22          10-21 @ 07:01  -  10-22 @ 07:00  --------------------------------------------------------  IN: 300 mL / OUT: 400 mL / NET: -100 mL      DRAINS: None    MEDICATIONS:  Antibiotics:    Neuro:  hydrOXYzine hydrochloride 50 milliGRAM(s) Oral daily  venlafaxine 37.5 milliGRAM(s) Oral daily    Cardiac:    Pulm:    GI/:  famotidine    Tablet 20 milliGRAM(s) Oral daily  pantoprazole    Tablet 40 milliGRAM(s) Oral before breakfast  polyethylene glycol 3350 17 Gram(s) Oral two times a day  senna 1 Tablet(s) Oral at bedtime    Other:   artificial  tears Solution 1 Drop(s) Both EYES three times a day PRN for dry eyes  aspirin  chewable 81 milliGRAM(s) Oral <User Schedule>  atorvastatin 20 milliGRAM(s) Oral at bedtime  chlorhexidine 4% Liquid 1 Application(s) Topical <User Schedule>  cyanocobalamin 1000 MICROGram(s) Oral daily  dextrose 5%. 1000 milliLiter(s) IV Continuous <Continuous>  dextrose 5%. 1000 milliLiter(s) IV Continuous <Continuous>  dextrose 50% Injectable 25 Gram(s) IV Push once  dextrose 50% Injectable 12.5 Gram(s) IV Push once  dextrose 50% Injectable 25 Gram(s) IV Push once  dextrose Oral Gel 15 Gram(s) Oral once PRN Blood Glucose LESS THAN 70 milliGRAM(s)/deciliter  enoxaparin Injectable 40 milliGRAM(s) SubCutaneous every 24 hours  folic acid 1 milliGRAM(s) Oral daily  glucagon  Injectable 1 milliGRAM(s) IntraMuscular once  insulin lispro (ADMELOG) corrective regimen sliding scale   SubCutaneous three times a day before meals  insulin lispro (ADMELOG) corrective regimen sliding scale   SubCutaneous at bedtime  ticagrelor 90 milliGRAM(s) Oral <User Schedule>    DIET: [] Regular [x] CCD [] Renal [] Puree [] Dysphagia [] Tube Feeds:     IMAGING:   < from: VA Duplex Carotid, Bilat (10.20.23 @ 12:46) >  IMPRESSION: Wide patency of right internal carotid artery stent.    Mixed plaque at the origin of the leftinternal carotid artery without   duplex evidence of hemodynamically significant stenosis.    Occlusion of the left vertebral artery.    Measurement of carotid stenosis is based on velocity parameters that   correlate the residual internal carotid diameter with that of the more   distal vessel in accordance with a method such as the [place] American   Symptomatic Carotid Endarterectomy Trial (NASCET).    --- End of Report ---    ANNIKA HYMAN MD; Attending Radiologist  This document has been electronically signed. Oct 20 2023  2:49PM    < end of copied text >

## 2023-10-22 NOTE — PROGRESS NOTE ADULT - PROVIDER SPECIALTY LIST ADULT
Internal Medicine
Neurology
Neurology
Neurosurgery
Internal Medicine
Neurosurgery
Internal Medicine
NSICU
Neurology
Internal Medicine
NSICU
Neurosurgery
Neurosurgery
Ophthalmology
Cardiology

## 2023-10-22 NOTE — DISCHARGE NOTE NURSING/CASE MANAGEMENT/SOCIAL WORK - NSTRANSFERBELONGINGSDISPO_GEN_A_NUR
"Outpatient Speech Language Pathology   Peds Speech Language Progress Note  [facility]     Patient Name: Reed Watkins  : 2004  MRN: [id number]  SLP Assessment/Plan - 19 1715        SLP Assessment    Functional Problems  Speech Language- Peds   -    Impact on Function: Peds Speech Language  Language delay/disorder negatively impacts the child's ability to effectively communicate with peers and adults;Deficit of pragmatic/social aspects of communication negatively affect child's communicative interactions with peers and adults;Other (comment)   -    SLP Diagnosis  symbolic dysfunction   -    Prognosis  Good (comment)   -    Patient/caregiver participated in establishment of treatment plan and goals  Yes   -    Patient would benefit from skilled therapy intervention  Yes   -       SLP Plan    Frequency  weekly for the summer   -    Duration  3 months   -    Planned CPT's?  SLP INDIVIDUAL SPEECH THERAPY: 88221   -    Expected Duration Therapy Session - minutes  45-60 minutes   -      User Key  (r) = Recorded By, (t) = Taken By, (c) = Cosigned By    Initials Name Provider Type     Olesya Freeman MA,CCC-SLP Speech and Language Pathologist          SLP Comments: [facility]- SLP Start Time: 1600  SLP Stop Time: 1700  SLP Time Calculation (min): 60 min    Therapy Charges for Today     Code Description Service Date Service Provider Modifiers Qty    [id number] HC ST TREATMENT SPEECH 4 6/5/2019 Olesya Freeman MA,CCC-SLP 59, GN 1                     Olesya Freeman MA,CCC-SLP  6/5/2019   with patient

## 2023-10-22 NOTE — PROGRESS NOTE ADULT - ASSESSMENT
Patient is a 72 year old male with a PMHx of prior B/L cerebellar infarcts, without any residual deficits, B/L VA stenosis (s/p R VA stent at [facility] in 2016 on ASA/Plavix), Type II DM, HLD, HTN, cataracts in both eyes (2012), depression, rheumatoid arthritis who presented from his outpatient opthomologists' office for right eye visual changes. Per chart review, about 3-4 weeks ago, patient reportedly began to notice gradually that his right eye's lower visual field is becoming/ seeing dark. Patient went to see his opthomologist, Dr. Dawit Lawrence who performed formal visual field testing in the office, and advised for patient to come in as it was reportedly noted that patient has "partial inferior altitudinal defect nasally and possible small SN defect." Patient denies history of diabetic retinopathy. Patient was sent in for stroke work up as per documentation to evaluate if right eye visual field deficit found on VF testing is possibly due to embolic retinal ischemia. Internal Medicine has been consulted on Mr. Barrera's care for medical management. Patient denies history of MI/ DVT / PE in the past. Denies any allergies. Denies headache, chest pain, palpitations, shortness of breath or dyspnea. Denies numbness or tingling.       Right Eye Visual Deficits Due to CVA  - Pt presented with 3-4 weeks of seeing "black" in his lower visual field   - CT Head/ CT Angio H/N w/ Severe > 70% BHANU stenosis, Moderate to severe stenosis of L vertebral artery   - MR head w/  Chronic lacunar infarct, moderate chronic microvascular ischemic changes  - On ASA, Brilinta and Statin   - Monitor on tele   - S/P Right carotid artery stent placement with NSGY   - F/u Carotid Duplex   - Fall, Seizure, Aspiration precautions   - PT / PT / S+S   - Care per Neuro/ NSGY appreciated    Risk Stratification   - TTE w/ LVSF of 52%, Mild AS, Neg for LV thrombus or pericardial effusion   - Pt is medically optimized for stent placement. Moderate risk candidate. Cardiac clearance per cardiology team.     Visual changes  - MR orbits read as unremarkable   - ESR/ CRP negative   - Optho eval appreciated; F/u recs    Type II DM  - A1C of 7.4  - Sliding scale   - Diet and lifestyle modifications  - Diabetic DASH diet     HTN   - BP parameters as per neurology   - Now hypotensive. On Phenyephrine in PACU. Monitor BP closely   - Monitor BP, VS and patient closely    HLD  - C/w Statin therapy     PPX  - PPI and Lovenox

## 2023-11-01 ENCOUNTER — NON-APPOINTMENT (OUTPATIENT)
Age: 73
End: 2023-11-01

## 2023-11-01 ENCOUNTER — APPOINTMENT (OUTPATIENT)
Dept: NEUROSURGERY | Facility: CLINIC | Age: 73
End: 2023-11-01
Payer: MEDICARE

## 2023-11-01 VITALS
SYSTOLIC BLOOD PRESSURE: 149 MMHG | BODY MASS INDEX: 27.32 KG/M2 | HEIGHT: 66 IN | DIASTOLIC BLOOD PRESSURE: 80 MMHG | TEMPERATURE: 98.1 F | OXYGEN SATURATION: 97 % | WEIGHT: 170 LBS | HEART RATE: 91 BPM

## 2023-11-01 DIAGNOSIS — I65.29 OCCLUSION AND STENOSIS OF UNSPECIFIED CAROTID ARTERY: ICD-10-CM

## 2023-11-01 PROCEDURE — 99214 OFFICE O/P EST MOD 30 MIN: CPT | Mod: 24

## 2023-11-13 PROCEDURE — 99285 EMERGENCY DEPT VISIT HI MDM: CPT

## 2023-11-13 PROCEDURE — 70543 MRI ORBT/FAC/NCK W/O &W/DYE: CPT

## 2023-11-13 PROCEDURE — 93880 EXTRACRANIAL BILAT STUDY: CPT

## 2023-11-13 PROCEDURE — C1769: CPT

## 2023-11-13 PROCEDURE — 36415 COLL VENOUS BLD VENIPUNCTURE: CPT

## 2023-11-13 PROCEDURE — 85027 COMPLETE CBC AUTOMATED: CPT

## 2023-11-13 PROCEDURE — 85652 RBC SED RATE AUTOMATED: CPT

## 2023-11-13 PROCEDURE — 83036 HEMOGLOBIN GLYCOSYLATED A1C: CPT

## 2023-11-13 PROCEDURE — 86900 BLOOD TYPING SEROLOGIC ABO: CPT

## 2023-11-13 PROCEDURE — 86850 RBC ANTIBODY SCREEN: CPT

## 2023-11-13 PROCEDURE — 80048 BASIC METABOLIC PNL TOTAL CA: CPT

## 2023-11-13 PROCEDURE — 70498 CT ANGIOGRAPHY NECK: CPT | Mod: MH

## 2023-11-13 PROCEDURE — 82962 GLUCOSE BLOOD TEST: CPT

## 2023-11-13 PROCEDURE — 97161 PT EVAL LOW COMPLEX 20 MIN: CPT

## 2023-11-13 PROCEDURE — 85025 COMPLETE CBC W/AUTO DIFF WBC: CPT

## 2023-11-13 PROCEDURE — 83735 ASSAY OF MAGNESIUM: CPT

## 2023-11-13 PROCEDURE — 86140 C-REACTIVE PROTEIN: CPT

## 2023-11-13 PROCEDURE — 97130 THER IVNTJ EA ADDL 15 MIN: CPT

## 2023-11-13 PROCEDURE — 85576 BLOOD PLATELET AGGREGATION: CPT

## 2023-11-13 PROCEDURE — 97165 OT EVAL LOW COMPLEX 30 MIN: CPT

## 2023-11-13 PROCEDURE — 70450 CT HEAD/BRAIN W/O DYE: CPT | Mod: MH

## 2023-11-13 PROCEDURE — 80061 LIPID PANEL: CPT

## 2023-11-13 PROCEDURE — 97129 THER IVNTJ 1ST 15 MIN: CPT

## 2023-11-13 PROCEDURE — C1874: CPT

## 2023-11-13 PROCEDURE — 85610 PROTHROMBIN TIME: CPT

## 2023-11-13 PROCEDURE — 92523 SPEECH SOUND LANG COMPREHEN: CPT

## 2023-11-13 PROCEDURE — 70553 MRI BRAIN STEM W/O & W/DYE: CPT

## 2023-11-13 PROCEDURE — C1760: CPT

## 2023-11-13 PROCEDURE — 36226 PLACE CATH VERTEBRAL ART: CPT

## 2023-11-13 PROCEDURE — 84100 ASSAY OF PHOSPHORUS: CPT

## 2023-11-13 PROCEDURE — 80053 COMPREHEN METABOLIC PANEL: CPT

## 2023-11-13 PROCEDURE — 85730 THROMBOPLASTIN TIME PARTIAL: CPT

## 2023-11-13 PROCEDURE — C8929: CPT

## 2023-11-13 PROCEDURE — C1725: CPT

## 2023-11-13 PROCEDURE — C1894: CPT

## 2023-11-13 PROCEDURE — 36223 PLACE CATH CAROTID/INOM ART: CPT | Mod: XS

## 2023-11-13 PROCEDURE — C1884: CPT

## 2023-11-13 PROCEDURE — C1887: CPT

## 2023-11-13 PROCEDURE — 70496 CT ANGIOGRAPHY HEAD: CPT | Mod: MH

## 2023-11-13 PROCEDURE — 37215 TRANSCATH STENT CCA W/EPS: CPT

## 2023-11-13 PROCEDURE — A9585: CPT

## 2023-11-13 PROCEDURE — 86901 BLOOD TYPING SEROLOGIC RH(D): CPT

## 2024-04-01 NOTE — REASON FOR VISIT
[Follow-Up: _____] : a [unfilled] follow-up visit [FreeTextEntry1] : s/p Right internal carotid artery stent placement 10/19/23 Reviewed results of 6 month follow up carotid doppler ultrasound done..

## 2024-04-01 NOTE — HISTORY OF PRESENT ILLNESS
[FreeTextEntry1] : CORTNEY SERNA is a 73 year old male with PMH of HTN, HLD, DM 2, prior bilateral cerebellar infarcts with no residual deficits, bilateral vertebral artery stenosis, L vert occlusion, (s/p Right vertebral artery stent at [facility] 3/28/2016 on ASA/Plavix), bilateral cataracts 2012, depression, rheumatoid arthritis who was sent to ED from outpatient ophthalmology office, Dr. Dawit Lawrence, for 3 week history of right eye vision changes believed to be embolic in nature. Reports periods of blackness in R lower visual field. Per ophtho, the right eye visual field deficit found on VF testing is possibly due to embolic retinal ischemia and recommended patient come to [facility] for stroke work-up. Of note, neurology had seen patient in 11/2021 for acute vestibular syndrome. Neuroimaging at the time did not show new findings, including MRI brain. At the time of neurological exam at [facility], patient was not complaining of significant visual field loss, and on visual field testing, did not have gross visual field cut. CTA/CTH with no acute infarct. MRI brain showed no evidence of acute infarct, hemorrhage, or mass lesion. Chronic lacunar infarct in the medial right cerebellum and in the left perisylvian cortex. High grade severe 70% R ICA stenosis on CTA. On 10/19/23, he underwent cerebral angiogram and right ICA angioplasty and stenting. On Aspirin 81mg daily and Brilinta 90mg BID. Discharged home with home care/visiting nurse services.  Today, he presents for 6 month follow up to review results of repeat bilateral carotid doppler ultrasound obtained on.... [[no new neurologic complaints or worsening symptoms since his hospitalization. He ambulates independently without assistance of a cane or walker. Denies issues with groin puncture site.]]

## 2024-04-01 NOTE — ASSESSMENT
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 No acute infarcts on brain imaging. CTA with high grade severe >70% R ICA stenosis s/p R ICA stent placement 10/19/23. On ASA 81, Brilinta 90 BID.  Repeat carotid ultrasound....  [[Patient doing clinically well post-carotid stenting. Denies symptoms of motor, sensory, speech, or visual abnormalities. No issues with groin puncture site.]]    Last PLAN 11/1/23: Continue Aspirin 81mg daily and Brilinta 90mg BID Bilateral carotid doppler ultrasound in 6 months post-procedure - April 2024 Follow up visit after to review imaging Follow up with vascular neurology Continue follow up with PCP or cardiologist for BP management.

## 2024-04-03 ENCOUNTER — APPOINTMENT (OUTPATIENT)
Dept: NEUROSURGERY | Facility: CLINIC | Age: 74
End: 2024-04-03

## 2024-07-12 ENCOUNTER — RESULT REVIEW (OUTPATIENT)
Age: 74
End: 2024-07-12

## 2024-07-12 ENCOUNTER — INPATIENT (INPATIENT)
Facility: HOSPITAL | Age: 74
LOS: 6 days | Discharge: HOME | End: 2024-07-19
Attending: STUDENT IN AN ORGANIZED HEALTH CARE EDUCATION/TRAINING PROGRAM | Admitting: STUDENT IN AN ORGANIZED HEALTH CARE EDUCATION/TRAINING PROGRAM
Payer: MEDICARE

## 2024-07-12 VITALS
HEIGHT: 66 IN | WEIGHT: 180.56 LBS | HEART RATE: 87 BPM | RESPIRATION RATE: 19 BRPM | SYSTOLIC BLOOD PRESSURE: 135 MMHG | DIASTOLIC BLOOD PRESSURE: 98 MMHG | TEMPERATURE: 98 F | OXYGEN SATURATION: 99 %

## 2024-07-12 DIAGNOSIS — R14.0 ABDOMINAL DISTENSION (GASEOUS): ICD-10-CM

## 2024-07-12 DIAGNOSIS — I65.09 OCCLUSION AND STENOSIS OF UNSPECIFIED VERTEBRAL ARTERY: Chronic | ICD-10-CM

## 2024-07-12 DIAGNOSIS — I10 ESSENTIAL (PRIMARY) HYPERTENSION: ICD-10-CM

## 2024-07-12 DIAGNOSIS — Z86.73 PERSONAL HISTORY OF TRANSIENT ISCHEMIC ATTACK (TIA), AND CEREBRAL INFARCTION WITHOUT RESIDUAL DEFICITS: ICD-10-CM

## 2024-07-12 DIAGNOSIS — R42 DIZZINESS AND GIDDINESS: ICD-10-CM

## 2024-07-12 DIAGNOSIS — R55 SYNCOPE AND COLLAPSE: ICD-10-CM

## 2024-07-12 DIAGNOSIS — K21.9 GASTRO-ESOPHAGEAL REFLUX DISEASE WITHOUT ESOPHAGITIS: ICD-10-CM

## 2024-07-12 DIAGNOSIS — E78.5 HYPERLIPIDEMIA, UNSPECIFIED: ICD-10-CM

## 2024-07-12 DIAGNOSIS — E11.9 TYPE 2 DIABETES MELLITUS WITHOUT COMPLICATIONS: ICD-10-CM

## 2024-07-12 DIAGNOSIS — Z29.9 ENCOUNTER FOR PROPHYLACTIC MEASURES, UNSPECIFIED: ICD-10-CM

## 2024-07-12 LAB
A1C WITH ESTIMATED AVERAGE GLUCOSE RESULT: 9.3 % — HIGH (ref 4–5.6)
ALBUMIN SERPL ELPH-MCNC: 3.9 G/DL — SIGNIFICANT CHANGE UP (ref 3.3–5)
ALP SERPL-CCNC: 86 U/L — SIGNIFICANT CHANGE UP (ref 40–120)
ALT FLD-CCNC: 40 U/L — SIGNIFICANT CHANGE UP (ref 4–41)
ANION GAP SERPL CALC-SCNC: 13 MMOL/L — SIGNIFICANT CHANGE UP (ref 7–14)
AST SERPL-CCNC: 51 U/L — HIGH (ref 4–40)
BILIRUB DIRECT SERPL-MCNC: <0.2 MG/DL — SIGNIFICANT CHANGE UP (ref 0–0.3)
BILIRUB INDIRECT FLD-MCNC: >0.2 MG/DL — SIGNIFICANT CHANGE UP (ref 0–1)
BILIRUB SERPL-MCNC: 0.4 MG/DL — SIGNIFICANT CHANGE UP (ref 0.2–1.2)
BUN SERPL-MCNC: 23 MG/DL — SIGNIFICANT CHANGE UP (ref 7–23)
CALCIUM SERPL-MCNC: 9.1 MG/DL — SIGNIFICANT CHANGE UP (ref 8.4–10.5)
CHLORIDE SERPL-SCNC: 101 MMOL/L — SIGNIFICANT CHANGE UP (ref 98–107)
CO2 SERPL-SCNC: 20 MMOL/L — LOW (ref 22–31)
CREAT SERPL-MCNC: 1.03 MG/DL — SIGNIFICANT CHANGE UP (ref 0.5–1.3)
EGFR: 76 ML/MIN/1.73M2 — SIGNIFICANT CHANGE UP
ESTIMATED AVERAGE GLUCOSE: 220 — SIGNIFICANT CHANGE UP
FERRITIN SERPL-MCNC: 37 NG/ML — SIGNIFICANT CHANGE UP (ref 30–400)
GLUCOSE BLDC GLUCOMTR-MCNC: 190 MG/DL — HIGH (ref 70–99)
GLUCOSE BLDC GLUCOMTR-MCNC: 198 MG/DL — HIGH (ref 70–99)
GLUCOSE BLDC GLUCOMTR-MCNC: 201 MG/DL — HIGH (ref 70–99)
GLUCOSE BLDC GLUCOMTR-MCNC: 224 MG/DL — HIGH (ref 70–99)
GLUCOSE SERPL-MCNC: 252 MG/DL — HIGH (ref 70–99)
HCT VFR BLD CALC: 39.1 % — SIGNIFICANT CHANGE UP (ref 39–50)
HGB BLD-MCNC: 12 G/DL — LOW (ref 13–17)
IRON SATN MFR SERPL: 15 % — SIGNIFICANT CHANGE UP (ref 14–50)
IRON SATN MFR SERPL: 60 UG/DL — SIGNIFICANT CHANGE UP (ref 45–165)
MAGNESIUM SERPL-MCNC: 2 MG/DL — SIGNIFICANT CHANGE UP (ref 1.6–2.6)
MCHC RBC-ENTMCNC: 19.5 PG — LOW (ref 27–34)
MCHC RBC-ENTMCNC: 30.7 GM/DL — LOW (ref 32–36)
MCV RBC AUTO: 63.5 FL — LOW (ref 80–100)
NRBC # BLD: 0 /100 WBCS — SIGNIFICANT CHANGE UP (ref 0–0)
NRBC # FLD: 0 K/UL — SIGNIFICANT CHANGE UP (ref 0–0)
PHOSPHATE SERPL-MCNC: 2.7 MG/DL — SIGNIFICANT CHANGE UP (ref 2.5–4.5)
PLATELET # BLD AUTO: 295 K/UL — SIGNIFICANT CHANGE UP (ref 150–400)
POTASSIUM SERPL-MCNC: 4.6 MMOL/L — SIGNIFICANT CHANGE UP (ref 3.5–5.3)
POTASSIUM SERPL-SCNC: 4.6 MMOL/L — SIGNIFICANT CHANGE UP (ref 3.5–5.3)
PROT SERPL-MCNC: 7.4 G/DL — SIGNIFICANT CHANGE UP (ref 6–8.3)
RBC # BLD: 6.16 M/UL — HIGH (ref 4.2–5.8)
RBC # FLD: 19.9 % — HIGH (ref 10.3–14.5)
SODIUM SERPL-SCNC: 134 MMOL/L — LOW (ref 135–145)
TIBC SERPL-MCNC: 393 UG/DL — SIGNIFICANT CHANGE UP (ref 220–430)
TSH SERPL-MCNC: 10.22 UIU/ML — HIGH (ref 0.27–4.2)
UIBC SERPL-MCNC: 333 UG/DL — SIGNIFICANT CHANGE UP (ref 110–370)
VIT B12 SERPL-MCNC: 944 PG/ML — HIGH (ref 200–900)
WBC # BLD: 8.91 K/UL — SIGNIFICANT CHANGE UP (ref 3.8–10.5)
WBC # FLD AUTO: 8.91 K/UL — SIGNIFICANT CHANGE UP (ref 3.8–10.5)

## 2024-07-12 PROCEDURE — 99221 1ST HOSP IP/OBS SF/LOW 40: CPT

## 2024-07-12 PROCEDURE — 93010 ELECTROCARDIOGRAM REPORT: CPT

## 2024-07-12 PROCEDURE — 93306 TTE W/DOPPLER COMPLETE: CPT | Mod: 26

## 2024-07-12 PROCEDURE — 33285 INSJ SUBQ CAR RHYTHM MNTR: CPT

## 2024-07-12 PROCEDURE — 99223 1ST HOSP IP/OBS HIGH 75: CPT

## 2024-07-12 PROCEDURE — 76705 ECHO EXAM OF ABDOMEN: CPT | Mod: 26

## 2024-07-12 PROCEDURE — 99233 SBSQ HOSP IP/OBS HIGH 50: CPT

## 2024-07-12 RX ORDER — DEXTROSE 30 % IN WATER 30 %
25 VIAL (ML) INTRAVENOUS ONCE
Refills: 0 | Status: DISCONTINUED | OUTPATIENT
Start: 2024-07-12 | End: 2024-07-15

## 2024-07-12 RX ORDER — FOLIC ACID
1 POWDER (GRAM) MISCELLANEOUS DAILY
Refills: 0 | Status: DISCONTINUED | OUTPATIENT
Start: 2024-07-12 | End: 2024-07-19

## 2024-07-12 RX ORDER — ATORVASTATIN CALCIUM 20 MG/1
40 TABLET, FILM COATED ORAL AT BEDTIME
Refills: 0 | Status: DISCONTINUED | OUTPATIENT
Start: 2024-07-12 | End: 2024-07-19

## 2024-07-12 RX ORDER — MECLIZINE HCL 25 MG
25 TABLET ORAL
Refills: 0 | Status: DISCONTINUED | OUTPATIENT
Start: 2024-07-12 | End: 2024-07-15

## 2024-07-12 RX ORDER — LOSARTAN POTASSIUM 100 MG/1
50 TABLET, FILM COATED ORAL DAILY
Refills: 0 | Status: DISCONTINUED | OUTPATIENT
Start: 2024-07-12 | End: 2024-07-19

## 2024-07-12 RX ORDER — DEXTROSE MONOHYDRATE AND SODIUM CHLORIDE 5; .3 G/100ML; G/100ML
1000 INJECTION, SOLUTION INTRAVENOUS
Refills: 0 | Status: DISCONTINUED | OUTPATIENT
Start: 2024-07-12 | End: 2024-07-15

## 2024-07-12 RX ORDER — INSULIN LISPRO 100 [IU]/ML
INJECTION, SOLUTION SUBCUTANEOUS AT BEDTIME
Refills: 0 | Status: DISCONTINUED | OUTPATIENT
Start: 2024-07-12 | End: 2024-07-13

## 2024-07-12 RX ORDER — TICAGRELOR 90 MG/1
90 TABLET ORAL EVERY 12 HOURS
Refills: 0 | Status: DISCONTINUED | OUTPATIENT
Start: 2024-07-12 | End: 2024-07-19

## 2024-07-12 RX ORDER — CYANOCOBALAMIN (VITAMIN B-12) 1000 MCG
1000 TABLET, EXTENDED RELEASE ORAL DAILY
Refills: 0 | Status: DISCONTINUED | OUTPATIENT
Start: 2024-07-12 | End: 2024-07-19

## 2024-07-12 RX ORDER — ASPIRIN 325 MG/1
81 TABLET, FILM COATED ORAL DAILY
Refills: 0 | Status: DISCONTINUED | OUTPATIENT
Start: 2024-07-12 | End: 2024-07-19

## 2024-07-12 RX ORDER — FAMOTIDINE 40 MG
20 TABLET ORAL DAILY
Refills: 0 | Status: DISCONTINUED | OUTPATIENT
Start: 2024-07-12 | End: 2024-07-19

## 2024-07-12 RX ORDER — SENNOSIDES 8.6 MG
1 TABLET ORAL AT BEDTIME
Refills: 0 | Status: DISCONTINUED | OUTPATIENT
Start: 2024-07-12 | End: 2024-07-19

## 2024-07-12 RX ORDER — VENLAFAXINE 37.5 MG/1
37.5 CAPSULE, EXTENDED RELEASE ORAL DAILY
Refills: 0 | Status: DISCONTINUED | OUTPATIENT
Start: 2024-07-12 | End: 2024-07-19

## 2024-07-12 RX ORDER — INSULIN LISPRO 100 [IU]/ML
INJECTION, SOLUTION SUBCUTANEOUS
Refills: 0 | Status: DISCONTINUED | OUTPATIENT
Start: 2024-07-12 | End: 2024-07-13

## 2024-07-12 RX ORDER — GLUCAGON HYDROCHLORIDE 1 MG/ML
1 INJECTION, POWDER, FOR SOLUTION INTRAMUSCULAR; INTRAVENOUS; SUBCUTANEOUS ONCE
Refills: 0 | Status: DISCONTINUED | OUTPATIENT
Start: 2024-07-12 | End: 2024-07-19

## 2024-07-12 RX ORDER — DEXTROSE 30 % IN WATER 30 %
15 VIAL (ML) INTRAVENOUS ONCE
Refills: 0 | Status: DISCONTINUED | OUTPATIENT
Start: 2024-07-12 | End: 2024-07-15

## 2024-07-12 RX ORDER — DEXTROSE 30 % IN WATER 30 %
12.5 VIAL (ML) INTRAVENOUS ONCE
Refills: 0 | Status: DISCONTINUED | OUTPATIENT
Start: 2024-07-12 | End: 2024-07-15

## 2024-07-12 RX ADMIN — Medication 1000 MICROGRAM(S): at 16:58

## 2024-07-12 RX ADMIN — Medication 1 MILLIGRAM(S): at 16:58

## 2024-07-12 RX ADMIN — VENLAFAXINE 37.5 MILLIGRAM(S): 37.5 CAPSULE, EXTENDED RELEASE ORAL at 16:59

## 2024-07-12 RX ADMIN — TICAGRELOR 90 MILLIGRAM(S): 90 TABLET ORAL at 16:59

## 2024-07-12 RX ADMIN — ASPIRIN 81 MILLIGRAM(S): 325 TABLET, FILM COATED ORAL at 16:58

## 2024-07-12 RX ADMIN — Medication 20 MILLIGRAM(S): at 16:58

## 2024-07-12 RX ADMIN — LOSARTAN POTASSIUM 50 MILLIGRAM(S): 100 TABLET, FILM COATED ORAL at 17:01

## 2024-07-12 RX ADMIN — Medication 1 TABLET(S): at 22:00

## 2024-07-12 RX ADMIN — Medication 25 MILLIGRAM(S): at 17:00

## 2024-07-12 RX ADMIN — ATORVASTATIN CALCIUM 40 MILLIGRAM(S): 20 TABLET, FILM COATED ORAL at 22:00

## 2024-07-12 RX ADMIN — INSULIN LISPRO 1: 100 INJECTION, SOLUTION SUBCUTANEOUS at 17:05

## 2024-07-12 NOTE — H&P ADULT - PROBLEM SELECTOR PLAN 5
insulin corrective scale Decreased dose of losartan from 100 to 50.  Holding amlodipine   F/u orthostatics

## 2024-07-12 NOTE — H&P ADULT - PROBLEM SELECTOR PLAN 3
Continue asa/brilinta  Hx of vertebral stent Abdominal distension noted on exam.  Mild fluid wave  Will get LFTs and send for US abdomen

## 2024-07-12 NOTE — H&P ADULT - NSHPPHYSICALEXAM_GEN_ALL_CORE
Physical exam:  General: patient in no acute distress, resting comfortably  Head:  Atraumatic, Normocephalic  Eyes: EOMI, PERRLA, clear sclera  Neck: Supple, thyroid nontender, non enlarged  Cardio: S1/S2 +ve, regular rate and rhythm, no M/G/R  Resp: clear to ausculation bilaterally, no rales or wheezes  GI: abdomen soft, nontender, mildly distended, mild fluid wave, tympanic to percussion - specifically over the upper abdomen  Ext: no significant pedal edema  Neuro: CN 2-12 intact, no significant motor or sensory deficits. Mild nystagmus noted on R to L gaze.  Skin: No rashes or lesions

## 2024-07-12 NOTE — CHART NOTE - NSCHARTNOTEFT_GEN_A_CORE
S/P ILR implantation  ILR  was covered with a  clear with a gauge and clear Tegaderm. It was clean, dry, and intact. No bleeding, drainage hematoma, or ecchymosis appreciated.    Post-op ILR instruction has been verbally explained and given to the patient.  Patient expressed understanding and all questions were answered. A carbon copy is located in the patient's chart  You can return to normal activities 24hours after the procedure   No scrubbing the incision site   No lotion, ointment, powder or direct sunlight to the incision site for 2 weeks   No swimming pool, Jacuzzi, or bath for 7 days.   Patient can shower 24 hours after procedure. Pat the area dry  Pt was instructed to call [phone] if the following occurs:      - fever with temperature > 101F      - swelling, drainage or bleeding at the site incision   Patient is scheduled for a telephone appointment on 7/25/24 @9:20am

## 2024-07-12 NOTE — PATIENT PROFILE ADULT - FALL HARM RISK - HARM RISK INTERVENTIONS
Assistance with ambulation/Assistance OOB with selected safe patient handling equipment/Communicate Risk of Fall with Harm to all staff/Discuss with provider need for PT consult/Monitor gait and stability/Reinforce activity limits and safety measures with patient and family/Tailored Fall Risk Interventions/Visual Cue: Yellow wristband and red socks/Bed in lowest position, wheels locked, appropriate side rails in place/Call bell, personal items and telephone in reach/Instruct patient to call for assistance before getting out of bed or chair/Non-slip footwear when patient is out of bed/[place] to call system/Physically safe environment - no spills, clutter or unnecessary equipment/Purposeful Proactive Rounding/Room/bathroom lighting operational, light cord in reach

## 2024-07-12 NOTE — H&P ADULT - PROBLEM SELECTOR PLAN 4
Decreased dose of losartan from 100 to 50  Patient was taking fludrocortisone last year.  F/u orthostatics Decreased dose of losartan from 100 to 50.  Holding amlodipine   F/u orthostatics Continue asa/brilinta  Hx of vertebral stent

## 2024-07-12 NOTE — H&P ADULT - PROBLEM SELECTOR PROBLEM 9
Pt meets criteria for severe protein/calorie malnutrition in context of acute illness chronic secondary to severe muscle/fat wasting and poor intake PTA <50% x > 5 days./Malnutrition Preventive measure

## 2024-07-12 NOTE — H&P ADULT - NSHPLABSRESULTS_GEN_ALL_CORE
Recent Vitals  T(C): 36.6 (07-12-24 @ 09:00), Max: 36.6 (07-12-24 @ 09:00)  HR: 87 (07-12-24 @ 09:00) (87 - 87)  BP: 135/98 (07-12-24 @ 09:00) (135/98 - 135/98)  RR: 19 (07-12-24 @ 09:00) (19 - 19)  SpO2: 99% (07-12-24 @ 09:00) (99% - 99%)          dextrose 5%. 1000 milliLiter(s) IV Continuous <Continuous>  dextrose 5%. 1000 milliLiter(s) IV Continuous <Continuous>  dextrose 50% Injectable 12.5 Gram(s) IV Push once  dextrose 50% Injectable 25 Gram(s) IV Push once  dextrose 50% Injectable 25 Gram(s) IV Push once  dextrose Oral Gel 15 Gram(s) Oral once PRN  glucagon  Injectable 1 milliGRAM(s) IntraMuscular once  insulin lispro (ADMELOG) corrective regimen sliding scale   SubCutaneous three times a day before meals  insulin lispro (ADMELOG) corrective regimen sliding scale   SubCutaneous at bedtime    Home Medications:  aspirin 81 mg oral tablet, chewable: 1 tab(s) orally once a day (24 Nov 2021 14:41)  Calcium 600-Vit D3 500 Softgel 600 mg (1500 mg)-500 unit: 1 tab qd po (17 Oct 2023 02:20)  famotidine 20 mg oral tablet: 1 tab(s) orally once a day (17 Oct 2023 02:24)  folic acid 1 mg oral tablet: 1 tab(s) orally once a day (22 Oct 2023 10:00)  glimepiride 1 mg oral tablet: 2 tab(s) orally once a day (17 Oct 2023 02:21)  hydrOXYzine hydrochloride 50 mg oral tablet: 1 tab(s) orally once a day (17 Oct 2023 02:38)  Januvia 25 mg oral tablet: 1 tab(s) orally once a day (17 Oct 2023 02:26)  Jardiance 10 mg oral tablet: 1 tab(s) orally once a day (17 Oct 2023 02:22)  leflunomide 20 mg oral tablet: 1 tab(s) orally (17 Oct 2023 02:20)  Lipitor 20 mg oral tablet: 1 tab(s) orally once a day (22 Oct 2023 10:00)  Lubricant Eye Drops ophthalmic solution: 1 drop(s) in each eye prn as needed for  dry eyes (17 Oct 2023 02:27)  omeprazole 40 mg oral delayed release capsule: 1 cap(s) orally once a day (17 Oct 2023 02:28)  polyethylene glycol 3350 oral powder for reconstitution: 17 gram(s) orally 2 times a day (22 Oct 2023 10:00)  senna leaf extract oral tablet: 1 tab(s) orally once a day (at bedtime) (22 Oct 2023 10:00)  Tradjenta 5 mg oral tablet: 1 tab(s) orally once a day (17 Oct 2023 02:29)  venlafaxine 37.5 mg oral tablet: 1 tab(s) orally once a day (17 Oct 2023 02:29)  Vitamin B-12 1000 mcg oral tablet: 1 tab(s) orally once a day (24 Nov 2021 14:41)

## 2024-07-12 NOTE — CONSULT NOTE ADULT - SUBJECTIVE AND OBJECTIVE BOX
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 Ansari      Depression      Interstitial lung disease      Vertebral artery stenosis    PREVIOUS DIAGNOSTIC TESTING:    Echocardiogram:   Catheterization:  Stress Test:  	    MEDICATIONS:                  FAMILY HISTORY:  Family history of asthma (Mother)    Family history of heart disease (Father)  father passed from heart attack        SOCIAL HISTORY:      [ ] Smoker  [ ] Alcohol  [ ] Drugs    Allergies    No Known Allergies    Intolerances    	    REVIEW OF SYSTEMS:  CONSTITUTIONAL: No fever, weight loss, or fatigue  EYES: No eye pain, visual disturbances, or discharge  ENMT:  No difficulty hearing, tinnitus, vertigo; No sinus or throat pain  NECK: No pain or stiffness  RESPIRATORY: No cough, wheezing, chills or hemoptysis; No Shortness of Breath  CARDIOVASCULAR: No chest pain, palpitations, passing out, dizziness, or leg swelling  GASTROINTESTINAL: No abdominal or epigastric pain. No nausea, vomiting, or hematemesis; No diarrhea or constipation. No melena or hematochezia.  GENITOURINARY: No dysuria, frequency, hematuria, or incontinence  NEUROLOGICAL: No headaches, memory loss, loss of strength, numbness, or tremors  SKIN: No itching, burning, rashes, or lesions   LYMPH Nodes: No enlarged glands  ENDOCRINE: No heat or cold intolerance; No hair loss  MUSCULOSKELETAL: No joint pain or swelling; No muscle, back, or extremity pain  PSYCHIATRIC: No depression, anxiety, mood swings, or difficulty sleeping  HEME/LYMPH: No easy bruising, or bleeding gums  ALLERY AND IMMUNOLOGIC: No hives or eczema	    [ ] All others negative	  [ ] Unable to obtain    PHYSICAL EXAM:  T(C): 36.6 (07-12-24 @ 09:00), Max: 36.6 (07-12-24 @ 09:00)  HR: 87 (07-12-24 @ 09:00) (87 - 87)  BP: 135/98 (07-12-24 @ 09:00) (135/98 - 135/98)  RR: 19 (07-12-24 @ 09:00) (19 - 19)  SpO2: 99% (07-12-24 @ 09:00) (99% - 99%)  Wt(kg): --  I&O's Summary      Appearance: Normal	  HEENT:   Normal oral mucosa, PERRL, EOMI	  Lymphatic: No lymphadenopathy  Cardiovascular: Normal S1 S2, No JVD, No murmurs, No edema  Respiratory: Lungs clear to auscultation	  Psychiatry: A & O x 3, Mood & affect appropriate  Gastrointestinal:  Soft, Non-tender, + BS	  Skin: No rashes, No ecchymoses, No cyanosis	  Neurologic: Non-focal  Extremities: Normal range of motion, No clubbing, cyanosis or edema  Vascular: Peripheral pulses palpable 2+ bilaterally    TELEMETRY: 	    ECG:  	  RADIOLOGY:  OTHER: 	  	  LABS:	 	    CARDIAC MARKERS:                    proBNP:   Lipid Profile:   HgA1c:   TSH:          CHIEF COMPLAINT:  Patient admitted to [facility] with syncope and bifascicular block and now transferred to [facility]    HISTORY OF PRESENT ILLNESS:  72M w/ hx of b/l VA stenosis s/p R VA stent in 2016 on ASA/plavix, prior b/l cerebellar infarcts, HTN, HLD, DM2, HLD, HTN, b/l cataracts, sevre BHANU stenosis s/p stent presented to [facility] after syncope and with bilateral shoulder pain. EKG at [facility] showed bifascicular block and patient is now transferred to [facility] for further evaluation. Patient reports that he got up in the morning around 7:30 am on  and went to the bathroom. After going to the bathroom, he gave his wife(wife is demented and patient is the caretaker) some green tea from the refrigerator. He went back to the refrigerator and got some green tea for himself. He choked slightly and had a mild cough and next thing remembers is getting up on the floor in the kitchen. He reports + LOC. He went back to bed and got up around 11am with severe shooting pain in bilateral shoulder area. he called ambulance and EMS took him to [facility]. Patient is now transferred to [facility] as EKG showed bifascicular block. Patient continued to have vertigo like symptoms- room spinning and dizziness with position changes while at [facility]. EP was called to evaluate. Patient reports having multiple syncopal episodes in the past between 3796-6109 after having stroke. He was told that he had + orthostasis. Patient is currently resting in bed and continues to have mild dizziness with movement. He denies any chest pain, SOB or palpitations. Echocardiogram from 10/17/23 showed normal LV function with EF 52%.   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  CORONARY VESSELS: The coronary circulation is right dominant.  LM:   --  LM: Normal.  LAD:   --  Mid LAD: Angiography showed minor luminal irregularities with no  flow limiting lesions.  --  D1: There was a discrete 30 % stenosis.  --  D2: Normal.  CX:   --  Circumflex: Normal.  --  OM1: Angiography showed minor luminal irregularities with noflow  limiting lesions.  RCA:   --  RCA: Normal.  --  RPDA: Normal.  --  RPLS: Normal.  COMPLICATIONS: No complications occurred during the cath lab visit.  DIAGNOSTIC RECOMMENDATIONS: The patient should continue with the present  medications.  Prepared and signed by  Angelita Forbes M.D.  Signed 2017 10:58:35  HEMODYNAMIC TABLES  Pressures:  Baseline  Pressures:  - HR: 100  Pressures:  - Rhythm:  Pressures:  -- Aortic Pressure (S/D/M): 146/81/111  Pressures:  -- Left Ventricle (s/edp): 132/12/--  Pressures:  Post Angio  Pressures:  - HR: 93  Pressures:  - Rhythm:  Pressures:  -- Aortic Pressure (S/D/M): 139/77/80  Pressures:  -- Left Ventricle (s/edp): 139/8/--  Outputs:  Baseline  Outputs:  -- CALCULATIONS[age]  Outputs:  -- CALCULATIONS: Body Surface Area: 1.98  Outputs:  -- CALCULATIONS: Height in cm: 170.00  Outputs:  -- CALCULATIONS: Sex: Male  Outputs:  -- CALCULATIONS: Weight in k.20  Outputs:  -- OUTPUTS: O2 consumption: 247.14  Outputs:  -- OUTPUTS: [id number]: 125.00  Outputs:  Post Angio  Outputs:  -- OUTPUTS: O2 consumption: 247.14  Outputs:  -- OUTPUTS: Vo2 Indexed: 125.00      MEDICATIONS:                  FAMILY HISTORY:  Family history of asthma (Mother)    Family history of heart disease (Father)  father passed from heart attack        SOCIAL HISTORY:      [-] Smoker  Alcohol- occasional  [-] Drugs    Allergies    No Known Allergies    Intolerances    	    REVIEW OF SYSTEMS:  CONSTITUTIONAL: No fever, weight loss, or fatigue  EYES: No eye pain, visual disturbances, or discharge  ENMT:  No difficulty hearing, tinnitus, vertigo; No sinus or throat pain  NECK: No pain or stiffness  RESPIRATORY: No cough, wheezing, chills or hemoptysis; No Shortness of Breath  CARDIOVASCULAR: No chest pain, palpitations, passing out,  or leg swelling, + dizziness  GASTROINTESTINAL: No abdominal or epigastric pain. No nausea, vomiting, or hematemesis; No diarrhea or constipation. No melena or hematochezia.  GENITOURINARY: No dysuria, frequency, hematuria, or incontinence  NEUROLOGICAL: No headaches, memory loss, loss of strength, numbness, or tremors  SKIN: No itching, burning, rashes, or lesions   LYMPH Nodes: No enlarged glands  ENDOCRINE: No heat or cold intolerance; No hair loss  MUSCULOSKELETAL: No joint pain or swelling; or extremity pain, + shoulder pain with movement  PSYCHIATRIC: No depression, anxiety, mood swings, or difficulty sleeping  HEME/LYMPH: No easy bruising, or bleeding gums  ALLERY AND IMMUNOLOGIC: No hives or eczema	    [X] All others negative	  [ ] Unable to obtain    PHYSICAL EXAM:  T(C): 36.6 (24 @ 09:00), Max: 36.6 (24 @ 09:00)  HR: 87 (24 @ 09:00) (87 - 87)  BP: 135/98 (24 @ 09:00) (135/98 - 135/98)  RR: 19 (24 @ 09:00) (19 - 19)  SpO2: 99% (24 @ 09:00) (99% - 99%)  Wt(kg): --  I&O's Summary      Appearance: Normal	  Cardiovascular: Normal S1 S2, No JVD, No murmurs, No edema  Respiratory: Lungs clear to auscultation	  Psychiatry: A & O x 3, Mood & affect appropriate  Gastrointestinal:  Soft, Non-tender, + BS	  Extremities: Normal range of motion, No clubbing, cyanosis or edema    TELEMETRY: Sinus rhythm with HR 70s-100s	    ECG:  	  RADIOLOGY:  OTHER: 	  	  LABS:	 	    CARDIAC MARKERS:  TSH: pending         CHIEF COMPLAINT:  Patient admitted to [facility] with syncope and bifascicular block and now transferred to [facility]    HISTORY OF PRESENT ILLNESS:  72M w/ hx of b/l VA stenosis s/p R VA stent in 2016 on ASA/plavix, prior b/l cerebellar infarcts, HTN, HLD, DM2, HLD, HTN, b/l cataracts, sevre BHANU stenosis s/p stent presented to [facility] after syncope and with bilateral shoulder pain. EKG at [facility] showed bifascicular block and patient is now transferred to [facility] for further evaluation. Patient reports that he got up in the morning around 7:30 am on  and went to the bathroom. After going to the bathroom, he gave his wife(wife is demented and patient is the caretaker) some green tea from the refrigerator. He went back to the refrigerator and got some green tea for himself. He choked slightly and had a mild cough and next thing remembers is getting up on the floor in the kitchen. He reports + LOC. He went back to bed and got up around 11am with severe shooting pain in bilateral shoulder area. he called ambulance and EMS took him to [facility]. Patient is now transferred to [facility] as EKG showed bifascicular block. Patient continued to have vertigo like symptoms- room spinning and dizziness with position changes while at [facility]. EP was called to evaluate. Patient reports having multiple syncopal episodes in the past between 1515-7169 after having stroke. He was told that he had + orthostasis. Patient is currently resting in bed and continues to have mild dizziness with movement. He denies any chest pain, SOB or palpitations. Echocardiogram from 10/17/23 showed normal LV function with EF 52%.   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  CORONARY VESSELS: The coronary circulation is right dominant.  LM:   --  LM: Normal.  LAD:   --  Mid LAD: Angiography showed minor luminal irregularities with no  flow limiting lesions.  --  D1: There was a discrete 30 % stenosis.  --  D2: Normal.  CX:   --  Circumflex: Normal.  --  OM1: Angiography showed minor luminal irregularities with noflow  limiting lesions.  RCA:   --  RCA: Normal.  --  RPDA: Normal.  --  RPLS: Normal.  COMPLICATIONS: No complications occurred during the cath lab visit.  DIAGNOSTIC RECOMMENDATIONS: The patient should continue with the present  medications.  Prepared and signed by  Angelita Forbes M.D.  Signed 2017 10:58:35  HEMODYNAMIC TABLES  Pressures:  Baseline  Pressures:  - HR: 100  Pressures:  - Rhythm:  Pressures:  -- Aortic Pressure (S/D/M): 146/81/111  Pressures:  -- Left Ventricle (s/edp): 132/12/--  Pressures:  Post Angio  Pressures:  - HR: 93  Pressures:  - Rhythm:  Pressures:  -- Aortic Pressure (S/D/M): 139/77/80  Pressures:  -- Left Ventricle (s/edp): 139/8/--  Outputs:  Baseline  Outputs:  -- CALCULATIONS[age]  Outputs:  -- CALCULATIONS: Body Surface Area: 1.98  Outputs:  -- CALCULATIONS: Height in cm: 170.00  Outputs:  -- CALCULATIONS: Sex: Male  Outputs:  -- CALCULATIONS: Weight in k.20  Outputs:  -- OUTPUTS: O2 consumption: 247.14  Outputs:  -- OUTPUTS: [id number]: 125.00  Outputs:  Post Angio  Outputs:  -- OUTPUTS: O2 consumption: 247.14  Outputs:  -- OUTPUTS: Vo2 Indexed: 125.00      MEDICATIONS:  Brilinta 90mg BID  Crestor 40mg daily  Ezetimibe 10mg daily  Famotidine 20mg BID  Glimepiride 8 mg daily  Atarax 50mg daily QHS  Jardiance 25 mg daily  Losartan 100mg daily  Meclizine 12.5 mg Q12H  Trajenta 5mg daily    FAMILY HISTORY:  Family history of asthma (Mother)    Family history of heart disease (Father)  father passed from heart attack        SOCIAL HISTORY:      [-] Smoker  Alcohol- occasional  [-] Drugs    Allergies    No Known Allergies    Intolerances    	    REVIEW OF SYSTEMS:  CONSTITUTIONAL: No fever, weight loss, or fatigue  EYES: No eye pain, visual disturbances, or discharge  ENMT:  No difficulty hearing, tinnitus, vertigo; No sinus or throat pain  NECK: No pain or stiffness  RESPIRATORY: No cough, wheezing, chills or hemoptysis; No Shortness of Breath  CARDIOVASCULAR: No chest pain, palpitations, passing out,  or leg swelling, + dizziness  GASTROINTESTINAL: No abdominal or epigastric pain. No nausea, vomiting, or hematemesis; No diarrhea or constipation. No melena or hematochezia.  GENITOURINARY: No dysuria, frequency, hematuria, or incontinence  NEUROLOGICAL: No headaches, memory loss, loss of strength, numbness, or tremors  SKIN: No itching, burning, rashes, or lesions   LYMPH Nodes: No enlarged glands  ENDOCRINE: No heat or cold intolerance; No hair loss  MUSCULOSKELETAL: No joint pain or swelling; or extremity pain, + shoulder pain with movement  PSYCHIATRIC: No depression, anxiety, mood swings, or difficulty sleeping  HEME/LYMPH: No easy bruising, or bleeding gums  ALLERY AND IMMUNOLOGIC: No hives or eczema	    [X] All others negative	  [ ] Unable to obtain    PHYSICAL EXAM:  T(C): 36.6 (24 @ 09:00), Max: 36.6 (24 @ 09:00)  HR: 87 (24 @ 09:00) (87 - 87)  BP: 135/98 (24 @ 09:00) (135/98 - 135/98)  RR: 19 (24 @ 09:00) (19 - 19)  SpO2: 99% (24 @ 09:00) (99% - 99%)  Wt(kg): --  I&O's Summary      Appearance: Normal	  Cardiovascular: Normal S1 S2, No JVD, No murmurs, No edema  Respiratory: Lungs clear to auscultation	  Psychiatry: A & O x 3, Mood & affect appropriate  Gastrointestinal:  Soft, Non-tender, + BS	  Extremities: Normal range of motion, No clubbing, cyanosis or edema    TELEMETRY: Sinus rhythm with HR 70s-100s	    ECG:  	  RADIOLOGY:  OTHER: 	  	  LABS:	 	    CARDIAC MARKERS:  TSH: pending         CHIEF COMPLAINT:  Patient admitted to [facility] with syncope and bifascicular block and now transferred to [facility]    HISTORY OF PRESENT ILLNESS:  72M w/ hx of b/l VA stenosis s/p R VA stent in 2016 on ASA/plavix, prior b/l cerebellar infarcts, HTN, HLD, DM2, HLD, HTN, b/l cataracts, severe BHANU stenosis s/p stent presented to [facility] after syncope and with bilateral shoulder pain. EKG at [facility] showed bifascicular block and patient is now transferred to [facility] for further evaluation. Patient reports that he got up in the morning around 7:30 am on  and went to the bathroom. After going to the bathroom, he gave his wife (wife is demented and patient is the caretaker) some green tea from the refrigerator. He went back to the refrigerator and got some green tea for himself. He choked slightly and had a mild cough and next thing remembers is getting up on the floor in the kitchen. He reports + LOC. He went back to bed and got up around 11am with severe shooting pain in bilateral shoulder area. He called ambulance and EMS took him to [facility]. Patient is now transferred to [facility] as EKG showed bifascicular block. Patient continued to have vertigo like symptoms- room spinning and dizziness with position changes while at [facility]. EP was called to evaluate. Patient reports having multiple syncopal episodes in the past between 4034-7246 after having stroke. He was told that he had + orthostasis. Patient is currently resting in bed and continues to have mild dizziness with movement. He denies any chest pain, SOB or palpitations. Echocardiogram from 10/17/23 showed normal LV function with EF 52%.   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  CORONARY VESSELS: The coronary circulation is right dominant.  LM:   --  LM: Normal.  LAD:   --  Mid LAD: Angiography showed minor luminal irregularities with no  flow limiting lesions.  --  D1: There was a discrete 30 % stenosis.  --  D2: Normal.  CX:   --  Circumflex: Normal.  --  OM1: Angiography showed minor luminal irregularities with noflow  limiting lesions.  RCA:   --  RCA: Normal.  --  RPDA: Normal.  --  RPLS: Normal.  COMPLICATIONS: No complications occurred during the cath lab visit.  DIAGNOSTIC RECOMMENDATIONS: The patient should continue with the present  medications.  Prepared and signed by  Angelita Forbes M.D.  Signed 2017 10:58:35  HEMODYNAMIC TABLES  Pressures:  Baseline  Pressures:  - HR: 100  Pressures:  - Rhythm:  Pressures:  -- Aortic Pressure (S/D/M): 146/81/111  Pressures:  -- Left Ventricle (s/edp): 132/12/--  Pressures:  Post Angio  Pressures:  - HR: 93  Pressures:  - Rhythm:  Pressures:  -- Aortic Pressure (S/D/M): 139/77/80  Pressures:  -- Left Ventricle (s/edp): 139/8/--  Outputs:  Baseline  Outputs:  -- CALCULATIONS[age]  Outputs:  -- CALCULATIONS: Body Surface Area: 1.98  Outputs:  -- CALCULATIONS: Height in cm: 170.00  Outputs:  -- CALCULATIONS: Sex: Male  Outputs:  -- CALCULATIONS: Weight in k.20  Outputs:  -- OUTPUTS: O2 consumption: 247.14  Outputs:  -- OUTPUTS: [id number]: 125.00  Outputs:  Post Angio  Outputs:  -- OUTPUTS: O2 consumption: 247.14  Outputs:  -- OUTPUTS: Vo2 Indexed: 125.00      MEDICATIONS:  Brilinta 90mg BID  Crestor 40mg daily  Ezetimibe 10mg daily  Famotidine 20mg BID  Glimepiride 8 mg daily  Atarax 50mg daily QHS  Jardiance 25 mg daily  Losartan 100mg daily  Meclizine 12.5 mg Q12H  Trajenta 5mg daily    FAMILY HISTORY:  Family history of asthma (Mother)    Family history of heart disease (Father)  father passed from heart attack        SOCIAL HISTORY:      [-] Smoker  Alcohol- occasional  [-] Drugs    Allergies    No Known Allergies    Intolerances    	    REVIEW OF SYSTEMS:  CONSTITUTIONAL: No fever, weight loss, or fatigue  EYES: No eye pain, visual disturbances, or discharge  ENMT:  No difficulty hearing, tinnitus, vertigo; No sinus or throat pain  NECK: No pain or stiffness  RESPIRATORY: No cough, wheezing, chills or hemoptysis; No Shortness of Breath  CARDIOVASCULAR: No chest pain, palpitations, passing out,  or leg swelling, + dizziness  GASTROINTESTINAL: No abdominal or epigastric pain. No nausea, vomiting, or hematemesis; No diarrhea or constipation. No melena or hematochezia.  GENITOURINARY: No dysuria, frequency, hematuria, or incontinence  NEUROLOGICAL: No headaches, memory loss, loss of strength, numbness, or tremors  SKIN: No itching, burning, rashes, or lesions   LYMPH Nodes: No enlarged glands  ENDOCRINE: No heat or cold intolerance; No hair loss  MUSCULOSKELETAL: No joint pain or swelling; or extremity pain, + shoulder pain with movement  PSYCHIATRIC: No depression, anxiety, mood swings, or difficulty sleeping  HEME/LYMPH: No easy bruising, or bleeding gums  ALLERY AND IMMUNOLOGIC: No hives or eczema	    [X] All others negative	  [ ] Unable to obtain    PHYSICAL EXAM:  T(C): 36.6 (24 @ 09:00), Max: 36.6 (24 @ 09:00)  HR: 87 (24 @ 09:00) (87 - 87)  BP: 135/98 (24 @ 09:00) (135/98 - 135/98)  RR: 19 (24 @ 09:00) (19 - 19)  SpO2: 99% (24 @ 09:00) (99% - 99%)  Wt(kg): --  I&O's Summary      Appearance: Normal	  Cardiovascular: Normal S1 S2, No JVD, No murmurs, No edema  Respiratory: Lungs clear to auscultation	  Psychiatry: A & O x 3, Mood & affect appropriate  Gastrointestinal:  Soft, Non-tender, + BS	  Extremities: Normal range of motion, No clubbing, cyanosis or edema    TELEMETRY: Sinus rhythm with HR 70s-100s	    ECG:  	  RADIOLOGY:  OTHER: 	  	  LABS:	 	    CARDIAC MARKERS:  TSH: pending         CHIEF COMPLAINT:  Patient admitted to [facility] with syncope and bifascicular block and now transferred to [facility]    HISTORY OF PRESENT ILLNESS:  72M w/ hx of b/l VA stenosis s/p R VA stent in 2016 on ASA/plavix, prior b/l cerebellar infarcts, HTN, HLD, DM2, HLD, HTN, b/l cataracts, severe BHANU stenosis s/p stent presented to [facility] after syncope and with bilateral shoulder pain. EKG at [facility] showed bifascicular block and patient is now transferred to [facility] for further evaluation. Patient reports that he got up in the morning around 7:30 am on  and went to the bathroom. After going to the bathroom, he gave his wife (wife is demented and patient is the caretaker) some green tea from the refrigerator. He went back to the refrigerator and got some green tea for himself. He choked slightly, had a mild cough and next thing he remembers is waking up on the floor in the kitchen. He reports + LOC. He went back to bed and got up around 11am with severe shooting pain in bilateral shoulder area. He called ambulance and EMS took him to [facility]. Patient is now transferred to [facility] as EKG showed bifascicular block. Patient continued to have vertigo like symptoms- room spinning and dizziness with position changes while at [facility]. EP was called to evaluate. Patient reports having multiple syncopal episodes in the past between 0932-7967 after having stroke. He was told that he had + orthostasis. Patient is currently resting in bed and continues to have dizziness and feeling like room spinning with movement. He denies any chest pain, SOB or palpitations. Echocardiogram from 10/17/23 showed normal LV function with EF 52%.   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  CORONARY VESSELS: The coronary circulation is right dominant.  LM:   --  LM: Normal.  LAD:   --  Mid LAD: Angiography showed minor luminal irregularities with no  flow limiting lesions.  --  D1: There was a discrete 30 % stenosis.  --  D2: Normal.  CX:   --  Circumflex: Normal.  --  OM1: Angiography showed minor luminal irregularities with noflow  limiting lesions.  RCA:   --  RCA: Normal.  --  RPDA: Normal.  --  RPLS: Normal.  COMPLICATIONS: No complications occurred during the cath lab visit.  DIAGNOSTIC RECOMMENDATIONS: The patient should continue with the present  medications.  Prepared and signed by  Angelita Forbes M.D.  Signed 2017 10:58:35  HEMODYNAMIC TABLES  Pressures:  Baseline  Pressures:  - HR: 100  Pressures:  - Rhythm:  Pressures:  -- Aortic Pressure (S/D/M): 146/81/111  Pressures:  -- Left Ventricle (s/edp): 132/12/--  Pressures:  Post Angio  Pressures:  - HR: 93  Pressures:  - Rhythm:  Pressures:  -- Aortic Pressure (S/D/M): 139/77/80  Pressures:  -- Left Ventricle (s/edp): 139/8/--  Outputs:  Baseline  Outputs:  -- CALCULATIONS[age]  Outputs:  -- CALCULATIONS: Body Surface Area: 1.98  Outputs:  -- CALCULATIONS: Height in cm: 170.00  Outputs:  -- CALCULATIONS: Sex: Male  Outputs:  -- CALCULATIONS: Weight in k.20  Outputs:  -- OUTPUTS: O2 consumption: 247.14  Outputs:  -- OUTPUTS: Qw5Rfzywii: 125.00  Outputs:  Post Angio  Outputs:  -- OUTPUTS: O2 consumption: 247.14  Outputs:  -- OUTPUTS: Vo2 Indexed: 125.00      MEDICATIONS:  Brilinta 90mg BID  Crestor 40mg daily  Ezetimibe 10mg daily  Famotidine 20mg BID  Glimepiride 8 mg daily  Atarax 50mg daily QHS  Jardiance 25 mg daily  Losartan 100mg daily  Meclizine 12.5 mg Q12H  Trajenta 5mg daily    FAMILY HISTORY:  Family history of asthma (Mother)    Family history of heart disease (Father)  father passed from heart attack        SOCIAL HISTORY:      [-] Smoker  Alcohol- occasional  [-] Drugs    Allergies    No Known Allergies    Intolerances    	    REVIEW OF SYSTEMS:  CONSTITUTIONAL: No fever, weight loss, or fatigue  EYES: No eye pain, visual disturbances, or discharge  ENMT:  No difficulty hearing, tinnitus, vertigo; No sinus or throat pain  NECK: No pain or stiffness  RESPIRATORY: No cough, wheezing, chills or hemoptysis; No Shortness of Breath  CARDIOVASCULAR: No chest pain, palpitations, passing out,  or leg swelling, + dizziness  GASTROINTESTINAL: No abdominal or epigastric pain. No nausea, vomiting, or hematemesis; No diarrhea or constipation. No melena or hematochezia.  GENITOURINARY: No dysuria, frequency, hematuria, or incontinence  NEUROLOGICAL: No headaches, memory loss, loss of strength, numbness, or tremors  SKIN: No itching, burning, rashes, or lesions   LYMPH Nodes: No enlarged glands  ENDOCRINE: No heat or cold intolerance; No hair loss  MUSCULOSKELETAL: No joint pain or swelling; or extremity pain, + shoulder pain with movement  PSYCHIATRIC: No depression, anxiety, mood swings, or difficulty sleeping  HEME/LYMPH: No easy bruising, or bleeding gums  ALLERY AND IMMUNOLOGIC: No hives or eczema	    [X] All others negative	  [ ] Unable to obtain    PHYSICAL EXAM:  T(C): 36.6 (24 @ 09:00), Max: 36.6 (24 @ 09:00)  HR: 87 (24 @ 09:00) (87 - 87)  BP: 135/98 (24 @ 09:00) (135/98 - 135/98)  RR: 19 (24 @ 09:00) (19 - 19)  SpO2: 99% (24 @ 09:00) (99% - 99%)  Wt(kg): --  I&O's Summary      Appearance: Normal	  Cardiovascular: Normal S1 S2, No JVD, No murmurs, No edema  Respiratory: Lungs clear to auscultation	  Psychiatry: A & O x 3, Mood & affect appropriate  Gastrointestinal:  Soft, Non-tender, + BS	  Extremities: Normal range of motion, No clubbing, cyanosis or edema    TELEMETRY: Sinus rhythm with HR 70s-100s	    ECG:  	  RADIOLOGY:  OTHER: 	  	  LABS:	 	    CARDIAC MARKERS:  TSH: pending

## 2024-07-12 NOTE — CONSULT NOTE ADULT - ASSESSMENT
72M R-handed PMHx HTN, DM, HLD, multiple prior strokes (2015, 2016; bilateral cerebellum, no residual deficits), bilateral vertebral artery stenosis s/p R VA stent, and R ICA stenosis s/p stent (10/2023) presenting as a transfer from [facility] for syncope and EP evaluation for bifascicular block. Neurology consulted for vertigo-like symptoms. Pt noted to have these symptoms in the past. Pt is non-focal on exam.     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 Low suspicion for new central etiology.     Recommendations:   - No further neuroimaging indicated at this time  - Continue home ASA 81mg QD  - Continue home Atorvastatin QHS  - Continue Meclizine 25mg and titrate up as necessary  - Continue cardiac work-up  - Please have  see pt regarding safety at home (noted situations escalating to violence at home)  - Neurochecks and vitals per routine  - Rest of care per primary team    Case d/w Neurology attending.    72M R-handed PMHx HTN, DM, HLD, multiple prior strokes (2015, 2016; bilateral cerebellum, no residual deficits), bilateral vertebral artery stenosis s/p R VA stent, and R ICA stenosis s/p stent (10/2023) presenting as a transfer from [facility] for syncope and EP evaluation for bifascicular block. Neurology consulted for vertigo-like symptoms. Pt noted to have these symptoms in the past. Pt is non-focal on exam.     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 Low suspicion for new central etiology.     Recommendations:   - No further neuroimaging indicated at this time  - Continue home ASA 81mg QD  - Continue home Brilinta   - Continue home Atorvastatin QHS  - Continue Meclizine 25mg and titrate up as necessary  - Continue cardiac work-up  - Please have  see pt regarding safety at home (noted situations escalating to violence at home)  - Upon discharge, PT should F/U with either Dr. Salazar or Dr. Black: [phone] [address] [place] [address]  - Neurochecks and vitals per routine  - Rest of care per primary team    Case d/w Neurology attending.

## 2024-07-12 NOTE — CONSULT NOTE ADULT - ASSESSMENT
72M w/ hx of b/l VA stenosis s/p R VA stent in 2016 on ASA/plavix, prior b/l cerebellar infarcts, HTN, HLD, DM2, HLD, HTN, b/l cataracts, severe BHANU stenosis s/p stent presented to [facility] after syncope and with bilateral shoulder pain. EKG at [facility] showed bifascicular block and patient is now transferred to [facility] for further evaluation. Telemetry shows sinus rhythm with HR 70s-100s. He is resting in bed.   - Continue to monitor on telemetry  - Maintain K>4 and Mg>2  - Echocardiogram to evaluate LV function and valve function  - EKG STAT   - Check orthostatics  72M w/ hx of b/l VA stenosis s/p R VA stent in 2016 on ASA/plavix, prior b/l cerebellar infarcts, HTN, HLD, DM2, HLD, HTN, b/l cataracts, severe BHANU stenosis s/p stent presented to [facility] after syncope and with bilateral shoulder pain. EKG at [facility] showed bifascicular block and patient is now transferred to [facility] for further evaluation. Telemetry shows sinus rhythm with HR 70s-100s. He is resting in bed. EKG shows sinus rhythm with RBBB. Prior EKG from 2023 also showed bifascicular block. No new changes noted on EKG. He is with + orthostasis. Patient's syncope likely related to orthostatic hypotension. He also continues to have dizziness associated with movement even when in bed while in sinus rhythm. No tachy or tien arrhythmias noted on telemetry. Discussed with patient regarding risks/benefits and alternatives of ILR placement. All questions answered. Patient consented for ILR placement.  - Continue to monitor on telemetry  - Maintain K>4 and Mg>2  - Echocardiogram to evaluate LV function and valve function  - EKG STAT- done  - Check orthostatics- done  - Plan for ILR placement today  - Discussed with Dr. Luevano

## 2024-07-12 NOTE — CONSULT NOTE ADULT - SUBJECTIVE AND OBJECTIVE BOX
Neurology - Consult Note    Spectra: [phone] ([facility]), [phone] ([facility])    HPI:  72M R-handed PMHx HTN, DM, HLD, multiple prior strokes (2015, 2016; bilateral cerebellum, no residual deficits), bilateral vertebral artery stenosis s/p R VA stent, and R ICA stenosis s/p stent (10/2023) presenting as a transfer from [facility] for syncope and EP evaluation for bifascicular block. Pt notes 4 days prior to admission, he woke up around 7:15am and went to the kitchen to make breakfast. He choked on some green tea and next thing he remembers is him being on the floor. He does not know how long he was down for. Pt noted severe pain in the b/l shoulders upon getting up. His wife's aid who came later in the day encouraged him to call the ambulance prompting ED visit. Neurology consulted for "dizziness." During hospitalization, pt has been noting intermittent vertigo-like symptoms where change in position (looking to the left) triggering room-spinning sensation. Pt has had these episodes in the past but felt like it was acutely worsening in the past few days. No other numbness/weakness noted. No nausea or vomiting.     Review of Systems:  CONSTITUTIONAL: No fevers or chills  EYES AND ENT: No visual changes or no throat pain   NECK: No pain or stiffness  RESPIRATORY: No shortness of breath  CARDIOVASCULAR: No chest pain or palpitations  GASTROINTESTINAL: No nausea or vomiting   GENITOURINARY: No dysuria  NEUROLOGICAL: +As stated in HPI above  SKIN: No itching, burning, rashes, or lesions   All other review of systems is negative unless indicated above.    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     Medications:  MEDICATIONS  (STANDING):  aspirin  chewable 81 milliGRAM(s) Oral daily  atorvastatin 40 milliGRAM(s) Oral at bedtime  cyanocobalamin 1000 MICROGram(s) Oral daily  dextrose 5%. 1000 milliLiter(s) (50 mL/Hr) IV Continuous <Continuous>  dextrose 5%. 1000 milliLiter(s) (100 mL/Hr) IV Continuous <Continuous>  dextrose 50% Injectable 12.5 Gram(s) IV Push once  dextrose 50% Injectable 25 Gram(s) IV Push once  dextrose 50% Injectable 25 Gram(s) IV Push once  famotidine    Tablet 20 milliGRAM(s) Oral daily  folic acid 1 milliGRAM(s) Oral daily  glucagon  Injectable 1 milliGRAM(s) IntraMuscular once  insulin lispro (ADMELOG) corrective regimen sliding scale   SubCutaneous at bedtime  insulin lispro (ADMELOG) corrective regimen sliding scale   SubCutaneous three times a day before meals  losartan 50 milliGRAM(s) Oral daily  meclizine 25 milliGRAM(s) Oral two times a day  senna 1 Tablet(s) Oral at bedtime  ticagrelor 90 milliGRAM(s) Oral every 12 hours  venlafaxine 37.5 milliGRAM(s) Oral daily    MEDICATIONS  (PRN):  dextrose Oral Gel 15 Gram(s) Oral once PRN Blood Glucose LESS THAN 70 milliGRAM(s)/deciliter      Vitals:  T(C): 36.7 (07-12-24 @ 16:46), Max: 36.8 (07-12-24 @ 14:30)  HR: 86 (07-12-24 @ 16:46) (86 - 96)  BP: 138/72 (07-12-24 @ 16:46) (135/78 - 138/72)  RR: 16 (07-12-24 @ 16:46) (13 - 19)  SpO2: 100% (07-12-24 @ 16:46) (98% - 100%)    Physical Examination:  General - non-toxic appearing male in no acute distress  Cardiovascular - peripheral pulses palpable, no edema  Respiratory - breathing comfortably with no increased work of breathing    Neurologic Exam:  Mental status - Awake, Alert, Oriented to person, place ([facility]), and time (July, 2024). Speech fluent, repetition and naming intact. Follows simple and complex commands.    Cranial nerves - PERRLA (4mm -> 3mm b/l), VFF, EOMI, face sensation (V1-V3) intact b/l, facial strength intact without asymmetry b/l, hearing intact b/l, palate with symmetric elevation, trapezius 5/5 strength b/l, tongue midline on protrusion with full lateral movement    Motor - Normal bulk and tone throughout. No pronator drift.    Strength testing            Deltoid      Biceps      Triceps     Wrist Extension    Wrist Flexion     Interossei         R            5             5               5              5                         5                        5                 5  L             5             5               5              5                         5                        5                 5              Hip Flexion    Hip Extension    Knee Flexion    Knee Extension    Dorsiflexion    Plantar Flexion  R              5                 5                       5                     5                         5                          5  L              5                 5                        5                     5                         5                          5    Sensation - Light touch intact throughout    DTR's -             Biceps      Triceps     Brachioradialis      Patellar    Ankle    Toes/plantar response  R             2+         1+               1+                   2+            1+                 Neutral  L              2+         1+               1+                   2+            1+                  Neutral    Coordination - Finger to Nose intact b/l. No tremors appreciated    Gait and station - Normal casual gait. Romberg (-)      Labs:      CAPILLARY BLOOD GLUCOSE  POCT Blood Glucose.: 198 mg/dL (12 Jul 2024 17:01)        Radiology:     Neurology - Consult Note    Spectra: [phone] ([facility]), [phone] ([facility])    HPI:  72M R-handed PMHx HTN, DM, HLD, multiple prior strokes (2015, 2016; bilateral cerebellum, no residual deficits), bilateral vertebral artery stenosis s/p R VA stent, and R ICA stenosis s/p stent (10/2023) presenting as a transfer from [facility] for syncope and EP evaluation for bifascicular block. Pt notes 4 days prior to admission, he woke up around 7:15am and went to the kitchen to make breakfast. He choked on some green tea and next thing he remembers is him being on the floor. He does not know how long he was down for. Pt noted severe pain in the b/l shoulders upon getting up. His wife's aid who came later in the day encouraged him to call the ambulance prompting ED visit. Neurology consulted for "dizziness." During hospitalization, pt has been noting intermittent vertigo-like symptoms where change in position (looking to the left) triggering room-spinning sensation. Pt has had these episodes in the past but felt like it was acutely worsening in the past few days. No other numbness/weakness noted. No nausea or vomiting.     Review of Systems:  CONSTITUTIONAL: No fevers or chills  EYES AND ENT: No visual changes or no throat pain   NECK: No pain or stiffness  RESPIRATORY: No shortness of breath  CARDIOVASCULAR: No chest pain or palpitations  GASTROINTESTINAL: No nausea or vomiting   GENITOURINARY: No dysuria  NEUROLOGICAL: +As stated in HPI above  SKIN: No itching, burning, rashes, or lesions   All other review of systems is negative unless indicated above.    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     Medications:  MEDICATIONS  (STANDING):  aspirin  chewable 81 milliGRAM(s) Oral daily  atorvastatin 40 milliGRAM(s) Oral at bedtime  cyanocobalamin 1000 MICROGram(s) Oral daily  dextrose 5%. 1000 milliLiter(s) (50 mL/Hr) IV Continuous <Continuous>  dextrose 5%. 1000 milliLiter(s) (100 mL/Hr) IV Continuous <Continuous>  dextrose 50% Injectable 12.5 Gram(s) IV Push once  dextrose 50% Injectable 25 Gram(s) IV Push once  dextrose 50% Injectable 25 Gram(s) IV Push once  famotidine    Tablet 20 milliGRAM(s) Oral daily  folic acid 1 milliGRAM(s) Oral daily  glucagon  Injectable 1 milliGRAM(s) IntraMuscular once  insulin lispro (ADMELOG) corrective regimen sliding scale   SubCutaneous at bedtime  insulin lispro (ADMELOG) corrective regimen sliding scale   SubCutaneous three times a day before meals  losartan 50 milliGRAM(s) Oral daily  meclizine 25 milliGRAM(s) Oral two times a day  senna 1 Tablet(s) Oral at bedtime  ticagrelor 90 milliGRAM(s) Oral every 12 hours  venlafaxine 37.5 milliGRAM(s) Oral daily    MEDICATIONS  (PRN):  dextrose Oral Gel 15 Gram(s) Oral once PRN Blood Glucose LESS THAN 70 milliGRAM(s)/deciliter      Vitals:  T(C): 36.7 (07-12-24 @ 16:46), Max: 36.8 (07-12-24 @ 14:30)  HR: 86 (07-12-24 @ 16:46) (86 - 96)  BP: 138/72 (07-12-24 @ 16:46) (135/78 - 138/72)  RR: 16 (07-12-24 @ 16:46) (13 - 19)  SpO2: 100% (07-12-24 @ 16:46) (98% - 100%)    Physical Examination:  General - non-toxic appearing male in no acute distress  Cardiovascular - peripheral pulses palpable, no edema  Respiratory - breathing comfortably with no increased work of breathing    Neurologic Exam:  Mental status - Awake, Alert, Oriented to person, place ([facility]), and time (July, 2024). Speech fluent, repetition and naming intact. Follows simple and complex commands.    Cranial nerves - PERRLA (4mm -> 3mm b/l), VFF, EOMI, face sensation (V1-V3) intact b/l, facial strength intact without asymmetry b/l, hearing intact b/l, palate with symmetric elevation, trapezius 5/5 strength b/l, tongue midline on protrusion with full lateral movement    Motor - Normal bulk and tone throughout. No pronator drift.    Strength testing            Deltoid      Biceps      Triceps     Wrist Extension    Wrist Flexion     Interossei         R            5             5               5              5                         5                        5                 5  L             5             5               5              5                         5                        5                 5              Hip Flexion    Hip Extension    Knee Flexion    Knee Extension    Dorsiflexion    Plantar Flexion  R              5                 5                       5                     5                         5                          5  L              5                 5                        5                     5                         5                          5    Sensation - Light touch intact throughout    DTR's -             Biceps      Triceps     Brachioradialis      Patellar    Ankle    Toes/plantar response  R             2+         1+               1+                   2+            1+                 Neutral  L              2+         1+               1+                   2+            1+                  Neutral    Coordination - Finger to Nose, HKS intact b/l. No tremors appreciated    Gait and station - Normal casual gait. Romberg (-)      Labs:      CAPILLARY BLOOD GLUCOSE  POCT Blood Glucose.: 198 mg/dL (12 Jul 2024 17:01)        Radiology:

## 2024-07-12 NOTE — H&P ADULT - PROBLEM SELECTOR PLAN 2
CT head as per above  Patient reports dizziness upon change in position - consider BPPV  R to L nystagmus noted on exam  Started on meclizine at [facility], will continue for now   F/u CT head results  Will consult Neurology

## 2024-07-12 NOTE — H&P ADULT - HISTORY OF PRESENT ILLNESS
Patient is a 72M with a PMH of VA stenosis s/p R VA stent in 2016 on ASA/plavix, bilateral cerebellar infarcts, HTN, DM, HLD, R ICA stenosis s/p stent in 10/2023 who initially presented to [facility] s/p syncope.  Transferred to [facility] for bifascicular block.  Patient reports that he got up from bed 4 days ago and was making breakfast for himself and his wife.  Patient states that he choked a little on some tea and had mild cough.  Next thing he remembers he was on the floor.  Unknown how long he was down for.  Patient states that when he woke up he had pain in his bilateral shoulders - specifically posteriorly behind his shoulder blades.  Denies history of CP or palpitations prior to syncope.  Patient notes that over the last month he has had worsening dizziness.  Feels the sensation of the room spinning but also notes dizziness on change of position (worse when looking right to left.)  Patient notes history of multiple syncope in the past and has had multiple strokes.  On ROS, patient admits to poor appetite lately and abdominal swelling.  No other complaints.           synco    orthostatic?  vertigo  mild nystagmus L sided gaze  Mostly unchanged from 10/23.    his cva  htn  ppi  iss  lip 80    amLODIPine Besylate 2.5 MG Oral Tablet; TAKE 1 TABLET DAILY  Aspirin 81 MG TABS  Atorvastatin Calcium 80 MG Oral Tablet; TAKE 1 TABLET AT BEDTIME  brilin 90t  Famotidine 20 MG Oral Tablet; TAKE 1 TABLET TWICE DAILY  Fenofibrate 145 MG Oral Tablet; TAKE 1 TABLET DAILY  Fludrocortisone Acetate 0.1 MG Oral Tablet; Take 1 tablet daily  glyBURIDE 2.5 MG Oral Tablet; TAKE 1 TABLET TWICE DAILY WITH MEALS  Losartan Potassium 100 MG Oral Tablet; TAKE 1 TABLET DAILY  Meclizine HCl - 25 MG Oral Tablet; take one tablet bid  Rapaflo 8 MG Oral Capsule  Venlafaxine HCl - 37.5 MG Oral Tablet; TAKE 1 TABLET DAILY  Vitamin D3 1.25 MG (54045 UT) TABS; TAKE ONE CAPSULE ONCE A WEEK   Patient is a 72M with a PMH of VA stenosis s/p R VA stent in 2016 on ASA/plavix, bilateral cerebellar infarcts, HTN, DM, HLD, R ICA stenosis s/p stent in 10/2023 who initially presented to [facility] s/p syncope.  Transferred to [facility] for EP eval for bifascicular block.  Patient reports that he got up from bed 4 days ago and was making breakfast for himself and his wife.  Patient states that he choked a little on some tea and had mild cough.  Next thing he remembers he was on the floor.  Unknown how long he was down for.  Patient states that when he woke up he had pain in his bilateral shoulders - specifically posteriorly behind his shoulder blades.  Denies history of CP or palpitations prior to syncope.  Patient notes that over the last month he has had worsening dizziness.  Feels the sensation of the room spinning but also notes dizziness on change of position (worse when looking right to left.)  Patient notes history of multiple syncope in the past and has had multiple strokes.  On ROS, patient admits to poor appetite lately and abdominal swelling.  No other complaints.

## 2024-07-12 NOTE — H&P ADULT - PROBLEM SELECTOR PLAN 1
Patient was transferred from [facility] for EP eval.  EKG shows bifascicular block.      Transfer documents incomplete, unclear what workup was done at [facility]    EP on board - plan for ILR today  Unclear if cardiac was the primary cause of patient's syncope    EKG shows bifascicular black - appears mostly unchanged from 10/23  Continue Telemetry Monitoring   Will send for CT Head, TTE ordered    Last TTE 10/23.  EF 52%.  No diastolic dysfunction noted    Will also send for carotid doppler as patient has a history of ICA stenosis,   Patient noted to have drop of BP on orthostatics.  Chart review shows he was once taking fludrocortisone.    Will decrease dose of losartan from 100 to 50.  Try orthostatics again tomorrow     PT Evaluation Patient was transferred from [facility] for EP eval.  EKG shows bifascicular block.      Transfer documents incomplete, unclear what workup was done at [facility]    EP on board - plan for ILR today  Unclear if cardiac was the primary cause of patient's syncope    EKG shows bifascicular black - appears mostly unchanged from 10/23  Continue Telemetry Monitoring   Will send for CT Head, TTE ordered    Last TTE 10/23.  EF 52%.  No diastolic dysfunction noted    Will also send for carotid doppler as patient has a history of ICA stenosis,   Patient noted to have drop of BP on orthostatics.  Chart review shows he was once taking fludrocortisone.    After ICA stent last year patient had a drop of BP, required fludrocortisone on DC.    No longer taking fludrocortisone.    Will decrease dose of losartan from 100 to 50.  Try orthostatics again tomorrow     PT Evaluation

## 2024-07-12 NOTE — H&P ADULT - NSHPREVIEWOFSYSTEMS_GEN_ALL_CORE
Constitutional: no fever, chills, night sweats  Ears: no hearing changes or ear pain,   Nose: no nasal congestion, sinus pain, or rhinorrhea  Cardio: no chest pain, orthopnea, edema, or palpitations  Resp: no dyspnea, cough, wheezing  GI: no nausea, vomiting, diarrhea, constipation, hematochezia, or melena  : no dysuria, urinary frequency, hematuria  MSK: no back pain, neck pain  Skin: no rash, pruritis   Neuro: dizziness, lightheadedness, syncope positive.    Heme/Lymph: no bruising or bleeding

## 2024-07-12 NOTE — H&P ADULT - ASSESSMENT
Patient is a 72M with a PMH of VA stenosis s/p R VA stent in 2016 on ASA/plavix, bilateral cerebellar infarcts, HTN, DM, HLD, R ICA stenosis s/p stent in 10/2023 who initially presented to [facility] s/p syncope.  Transferred to [facility] for EP eval for bifascicular block.

## 2024-07-13 ENCOUNTER — RESULT REVIEW (OUTPATIENT)
Age: 74
End: 2024-07-13

## 2024-07-13 LAB
A1C WITH ESTIMATED AVERAGE GLUCOSE RESULT: 9.4 % — HIGH (ref 4–5.6)
ANION GAP SERPL CALC-SCNC: 11 MMOL/L — SIGNIFICANT CHANGE UP (ref 7–14)
BUN SERPL-MCNC: 22 MG/DL — SIGNIFICANT CHANGE UP (ref 7–23)
CALCIUM SERPL-MCNC: 9.1 MG/DL — SIGNIFICANT CHANGE UP (ref 8.4–10.5)
CHLORIDE SERPL-SCNC: 103 MMOL/L — SIGNIFICANT CHANGE UP (ref 98–107)
CHOLEST SERPL-MCNC: 127 MG/DL — SIGNIFICANT CHANGE UP
CO2 SERPL-SCNC: 20 MMOL/L — LOW (ref 22–31)
CREAT SERPL-MCNC: 0.94 MG/DL — SIGNIFICANT CHANGE UP (ref 0.5–1.3)
EGFR: 85 ML/MIN/1.73M2 — SIGNIFICANT CHANGE UP
ESTIMATED AVERAGE GLUCOSE: 223 — SIGNIFICANT CHANGE UP
GLUCOSE BLDC GLUCOMTR-MCNC: 141 MG/DL — HIGH (ref 70–99)
GLUCOSE BLDC GLUCOMTR-MCNC: 191 MG/DL — HIGH (ref 70–99)
GLUCOSE BLDC GLUCOMTR-MCNC: 203 MG/DL — HIGH (ref 70–99)
GLUCOSE BLDC GLUCOMTR-MCNC: 331 MG/DL — HIGH (ref 70–99)
GLUCOSE SERPL-MCNC: 197 MG/DL — HIGH (ref 70–99)
HCT VFR BLD CALC: 38 % — LOW (ref 39–50)
HDLC SERPL-MCNC: 32 MG/DL — LOW
HGB BLD-MCNC: 11.7 G/DL — LOW (ref 13–17)
LIPID PNL WITH DIRECT LDL SERPL: 20 MG/DL — SIGNIFICANT CHANGE UP
MAGNESIUM SERPL-MCNC: 2 MG/DL — SIGNIFICANT CHANGE UP (ref 1.6–2.6)
MCHC RBC-ENTMCNC: 19.4 PG — LOW (ref 27–34)
MCHC RBC-ENTMCNC: 30.8 GM/DL — LOW (ref 32–36)
MCV RBC AUTO: 62.9 FL — LOW (ref 80–100)
NON HDL CHOLESTEROL: 95 MG/DL — SIGNIFICANT CHANGE UP
NRBC # BLD: 0 /100 WBCS — SIGNIFICANT CHANGE UP (ref 0–0)
NRBC # FLD: 0 K/UL — SIGNIFICANT CHANGE UP (ref 0–0)
PHOSPHATE SERPL-MCNC: 3.2 MG/DL — SIGNIFICANT CHANGE UP (ref 2.5–4.5)
PLATELET # BLD AUTO: 296 K/UL — SIGNIFICANT CHANGE UP (ref 150–400)
POTASSIUM SERPL-MCNC: 4.2 MMOL/L — SIGNIFICANT CHANGE UP (ref 3.5–5.3)
POTASSIUM SERPL-SCNC: 4.2 MMOL/L — SIGNIFICANT CHANGE UP (ref 3.5–5.3)
RBC # BLD: 6.04 M/UL — HIGH (ref 4.2–5.8)
RBC # FLD: 20 % — HIGH (ref 10.3–14.5)
SODIUM SERPL-SCNC: 134 MMOL/L — LOW (ref 135–145)
TRIGL SERPL-MCNC: 377 MG/DL — HIGH
WBC # BLD: 8.35 K/UL — SIGNIFICANT CHANGE UP (ref 3.8–10.5)
WBC # FLD AUTO: 8.35 K/UL — SIGNIFICANT CHANGE UP (ref 3.8–10.5)

## 2024-07-13 PROCEDURE — 99232 SBSQ HOSP IP/OBS MODERATE 35: CPT

## 2024-07-13 PROCEDURE — 93880 EXTRACRANIAL BILAT STUDY: CPT | Mod: 26

## 2024-07-13 PROCEDURE — 93010 ELECTROCARDIOGRAM REPORT: CPT

## 2024-07-13 RX ORDER — INSULIN LISPRO 100 [IU]/ML
3 INJECTION, SOLUTION SUBCUTANEOUS
Refills: 0 | Status: DISCONTINUED | OUTPATIENT
Start: 2024-07-13 | End: 2024-07-15

## 2024-07-13 RX ORDER — INSULIN GLARGINE 100 [IU]/ML
10 INJECTION, SOLUTION SUBCUTANEOUS AT BEDTIME
Refills: 0 | Status: DISCONTINUED | OUTPATIENT
Start: 2024-07-13 | End: 2024-07-15

## 2024-07-13 RX ORDER — INSULIN LISPRO 100 [IU]/ML
INJECTION, SOLUTION SUBCUTANEOUS
Refills: 0 | Status: DISCONTINUED | OUTPATIENT
Start: 2024-07-13 | End: 2024-07-15

## 2024-07-13 RX ORDER — ACETAMINOPHEN 325 MG
650 TABLET ORAL ONCE
Refills: 0 | Status: COMPLETED | OUTPATIENT
Start: 2024-07-13 | End: 2024-07-13

## 2024-07-13 RX ADMIN — Medication 25 MILLIGRAM(S): at 05:26

## 2024-07-13 RX ADMIN — Medication 1000 MICROGRAM(S): at 10:08

## 2024-07-13 RX ADMIN — ATORVASTATIN CALCIUM 40 MILLIGRAM(S): 20 TABLET, FILM COATED ORAL at 21:06

## 2024-07-13 RX ADMIN — Medication 650 MILLIGRAM(S): at 10:54

## 2024-07-13 RX ADMIN — INSULIN GLARGINE 10 UNIT(S): 100 INJECTION, SOLUTION SUBCUTANEOUS at 21:07

## 2024-07-13 RX ADMIN — Medication 25 MILLIGRAM(S): at 17:54

## 2024-07-13 RX ADMIN — Medication 1 TABLET(S): at 21:06

## 2024-07-13 RX ADMIN — Medication 650 MILLIGRAM(S): at 10:08

## 2024-07-13 RX ADMIN — LOSARTAN POTASSIUM 50 MILLIGRAM(S): 100 TABLET, FILM COATED ORAL at 05:26

## 2024-07-13 RX ADMIN — Medication 1 MILLIGRAM(S): at 10:08

## 2024-07-13 RX ADMIN — VENLAFAXINE 37.5 MILLIGRAM(S): 37.5 CAPSULE, EXTENDED RELEASE ORAL at 10:08

## 2024-07-13 RX ADMIN — INSULIN LISPRO 1: 100 INJECTION, SOLUTION SUBCUTANEOUS at 08:34

## 2024-07-13 RX ADMIN — Medication 20 MILLIGRAM(S): at 10:08

## 2024-07-13 RX ADMIN — TICAGRELOR 90 MILLIGRAM(S): 90 TABLET ORAL at 17:54

## 2024-07-13 RX ADMIN — INSULIN LISPRO 3 UNIT(S): 100 INJECTION, SOLUTION SUBCUTANEOUS at 17:40

## 2024-07-13 RX ADMIN — INSULIN LISPRO 4: 100 INJECTION, SOLUTION SUBCUTANEOUS at 12:12

## 2024-07-13 RX ADMIN — TICAGRELOR 90 MILLIGRAM(S): 90 TABLET ORAL at 05:26

## 2024-07-13 RX ADMIN — ASPIRIN 81 MILLIGRAM(S): 325 TABLET, FILM COATED ORAL at 10:08

## 2024-07-13 NOTE — PROGRESS NOTE ADULT - SUBJECTIVE AND OBJECTIVE BOX
Patient is a 73y old  Male who presents with a chief complaint of Syncope (12 Jul 2024 10:27)      SUBJECTIVE / OVERNIGHT EVENTS:    MEDICATIONS  (STANDING):  aspirin  chewable 81 milliGRAM(s) Oral daily  atorvastatin 40 milliGRAM(s) Oral at bedtime  cyanocobalamin 1000 MICROGram(s) Oral daily  dextrose 5%. 1000 milliLiter(s) (100 mL/Hr) IV Continuous <Continuous>  dextrose 5%. 1000 milliLiter(s) (50 mL/Hr) IV Continuous <Continuous>  dextrose 50% Injectable 12.5 Gram(s) IV Push once  dextrose 50% Injectable 25 Gram(s) IV Push once  dextrose 50% Injectable 25 Gram(s) IV Push once  famotidine    Tablet 20 milliGRAM(s) Oral daily  folic acid 1 milliGRAM(s) Oral daily  glucagon  Injectable 1 milliGRAM(s) IntraMuscular once  insulin lispro (ADMELOG) corrective regimen sliding scale   SubCutaneous at bedtime  insulin lispro (ADMELOG) corrective regimen sliding scale   SubCutaneous three times a day before meals  losartan 50 milliGRAM(s) Oral daily  meclizine 25 milliGRAM(s) Oral two times a day  senna 1 Tablet(s) Oral at bedtime  ticagrelor 90 milliGRAM(s) Oral every 12 hours  venlafaxine 37.5 milliGRAM(s) Oral daily    MEDICATIONS  (PRN):  dextrose Oral Gel 15 Gram(s) Oral once PRN Blood Glucose LESS THAN 70 milliGRAM(s)/deciliter        CAPILLARY BLOOD GLUCOSE      POCT Blood Glucose.: 331 mg/dL (13 Jul 2024 11:38)  POCT Blood Glucose.: 191 mg/dL (13 Jul 2024 07:38)  POCT Blood Glucose.: 224 mg/dL (12 Jul 2024 21:39)  POCT Blood Glucose.: 198 mg/dL (12 Jul 2024 17:01)    I&O's Summary    Vital Signs Last 24 Hrs  T(F): 98.4 (13 Jul 2024 11:15), Max: 98.6 (12 Jul 2024 22:05)  HR: 89 (13 Jul 2024 11:15) (83 - 89)  BP: 111/71 (13 Jul 2024 11:15) (111/71 - 139/85)  RR: 17 (13 Jul 2024 11:15) (16 - 18)  SpO2: 97%  room air      PHYSICAL EXAM:  GENERAL: NAD,   HEAD:  Atraumatic, Normocephalic  EYES: EOMI, PERRLA, conjunctiva and sclera clear  NECK: Supple, No JVD  CHEST/LUNG: Clear to auscultation bilaterally; No wheeze  HEART: Regular rate and rhythm; No murmurs, rubs, or gallops  ABDOMEN: Soft, Nontender, Nondistended; Bowel sounds present  EXTREMITIES:  2+ Peripheral Pulses, No clubbing, cyanosis, or edema  PSYCH: AAOx3  NEUROLOGY: non-focal  SKIN: No rashes or lesions    LABS:                        11.7   8.35  )-----------( 296      ( 13 Jul 2024 08:57 )             38.0     07-13    134<L>  |  103  |  22  ----------------------------<  197<H>  4.2   |  20<L>  |  0.94    Ca    9.1      13 Jul 2024 08:57  Phos  3.2     07-13  Mg     2.00     07-13    TPro  7.4  /  Alb  3.9  /  TBili  0.4  /  DBili  <0.2  /  AST  51<H>  /  ALT  40  /  AlkPhos  86  07-12        RADIOLOGY & ADDITIONAL TESTS:  < from: US Abdomen Limited (07.12.24 @ 15:15) >  IMPRESSION:  Fatty infiltration of the liver.  No evidence of ascites.      Care Discussed with Consultants/Other Providers:   Patient is a 73y old  Male who presents with a chief complaint of Syncope (12 Jul 2024 10:27)      SUBJECTIVE / OVERNIGHT EVENTS:  Patient alert and ox3  not sure if he had Ct od head at [facility].  Notes he sees PCP for his glucose, does not see Endocrine      MEDICATIONS  (STANDING):  aspirin  chewable 81 milliGRAM(s) Oral daily  atorvastatin 40 milliGRAM(s) Oral at bedtime  cyanocobalamin 1000 MICROGram(s) Oral daily  dextrose 5%. 1000 milliLiter(s) (100 mL/Hr) IV Continuous <Continuous>  dextrose 5%. 1000 milliLiter(s) (50 mL/Hr) IV Continuous <Continuous>  dextrose 50% Injectable 12.5 Gram(s) IV Push once  dextrose 50% Injectable 25 Gram(s) IV Push once  dextrose 50% Injectable 25 Gram(s) IV Push once  famotidine    Tablet 20 milliGRAM(s) Oral daily  folic acid 1 milliGRAM(s) Oral daily  glucagon  Injectable 1 milliGRAM(s) IntraMuscular once  insulin lispro (ADMELOG) corrective regimen sliding scale   SubCutaneous at bedtime  insulin lispro (ADMELOG) corrective regimen sliding scale   SubCutaneous three times a day before meals  losartan 50 milliGRAM(s) Oral daily  meclizine 25 milliGRAM(s) Oral two times a day  senna 1 Tablet(s) Oral at bedtime  ticagrelor 90 milliGRAM(s) Oral every 12 hours  venlafaxine 37.5 milliGRAM(s) Oral daily    MEDICATIONS  (PRN):  dextrose Oral Gel 15 Gram(s) Oral once PRN Blood Glucose LESS THAN 70 milliGRAM(s)/deciliter        CAPILLARY BLOOD GLUCOSE      POCT Blood Glucose.: 331 mg/dL (13 Jul 2024 11:38)  POCT Blood Glucose.: 191 mg/dL (13 Jul 2024 07:38)  POCT Blood Glucose.: 224 mg/dL (12 Jul 2024 21:39)  POCT Blood Glucose.: 198 mg/dL (12 Jul 2024 17:01)    I&O's Summary    Vital Signs Last 24 Hrs  T(F): 98.4 (13 Jul 2024 11:15), Max: 98.6 (12 Jul 2024 22:05)  HR: 89 (13 Jul 2024 11:15) (83 - 89)  BP: 111/71 (13 Jul 2024 11:15) (111/71 - 139/85)  RR: 17 (13 Jul 2024 11:15) (16 - 18)  SpO2: 97%  room air      PHYSICAL EXAM:  GENERAL: NAD, Pleasant man  HEAD:  Atraumatic, Normocephalic  EYES: EOMI, PERRLA, conjunctiva and sclera clear  NECK: Supple, No JVD  CHEST/LUNG: Clear to auscultation bilaterally; No wheeze  L chest with dressing over ILD  HEART: Regular rate and rhythm; No murmurs, rubs, or gallops  ABDOMEN: Soft, Nontender, Nondistended; Bowel sounds present  EXTREMITIES:  2+ Peripheral Pulses, No clubbing, cyanosis, or edema  PSYCH: AAOx3  NEUROLOGY: non-focal  SKIN: No rashes or lesions    LABS:                        11.7   8.35  )-----------( 296      ( 13 Jul 2024 08:57 )             38.0     07-13    134<L>  |  103  |  22  ----------------------------<  197<H>  4.2   |  20<L>  |  0.94    Ca    9.1      13 Jul 2024 08:57  Phos  3.2     07-13  Mg     2.00     07-13    TPro  7.4  /  Alb  3.9  /  TBili  0.4  /  DBili  <0.2  /  AST  51<H>  /  ALT  40  /  AlkPhos  86  07-12        RADIOLOGY & ADDITIONAL TESTS:  < from: US Abdomen Limited (07.12.24 @ 15:15) >  IMPRESSION:  Fatty infiltration of the liver.  No evidence of ascites.      Care Discussed with Consultants/Other Providers:  Patient /pa

## 2024-07-13 NOTE — PHYSICAL THERAPY INITIAL EVALUATION ADULT - LEVEL OF INDEPENDENCE: SIT/STAND, REHAB EVAL
held due to patient stating he was dizzy and wanting to lay back down, RN Karen aware./unable to perform

## 2024-07-13 NOTE — PHYSICAL THERAPY INITIAL EVALUATION ADULT - ADDITIONAL COMMENTS
Patient lives with family in private home, no steps to negotiate. patient was not using an assistive device prior to admission. Patient was independent in all ADL prior to admission.

## 2024-07-13 NOTE — PHYSICAL THERAPY INITIAL EVALUATION ADULT - PERTINENT HX OF CURRENT PROBLEM, REHAB EVAL
72 year old Male presenting as a transfer from [facility] for syncope and EP evaluation for bifascicular block. Neurology consulted for vertigo-like symptoms. Pt noted to have these symptoms in the past. Pt is non-focal on exam.

## 2024-07-13 NOTE — PROGRESS NOTE ADULT - ASSESSMENT
Patient is a 72M with a PMH of VA stenosis s/p R VA stent in 2016 on ASA/plavix, bilateral cerebellar infarcts, HTN, DM, HLD, R ICA stenosis s/p stent in 10/2023 who initially presented to [facility] s/p syncope.  Transferred to [facility] for EP eval for bifascicular block.   Patient is a 72M with a PMH of VA stenosis s/p R VA stent in 2016 on ASA/plavix, bilateral cerebellar infarcts, HTN, DM, HLD, R ICA stenosis s/p stent in 10/2023 who initially presented to [facility] s/p syncope.  Transferred to [facility] for EP eval for bifascicular block.      Dysequilibrium due to b/l cerebellar infarcts in addition to known intermittent positional peripheral vertigo potentially acutely worsened due to other active medical issues. Low suspicion for new central etiology. as per neurology 7/12

## 2024-07-13 NOTE — PROGRESS NOTE ADULT - PROBLEM SELECTOR PLAN 6
insulin corrective scale insulin corrective scale  hem Aic 9.3  Endocrine consult requested insulin corrective scale  hem Aic 9.3  Endocrine consult requested  start Lantus 10 u qhs  start ADMELOG 3 units with meals 3x/day, and  low dose ss coverage

## 2024-07-13 NOTE — PROGRESS NOTE ADULT - PROBLEM SELECTOR PLAN 3
Have pt scheduled for 5/27 at 2:40 PM.    Abdominal distension noted on exam.  Mild fluid wave  Will get LFTs and send for US abdomen Abdominal distension noted on exam.  Mild fluid wave  Will get LFTs     < from: US Abdomen Limited (07.12.24 @ 15:15)   IMPRESSION:  Fatty infiltration of the liver.  No evidence of ascites.

## 2024-07-13 NOTE — PHYSICAL THERAPY INITIAL EVALUATION ADULT - NSPTDISCHREC_GEN_A_CORE
Patient called and left a message that she would like for a nurse to give her a call about her situation and she can be reached @ [phone].
Per Dr Chinyere Willoughby, patient advised she needs to return to the [facility] office today for a repeat HCG level. She states she is available and will come in this afternoon.
No skilled PT needs

## 2024-07-13 NOTE — PHYSICAL THERAPY INITIAL EVALUATION ADULT - PREDICTED DURATION OF THERAPY (DAYS/WKS), PT EVAL
1-2 weeks Alert-The patient is alert, awake and responds to voice. The patient is oriented to time, place, and person. The triage nurse is able to obtain subjective information.

## 2024-07-13 NOTE — PROGRESS NOTE ADULT - PROBLEM SELECTOR PLAN 2
CT head as per above  Patient reports dizziness upon change in position - consider BPPV  R to L nystagmus noted on exam  Started on meclizine at [facility], will continue for now   F/u CT head results  Will consult Neurology CT head as per above  Patient reports dizziness upon change in position - consider BPPV  R to L nystagmus noted on exam  Started on meclizine at [facility], will continue for now   F/u CT head results  Seen by Neurology 7/12:  "Recommendations:   - No further neuroimaging indicated at this time  - Continue home ASA 81mg QD  - Continue home Brilinta   - Continue home Atorvastatin QHS  - Continue Meclizine 25mg and titrate up as necessary  - Continue cardiac work-up  - Please have  see pt regarding safety at home (noted situations escalating to violence at home)  - Upon discharge, PT should F/U with either Dr. Salazar or Dr. Black: [phone] [address] [place] [address]" CT head as per prior MD  Patient reports dizziness upon change in position - consider BPPV  R to L nystagmus noted on exam  Started on meclizine at [facility], will continue for now   F/u CT head results  Seen by Neurology 7/12:  "Recommendations:   - No further neuroimaging indicated at this time  - Continue home ASA 81mg QD  - Continue home Brilinta   - Continue home Atorvastatin QHS  - Continue Meclizine 25mg and titrate up as necessary  - Continue cardiac work-up  - Please have  see pt regarding safety at home (noted situations escalating to violence at home)  - Upon discharge, PT should F/U with either Dr. Salazar or Dr. Black: [phone] [address] [place] [address]" CT head as per prior MD  Patient reports dizziness upon change in position - consider BPPV  R to L nystagmus noted on exam  Started on meclizine at [facility], will continue for now   F/u CT head results--> ordered  Seen by Neurology 7/12:  "Recommendations:   - No further neuroimaging indicated at this time  - Continue home ASA 81mg QD  - Continue home Brilinta   - Continue home Atorvastatin QHS  - Continue Meclizine 25mg and titrate up as necessary  - Continue cardiac work-up  - Please have  see pt regarding safety at home (noted situations escalating to violence at home)  - Upon discharge, PT should F/U with either Dr. Salazar or Dr. Black: [phone] [address] [place] [address]" Patient reports dizziness upon change in position - consider BPPV  R to L nystagmus noted on exam  Started on meclizine at [facility], will continue for now   Seen by Neurology 7/12:---> Dysequilibrium due to b/l cerebellar infarcts in addition to known intermittent positional peripheral vertigo potentially acutely worsened due to other active medical issues. Low suspicion for new central etiology.     "Recommendations:   - No further neuroimaging indicated at this time  - Continue home ASA 81mg QD  - Continue home Brilinta   - Continue home Atorvastatin QHS  - Continue Meclizine 25mg and titrate up as necessary  - Continue cardiac work-up  - Please have  see pt regarding safety at home (noted situations escalating to violence at home)  - Upon discharge, PT should F/U with either Dr. Salazar or Dr. Black: [phone] [address] [place] [address]"

## 2024-07-13 NOTE — PROGRESS NOTE ADULT - PROBLEM SELECTOR PLAN 1
Patient was transferred from [facility] for EP eval.  EKG shows bifascicular block.      Transfer documents incomplete, unclear what workup was done at [facility]    EP on board - plan for ILR today  Unclear if cardiac was the primary cause of patient's syncope    EKG shows bifascicular black - appears mostly unchanged from 10/23  Continue Telemetry Monitoring   Will send for CT Head, TTE ordered    Last TTE 10/23.  EF 52%.  No diastolic dysfunction noted    Will also send for carotid doppler as patient has a history of ICA stenosis,   Patient noted to have drop of BP on orthostatics.  Chart review shows he was once taking fludrocortisone.    After ICA stent last year patient had a drop of BP, required fludrocortisone on DC.    No longer taking fludrocortisone.    Will decrease dose of losartan from 100 to 50.  Try orthostatics again tomorrow     PT Evaluation Patient was transferred from [facility] for EP eval.  EKG shows bifascicular block.      Transfer documents incomplete, unclear what workup was done at [facility]    EP on board - plan for ILR todayUnclear if cardiac was the primary cause of patient's syncope    EKG shows bifascicular black - appears mostly unchanged from 10/23Continue Telemetry Monitoring   Will send for CT Head, TTE ordered  Last TTE 10/23.  EF 52%.  No diastolic dysfunction noted    Will also send for carotid doppler as patient has a history of ICA stenosis,   Patient noted to have drop of BP on orthostatics   Chart review shows he was once taking fludrocortisone.    After ICA stent last year patient had a drop of BP, required fludrocortisone on DC.No longer taking fludrocortisone.    Will decrease dose of losartan from 100 to 50.  Try orthostatics again 7/13  Seen by EP 7/12:  Patient consented for ILR placement.  - Continue to monitor on telemetry, - Maintain K>4 and Mg>2, - Echocardiogram to evaluate LV function and valve function  - Plan for ILR placement ---> done 7/12/24 by EP S/P ILR implantation Patient was transferred from [facility] for EP eval.  EKG shows bifascicular block.      unclear what workup was done at [facility]    EP on board - plan for ILR --> Unclear if cardiac was the primary cause of patient's syncope    EKG shows bifascicular black - appears mostly unchanged from 10/23Continue Telemetry Monitoring   Will send for CT Head, TTE ordered  Last TTE 10/23.  EF 52%.  No diastolic dysfunction noted    Will also send for carotid doppler as patient has a history of ICA stenosis,   Patient noted to have drop of BP on orthostatics   Chart review shows he was once taking fludrocortisone.    After ICA stent last year patient had a drop of BP, required fludrocortisone on DC.No longer taking fludrocortisone.    Will decrease dose of losartan from 100 to 50.  Try orthostatics again 7/13  Seen by EP 7/12: Patient consented for ILR implantation  done 7/12/24 by BONNIE Patient was transferred from [facility] for EP eval.  EKG shows bifascicular block.      unclear what workup was done at [facility]    EP on board - plan for ILR --> Unclear if cardiac was the primary cause of patient's syncope    EKG shows bifascicular black - appears mostly unchanged from 10/23Continue Telemetry Monitoring   Prior mD noted--> Will send for CT Head, TTE ordered  Last TTE 10/23.  EF 52%.  No diastolic dysfunction noted    Will also send for carotid doppler as patient has a history of ICA stenosis,   Patient noted to have drop of BP on orthostatics   Chart review shows he was once taking fludrocortisone.    After ICA stent last year patient had a drop of BP, required fludrocortisone on DC.No longer taking fludrocortisone.    Will decrease dose of losartan from 100 to 50.  Try orthostatics again 7/13  Seen by EP 7/12: Patient consented for ILR implantation  done 7/12/24 by BONNIE

## 2024-07-14 LAB
ANION GAP SERPL CALC-SCNC: 13 MMOL/L — SIGNIFICANT CHANGE UP (ref 7–14)
BUN SERPL-MCNC: 21 MG/DL — SIGNIFICANT CHANGE UP (ref 7–23)
CALCIUM SERPL-MCNC: 9.2 MG/DL — SIGNIFICANT CHANGE UP (ref 8.4–10.5)
CHLORIDE SERPL-SCNC: 101 MMOL/L — SIGNIFICANT CHANGE UP (ref 98–107)
CO2 SERPL-SCNC: 21 MMOL/L — LOW (ref 22–31)
CREAT SERPL-MCNC: 0.94 MG/DL — SIGNIFICANT CHANGE UP (ref 0.5–1.3)
EGFR: 85 ML/MIN/1.73M2 — SIGNIFICANT CHANGE UP
GLUCOSE BLDC GLUCOMTR-MCNC: 188 MG/DL — HIGH (ref 70–99)
GLUCOSE BLDC GLUCOMTR-MCNC: 195 MG/DL — HIGH (ref 70–99)
GLUCOSE BLDC GLUCOMTR-MCNC: 219 MG/DL — HIGH (ref 70–99)
GLUCOSE BLDC GLUCOMTR-MCNC: 227 MG/DL — HIGH (ref 70–99)
GLUCOSE SERPL-MCNC: 215 MG/DL — HIGH (ref 70–99)
HCT VFR BLD CALC: 38.2 % — LOW (ref 39–50)
HGB BLD-MCNC: 11.7 G/DL — LOW (ref 13–17)
MAGNESIUM SERPL-MCNC: 1.9 MG/DL — SIGNIFICANT CHANGE UP (ref 1.6–2.6)
MCHC RBC-ENTMCNC: 19.1 PG — LOW (ref 27–34)
MCHC RBC-ENTMCNC: 30.6 GM/DL — LOW (ref 32–36)
MCV RBC AUTO: 62.5 FL — LOW (ref 80–100)
NRBC # BLD: 0 /100 WBCS — SIGNIFICANT CHANGE UP (ref 0–0)
NRBC # FLD: 0 K/UL — SIGNIFICANT CHANGE UP (ref 0–0)
PHOSPHATE SERPL-MCNC: 3.3 MG/DL — SIGNIFICANT CHANGE UP (ref 2.5–4.5)
PLATELET # BLD AUTO: 275 K/UL — SIGNIFICANT CHANGE UP (ref 150–400)
POTASSIUM SERPL-MCNC: 4 MMOL/L — SIGNIFICANT CHANGE UP (ref 3.5–5.3)
POTASSIUM SERPL-SCNC: 4 MMOL/L — SIGNIFICANT CHANGE UP (ref 3.5–5.3)
RBC # BLD: 6.11 M/UL — HIGH (ref 4.2–5.8)
RBC # FLD: 20.4 % — HIGH (ref 10.3–14.5)
SODIUM SERPL-SCNC: 135 MMOL/L — SIGNIFICANT CHANGE UP (ref 135–145)
WBC # BLD: 8.2 K/UL — SIGNIFICANT CHANGE UP (ref 3.8–10.5)
WBC # FLD AUTO: 8.2 K/UL — SIGNIFICANT CHANGE UP (ref 3.8–10.5)

## 2024-07-14 PROCEDURE — 99232 SBSQ HOSP IP/OBS MODERATE 35: CPT

## 2024-07-14 RX ORDER — POLYVINYL ALCOHOL, POVIDONE .5; .6 G/100ML; G/100ML
1 SOLUTION OPHTHALMIC THREE TIMES A DAY
Refills: 0 | Status: DISCONTINUED | OUTPATIENT
Start: 2024-07-14 | End: 2024-07-19

## 2024-07-14 RX ADMIN — INSULIN GLARGINE 10 UNIT(S): 100 INJECTION, SOLUTION SUBCUTANEOUS at 21:52

## 2024-07-14 RX ADMIN — Medication 20 MILLIGRAM(S): at 12:18

## 2024-07-14 RX ADMIN — Medication 25 MILLIGRAM(S): at 17:09

## 2024-07-14 RX ADMIN — INSULIN LISPRO 1: 100 INJECTION, SOLUTION SUBCUTANEOUS at 17:39

## 2024-07-14 RX ADMIN — Medication 1 TABLET(S): at 21:53

## 2024-07-14 RX ADMIN — ATORVASTATIN CALCIUM 40 MILLIGRAM(S): 20 TABLET, FILM COATED ORAL at 21:53

## 2024-07-14 RX ADMIN — Medication 25 MILLIGRAM(S): at 06:30

## 2024-07-14 RX ADMIN — POLYVINYL ALCOHOL, POVIDONE 1 DROP(S): .5; .6 SOLUTION OPHTHALMIC at 13:04

## 2024-07-14 RX ADMIN — INSULIN LISPRO 3 UNIT(S): 100 INJECTION, SOLUTION SUBCUTANEOUS at 12:17

## 2024-07-14 RX ADMIN — TICAGRELOR 90 MILLIGRAM(S): 90 TABLET ORAL at 06:30

## 2024-07-14 RX ADMIN — VENLAFAXINE 37.5 MILLIGRAM(S): 37.5 CAPSULE, EXTENDED RELEASE ORAL at 12:18

## 2024-07-14 RX ADMIN — Medication 1 MILLIGRAM(S): at 12:17

## 2024-07-14 RX ADMIN — INSULIN LISPRO 2: 100 INJECTION, SOLUTION SUBCUTANEOUS at 08:34

## 2024-07-14 RX ADMIN — TICAGRELOR 90 MILLIGRAM(S): 90 TABLET ORAL at 17:09

## 2024-07-14 RX ADMIN — ASPIRIN 81 MILLIGRAM(S): 325 TABLET, FILM COATED ORAL at 12:18

## 2024-07-14 RX ADMIN — Medication 1000 MICROGRAM(S): at 12:18

## 2024-07-14 RX ADMIN — INSULIN LISPRO 1: 100 INJECTION, SOLUTION SUBCUTANEOUS at 12:16

## 2024-07-14 RX ADMIN — LOSARTAN POTASSIUM 50 MILLIGRAM(S): 100 TABLET, FILM COATED ORAL at 06:30

## 2024-07-14 RX ADMIN — INSULIN LISPRO 3 UNIT(S): 100 INJECTION, SOLUTION SUBCUTANEOUS at 17:39

## 2024-07-14 NOTE — PROGRESS NOTE ADULT - PROBLEM SELECTOR PLAN 1
Patient was transferred from [facility] for EP eval.  EKG shows bifascicular block.      unclear what workup was done at [facility]    EP on board - plan for ILR --> Unclear if cardiac was the primary cause of patient's syncope    EKG shows bifascicular black - appears mostly unchanged from 10/23Continue Telemetry Monitoring   Prior mD noted--> Will send for CT Head, TTE ordered  Last TTE 10/23.  EF 52%.  No diastolic dysfunction noted    Will also send for carotid doppler as patient has a history of ICA stenosis,   Patient noted to have drop of BP on orthostatics   Chart review by prior MD, shows he was once taking fludrocortisone.    After ICA stent last year patient had a drop of BP, required fludrocortisone on DC.  No longer taking fludrocortisone.    Will decrease dose of losartan from 100 to 50.  Try orthostatics again 7/13  Seen by EP 7/12: Patient consented for ILR implantation  done 7/12/24 by BONNIE

## 2024-07-14 NOTE — PROGRESS NOTE ADULT - PROBLEM SELECTOR PLAN 3
Abdominal distension noted on exam.  Mild fluid wave  Will get LFTs     < from: US Abdomen Limited (07.12.24 @ 15:15)   IMPRESSION:  Fatty infiltration of the liver.  No evidence of ascites.

## 2024-07-14 NOTE — PROGRESS NOTE ADULT - PROBLEM SELECTOR PLAN 6
insulin corrective scale  hem Aic 9.3  Endocrine consult requested  start Lantus 10 u qhs  start ADMELOG 3 units with meals 3x/day, and  low dose ss coverage insulin corrective scale  hem Aic 9.3  7/14 start Lantus 10 u qhs  start ADMELOG 3 units with meals 3x/day, and  low dose ss coverage  FS improved today.  Endocrine consult requested

## 2024-07-14 NOTE — PROGRESS NOTE ADULT - PROBLEM SELECTOR PLAN 2
Patient reports dizziness upon change in position - consider BPPV  R to L nystagmus noted on exam  Started on meclizine at [facility], will continue for now   Seen by Neurology 7/12:---> Dysequilibrium due to b/l cerebellar infarcts in addition to known intermittent positional peripheral vertigo potentially acutely worsened due to other active medical issues. Low suspicion for new central etiology.     "Recommendations:   - No further neuroimaging indicated at this time  - Continue home ASA 81mg QD  - Continue home Brilinta   - Continue home Atorvastatin QHS  - Continue Meclizine 25mg and titrate up as necessary  - Continue cardiac work-up  - Please have  see pt regarding safety at home (noted situations escalating to violence at home)  - Upon discharge, PT should F/U with either Dr. Salazar or Dr. Black: [phone] [address] [place] [address]"

## 2024-07-14 NOTE — PROGRESS NOTE ADULT - ASSESSMENT
Patient is a 72M with a PMH of VA stenosis s/p R VA stent in 2016 on ASA/plavix, bilateral cerebellar infarcts, HTN, DM, HLD, R ICA stenosis s/p stent in 10/2023 who initially presented to [facility] s/p syncope.  Transferred to [facility] for EP eval for bifascicular block.      Dysequilibrium due to b/l cerebellar infarcts in addition to known intermittent positional peripheral vertigo potentially acutely worsened due to other active medical issues. Low suspicion for new central etiology. as per neurology 7/12

## 2024-07-14 NOTE — PROGRESS NOTE ADULT - SUBJECTIVE AND OBJECTIVE BOX
Patient is a 73y old  Male who presents with a chief complaint of Transfer [facility]---> [facility] for bifascicular block. (13 Jul 2024 14:46)      SUBJECTIVE / OVERNIGHT EVENTS:    MEDICATIONS  (STANDING):  aspirin  chewable 81 milliGRAM(s) Oral daily  atorvastatin 40 milliGRAM(s) Oral at bedtime  cyanocobalamin 1000 MICROGram(s) Oral daily  dextrose 5%. 1000 milliLiter(s) (100 mL/Hr) IV Continuous <Continuous>  dextrose 5%. 1000 milliLiter(s) (50 mL/Hr) IV Continuous <Continuous>  dextrose 50% Injectable 12.5 Gram(s) IV Push once  dextrose 50% Injectable 25 Gram(s) IV Push once  dextrose 50% Injectable 25 Gram(s) IV Push once  famotidine    Tablet 20 milliGRAM(s) Oral daily  folic acid 1 milliGRAM(s) Oral daily  glucagon  Injectable 1 milliGRAM(s) IntraMuscular once  insulin glargine Injectable (LANTUS) 10 Unit(s) SubCutaneous at bedtime  insulin lispro (ADMELOG) corrective regimen sliding scale   SubCutaneous three times a day before meals  insulin lispro Injectable (ADMELOG) 3 Unit(s) SubCutaneous three times a day before meals  losartan 50 milliGRAM(s) Oral daily  meclizine 25 milliGRAM(s) Oral two times a day  senna 1 Tablet(s) Oral at bedtime  ticagrelor 90 milliGRAM(s) Oral every 12 hours  venlafaxine 37.5 milliGRAM(s) Oral daily    MEDICATIONS  (PRN):  artificial  tears Solution 1 Drop(s) Both EYES three times a day PRN Dry Eyes  dextrose Oral Gel 15 Gram(s) Oral once PRN Blood Glucose LESS THAN 70 milliGRAM(s)/deciliter        CAPILLARY BLOOD GLUCOSE      POCT Blood Glucose.: 188 mg/dL (14 Jul 2024 11:40)  POCT Blood Glucose.: 227 mg/dL (14 Jul 2024 07:51)  POCT Blood Glucose.: 203 mg/dL (13 Jul 2024 20:46)  POCT Blood Glucose.: 141 mg/dL (13 Jul 2024 17:06)    I&O's Summary      PHYSICAL EXAM:  GENERAL: NAD,   HEAD:  Atraumatic, Normocephalic  EYES: EOMI, PERRLA, conjunctiva and sclera clear  NECK: Supple, No JVD  CHEST/LUNG: Clear to auscultation bilaterally; No wheeze  HEART: Regular rate and rhythm; No murmurs, rubs, or gallops  ABDOMEN: Soft, Nontender, Nondistended; Bowel sounds present  EXTREMITIES:  2+ Peripheral Pulses, No clubbing, cyanosis, or edema  PSYCH: Alert    LABS:                        11.7   8.20  )-----------( 275      ( 14 Jul 2024 06:24 )             38.2     07-14    135  |  101  |  21  ----------------------------<  215<H>  4.0   |  21<L>  |  0.94    Ca    9.2      14 Jul 2024 06:24  Phos  3.3     07-14  Mg     1.90     07-14    TPro  7.4  /  Alb  3.9  /  TBili  0.4  /  DBili  <0.2  /  AST  51<H>  /  ALT  40  /  AlkPhos  86  07-12            Care Discussed with Consultants/Other Providers:  F/u endocrine d/w PA   Patient is a 73y old  Male who presents with a chief complaint of Transfer [facility]---> Transferred to [facility] for EP eval for bifascicular block. (13 Jul 2024 14:46)      SUBJECTIVE / OVERNIGHT EVENTS:  c/o Neck and head feels heavy.   No ch in MS or weakness    MEDICATIONS  (STANDING):  aspirin  chewable 81 milliGRAM(s) Oral daily  atorvastatin 40 milliGRAM(s) Oral at bedtime  cyanocobalamin 1000 MICROGram(s) Oral daily  dextrose 5%. 1000 milliLiter(s) (100 mL/Hr) IV Continuous <Continuous>  dextrose 5%. 1000 milliLiter(s) (50 mL/Hr) IV Continuous <Continuous>  dextrose 50% Injectable 12.5 Gram(s) IV Push once  dextrose 50% Injectable 25 Gram(s) IV Push once  dextrose 50% Injectable 25 Gram(s) IV Push once  famotidine    Tablet 20 milliGRAM(s) Oral daily  folic acid 1 milliGRAM(s) Oral daily  glucagon  Injectable 1 milliGRAM(s) IntraMuscular once  insulin glargine Injectable (LANTUS) 10 Unit(s) SubCutaneous at bedtime  insulin lispro (ADMELOG) corrective regimen sliding scale   SubCutaneous three times a day before meals  insulin lispro Injectable (ADMELOG) 3 Unit(s) SubCutaneous three times a day before meals  losartan 50 milliGRAM(s) Oral daily  meclizine 25 milliGRAM(s) Oral two times a day  senna 1 Tablet(s) Oral at bedtime  ticagrelor 90 milliGRAM(s) Oral every 12 hours  venlafaxine 37.5 milliGRAM(s) Oral daily    MEDICATIONS  (PRN):  artificial  tears Solution 1 Drop(s) Both EYES three times a day PRN Dry Eyes  dextrose Oral Gel 15 Gram(s) Oral once PRN Blood Glucose LESS THAN 70 milliGRAM(s)/deciliter        CAPILLARY BLOOD GLUCOSE      POCT Blood Glucose.: 188 mg/dL (14 Jul 2024 11:40)  POCT Blood Glucose.: 227 mg/dL (14 Jul 2024 07:51)  POCT Blood Glucose.: 203 mg/dL (13 Jul 2024 20:46)  POCT Blood Glucose.: 141 mg/dL (13 Jul 2024 17:06)    I&O's Summary      PHYSICAL EXAM:  GENERAL: NAD,   HEAD:  Atraumatic, Normocephalic  EYES: EOMI, PERRLA, conjunctiva and sclera clear  NECK: Supple, No JVD  CHEST/LUNG: Clear to auscultation bilaterally; No wheeze  HEART: Regular rate and rhythm; No murmurs, rubs, or gallops  ABDOMEN: Soft, Nontender, Nondistended; Bowel sounds present  EXTREMITIES:  2+ Peripheral Pulses, No clubbing, cyanosis, or edema  PSYCH: Alert  Neuro --> no focal deficits   LABS:                        11.7   8.20  )-----------( 275      ( 14 Jul 2024 06:24 )             38.2     07-14    135  |  101  |  21  ----------------------------<  215<H>  4.0   |  21<L>  |  0.94    Ca    9.2      14 Jul 2024 06:24  Phos  3.3     07-14  Mg     1.90     07-14    TPro  7.4  /  Alb  3.9  /  TBili  0.4  /  DBili  <0.2  /  AST  51<H>  /  ALT  40  /  AlkPhos  86  07-12            Care Discussed with Consultants/Other Providers:  F/u endocrine d/w PA

## 2024-07-14 NOTE — PROVIDER CONTACT NOTE (OTHER) - ASSESSMENT
Patient endorsing dizziness. Ventricular rhythm up to 115 on tele, now back in NSR. Denies headache, chest pain, shortness of breath, feelings of hypoglycemia.

## 2024-07-15 LAB
ANION GAP SERPL CALC-SCNC: 11 MMOL/L — SIGNIFICANT CHANGE UP (ref 7–14)
BUN SERPL-MCNC: 22 MG/DL — SIGNIFICANT CHANGE UP (ref 7–23)
CALCIUM SERPL-MCNC: 9.2 MG/DL — SIGNIFICANT CHANGE UP (ref 8.4–10.5)
CHLORIDE SERPL-SCNC: 102 MMOL/L — SIGNIFICANT CHANGE UP (ref 98–107)
CO2 SERPL-SCNC: 21 MMOL/L — LOW (ref 22–31)
CREAT SERPL-MCNC: 0.97 MG/DL — SIGNIFICANT CHANGE UP (ref 0.5–1.3)
EGFR: 81 ML/MIN/1.73M2 — SIGNIFICANT CHANGE UP
GLUCOSE BLDC GLUCOMTR-MCNC: 129 MG/DL — HIGH (ref 70–99)
GLUCOSE BLDC GLUCOMTR-MCNC: 191 MG/DL — HIGH (ref 70–99)
GLUCOSE BLDC GLUCOMTR-MCNC: 257 MG/DL — HIGH (ref 70–99)
GLUCOSE BLDC GLUCOMTR-MCNC: 289 MG/DL — HIGH (ref 70–99)
GLUCOSE SERPL-MCNC: 184 MG/DL — HIGH (ref 70–99)
HCT VFR BLD CALC: 36.9 % — LOW (ref 39–50)
HGB BLD-MCNC: 11.8 G/DL — LOW (ref 13–17)
MAGNESIUM SERPL-MCNC: 1.9 MG/DL — SIGNIFICANT CHANGE UP (ref 1.6–2.6)
MCHC RBC-ENTMCNC: 19.9 PG — LOW (ref 27–34)
MCHC RBC-ENTMCNC: 32 GM/DL — SIGNIFICANT CHANGE UP (ref 32–36)
MCV RBC AUTO: 62.1 FL — LOW (ref 80–100)
NRBC # BLD: 0 /100 WBCS — SIGNIFICANT CHANGE UP (ref 0–0)
NRBC # FLD: 0 K/UL — SIGNIFICANT CHANGE UP (ref 0–0)
PHOSPHATE SERPL-MCNC: 3.5 MG/DL — SIGNIFICANT CHANGE UP (ref 2.5–4.5)
PLATELET # BLD AUTO: 302 K/UL — SIGNIFICANT CHANGE UP (ref 150–400)
POTASSIUM SERPL-MCNC: 4.2 MMOL/L — SIGNIFICANT CHANGE UP (ref 3.5–5.3)
POTASSIUM SERPL-SCNC: 4.2 MMOL/L — SIGNIFICANT CHANGE UP (ref 3.5–5.3)
RBC # BLD: 5.94 M/UL — HIGH (ref 4.2–5.8)
RBC # FLD: 19.8 % — HIGH (ref 10.3–14.5)
SODIUM SERPL-SCNC: 134 MMOL/L — LOW (ref 135–145)
TSH SERPL-MCNC: 7.15 UIU/ML — HIGH (ref 0.27–4.2)
WBC # BLD: 8.99 K/UL — SIGNIFICANT CHANGE UP (ref 3.8–10.5)
WBC # FLD AUTO: 8.99 K/UL — SIGNIFICANT CHANGE UP (ref 3.8–10.5)

## 2024-07-15 PROCEDURE — 99222 1ST HOSP IP/OBS MODERATE 55: CPT

## 2024-07-15 PROCEDURE — 99233 SBSQ HOSP IP/OBS HIGH 50: CPT

## 2024-07-15 RX ORDER — INSULIN LISPRO 100 [IU]/ML
INJECTION, SOLUTION SUBCUTANEOUS AT BEDTIME
Refills: 0 | Status: DISCONTINUED | OUTPATIENT
Start: 2024-07-15 | End: 2024-07-19

## 2024-07-15 RX ORDER — DEXTROSE MONOHYDRATE AND SODIUM CHLORIDE 5; .3 G/100ML; G/100ML
1000 INJECTION, SOLUTION INTRAVENOUS
Refills: 0 | Status: DISCONTINUED | OUTPATIENT
Start: 2024-07-15 | End: 2024-07-19

## 2024-07-15 RX ORDER — DEXTROSE 30 % IN WATER 30 %
25 VIAL (ML) INTRAVENOUS ONCE
Refills: 0 | Status: DISCONTINUED | OUTPATIENT
Start: 2024-07-15 | End: 2024-07-19

## 2024-07-15 RX ORDER — INSULIN GLARGINE 100 [IU]/ML
13 INJECTION, SOLUTION SUBCUTANEOUS AT BEDTIME
Refills: 0 | Status: DISCONTINUED | OUTPATIENT
Start: 2024-07-15 | End: 2024-07-16

## 2024-07-15 RX ORDER — DEXTROSE 30 % IN WATER 30 %
15 VIAL (ML) INTRAVENOUS ONCE
Refills: 0 | Status: DISCONTINUED | OUTPATIENT
Start: 2024-07-15 | End: 2024-07-19

## 2024-07-15 RX ORDER — MECLIZINE HCL 25 MG
25 TABLET ORAL EVERY 8 HOURS
Refills: 0 | Status: DISCONTINUED | OUTPATIENT
Start: 2024-07-15 | End: 2024-07-19

## 2024-07-15 RX ORDER — DEXTROSE 30 % IN WATER 30 %
12.5 VIAL (ML) INTRAVENOUS ONCE
Refills: 0 | Status: DISCONTINUED | OUTPATIENT
Start: 2024-07-15 | End: 2024-07-19

## 2024-07-15 RX ORDER — INSULIN LISPRO 100 [IU]/ML
INJECTION, SOLUTION SUBCUTANEOUS
Refills: 0 | Status: DISCONTINUED | OUTPATIENT
Start: 2024-07-15 | End: 2024-07-19

## 2024-07-15 RX ORDER — GLUCAGON HYDROCHLORIDE 1 MG/ML
1 INJECTION, POWDER, FOR SOLUTION INTRAMUSCULAR; INTRAVENOUS; SUBCUTANEOUS ONCE
Refills: 0 | Status: DISCONTINUED | OUTPATIENT
Start: 2024-07-15 | End: 2024-07-19

## 2024-07-15 RX ORDER — INSULIN LISPRO 100 [IU]/ML
5 INJECTION, SOLUTION SUBCUTANEOUS
Refills: 0 | Status: DISCONTINUED | OUTPATIENT
Start: 2024-07-15 | End: 2024-07-16

## 2024-07-15 RX ADMIN — INSULIN LISPRO 1: 100 INJECTION, SOLUTION SUBCUTANEOUS at 21:53

## 2024-07-15 RX ADMIN — INSULIN LISPRO 5 UNIT(S): 100 INJECTION, SOLUTION SUBCUTANEOUS at 17:50

## 2024-07-15 RX ADMIN — VENLAFAXINE 37.5 MILLIGRAM(S): 37.5 CAPSULE, EXTENDED RELEASE ORAL at 11:35

## 2024-07-15 RX ADMIN — Medication 1 MILLIGRAM(S): at 11:34

## 2024-07-15 RX ADMIN — Medication 25 MILLIGRAM(S): at 17:25

## 2024-07-15 RX ADMIN — ASPIRIN 81 MILLIGRAM(S): 325 TABLET, FILM COATED ORAL at 11:35

## 2024-07-15 RX ADMIN — LOSARTAN POTASSIUM 50 MILLIGRAM(S): 100 TABLET, FILM COATED ORAL at 06:16

## 2024-07-15 RX ADMIN — Medication 1 TABLET(S): at 21:54

## 2024-07-15 RX ADMIN — TICAGRELOR 90 MILLIGRAM(S): 90 TABLET ORAL at 17:24

## 2024-07-15 RX ADMIN — ATORVASTATIN CALCIUM 40 MILLIGRAM(S): 20 TABLET, FILM COATED ORAL at 21:54

## 2024-07-15 RX ADMIN — INSULIN LISPRO 3 UNIT(S): 100 INJECTION, SOLUTION SUBCUTANEOUS at 08:19

## 2024-07-15 RX ADMIN — Medication 25 MILLIGRAM(S): at 06:16

## 2024-07-15 RX ADMIN — Medication 1000 MICROGRAM(S): at 11:34

## 2024-07-15 RX ADMIN — TICAGRELOR 90 MILLIGRAM(S): 90 TABLET ORAL at 06:16

## 2024-07-15 RX ADMIN — INSULIN LISPRO 1: 100 INJECTION, SOLUTION SUBCUTANEOUS at 08:20

## 2024-07-15 RX ADMIN — INSULIN LISPRO 3: 100 INJECTION, SOLUTION SUBCUTANEOUS at 12:11

## 2024-07-15 RX ADMIN — INSULIN LISPRO 3 UNIT(S): 100 INJECTION, SOLUTION SUBCUTANEOUS at 12:11

## 2024-07-15 RX ADMIN — INSULIN GLARGINE 13 UNIT(S): 100 INJECTION, SOLUTION SUBCUTANEOUS at 21:52

## 2024-07-15 RX ADMIN — Medication 20 MILLIGRAM(S): at 11:34

## 2024-07-15 NOTE — PROGRESS NOTE ADULT - PROBLEM SELECTOR PLAN 1
- Transferred from [facility] for EP eval. EKG w/ bifascicular block.   - pt w/ hx of CVA, carotid stenosis s/p stents.   - s/p ILR placement w/ EP on 7/12/24. Has tele appt w/ them on 7/25 (see EP note).   - TTE performed, pending read.   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   - Ordered the following on 7/15: CTA head and neck.   - Per prior charts, no longer on Fludrocortisone.   - Home Losartan 100mg QD decreased to 50mg QD 7/12/24. SBPs in 150. Will keep at current regimen since pt has hx of stenosis, slightly higher pressures to allow continued perfusion.

## 2024-07-15 NOTE — CONSULT NOTE ADULT - ASSESSMENT
Pt is a 71 yo M with a PMH of VA stenosis s/p R VA stent in 2016 on ASA/plavix, bilateral cerebellar infarcts, HTN, T2DM, HLD, BPPV R ICA stenosis s/p stent in 10/2023, admitted for EP eval for bifascicular block after presenting to [facility] following an episode of presyncope. Endocrinology consulted for inpatient glycemic control.  He is adherent to his medication regimen (Jardiance 10 mg PO QD, Januvia 25 mg PO QD, Tradjenta 5 mg PO QD, Glimepiride 1 mg PO QD, Losartan 50 mg PO QD, Atorvastatin 40 mg PO QD). He is currently on 2 DPP-4 inhibitors, one of which is the incorrect dose for a patient with his renal function (Januvia 25 mg PO is dosing for patients undergoing hemodialysis).    Given the Mr. Barrera's extensive cardiovascular history, the ideal regimen for this patient is Metformin 500 BID, Jardiance 10 mg PO QD, and Trulicity 0.75 subq 1x/week (for its cardioprotective effects). If this regimen can be achieved, he may DC all other medications for his T2DM.    Of note, Mr. Barrera's TSH was elevated to 10.22 (7/12/2024). He is not aware of any medical history concerning his thyroid, however he has experienced low energy, heat intolerance and weight loss over the past year. Further testing of his thyroid function should be ordered.    #T2DM   - A1c: 9.4%  - Home meds: Jardiance 10 mg PO QD, Januvia 25 mg PO QD, Tradjenta 5 mg PO QD, Glimepiride 1 mg PO QD, Losartan 50 mg PO QD, Atorvastatin 40 mg PO QD  - Endocrinologist: follows with PCP, Dr. Roxanne Choudhury    INPATIENT RECOMMENDATIONS:  - Pt changed from regular to carb consistent diet   - additional 2 units of sliding scale given in the past 24 hours  - start Lantus 13 units at bedtime  - start Admelog 5 units premeal TID (HOLD IF NPO)  - Continue low admelog correctional scale premeal   - start low admelog correctional scale at bedtime  - Registered Dietician consult placed   - Please ensure patient receives Diabetes/insulin pen teaching prior to discharge    DISCHARGE RECOMMENDATIONS:  - Recommend pt to start Metformin 500 BID x1 week then increase to 1000 BID with food  - Continue Jardiance 10 mg PO QD  - Pt will need prescription for GLP-1 agonist. Ideal agent is Trulicity 0.75 mg subq 1x/week which is cardioprotective, lowers A1c and promotes minimal weight loss. (If Trulicity is not covered then ask pharmacy which brand of GLP-1 agonist is covered by their insurance plan).   - Stop both DPP-4 inhibitors (Januvia and Tradjenta) IF Pt is able to obtain prescription for GLP-1 agonist.  - Stop Glimepiride   - Education for GLP-1 self administration    #HLD  - Repeat fasting triglycerides in AM (ordered)  - patient on atorvastatin 40 mg PO QD  - goal LDL in diabetes mellitus is <70    #HTN  - patient on Losartan 50 mg PO QD  - goal BP in diabetes mellitus is <130/80  - defer to primary team for management    #TSH >10  - Pt is unaware of any history of hypothyroidism   - Repeat TSH, obtain free T4, and TPO antibody (ordered) Pt is a 73 yo M with a PMH of VA stenosis s/p R VA stent in 2016 on ASA/plavix, bilateral cerebellar infarcts, HTN, T2DM, HLD, BPPV R ICA stenosis s/p stent in 10/2023, admitted for EP eval for bifascicular block after presenting to [facility] following an episode of presyncope. Endocrinology consulted for inpatient glycemic control.  He is adherent to his medication regimen (Jardiance 10 mg PO QD, Januvia 25 mg PO QD, Tradjenta 5 mg PO QD, Glimepiride 1 mg PO QD, Losartan 50 mg PO QD, Atorvastatin 40 mg PO QD). He is currently on 2 DPP-4 inhibitors, one of which is the incorrect dose for a patient with his renal function (Januvia 25 mg PO is dosing for patients undergoing hemodialysis). While admitted, Mr. Barrera's glucose levels have been in the 200's and have persistently required correction on 10 Lantus QHS and Admelog 3 TID.     Given the Mr. Barrera's extensive cardiovascular history, the ideal regimen for this patient is Metformin 500 BID, Jardiance 10 mg PO QD, and Trulicity 0.75 subq 1x/week (for its cardioprotective effects). If this regimen can be achieved, he may DC all other medications for his T2DM.    Of note, Mr. Barrera's TSH was elevated to 10.22 (7/12/2024). He is not aware of any medical history concerning his thyroid, however he has experienced low energy, heat intolerance and weight loss over the past year. Further testing of his thyroid function should be ordered.    #T2DM   - A1c: 9.4%  - Home meds: Jardiance 10 mg PO QD, Januvia 25 mg PO QD, Tradjenta 5 mg PO QD, Glimepiride 1 mg PO QD, Losartan 50 mg PO QD, Atorvastatin 40 mg PO QD  - Endocrinologist: follows with PCP, Dr. Roxanne Choudhury    INPATIENT RECOMMENDATIONS:  - Pt changed from regular to carb consistent diet   - additional 2 units of sliding scale given in the past 24 hours  - start Lantus 13 units at bedtime  - start Admelog 5 units premeal TID (HOLD IF NPO)  - Continue low admelog correctional scale premeal   - start low admelog correctional scale at bedtime  - Registered Dietician consult placed   - Please ensure patient receives Diabetes/insulin pen teaching prior to discharge    DISCHARGE RECOMMENDATIONS:  - Recommend pt to start Metformin 500 BID x1 week then increase to 1000 BID with food  - Continue Jardiance 10 mg PO QD  - Pt will need prescription for GLP-1 agonist. Ideal agent is Trulicity 0.75 mg subq 1x/week which is cardioprotective, lowers A1c and promotes minimal weight loss. (If Trulicity is not covered then ask pharmacy which brand of GLP-1 agonist is covered by their insurance plan).   - Stop both DPP-4 inhibitors (Januvia and Tradjenta) IF Pt is able to obtain prescription for GLP-1 agonist.  - Stop Glimepiride   - Education for GLP-1 self administration    #HLD  - Repeat fasting triglycerides in AM (ordered)  - patient on atorvastatin 40 mg PO QD  - goal LDL in diabetes mellitus is <70    #HTN  - patient on Losartan 50 mg PO QD  - goal BP in diabetes mellitus is <130/80  - defer to primary team for management    #TSH >10  - Pt is unaware of any history of hypothyroidism   - Repeat TSH, obtain free T4, and TPO antibody (ordered) Pt is a 71 yo M with a PMH of VA stenosis s/p R VA stent in 2016 on ASA/plavix, bilateral cerebellar infarcts, HTN, T2DM, HLD, BPPV R ICA stenosis s/p stent in 10/2023, admitted for EP eval for bifascicular block after presenting to [facility] following an episode of presyncope. Endocrinology consulted for inpatient glycemic control.  He is adherent to his medication regimen (Jardiance 10 mg PO QD, Januvia 25 mg PO QD, Tradjenta 5 mg PO QD, Glimepiride 1 mg PO QD, Losartan 50 mg PO QD, Atorvastatin 40 mg PO QD). He is currently on 2 DPP-4 inhibitors, one of which is the incorrect dose for a patient with his renal function (Januvia 25 mg PO is dosing for patients undergoing hemodialysis). While admitted, Mr. Barrera's glucose levels have been in the 200's and have persistently required correction on 10 Lantus QHS and Admelog 3 TID.     Given the Mr. Barrera's extensive cardiovascular history, the ideal regimen for this patient is Metformin 500 BID, Jardiance 10 mg PO QD, and Trulicity 0.75 subq 1x/week (for its cardioprotective effects). If this regimen can be achieved, he may DC all other medications for his T2DM.    Of note, Mr. Barrera's TSH was elevated to 10.22 (7/12/2024). He is not aware of any medical history concerning his thyroid, however he has experienced low energy, heat intolerance and weight loss over the past year. Further testing of his thyroid function should be ordered.    #T2DM   - A1c: 9.4%  - Home meds: Jardiance 10 mg PO QD, Januvia 25 mg PO QD, Tradjenta 5 mg PO QD, Glimepiride 1 mg PO QD, Losartan 50 mg PO QD, Atorvastatin 40 mg PO QD  - Endocrinologist: follows with PCP, Dr. Roxanne Choudhury    INPATIENT RECOMMENDATIONS:  - Pt changed from regular to carb consistent diet   - additional 2 units of sliding scale given in the past 24 hours  - start Lantus 13 units at bedtime  - start Admelog 5 units premeal TID (HOLD IF NPO)  - Continue low admelog correctional scale premeal   - start low admelog correctional scale at bedtime  - Registered Dietician consult placed   - Please ensure patient receives Diabetes/insulin pen teaching prior to discharge    DISCHARGE RECOMMENDATIONS:  - Recommend pt to start Metformin 500 BID x1 week then increase to 1000 BID with food  - Resume Jardiance 10 mg PO daily  - Pt will need prescription for GLP-1 agonist. Ideal agent is Trulicity 0.75 mg subq 1x/week which is cardioprotective, lowers A1c and promotes minimal weight loss compared to other agents in the class. (If Trulicity is not covered then ask pharmacy which brand of GLP-1 agonist is covered by their insurance plan).   - Stop both DPP-4 inhibitors (Januvia and Tradjenta) IF Pt is able to obtain prescription for GLP-1 agonist.  - Stop Glimepiride   - Education for GLP-1 self administration    #HLD  - Repeat fasting triglycerides in AM (ordered)  - patient on atorvastatin 40 mg PO QD  - goal LDL in diabetes mellitus is <70    #HTN  - patient on Losartan 50 mg PO QD  - goal BP in diabetes mellitus is <130/80  - defer to primary team for management    #TSH >10  - Pt is unaware of any history of hypothyroidism   - Repeat TSH, obtain free T4, and TPO antibody (ordered) Pt is a 73 yo M with a PMH of VA stenosis s/p R VA stent in 2016 on ASA/plavix, bilateral cerebellar infarcts, HTN, T2DM, HLD, BPPV R ICA stenosis s/p stent in 10/2023, admitted for EP eval for bifascicular block after presenting to [facility] following an episode of presyncope. Endocrinology consulted for inpatient glycemic control.  He is adherent to his medication regimen (Jardiance 10 mg PO QD, Januvia 25 mg PO QD, Tradjenta 5 mg PO QD, Glimepiride 1 mg PO QD, Losartan 50 mg PO QD, Atorvastatin 40 mg PO QD). He is currently on 2 DPP-4 inhibitors, one of which is the incorrect dose for a patient with his renal function (Januvia 25 mg PO is dosing for patients undergoing hemodialysis). While admitted, Mr. Barrera's glucose levels have been in the 200's and have persistently required correction on 10 Lantus QHS and Admelog 3 TID.     Given the Mr. Barrera's extensive cardiovascular history, the ideal regimen for this patient is Metformin 500 BID, Jardiance 10 mg PO QD, and Trulicity 0.75 subq 1x/week (for its cardioprotective effects). If this regimen can be achieved, he may DC all other medications for his T2DM.    Of note, Mr. Barrera's TSH was elevated to 10.22 (7/12/2024). He is not aware of any medical history concerning his thyroid, however he has experienced low energy, heat intolerance and weight loss over the past year. Further testing of his thyroid function should be ordered.    #T2DM   - A1c: 9.4%  - Home meds: Jardiance 10 mg PO daily, Januvia 25 mg PO daily, Tradjenta 5 mg PO daily, Glimepiride 1 mg PO daily, Losartan 50 mg PO daily, Atorvastatin 40 mg PO daily  - Endocrinologist: follows with PCP, Dr. Roxanne Choudhury    INPATIENT RECOMMENDATIONS:  - Pt changed from regular to carb consistent diet   - additional 2 units of sliding scale given in the past 24 hours  - start Lantus 13 units at bedtime  - start Admelog 5 units premeal TID (HOLD IF NPO)  - Continue low admelog correctional scale premeal   - start low admelog correctional scale at bedtime  - Registered Dietician consult placed   - Please ensure patient receives Diabetes/insulin pen teaching prior to discharge    DISCHARGE RECOMMENDATIONS:  - Recommend pt to start Metformin 500 BID x1 week then increase to 1000 BID with food  - Resume Jardiance 10 mg PO daily  - Pt will need prescription for GLP-1 agonist. Ideal agent is Trulicity 0.75 mg subq 1x/week which is cardioprotective, lowers A1c and promotes minimal weight loss compared to other agents in the class. (If Trulicity is not covered then ask pharmacy which brand of GLP-1 agonist is covered by their insurance plan).   - Stop both DPP-4 inhibitors (Januvia and Tradjenta) IF Pt is able to obtain prescription for GLP-1 agonist.  - Stop Glimepiride   - Education for GLP-1 self administration    #HLD  - Repeat fasting triglycerides in AM (ordered)  - patient on atorvastatin 40 mg PO daily  - goal LDL in diabetes mellitus is <70    #HTN  - patient on Losartan 50 mg PO daily  - goal BP in diabetes mellitus is <130/80  - defer to primary team for management    #TSH >10  - Pt is unaware of any history of hypothyroidism   - Repeat TSH, obtain free T4, and TPO antibody (ordered)

## 2024-07-15 NOTE — PROGRESS NOTE ADULT - PROBLEM SELECTOR PLAN 3
- Pt endorsing worsening abdominal distension and weight loss unintentional.   - Abs US w/ fatty liver otherwise no abnormality.   - Will get CT A/P w/o contrast.

## 2024-07-15 NOTE — PROGRESS NOTE ADULT - PROBLEM SELECTOR PLAN 2
- Dizziness w/ positional changes.   - Most likely central etiology d/t hx of CVA and stenosis.   - Started on Meclizine 25mg BID at [facility]. Adjusted to 25mg TID PRN.   - Seen by Neurology 7/12:---> Dysequilibrium due to b/l cerebellar infarcts in addition to known intermittent positional peripheral vertigo potentially acutely worsened due to other active medical issues. Low suspicion for new central etiology. Advising to "c/w ASA 81mg, Brilinta 90 mg q12h, Atorvastatin 40mg QHS. Continue Meclizine 25mg and titrate up as necessary. Upon discharge, PT should F/U with either Dr. Salazar or Dr. Black: [phone] [address] [place] [address]"  - Further imaging as noted above. Will c/w meds.

## 2024-07-15 NOTE — CONSULT NOTE ADULT - ATTENDING COMMENTS
Syncope, Tx for EP evaluation.  He has no new neurological symptoms.  Chronic, intermittent vertigo and imbalance.     A/P  Mr. Barrera is a 73 yo man with a h/o strokes and cerebrovascular stents with no new neurological symptoms.   Outpatient vascular neurology f/u  [facility] vestibular rehabilitation - [phone].  I agree with work up and management as above.   Neurology signing off. Please reconsult PRN or call [email or web address] with any questions.   Thank you
73M DM2 history of CVA HbA1c 9.4%  Inpatient agree with basal bolus insulin as outlined.  On dc would like to optimize regimen check coverage for Trulicity.  To contact pcp to clarify why metformin previously stopped (patient does not remember).  On 2 DPP4 inhibitors at home and a sulfonylurea.  To finalize meds for dc.  Would need teaching for GLP1 injection once coverage clarified.    Dayo Thomas MD  Division of Endocrinology  Pager: [phone]    If after 6PM or before 9AM, or on weekends/holidays, please call endocrine answering service for assistance ([phone]).  For nonurgent matters email [email or web address] for assistance.

## 2024-07-15 NOTE — PROGRESS NOTE ADULT - ASSESSMENT
Patient is a 72M with a PMH of VA stenosis s/p R VA stent in 2016 on ASA/plavix, bilateral cerebellar infarcts, HTN, DM, HLD, R ICA stenosis s/p stent in 10/2023 who initially presented to [facility] s/p syncope.  Transferred to [facility] for EP eval for bifascicular block.    Dysequilibrium due to b/l cerebellar infarcts in addition to known intermittent positional peripheral vertigo potentially acutely worsened due to other active medical issues. Low suspicion for new central etiology. as per neurology 7/12/24.

## 2024-07-15 NOTE — PROGRESS NOTE ADULT - SUBJECTIVE AND OBJECTIVE BOX
[facility] Division of Hospital Medicine  Princess Adame MD   Available via MS Teams  In house pager [phone]    Patient is a 73y old  Male who presents with a chief complaint of Transfer [facility]---> Transferred to [facility] for EP eval for bifascicular block. (13 Jul 2024 14:46)    SUBJECTIVE / OVERNIGHT EVENTS:  - Seen and examined at bedside.   - hx of dizziness and vertigo; hx of carotid and intracranial stents.   - Seen by neuro, advising no w/u inpt.   - s/p ILR by EP.     ADDITIONAL REVIEW OF SYSTEMS: Dizziness, vertigo and abdominal distension. Otherwise no sxns.     MEDICATIONS  (STANDING):  aspirin  chewable 81 milliGRAM(s) Oral daily  atorvastatin 40 milliGRAM(s) Oral at bedtime  cyanocobalamin 1000 MICROGram(s) Oral daily  dextrose 5%. 1000 milliLiter(s) (100 mL/Hr) IV Continuous <Continuous>  dextrose 5%. 1000 milliLiter(s) (50 mL/Hr) IV Continuous <Continuous>  dextrose 50% Injectable 12.5 Gram(s) IV Push once  dextrose 50% Injectable 25 Gram(s) IV Push once  dextrose 50% Injectable 25 Gram(s) IV Push once  dextrose Oral Gel 15 Gram(s) Oral once  famotidine    Tablet 20 milliGRAM(s) Oral daily  folic acid 1 milliGRAM(s) Oral daily  glucagon  Injectable 1 milliGRAM(s) IntraMuscular once  glucagon  Injectable 1 milliGRAM(s) IntraMuscular once  insulin glargine Injectable (LANTUS) 13 Unit(s) SubCutaneous at bedtime  insulin lispro (ADMELOG) corrective regimen sliding scale   SubCutaneous at bedtime  insulin lispro (ADMELOG) corrective regimen sliding scale   SubCutaneous three times a day before meals  insulin lispro Injectable (ADMELOG) 5 Unit(s) SubCutaneous three times a day before meals  losartan 50 milliGRAM(s) Oral daily  meclizine 25 milliGRAM(s) Oral two times a day  senna 1 Tablet(s) Oral at bedtime  ticagrelor 90 milliGRAM(s) Oral every 12 hours  venlafaxine 37.5 milliGRAM(s) Oral daily    MEDICATIONS  (PRN):  artificial  tears Solution 1 Drop(s) Both EYES three times a day PRN Dry Eyes      I&O's Summary      PHYSICAL EXAM:  Vital Signs Last 24 Hrs  T(C): 36.6 (15 Jul 2024 12:56), Max: 36.7 (15 Jul 2024 00:37)  T(F): 97.8 (15 Jul 2024 12:56), Max: 98 (15 Jul 2024 00:37)  HR: 67 (15 Jul 2024 12:56) (67 - 82)  BP: 150/77 (15 Jul 2024 12:56) (127/84 - 150/77)  BP(mean): --  RR: 18 (15 Jul 2024 12:56) (17 - 18)  SpO2: 100% (15 Jul 2024 12:56) (97% - 100%)    Parameters below as of 15 Jul 2024 12:56  Patient On (Oxygen Delivery Method): room air      CONSTITUTIONAL: NAD, well-developed, well-groomed  EYES: PERRLA; conjunctiva and sclera clear  ENMT: Moist oral mucosa, no pharyngeal injection or exudates; normal dentition  NECK: Supple, no palpable masses; no thyromegaly  RESPIRATORY: Normal respiratory effort; lungs are clear to auscultation bilaterally  CARDIOVASCULAR: Regular rate and rhythm, normal S1 and S2, no murmur/rub/gallop; No lower extremity edema; Peripheral pulses are 2+ bilaterally  ABDOMEN: Nontender to palpation, normoactive bowel sounds, no rebound/guarding; No hepatosplenomegaly  MUSCULOSKELETAL:  Normal gait; no clubbing or cyanosis of digits; no joint swelling or tenderness to palpation  PSYCH: A+O to person, place, and time; affect appropriate  NEUROLOGY: CN 2-12 are intact and symmetric; no gross sensory deficits   SKIN: No rashes; no palpable lesions    LABS:                        11.8   8.99  )-----------( 302      ( 15 Jul 2024 06:03 )             36.9     07-15    134<L>  |  102  |  22  ----------------------------<  184<H>  4.2   |  21<L>  |  0.97    Ca    9.2      15 Jul 2024 06:03  Phos  3.5     07-15  Mg     1.90     07-15            Urinalysis Basic - ( 15 Jul 2024 06:03 )    Color: x / Appearance: x / SG: x / pH: x  Gluc: 184 mg/dL / Ketone: x  / Bili: x / Urobili: x   Blood: x / Protein: x / Nitrite: x   Leuk Esterase: x / RBC: x / WBC x   Sq Epi: x / Non Sq Epi: x / Bacteria: x            RADIOLOGY & ADDITIONAL TESTS:  New Results Reviewed Today:   New Imaging Personally Reviewed Today:  New Electrocardiogram Personally Reviewed Today:  Prior or Outpatient Records Reviewed Today:    COMMUNICATION:  Care Discussed with Consultants/Other Providers and Details of Discussion:  Discussions with Patient/Family:  PCP Communication:

## 2024-07-15 NOTE — CONSULT NOTE ADULT - SUBJECTIVE AND OBJECTIVE BOX
HPI:  Patient is a 72M with a PMH of VA stenosis s/p R VA stent in 2016 on ASA/plavix, bilateral cerebellar infarcts, HTN, DM, HLD, R ICA stenosis s/p stent in 10/2023 who initially presented to [facility] s/p syncope.  Transferred to [facility] for EP eval for bifascicular block.  Patient reports that he got up from bed 4 days ago and was making breakfast for himself and his wife.  Patient states that he choked a little on some tea and had mild cough.  Next thing he remembers he was on the floor.  Unknown how long he was down for.  Patient states that when he woke up he had pain in his bilateral shoulders - specifically posteriorly behind his shoulder blades.  Denies history of CP or palpitations prior to syncope.  Patient notes that over the last month he has had worsening dizziness.  Feels the sensation of the room spinning but also notes dizziness on change of position (worse when looking right to left.)  Patient notes history of multiple syncope in the past and has had multiple strokes.  On ROS, patient admits to poor appetite lately and abdominal swelling.  No other complaints.       Endocrine History:  Pt is a 73 yo M with a Hx of T2DM (A1c 9.4% on 7/13/2024) diagnosed about 10 years ago after he received high-dose steroids while admitted to a hospital for reasons he doesn't recall. 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 He does not regularly check his blood glucose level or keep a log, but he states that he does not experience episodes of hypoglycemia and has no history of DKA. To his knowledge, he has not experienced any medical complications as a result of his diabetes. He is adherent to his medication regimen (Jardiance 10 mg PO QD, Januvia 25 mg PO QD, Tradjenta 5 mg PO QD, Glimepiride 1 mg PO QD, Losartan 50 mg PO QD, Atorvastatin 40 mg PO QD). He is not sure why he was prescribed 2 drugs from the same class (Januvia and Tradjenta, both DPP-4 inhibitors), but he currently takes both. He also was prescribed Metformin but discontinued taking it earlier this year. He is not sure why his PCP discontinued his Metformin, but he remembers that he tolerated it well. Although the pt has received nutrition education and is knowledgeable about foods to avoid for his diabetes, he has trouble sticking to a diabetic diet because his wife doesn't eat much and he tries to cook foods that she will eat. While admitted, the pt has been eating 60-70% of his meals, however skipped breakfast yesterday and lunch today. The pt has no history of thyroid disease. He reports that he has some intolerance to heat, and frequently feels warm. He has lost some weight and has generally felt low on energy over the last year. The pt does not have an endocrinologist and follows outpatient with his PCP, Dr. Roxanne Choudhury.        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 Ansari  Depression  Interstitial lung disease  Vertebral artery stenosis      MEDICATIONS  (STANDING):  aspirin  chewable 81 milliGRAM(s) Oral daily  atorvastatin 40 milliGRAM(s) Oral at bedtime  cyanocobalamin 1000 MICROGram(s) Oral daily  dextrose 5%. 1000 milliLiter(s) (50 mL/Hr) IV Continuous <Continuous>  dextrose 5%. 1000 milliLiter(s) (100 mL/Hr) IV Continuous <Continuous>  dextrose 50% Injectable 12.5 Gram(s) IV Push once  dextrose 50% Injectable 25 Gram(s) IV Push once  dextrose 50% Injectable 25 Gram(s) IV Push once  dextrose Oral Gel 15 Gram(s) Oral once  famotidine    Tablet 20 milliGRAM(s) Oral daily  folic acid 1 milliGRAM(s) Oral daily  glucagon  Injectable 1 milliGRAM(s) IntraMuscular once  glucagon  Injectable 1 milliGRAM(s) IntraMuscular once  insulin glargine Injectable (LANTUS) 13 Unit(s) SubCutaneous at bedtime  insulin lispro (ADMELOG) corrective regimen sliding scale   SubCutaneous at bedtime  insulin lispro (ADMELOG) corrective regimen sliding scale   SubCutaneous three times a day before meals  insulin lispro Injectable (ADMELOG) 5 Unit(s) SubCutaneous three times a day before meals  losartan 50 milliGRAM(s) Oral daily  meclizine 25 milliGRAM(s) Oral two times a day  senna 1 Tablet(s) Oral at bedtime  ticagrelor 90 milliGRAM(s) Oral every 12 hours  venlafaxine 37.5 milliGRAM(s) Oral daily    MEDICATIONS  (PRN):  artificial  tears Solution 1 Drop(s) Both EYES three times a day PRN Dry Eyes      Allergies    No Known Allergies    Intolerances      Review of Systems:  Constitutional: No fever  Eyes: No blurry vision  Neuro: No tremors  HEENT: No pain  Cardiovascular: No chest pain, palpitations  Respiratory: No SOB, no cough  GI: No nausea, vomiting, abdominal pain  : No dysuria  Skin: no rash  Psych: no depression  Endocrine: no polyuria, polydipsia  Hem/lymph: no swelling  Osteoporosis: no fractures    ===================PHYSICAL EXAM=======================  VITALS: T(C): 36.6 (07-15-24 @ 12:56)  T(F): 97.8 (07-15-24 @ 12:56), Max: 98 (07-15-24 @ 00:37)  HR: 67 (07-15-24 @ 12:56) (67 - 82)  BP: 150/77 (07-15-24 @ 12:56) (127/84 - 150/77)  RR:  (17 - 18)  SpO2:  (97% - 100%)  Wt(kg): --  GENERAL: NAD  EYES: No proptosis, no lid lag, anicteric  THYROID: Normal size, no palpable nodules  RESPIRATORY: Clear to auscultation bilaterally  CARDIOVASCULAR: Regular rate and rhythm  GI: Soft, nontender, non distended  EXT: b/l feet without wounds; 2+ pulses  PSYCH: Alert and oriented x 3, reactive mood  ======================================================  POCT Blood Glucose.: 129 mg/dL (07-15-24 @ 17:20)  POCT Blood Glucose.: 257 mg/dL (07-15-24 @ 11:56)  POCT Blood Glucose.: 191 mg/dL (07-15-24 @ 07:32)  POCT Blood Glucose.: 219 mg/dL (07-14-24 @ 21:43)  POCT Blood Glucose.: 195 mg/dL (07-14-24 @ 16:51)  POCT Blood Glucose.: 188 mg/dL (07-14-24 @ 11:40)  POCT Blood Glucose.: 227 mg/dL (07-14-24 @ 07:51)  POCT Blood Glucose.: 203 mg/dL (07-13-24 @ 20:46)  POCT Blood Glucose.: 141 mg/dL (07-13-24 @ 17:06)  POCT Blood Glucose.: 331 mg/dL (07-13-24 @ 11:38)  POCT Blood Glucose.: 191 mg/dL (07-13-24 @ 07:38)  POCT Blood Glucose.: 224 mg/dL (07-12-24 @ 21:39)                            11.8   8.99  )-----------( 302      ( 15 Jul 2024 06:03 )             36.9       07-15    134<L>  |  102  |  22  ----------------------------<  184<H>  4.2   |  21<L>  |  0.97    eGFR: 81    Ca    9.2      07-15  Mg     1.90     07-15  Phos  3.5     07-15    TPro  7.4  /  Alb  3.9  /  TBili  0.4  /  DBili  <0.2  /  AST  51<H>  /  ALT  40  /  AlkPhos  86  07-12      Thyroid Function Tests:  07-12 @ 22:27 TSH 10.22 FreeT4 -- T3 -- Anti TPO -- Anti Thyroglobulin Ab -- TSI --      A1C with Estimated Average Glucose Result: 9.4 % (07-13-24 @ 08:57)  A1C with Estimated Average Glucose Result: 9.3 % (07-12-24 @ 22:27)  A1C with Estimated Average Glucose Result: 7.4 % (10-17-23 @ 05:59)      07-13 Chol 127 Direct LDL -- LDL calculated 20 HDL 32<L> Trig 377<H>               HPI:  Patient is a 72M with a PMH of VA stenosis s/p R VA stent in 2016 on ASA/plavix, bilateral cerebellar infarcts, HTN, DM, HLD, R ICA stenosis s/p stent in 10/2023 who initially presented to [facility] s/p syncope.  Transferred to [facility] for EP eval for bifascicular block.  Patient reports that he got up from bed 4 days ago and was making breakfast for himself and his wife.  Patient states that he choked a little on some tea and had mild cough.  Next thing he remembers he was on the floor.  Unknown how long he was down for.  Patient states that when he woke up he had pain in his bilateral shoulders - specifically posteriorly behind his shoulder blades.  Denies history of CP or palpitations prior to syncope.  Patient notes that over the last month he has had worsening dizziness.  Feels the sensation of the room spinning but also notes dizziness on change of position (worse when looking right to left.)  Patient notes history of multiple syncope in the past and has had multiple strokes.  On ROS, patient admits to poor appetite lately and abdominal swelling.  No other complaints.       Endocrine History:  Pt is a 73 yo M with a Hx of T2DM (A1c 9.4% on 7/13/2024) diagnosed about 10 years ago after he received high-dose steroids while admitted to a hospital for reasons he doesn't recall. 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 He does not regularly check his blood glucose level or keep a log, but he states that he does not experience episodes of hypoglycemia and has no history of DKA. To his knowledge, he has not experienced any medical complications as a result of his diabetes. He is adherent to his medication regimen (Jardiance 10 mg PO daily, Januvia 25 mg PO daily, Tradjenta 5 mg PO daily, Glimepiride 1 mg PO daily, Losartan 50 mg PO daily, Atorvastatin 40 mg PO daily). He is not sure why he was prescribed 2 drugs from the same class (Januvia and Tradjenta, both DPP-4 inhibitors), but he currently takes both. He also was prescribed Metformin but discontinued taking it earlier this year. He is not sure why his PCP discontinued his Metformin, but he remembers that he tolerated it well. Although the pt has received nutrition education and is knowledgeable about foods to avoid for his diabetes, he has trouble sticking to a diabetic diet because his wife doesn't eat much and he tries to cook foods that she will eat. While admitted, the pt has been eating 60-70% of his meals, however skipped breakfast yesterday and lunch today. The pt has no history of thyroid disease. He reports that he has some intolerance to heat, and frequently feels warm. He has lost some weight and has generally felt low on energy over the last year. The pt does not have an endocrinologist and follows outpatient with his PCP, Dr. Roxanne Choudhury.        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 Ansari  Depression  Interstitial lung disease  Vertebral artery stenosis      MEDICATIONS  (STANDING):  aspirin  chewable 81 milliGRAM(s) Oral daily  atorvastatin 40 milliGRAM(s) Oral at bedtime  cyanocobalamin 1000 MICROGram(s) Oral daily  dextrose 5%. 1000 milliLiter(s) (50 mL/Hr) IV Continuous <Continuous>  dextrose 5%. 1000 milliLiter(s) (100 mL/Hr) IV Continuous <Continuous>  dextrose 50% Injectable 12.5 Gram(s) IV Push once  dextrose 50% Injectable 25 Gram(s) IV Push once  dextrose 50% Injectable 25 Gram(s) IV Push once  dextrose Oral Gel 15 Gram(s) Oral once  famotidine    Tablet 20 milliGRAM(s) Oral daily  folic acid 1 milliGRAM(s) Oral daily  glucagon  Injectable 1 milliGRAM(s) IntraMuscular once  glucagon  Injectable 1 milliGRAM(s) IntraMuscular once  insulin glargine Injectable (LANTUS) 13 Unit(s) SubCutaneous at bedtime  insulin lispro (ADMELOG) corrective regimen sliding scale   SubCutaneous at bedtime  insulin lispro (ADMELOG) corrective regimen sliding scale   SubCutaneous three times a day before meals  insulin lispro Injectable (ADMELOG) 5 Unit(s) SubCutaneous three times a day before meals  losartan 50 milliGRAM(s) Oral daily  meclizine 25 milliGRAM(s) Oral two times a day  senna 1 Tablet(s) Oral at bedtime  ticagrelor 90 milliGRAM(s) Oral every 12 hours  venlafaxine 37.5 milliGRAM(s) Oral daily    MEDICATIONS  (PRN):  artificial  tears Solution 1 Drop(s) Both EYES three times a day PRN Dry Eyes      Allergies    No Known Allergies    Intolerances      Review of Systems:  Constitutional: No fever  Eyes: No blurry vision  Neuro: No tremors  HEENT: No pain  Cardiovascular: No chest pain, palpitations  Respiratory: No SOB, no cough  GI: No nausea, vomiting, abdominal pain  : No dysuria  Skin: no rash  Psych: no depression  Endocrine: no polyuria, polydipsia  Hem/lymph: no swelling  Osteoporosis: no fractures    ===================PHYSICAL EXAM=======================  VITALS: T(C): 36.6 (07-15-24 @ 12:56)  T(F): 97.8 (07-15-24 @ 12:56), Max: 98 (07-15-24 @ 00:37)  HR: 67 (07-15-24 @ 12:56) (67 - 82)  BP: 150/77 (07-15-24 @ 12:56) (127/84 - 150/77)  RR:  (17 - 18)  SpO2:  (97% - 100%)  Wt(kg): --  GENERAL: NAD  EYES: No proptosis, no lid lag, anicteric  THYROID: Normal size, no palpable nodules  RESPIRATORY: Clear to auscultation bilaterally  CARDIOVASCULAR: Regular rate and rhythm  GI: Soft, nontender, non distended  EXT: b/l feet without wounds; 2+ pulses  PSYCH: Alert and oriented x 3, reactive mood  ======================================================  POCT Blood Glucose.: 129 mg/dL (07-15-24 @ 17:20)  POCT Blood Glucose.: 257 mg/dL (07-15-24 @ 11:56)  POCT Blood Glucose.: 191 mg/dL (07-15-24 @ 07:32)  POCT Blood Glucose.: 219 mg/dL (07-14-24 @ 21:43)  POCT Blood Glucose.: 195 mg/dL (07-14-24 @ 16:51)  POCT Blood Glucose.: 188 mg/dL (07-14-24 @ 11:40)  POCT Blood Glucose.: 227 mg/dL (07-14-24 @ 07:51)  POCT Blood Glucose.: 203 mg/dL (07-13-24 @ 20:46)  POCT Blood Glucose.: 141 mg/dL (07-13-24 @ 17:06)  POCT Blood Glucose.: 331 mg/dL (07-13-24 @ 11:38)  POCT Blood Glucose.: 191 mg/dL (07-13-24 @ 07:38)  POCT Blood Glucose.: 224 mg/dL (07-12-24 @ 21:39)                            11.8   8.99  )-----------( 302      ( 15 Jul 2024 06:03 )             36.9       07-15    134<L>  |  102  |  22  ----------------------------<  184<H>  4.2   |  21<L>  |  0.97    eGFR: 81    Ca    9.2      07-15  Mg     1.90     07-15  Phos  3.5     07-15    TPro  7.4  /  Alb  3.9  /  TBili  0.4  /  DBili  <0.2  /  AST  51<H>  /  ALT  40  /  AlkPhos  86  07-12      Thyroid Function Tests:  07-12 @ 22:27 TSH 10.22 FreeT4 -- T3 -- Anti TPO -- Anti Thyroglobulin Ab -- TSI --      A1C with Estimated Average Glucose Result: 9.4 % (07-13-24 @ 08:57)  A1C with Estimated Average Glucose Result: 9.3 % (07-12-24 @ 22:27)  A1C with Estimated Average Glucose Result: 7.4 % (10-17-23 @ 05:59)      07-13 Chol 127 Direct LDL -- LDL calculated 20 HDL 32<L> Trig 377<H>               HPI:  Patient is a 72M with a PMH of VA stenosis s/p R VA stent in 2016 on ASA/plavix, bilateral cerebellar infarcts, HTN, DM, HLD, R ICA stenosis s/p stent in 10/2023 who initially presented to [facility] s/p syncope.  Transferred to [facility] for EP eval for bifascicular block.  Patient reports that he got up from bed 4 days ago and was making breakfast for himself and his wife.  Patient states that he choked a little on some tea and had mild cough.  Next thing he remembers he was on the floor.  Unknown how long he was down for.  Patient states that when he woke up he had pain in his bilateral shoulders - specifically posteriorly behind his shoulder blades.  Denies history of CP or palpitations prior to syncope.  Patient notes that over the last month he has had worsening dizziness.  Feels the sensation of the room spinning but also notes dizziness on change of position (worse when looking right to left.)  Patient notes history of multiple syncope in the past and has had multiple strokes.  On ROS, patient admits to poor appetite lately and abdominal swelling.  No other complaints.       Endocrine History:  Pt is a 71 yo M with a Hx of T2DM (A1c 9.4% on 7/13/2024) diagnosed about 10 years ago after he received high-dose steroids while admitted to a hospital for reasons he doesn't recall. 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 He does not regularly check his blood glucose level or keep a log, but he states that he does not experience episodes of hypoglycemia and has no history of DKA. To his knowledge, he has not experienced any medical complications as a result of his diabetes. He is adherent to his medication regimen (Jardiance 10 mg PO daily, Januvia 25 mg PO daily, Tradjenta 5 mg PO daily, Glimepiride 1 mg PO daily, Losartan 50 mg PO daily, Atorvastatin 40 mg PO daily). He is not sure why he was prescribed 2 drugs from the same class (Januvia and Tradjenta, both DPP-4 inhibitors), but he currently takes both. He also was prescribed Metformin but discontinued taking it earlier this year. He is not sure why his PCP discontinued his Metformin, but he remembers that he tolerated it well. Although the pt has received nutrition education and is knowledgeable about foods to avoid for his diabetes, he has trouble sticking to a diabetic diet because his wife doesn't eat much and he tries to cook foods that she will eat. While admitted, the pt has been eating 60-70% of his meals, however skipped breakfast yesterday and lunch today. The pt has no history of thyroid disease. He reports that he has some intolerance to heat, and frequently feels warm. He has lost some weight and has generally felt low on energy over the last year. The pt does not have an endocrinologist and follows outpatient with his PCP, Dr. Roxanne Choudhury.        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 Ansari  Depression  Interstitial lung disease  Vertebral artery stenosis    Family History:  CAD (father, brother)  T2DM  Hypertension (father, brother)    Social History:  Pt lives a mostly sedentary lifestyle. He lives at home with his wife, to whom he is a full-time caregiver and son.   Never smoker, does not drink alcohol        MEDICATIONS  (STANDING):  aspirin  chewable 81 milliGRAM(s) Oral daily  atorvastatin 40 milliGRAM(s) Oral at bedtime  cyanocobalamin 1000 MICROGram(s) Oral daily  dextrose 5%. 1000 milliLiter(s) (50 mL/Hr) IV Continuous <Continuous>  dextrose 5%. 1000 milliLiter(s) (100 mL/Hr) IV Continuous <Continuous>  dextrose 50% Injectable 12.5 Gram(s) IV Push once  dextrose 50% Injectable 25 Gram(s) IV Push once  dextrose 50% Injectable 25 Gram(s) IV Push once  dextrose Oral Gel 15 Gram(s) Oral once  famotidine    Tablet 20 milliGRAM(s) Oral daily  folic acid 1 milliGRAM(s) Oral daily  glucagon  Injectable 1 milliGRAM(s) IntraMuscular once  glucagon  Injectable 1 milliGRAM(s) IntraMuscular once  insulin glargine Injectable (LANTUS) 13 Unit(s) SubCutaneous at bedtime  insulin lispro (ADMELOG) corrective regimen sliding scale   SubCutaneous at bedtime  insulin lispro (ADMELOG) corrective regimen sliding scale   SubCutaneous three times a day before meals  insulin lispro Injectable (ADMELOG) 5 Unit(s) SubCutaneous three times a day before meals  losartan 50 milliGRAM(s) Oral daily  meclizine 25 milliGRAM(s) Oral two times a day  senna 1 Tablet(s) Oral at bedtime  ticagrelor 90 milliGRAM(s) Oral every 12 hours  venlafaxine 37.5 milliGRAM(s) Oral daily    MEDICATIONS  (PRN):  artificial  tears Solution 1 Drop(s) Both EYES three times a day PRN Dry Eyes      Allergies    No Known Allergies    Intolerances      Review of Systems:  Constitutional: No fever  Eyes: No blurry vision  Neuro: No tremors  HEENT: No pain  Cardiovascular: No chest pain, palpitations  Respiratory: No SOB, no cough  GI: No nausea, vomiting, abdominal pain  : No dysuria  Skin: no rash  Psych: no depression  Endocrine: no polyuria, polydipsia  Hem/lymph: no swelling  Osteoporosis: no fractures    ===================PHYSICAL EXAM=======================  VITALS: T(C): 36.6 (07-15-24 @ 12:56)  T(F): 97.8 (07-15-24 @ 12:56), Max: 98 (07-15-24 @ 00:37)  HR: 67 (07-15-24 @ 12:56) (67 - 82)  BP: 150/77 (07-15-24 @ 12:56) (127/84 - 150/77)  RR:  (17 - 18)  SpO2:  (97% - 100%)  Wt(kg): --  GENERAL: NAD  EYES: No proptosis, no lid lag, anicteric  THYROID: Normal size, no palpable nodules  RESPIRATORY: Clear to auscultation bilaterally  CARDIOVASCULAR: Regular rate and rhythm  GI: Soft, nontender, non distended  EXT: b/l feet without wounds; 2+ pulses  PSYCH: Alert and oriented x 3, reactive mood  ======================================================  POCT Blood Glucose.: 129 mg/dL (07-15-24 @ 17:20)  POCT Blood Glucose.: 257 mg/dL (07-15-24 @ 11:56)  POCT Blood Glucose.: 191 mg/dL (07-15-24 @ 07:32)  POCT Blood Glucose.: 219 mg/dL (07-14-24 @ 21:43)  POCT Blood Glucose.: 195 mg/dL (07-14-24 @ 16:51)  POCT Blood Glucose.: 188 mg/dL (07-14-24 @ 11:40)  POCT Blood Glucose.: 227 mg/dL (07-14-24 @ 07:51)  POCT Blood Glucose.: 203 mg/dL (07-13-24 @ 20:46)  POCT Blood Glucose.: 141 mg/dL (07-13-24 @ 17:06)  POCT Blood Glucose.: 331 mg/dL (07-13-24 @ 11:38)  POCT Blood Glucose.: 191 mg/dL (07-13-24 @ 07:38)  POCT Blood Glucose.: 224 mg/dL (07-12-24 @ 21:39)                            11.8   8.99  )-----------( 302      ( 15 Jul 2024 06:03 )             36.9       07-15    134<L>  |  102  |  22  ----------------------------<  184<H>  4.2   |  21<L>  |  0.97    eGFR: 81    Ca    9.2      07-15  Mg     1.90     07-15  Phos  3.5     07-15    TPro  7.4  /  Alb  3.9  /  TBili  0.4  /  DBili  <0.2  /  AST  51<H>  /  ALT  40  /  AlkPhos  86  07-12      Thyroid Function Tests:  07-12 @ 22:27 TSH 10.22 FreeT4 -- T3 -- Anti TPO -- Anti Thyroglobulin Ab -- TSI --      A1C with Estimated Average Glucose Result: 9.4 % (07-13-24 @ 08:57)  A1C with Estimated Average Glucose Result: 9.3 % (07-12-24 @ 22:27)  A1C with Estimated Average Glucose Result: 7.4 % (10-17-23 @ 05:59)      07-13 Chol 127 Direct LDL -- LDL calculated 20 HDL 32<L> Trig 377<H>

## 2024-07-15 NOTE — PROGRESS NOTE ADULT - PROBLEM SELECTOR PLAN 6
- A1c 9.3% 7/13/24.   - Lantus 10U QHS 7/14.   - ADMELOG 3 units with meals 3x/day, and  low dose ss coverage.   - FS improved.   - Endo consulted for recs on discharge and pt will need to establish care w/ endo.

## 2024-07-16 DIAGNOSIS — I65.02 OCCLUSION AND STENOSIS OF LEFT VERTEBRAL ARTERY: ICD-10-CM

## 2024-07-16 LAB
ANION GAP SERPL CALC-SCNC: 14 MMOL/L — SIGNIFICANT CHANGE UP (ref 7–14)
BUN SERPL-MCNC: 20 MG/DL — SIGNIFICANT CHANGE UP (ref 7–23)
CALCIUM SERPL-MCNC: 9.2 MG/DL — SIGNIFICANT CHANGE UP (ref 8.4–10.5)
CHLORIDE SERPL-SCNC: 99 MMOL/L — SIGNIFICANT CHANGE UP (ref 98–107)
CO2 SERPL-SCNC: 21 MMOL/L — LOW (ref 22–31)
CREAT SERPL-MCNC: 1.01 MG/DL — SIGNIFICANT CHANGE UP (ref 0.5–1.3)
EGFR: 78 ML/MIN/1.73M2 — SIGNIFICANT CHANGE UP
GLUCOSE BLDC GLUCOMTR-MCNC: 186 MG/DL — HIGH (ref 70–99)
GLUCOSE BLDC GLUCOMTR-MCNC: 188 MG/DL — HIGH (ref 70–99)
GLUCOSE BLDC GLUCOMTR-MCNC: 252 MG/DL — HIGH (ref 70–99)
GLUCOSE BLDC GLUCOMTR-MCNC: 255 MG/DL — HIGH (ref 70–99)
GLUCOSE SERPL-MCNC: 177 MG/DL — HIGH (ref 70–99)
HCT VFR BLD CALC: 35.7 % — LOW (ref 39–50)
HGB BLD-MCNC: 11.6 G/DL — LOW (ref 13–17)
MCHC RBC-ENTMCNC: 20.1 PG — LOW (ref 27–34)
MCHC RBC-ENTMCNC: 32.5 GM/DL — SIGNIFICANT CHANGE UP (ref 32–36)
MCV RBC AUTO: 61.8 FL — LOW (ref 80–100)
NRBC # BLD: 0 /100 WBCS — SIGNIFICANT CHANGE UP (ref 0–0)
NRBC # FLD: 0 K/UL — SIGNIFICANT CHANGE UP (ref 0–0)
PLATELET # BLD AUTO: 309 K/UL — SIGNIFICANT CHANGE UP (ref 150–400)
POTASSIUM SERPL-MCNC: 3.9 MMOL/L — SIGNIFICANT CHANGE UP (ref 3.5–5.3)
POTASSIUM SERPL-SCNC: 3.9 MMOL/L — SIGNIFICANT CHANGE UP (ref 3.5–5.3)
RBC # BLD: 5.78 M/UL — SIGNIFICANT CHANGE UP (ref 4.2–5.8)
RBC # FLD: 20 % — HIGH (ref 10.3–14.5)
SODIUM SERPL-SCNC: 134 MMOL/L — LOW (ref 135–145)
T4 FREE SERPL-MCNC: 1 NG/DL — SIGNIFICANT CHANGE UP (ref 0.9–1.8)
THYROPEROXIDASE AB SERPL-ACNC: <10 IU/ML — SIGNIFICANT CHANGE UP
TRIGL SERPL-MCNC: 222 MG/DL — HIGH
TSH SERPL-MCNC: 9.68 UIU/ML — HIGH (ref 0.27–4.2)
WBC # BLD: 8.84 K/UL — SIGNIFICANT CHANGE UP (ref 3.8–10.5)
WBC # FLD AUTO: 8.84 K/UL — SIGNIFICANT CHANGE UP (ref 3.8–10.5)

## 2024-07-16 PROCEDURE — 99232 SBSQ HOSP IP/OBS MODERATE 35: CPT

## 2024-07-16 PROCEDURE — 70498 CT ANGIOGRAPHY NECK: CPT | Mod: 26

## 2024-07-16 PROCEDURE — 99232 SBSQ HOSP IP/OBS MODERATE 35: CPT | Mod: GC

## 2024-07-16 PROCEDURE — 99221 1ST HOSP IP/OBS SF/LOW 40: CPT

## 2024-07-16 PROCEDURE — 70496 CT ANGIOGRAPHY HEAD: CPT | Mod: 26

## 2024-07-16 PROCEDURE — 74177 CT ABD & PELVIS W/CONTRAST: CPT | Mod: 26

## 2024-07-16 RX ORDER — INSULIN GLARGINE 100 [IU]/ML
15 INJECTION, SOLUTION SUBCUTANEOUS AT BEDTIME
Refills: 0 | Status: DISCONTINUED | OUTPATIENT
Start: 2024-07-16 | End: 2024-07-17

## 2024-07-16 RX ORDER — DULAGLUTIDE 3 MG/.5ML
0.75 INJECTION, SOLUTION SUBCUTANEOUS
Qty: 1 | Refills: 0
Start: 2024-07-16 | End: 2024-08-14

## 2024-07-16 RX ORDER — HEPARIN SODIUM 50 [USP'U]/ML
5000 INJECTION, SOLUTION INTRAVENOUS EVERY 12 HOURS
Refills: 0 | Status: DISCONTINUED | OUTPATIENT
Start: 2024-07-16 | End: 2024-07-19

## 2024-07-16 RX ORDER — INSULIN LISPRO 100 [IU]/ML
6 INJECTION, SOLUTION SUBCUTANEOUS
Refills: 0 | Status: DISCONTINUED | OUTPATIENT
Start: 2024-07-16 | End: 2024-07-17

## 2024-07-16 RX ADMIN — ASPIRIN 81 MILLIGRAM(S): 325 TABLET, FILM COATED ORAL at 11:46

## 2024-07-16 RX ADMIN — INSULIN LISPRO 1: 100 INJECTION, SOLUTION SUBCUTANEOUS at 17:31

## 2024-07-16 RX ADMIN — Medication 20 MILLIGRAM(S): at 11:46

## 2024-07-16 RX ADMIN — VENLAFAXINE 37.5 MILLIGRAM(S): 37.5 CAPSULE, EXTENDED RELEASE ORAL at 11:57

## 2024-07-16 RX ADMIN — INSULIN LISPRO 3: 100 INJECTION, SOLUTION SUBCUTANEOUS at 11:47

## 2024-07-16 RX ADMIN — Medication 1000 MICROGRAM(S): at 11:45

## 2024-07-16 RX ADMIN — LOSARTAN POTASSIUM 50 MILLIGRAM(S): 100 TABLET, FILM COATED ORAL at 05:20

## 2024-07-16 RX ADMIN — HEPARIN SODIUM 5000 UNIT(S): 50 INJECTION, SOLUTION INTRAVENOUS at 17:45

## 2024-07-16 RX ADMIN — INSULIN LISPRO 5 UNIT(S): 100 INJECTION, SOLUTION SUBCUTANEOUS at 08:13

## 2024-07-16 RX ADMIN — INSULIN LISPRO 1: 100 INJECTION, SOLUTION SUBCUTANEOUS at 21:42

## 2024-07-16 RX ADMIN — Medication 1 TABLET(S): at 21:01

## 2024-07-16 RX ADMIN — INSULIN GLARGINE 15 UNIT(S): 100 INJECTION, SOLUTION SUBCUTANEOUS at 21:42

## 2024-07-16 RX ADMIN — Medication 1 MILLIGRAM(S): at 11:45

## 2024-07-16 RX ADMIN — INSULIN LISPRO 1: 100 INJECTION, SOLUTION SUBCUTANEOUS at 08:13

## 2024-07-16 RX ADMIN — INSULIN LISPRO 6 UNIT(S): 100 INJECTION, SOLUTION SUBCUTANEOUS at 17:54

## 2024-07-16 RX ADMIN — TICAGRELOR 90 MILLIGRAM(S): 90 TABLET ORAL at 05:20

## 2024-07-16 RX ADMIN — TICAGRELOR 90 MILLIGRAM(S): 90 TABLET ORAL at 17:46

## 2024-07-16 RX ADMIN — INSULIN LISPRO 5 UNIT(S): 100 INJECTION, SOLUTION SUBCUTANEOUS at 11:46

## 2024-07-16 RX ADMIN — ATORVASTATIN CALCIUM 40 MILLIGRAM(S): 20 TABLET, FILM COATED ORAL at 21:01

## 2024-07-16 NOTE — CONSULT NOTE ADULT - SUBJECTIVE AND OBJECTIVE BOX
NEUROSURGERY CONSULT    HPI: 73y/o M pmhx VA stenosis s/p R VA stent in 2016 on ASA/ Brillinta, bilateral cerebellar infarcts, HTN, DM, HLD, R ICA stenosis s/p stent in 10/2023 with Dr. Niño who initially presented to [facility] s/p syncope on 7/12.  Transferred to [facility] for EP eval for bifascicular block.  Patient reports that he got up from bed 4 days ago and was making breakfast for himself and his wife.  Patient states that he choked a little on some tea and had mild cough.  Next thing he remembers he was on the floor.  Unknown how long he was down for.  Patient states that when he woke up he had pain in his bilateral shoulders - specifically posteriorly behind his shoulder blades.  Denies history of CP or palpitations prior to syncope.  Patient notes that over the last month he has had worsening dizziness.  Feels the sensation of the room spinning but also notes dizziness on change of position (worse when looking right to left.)  Patient notes history of multiple syncope in the past and has had multiple strokes. Now s/p ILR on 7/12.     RADIOLOGY:   < from: CT Angio Neck w/ IV Cont (07.16.24 @ 13:16) >  IMPRESSION:    CT brain:  No hydrocephalus, acute intracranial hemorrhage, mass effect, or brain   edema.  Redemonstration of right inferomedial cerebellar hemisphere infarct    CTA brain:  Left vertebral artery is again seen to be small in caliber and   demonstrates multiple short segment moderate to severe stenoses.    Approximately 40% short segment stenosis of the proximal right intradural   vertebral artery due to calcified plaque. Otherwise normal flow-related   enhancement of the right intradural vertebral artery.    No flow-limiting stenosis or vascular aneurysm. No AVM.    Venous system is well opacified, no evidence for venous sinus or cortical   vein thrombosis.    CTA neck:  Findings consistent with patency of intervally placed right common and   internal carotid artery metallic intraluminal stent.    Up to 40% long segment stenosis of the left mid and distal common carotid   artery due to noncalcified plaque. Normal flow-related enhancement of the   more proximal left internal carotid artery.    Partially calcified plaque involving the origin of the left internal   carotid artery does not result in significant luminal stenosis.    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      MEDS:  artificial  tears Solution 1 Drop(s) Both EYES three times a day PRN  aspirin  chewable 81 milliGRAM(s) Oral daily  atorvastatin 40 milliGRAM(s) Oral at bedtime  cyanocobalamin 1000 MICROGram(s) Oral daily  dextrose 5%. 1000 milliLiter(s) IV Continuous <Continuous>  dextrose 5%. 1000 milliLiter(s) IV Continuous <Continuous>  dextrose 50% Injectable 25 Gram(s) IV Push once  dextrose 50% Injectable 25 Gram(s) IV Push once  dextrose 50% Injectable 12.5 Gram(s) IV Push once  dextrose Oral Gel 15 Gram(s) Oral once  famotidine    Tablet 20 milliGRAM(s) Oral daily  folic acid 1 milliGRAM(s) Oral daily  glucagon  Injectable 1 milliGRAM(s) IntraMuscular once  glucagon  Injectable 1 milliGRAM(s) IntraMuscular once  heparin   Injectable 5000 Unit(s) SubCutaneous every 12 hours  insulin glargine Injectable (LANTUS) 15 Unit(s) SubCutaneous at bedtime  insulin lispro (ADMELOG) corrective regimen sliding scale   SubCutaneous at bedtime  insulin lispro (ADMELOG) corrective regimen sliding scale   SubCutaneous three times a day before meals  insulin lispro Injectable (ADMELOG) 6 Unit(s) SubCutaneous three times a day before meals  losartan 50 milliGRAM(s) Oral daily  meclizine 25 milliGRAM(s) Oral every 8 hours PRN  senna 1 Tablet(s) Oral at bedtime  ticagrelor 90 milliGRAM(s) Oral every 12 hours  venlafaxine 37.5 milliGRAM(s) Oral daily      Vital Signs Last 24 Hrs  T(C): 37 (16 Jul 2024 11:40), Max: 37 (16 Jul 2024 11:40)  T(F): 98.6 (16 Jul 2024 11:40), Max: 98.6 (16 Jul 2024 11:40)  HR: 85 (16 Jul 2024 11:40) (83 - 98)  BP: 109/76 (16 Jul 2024 11:40) (109/76 - 141/93)  BP(mean): --  RR: 15 (16 Jul 2024 11:40) (13 - 20)  SpO2: 100% (16 Jul 2024 11:40) (99% - 100%)    Parameters below as of 16 Jul 2024 11:40  Patient On (Oxygen Delivery Method): room air      LABS:                        11.6   8.84  )-----------( 309      ( 16 Jul 2024 06:12 )             35.7     07-16    134<L>  |  99  |  20  ----------------------------<  177<H>  3.9   |  21<L>  |  1.01    Ca    9.2      16 Jul 2024 06:12  Phos  3.5     07-15  Mg     1.90     07-15        PHYSICAL EXAM:  AOx3, appropriate, follows commands  PERRL, EOMI, face symmetrical, visual fields grossly intact   RONDON x 4 with good strength   Sensation intact to light touch   No pronator drift

## 2024-07-16 NOTE — PROGRESS NOTE ADULT - PROBLEM SELECTOR PLAN 3
- Pt endorsing worsening abdominal distension and weight loss unintentional.   - Abs US w/ fatty liver otherwise no abnormality.   - Will get CT A/P w/o contrast: No malignancy or masses noted. 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

## 2024-07-16 NOTE — PROGRESS NOTE ADULT - PROBLEM SELECTOR PLAN 1
- Transferred from [facility] for EP eval. EKG w/ bifascicular block.   - pt w/ hx of CVA, carotid stenosis s/p stents.   - s/p ILR placement w/ EP on 7/12/24. Has tele appt w/ them on 7/25 (see EP note).   - TTE performed, pending read.   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   - Ordered the following on 7/15: CTA head and neck. Showing some progression of L vertebral artery stenosis; will consult neurosurgery for any further recs.   - Per prior charts, no longer on Fludrocortisone. SBP range from 130s, to 140s-150s. Will keep off for now.   - Home Losartan 100mg QD decreased to 50mg QD 7/12/24. SBPs in 150. Will keep at current regimen since pt has hx of stenosis, slightly higher pressures to allow continued perfusion.

## 2024-07-16 NOTE — CONSULT NOTE ADULT - CONSULT REASON
hypoplastic L vertebral A, L CCA stenosis
Vertigo
Syncope, Bifascicular block
Inpatient glycemic control

## 2024-07-16 NOTE — PROGRESS NOTE ADULT - PROBLEM SELECTOR PLAN 6
- A1c 9.3% 7/13/24.   - Lantus 10U QHS 7/14. Per endo INCREASE to 15U QHS.   - ADMELOG 3 units with meals 3x/day. Per endo INCREASE to 5U TID. Low dose ISS premeal and bedtime.   - FS improved.   - Endo consulted for recs on discharge and pt will need to establish care w/ endo.    Discharge recs per endo:   - Start Metformin: 500mg BID x 1 week, then 1000mg BID.   - Resume Jardiance 25mg PO QD.   - Check coverage for Trulicity 0.75 subq 1x/week.   - STOP Tradjenta and STOP Glimepiride.     # TSH high and free T4 normal. Subclinical hypothyroid.

## 2024-07-16 NOTE — CONSULT NOTE ADULT - NS ATTEND AMEND GEN_ALL_CORE FT
Patient known to me with R severe carotid stenosis s/p carotid stent on 10/19/23, with known occlusion of the hypoplastic L vert with distal reconstitution, and a R vert stent in 2016. Angio in 2023 showed the R vert stent to be widely patent, with no flow limiting stenosis throughout the dominant R vert and basilar system. Patient presenting now with syncope, CTA largely similar to prior with minimal flow in hypoplastic L vert. No acute infarcts on MRI. No indication for angio or further neuro vascular intervention, continue current medical management, can f/u outpatient for MRA NOVA.
Syncope due to orthostatic hypotension likely. Will place a loop for long term arrhythmic monitoring.

## 2024-07-16 NOTE — PROGRESS NOTE ADULT - SUBJECTIVE AND OBJECTIVE BOX
[facility] Division of Hospital Medicine  Princess Adame MD   Available via MS Teams  In house pager [phone]    Patient is a 73y old  Male who presents with a chief complaint of Transfer [facility]---> Transferred to [facility] for EP eval for bifascicular block. (13 Jul 2024 14:46)    SUBJECTIVE / OVERNIGHT EVENTS:   - No acute events overnight.   - CT head and neck resulted, showing some progression of mod/severe L vertebral artery stenosis which may be cause of pt's worsening dizziness.     ADDITIONAL REVIEW OF SYSTEMS:  CONSTITUTIONAL:  No weakness, fevers or chills  EYES/ENT:  No visual changes;  No vertigo or throat pain   NECK:  No pain or stiffness  RESPIRATORY:  No cough, wheezing, hemoptysis; No shortness of breath  CARDIOVASCULAR:  No chest pain or palpitations  GASTROINTESTINAL: Abdominal distension; No abdominal or epigastric pain. No nausea, vomiting, or hematemesis; No diarrhea or constipation. No melena or hematochezia.  GENITOURINARY:  No dysuria, frequency or hematuria  MUSCULOSKELETAL:  FROM all extremities, normal strength, No calf tenderness  NEUROLOGICAL:  dizziness, vertigo   SKIN:  No itching, rashes      MEDICATIONS  (STANDING):  aspirin  chewable 81 milliGRAM(s) Oral daily  atorvastatin 40 milliGRAM(s) Oral at bedtime  cyanocobalamin 1000 MICROGram(s) Oral daily  dextrose 5%. 1000 milliLiter(s) (100 mL/Hr) IV Continuous <Continuous>  dextrose 5%. 1000 milliLiter(s) (50 mL/Hr) IV Continuous <Continuous>  dextrose 50% Injectable 12.5 Gram(s) IV Push once  dextrose 50% Injectable 25 Gram(s) IV Push once  dextrose 50% Injectable 25 Gram(s) IV Push once  dextrose Oral Gel 15 Gram(s) Oral once  famotidine    Tablet 20 milliGRAM(s) Oral daily  folic acid 1 milliGRAM(s) Oral daily  glucagon  Injectable 1 milliGRAM(s) IntraMuscular once  glucagon  Injectable 1 milliGRAM(s) IntraMuscular once  heparin   Injectable 5000 Unit(s) SubCutaneous every 12 hours  insulin glargine Injectable (LANTUS) 15 Unit(s) SubCutaneous at bedtime  insulin lispro (ADMELOG) corrective regimen sliding scale   SubCutaneous at bedtime  insulin lispro (ADMELOG) corrective regimen sliding scale   SubCutaneous three times a day before meals  insulin lispro Injectable (ADMELOG) 6 Unit(s) SubCutaneous three times a day before meals  losartan 50 milliGRAM(s) Oral daily  senna 1 Tablet(s) Oral at bedtime  ticagrelor 90 milliGRAM(s) Oral every 12 hours  venlafaxine 37.5 milliGRAM(s) Oral daily    MEDICATIONS  (PRN):  artificial  tears Solution 1 Drop(s) Both EYES three times a day PRN Dry Eyes  meclizine 25 milliGRAM(s) Oral every 8 hours PRN Dizziness      I&O's Summary      PHYSICAL EXAM:  Vital Signs Last 24 Hrs  T(C): 37 (16 Jul 2024 11:40), Max: 37 (16 Jul 2024 11:40)  T(F): 98.6 (16 Jul 2024 11:40), Max: 98.6 (16 Jul 2024 11:40)  HR: 85 (16 Jul 2024 11:40) (83 - 98)  BP: 109/76 (16 Jul 2024 11:40) (109/76 - 141/93)  BP(mean): --  RR: 15 (16 Jul 2024 11:40) (13 - 20)  SpO2: 100% (16 Jul 2024 11:40) (99% - 100%)    Parameters below as of 16 Jul 2024 11:40  Patient On (Oxygen Delivery Method): room air      CONSTITUTIONAL: NAD, well-developed, well-groomed  EYES: PERRLA; conjunctiva and sclera clear  ENMT: Moist oral mucosa, no pharyngeal injection or exudates; normal dentition  NECK: Supple, no palpable masses; no thyromegaly  RESPIRATORY: Normal respiratory effort; lungs are clear to auscultation bilaterally  CARDIOVASCULAR: Regular rate and rhythm, normal S1 and S2, no murmur/rub/gallop; No lower extremity edema; Peripheral pulses are 2+ bilaterally  ABDOMEN: Nontender to palpation, normoactive bowel sounds, no rebound/guarding; No hepatosplenomegaly  MUSCULOSKELETAL:  Normal gait; no clubbing or cyanosis of digits; no joint swelling or tenderness to palpation  PSYCH: A+O to person, place, and time; affect appropriate  NEUROLOGY: CN 2-12 are intact and symmetric; no gross sensory deficits, strength is good in UE and LE.   SKIN: No rashes; no palpable lesions    LABS:                        11.6   8.84  )-----------( 309      ( 16 Jul 2024 06:12 )             35.7     07-16    134<L>  |  99  |  20  ----------------------------<  177<H>  3.9   |  21<L>  |  1.01    Ca    9.2      16 Jul 2024 06:12  Phos  3.5     07-15  Mg     1.90     07-15            Urinalysis Basic - ( 16 Jul 2024 06:12 )    Color: x / Appearance: x / SG: x / pH: x  Gluc: 177 mg/dL / Ketone: x  / Bili: x / Urobili: x   Blood: x / Protein: x / Nitrite: x   Leuk Esterase: x / RBC: x / WBC x   Sq Epi: x / Non Sq Epi: x / Bacteria: x            RADIOLOGY & ADDITIONAL TESTS:  New Results Reviewed Today:   New Imaging Personally Reviewed Today:  New Electrocardiogram Personally Reviewed Today:  Prior or Outpatient Records Reviewed Today:    COMMUNICATION:  Care Discussed with Consultants/Other Providers and Details of Discussion:  Discussions with Patient/Family:  PCP Communication:

## 2024-07-16 NOTE — CONSULT NOTE ADULT - ASSESSMENT
Patient is a 72M with a PMH of VA stenosis s/p R VA stent in 2016 on ASA/plavix, bilateral cerebellar infarcts, HTN, DM, HLD, R ICA stenosis s/p stent in 10/2023 who initially presented to [facility] s/p syncope, Transferred to [facility] for EP eval for bifascicular block. S/p ILR placement on 7/12. CTA head and neck showing hypoplastic L vertebral A and 40% stenosis of L common carotid

## 2024-07-16 NOTE — PROGRESS NOTE ADULT - PROBLEM SELECTOR PLAN 2
- Dizziness w/ positional changes.   - Most likely central etiology d/t hx of CVA and stenosis.   - Started on Meclizine 25mg BID at [facility]. Adjusted to 25mg TID PRN.   - Seen by Neurology 7/12:---> Dysequilibrium due to b/l cerebellar infarcts in addition to known intermittent positional peripheral vertigo potentially acutely worsened due to other active medical issues. Low suspicion for new central etiology. Advising to "c/w ASA 81mg, Brilinta 90 mg q12h, Atorvastatin 40mg QHS. Continue Meclizine 25mg and titrate up as necessary. Upon discharge, PT should F/U with either Dr. Salazar or Dr. Black: [phone] [address] [place] [address]"  - Further imaging as noted above.

## 2024-07-16 NOTE — PROGRESS NOTE ADULT - ASSESSMENT
Pt is a 73 yo M with a PMH of VA stenosis s/p R VA stent in 2016 on ASA/plavix, bilateral cerebellar infarcts, HTN, T2DM, HLD, BPPV R ICA stenosis s/p stent in 10/2023, admitted for EP eval for bifascicular block after presenting to [facility] following an episode of presyncope. Endocrinology consulted for inpatient glycemic control.      Pt's fasting blood glucose was 186 this AM (7/16/204) and 255 before lunch. He has required 33 units of insulin in the last 24 hours, and glucose has persistently remained above goal range on 13 Lantus at bedtime and 5 Admelog with low correctional scale. Triglycerides are downtrending, 222 today (7/16/2024) down from 377 (7/13/2024). . Pt's fasting blood glucose was 186 this AM (7/16/204) and 255 before lunch. TSH is laterally trending (10.22 -->7.15-->9.68), TPO antibody is negative and free T4 is WNL. Given patient's age and acuity of illness, these results are most likely indicative of subclinical hypothyroidism. No clinical suspicion for thyroid disease.    PCP was contacted this AM for elucidation on outpatient medication regimen for T2DM. Most recent chart note was received which contained most recent med rec. Notably, per PCP note pt is NOT on 2 DPP-4 inhibitors as previously thought, however his outpatient glycemic control is still suboptimal and his regimen should be changed to promote better adherence.    #T2DM   A1c: 9.4%  Home meds:  Glimeperide 4 mg PO x2 with breakfast or first meal of day, Jardiance 25 mg PO daily, Tradjenta 5 mg PO daily  Endocrinologist: Dr. Roxanne Choudhury (PCP)    INPATIENT RECOMMENDATIONS:  - additional 5 units of sliding scale given in the past 24 hours  - start Lantus 15 units at bedtime  - start Admelog 6 units premeal TID (HOLD IF NPO)  - continue low admelog correctional scale premeal   - continue low admelog correctional scale at bedtime    DISCHARGE RECOMMENDATIONS:  - Recommend pt to start Metformin 500 BID x1 week then increase to 1000 BID with food  - Resume Jardiance 25 mg PO daily  - Please check coverage/ prescriptions for Trulicity 0.75 subq 1x/week. If not covered, please let endocrinology know so a different agent can be suggested.  - Stop DPP-4 inhibitor (Tradjenta) IF Pt is able to obtain prescription for GLP-1 agonist.  - Stop Glimepiride.  - Education for GLP-1 self administration  - OUT-PATIENT FOLLOW UP: Patient should follow up with Endocrinology office ([phone]) at [address] [place] [address] [address], [place] [address].    #TSH >10  - Low clinical suspicion for thyroid disease. Likely subclinical hypothyroidism iso acute illness  - Recheck TSH and free T4 in 1-2 weeks after discharge  - Can follow up outpatient  #HLD  - INPATIENT: - patient on Atorvastatin 40 mg PO daily   - OUTPATIENT: patient on Rosuvastatin 40 mg PO daily and Ezetimibe 10 mg PO daily  - goal LDL in diabetes mellitus is <70    #HTN  - OUTPATIENT: patient on Losartan 100 mg   - INPATIENT: patient on Losartan 50 mg   - goal BP in diabetes mellitus is <130/80  - defer to primary team for management Pt is a 73 yo M with a PMH of VA stenosis s/p R VA stent in 2016 on ASA/plavix, bilateral cerebellar infarcts, HTN, T2DM, HLD, BPPV R ICA stenosis s/p stent in 10/2023, admitted for EP eval for bifascicular block after presenting to [facility] following an episode of presyncope. Endocrinology consulted for inpatient glycemic control.      Pt's fasting blood glucose was 186 this AM (7/16/204) and 255 before lunch. He has required 33 units of insulin in the last 24 hours, and glucose has persistently remained above goal range on 13 Lantus at bedtime and 5 Admelog with low correctional scale. Triglycerides are downtrending, 222 today (7/16/2024) down from 377 (7/13/2024). . Pt's fasting blood glucose was 186 this AM (7/16/204) and 255 before lunch. TSH is laterally trending (10.22 -->7.15-->9.68), TPO antibody is negative and free T4 is WNL. Given patient's age and acuity of illness, these results are most likely indicative of subclinical hypothyroidism. Low clinical suspicion for thyroid disease.    PCP was contacted this AM for elucidation on outpatient medication regimen for T2DM. Most recent chart note was received which contained most recent med rec. Notably, per PCP note pt is NOT on 2 DPP-4 inhibitors as previously thought, however his outpatient glycemic control is still suboptimal and his regimen should be changed to promote better adherence.    #T2DM   A1c: 9.4%  Home meds:  Glimeperide 4 mg PO x2 with breakfast or first meal of day, Jardiance 25 mg PO daily, Tradjenta 5 mg PO daily  Endocrinologist: Dr. Roxanne Choudhury (PCP)    INPATIENT RECOMMENDATIONS:  - additional 5 units of sliding scale given in the past 24 hours  - start Lantus 15 units at bedtime  - start Admelog 6 units premeal TID (HOLD IF NPO)  - continue low admelog correctional scale premeal   - continue low admelog correctional scale at bedtime    DISCHARGE RECOMMENDATIONS:  - Recommend pt to start Metformin 500 BID x1 week then increase to 1000 BID with food  - Resume Jardiance 25 mg PO daily  - Please check coverage/ prescriptions for Trulicity 0.75 subq 1x/week. If not covered, please let endocrinology know so a different agent can be suggested.  - Stop DPP-4 inhibitor (Tradjenta) IF Pt is able to obtain prescription for GLP-1 agonist.  - Stop Glimepiride.  - Education for GLP-1 self administration  - OUT-PATIENT FOLLOW UP: Patient should follow up with Endocrinology office ([phone]) at [address] [place] [address] [address], [place] [address].    #TSH >10  - Low clinical suspicion for thyroid disease. Likely subclinical hypothyroidism iso acute illness  - Recheck TSH and free T4 in 1-2 weeks after discharge  - Can follow up outpatient    #HLD  - Triglycerides downtrending (377-->222)  - INPATIENT: - patient on Atorvastatin 40 mg PO daily   - OUTPATIENT: patient on Rosuvastatin 40 mg PO daily and Ezetimibe 10 mg PO daily  - goal LDL in diabetes mellitus is <70    #HTN  - OUTPATIENT: patient on Losartan 100 mg   - INPATIENT: patient on Losartan 50 mg   - goal BP in diabetes mellitus is <130/80  - defer to primary team for management Pt is a 73 yo M with a PMH of VA stenosis s/p R VA stent in 2016 on ASA/plavix, bilateral cerebellar infarcts, HTN, T2DM, HLD, BPPV R ICA stenosis s/p stent in 10/2023, admitted for EP eval for bifascicular block after presenting to [facility] following an episode of presyncope. Endocrinology consulted for inpatient glycemic control.      Pt's fasting blood glucose was 186 this AM (7/16/204) and 255 before lunch. He has required 33 units of insulin in the last 24 hours, and glucose has persistently remained above goal range on 13 Lantus at bedtime and 5 Admelog with low correctional scale. Triglycerides are downtrending, 222 today (7/16/2024) down from 377 (7/13/2024). . Pt's fasting blood glucose was 186 this AM (7/16/204) and 255 before lunch. TSH is laterally trending (10.22 -->7.15-->9.68), TPO antibody is negative and free T4 is WNL. Given patient's age and acuity of illness, these results are most likely indicative of subclinical hypothyroidism. Low clinical suspicion for thyroid disease.    PCP was contacted this AM for elucidation on outpatient medication regimen for T2DM. Most recent chart note was received which contained most recent med rec. Notably, per PCP note pt is NOT on 2 DPP-4 inhibitors as previously thought, however his outpatient glycemic control is still suboptimal and his regimen should be changed to promote better adherence.    #T2DM   A1c: 9.4%  Home meds (updated with records from pcp):  Glimeperide 4 mg PO x2 with breakfast or first meal of day, Jardiance 25 mg PO daily, Tradjenta 5 mg PO daily  Endocrinologist: Dr. Roxanne Choudhury (PCP)    INPATIENT RECOMMENDATIONS:  - additional 5 units of sliding scale given in the past 24 hours  - Increase Lantus to 15 units at bedtime  - Increase Admelog to 6 units premeal TID (HOLD IF NPO)  - continue low admelog correctional scale premeal   - continue low admelog correctional scale at bedtime    DISCHARGE RECOMMENDATIONS:  - Recommend pt to start Metformin 500 BID x1 week then increase to 1000 BID with food  - Resume Jardiance 25 mg PO daily  - Please check coverage/ prescriptions for Trulicity 0.75 subq 1x/week. If not covered, please let endocrinology know so a different agent can be suggested.  - Stop DPP-4 inhibitor (Tradjenta) IF Pt is able to obtain prescription for GLP-1 agonist.  - Stop Glimepiride.  - Education for GLP-1 self administration  - OUT-PATIENT FOLLOW UP: Patient should follow up with Endocrinology office ([phone]) at [address] [place] [address] [address], [place] [address].    #TSH >10  - Low clinical suspicion for thyroid disease. Likely subclinical hypothyroidism iso acute illness  - Recheck TSH and free T4 in 4 weeks after discharge  - Can follow up outpatient    #HLD  - Triglycerides downtrending (377-->222)  - INPATIENT: - patient on Atorvastatin 40 mg PO daily   - OUTPATIENT: patient on Rosuvastatin 40 mg PO daily and Ezetimibe 10 mg PO daily  - goal LDL in diabetes mellitus is <70    #HTN  - OUTPATIENT: patient on Losartan 100 mg   - INPATIENT: patient on Losartan 50 mg   - goal BP in diabetes mellitus is <130/80  - defer to primary team for management

## 2024-07-16 NOTE — PROGRESS NOTE ADULT - ASSESSMENT
Patient is a 72M with a PMH of VA stenosis s/p R VA stent in 2016 on ASA/plavix, bilateral cerebellar infarcts, HTN, DM, HLD, R ICA stenosis s/p stent in 10/2023 who initially presented to [facility] s/p syncope.  Transferred to [facility] for EP eval for bifascicular block.  Further details as noted below.

## 2024-07-16 NOTE — PROGRESS NOTE ADULT - SUBJECTIVE AND OBJECTIVE BOX
Interval Events: No acute overnight events. Pt is tolerating PO, however doesn't have much of an appetite and missed lunch today.Pt's PCP was called this morning to elucidate outpatient medication regimen (listed below). Pt's fasting blood glucose was 186 this AM (7/16/204) and 255 before lunch.  Triglyceride resulted this morning at 222. TSH is laterally trending (10.22 -->7.15-->9.68), TPO antibody is <10 and free T4 is 1.0. No nausea/vomiting. No hypoglycemia symptoms. Denies fevers, chills, CP, SOB, Abdominal pain, constipation, Diarrhea.      Per PCP documentation, active outpatient medications are as follows:  1) Alcohol Prep 70 % Pad Dose/Route/Freq: 1 Not Specified Every 12 Hours 50 day(s)   (2) ASPIRIN EC 81 MG TABLET Dose/Route/Freq: TAKE 1 TABLET BY MOUTH EVERY DAY   (3) Brilinta 90 MG Oral Tablet Dose/Route/Freq: Take 1 tablet (90 mg) by mouth 2 times per day  (4) Ezetimibe 10 MG Oral Tablet Dose/Route/Freq: TAKE 1 TABLET BY MOUTH EVERY DAY   (5) FAMOTIDINE 20 MG TABLET Dose/Route/Freq: TAKE 1 TABLET BY MOUTH TWICE A DAY   (6) GLIMEPIRIDE 4 MG TABLET Dose/Route/Freq: TAKE 2 TABLETS EVERY DAY WITH BREAKFAST OR FIRST MAIN MEAL OF THE DAY12/15/2020  (7) hydrOXYzine HCl 50 MG Oral Tablet Dose/Route/Freq: Take 1 tablet (50 mg) by mouth daily at bedtime as needed  (8) Jardiance 25 MG Oral Tablet Dose/Route/Freq: 1 TABLET (25 MG) ORALLY DAILY FOR TYPE TWO DIABETES MELLITUS   (9) LACTULOSE 10 GM/15 ML SOLUTION Dose/Route/Freq: TAKE 15 ML (10 GRAM) BY MOUTH DAILY AS NEEDED  (10) Lactulose 10 GM/15ML Oral Solution Dose/Route/Freq: Take 15 ml (10 gram) by mouth daily as needed  (11) Losartan Potassium 100 MG Oral Tablet Dose/Route/Freq: TAKE 1 TABLET BY MOUTH EVERY DAY   (12) Omeprazole 40 MG Oral Capsule Delayed Release Dose/Route/Freq: TAKE ONE CAPSULE BY MOUTH EVERY DAY  (13) ONE TOUCH ULTRA BLUE TEST STRP Dose/Route/Freq: 1 STRIP 1 TIME DAILY   (14) OneTouch FinePoint Lancets Miscellaneous Dose/Route/Freq: 1 lancet 1 time daily   (15) Rosuvastatin Calcium 40 MG Oral Tablet Dose/Route/Freq: TAKE 1 TABLET BY MOUTH EVERY DAY  (16) TRADJENTA 5MG TAB Dose/Route/Freq: TAKE 1 TABLET BY MOUTH EVERY DAY  (17) Venlafaxine HCl  MG Oral Capsule Extended Release 24 Hour Dose/Route/Freq: Take 1 capsule (150 mg) by mouth daily with food    MEDICATIONS  (STANDING):  aspirin  chewable 81 milliGRAM(s) Oral daily  atorvastatin 40 milliGRAM(s) Oral at bedtime  cyanocobalamin 1000 MICROGram(s) Oral daily  dextrose 5%. 1000 milliLiter(s) (50 mL/Hr) IV Continuous <Continuous>  dextrose 5%. 1000 milliLiter(s) (100 mL/Hr) IV Continuous <Continuous>  dextrose 50% Injectable 25 Gram(s) IV Push once  dextrose 50% Injectable 25 Gram(s) IV Push once  dextrose 50% Injectable 12.5 Gram(s) IV Push once  dextrose Oral Gel 15 Gram(s) Oral once  famotidine    Tablet 20 milliGRAM(s) Oral daily  folic acid 1 milliGRAM(s) Oral daily  glucagon  Injectable 1 milliGRAM(s) IntraMuscular once  glucagon  Injectable 1 milliGRAM(s) IntraMuscular once  heparin   Injectable 5000 Unit(s) SubCutaneous every 12 hours  insulin glargine Injectable (LANTUS) 15 Unit(s) SubCutaneous at bedtime  insulin lispro (ADMELOG) corrective regimen sliding scale   SubCutaneous at bedtime  insulin lispro (ADMELOG) corrective regimen sliding scale   SubCutaneous three times a day before meals  insulin lispro Injectable (ADMELOG) 6 Unit(s) SubCutaneous three times a day before meals  losartan 50 milliGRAM(s) Oral daily  senna 1 Tablet(s) Oral at bedtime  ticagrelor 90 milliGRAM(s) Oral every 12 hours  venlafaxine 37.5 milliGRAM(s) Oral daily    MEDICATIONS  (PRN):  artificial  tears Solution 1 Drop(s) Both EYES three times a day PRN Dry Eyes  meclizine 25 milliGRAM(s) Oral every 8 hours PRN Dizziness      Allergies  No Known Allergies  Intolerances          ================PHYSICAL EXAM======================  VITALS: T(C): 36.4 (07-16-24 @ 05:19)  T(F): 97.5 (07-16-24 @ 05:19), Max: 98.4 (07-15-24 @ 21:50)  HR: 83 (07-16-24 @ 05:19) (83 - 98)  BP: 141/93 (07-16-24 @ 05:19) (123/76 - 141/93)  RR:  (13 - 20)  SpO2:  (99% - 100%)  Wt(kg): --  GENERAL: NAD, appears comfortable  EYES: No proptosis, no lid lag, anicteric  HEENT:  Atraumatic, Normocephalic, moist mucous membranes  RESPIRATORY: nonlabored respirations, no wheezing  PSYCH: Alert and awake  ===================================================    CAPILLARY BLOOD GLUCOSE      POCT Blood Glucose.: 255 mg/dL (16 Jul 2024 11:44)  POCT Blood Glucose.: 186 mg/dL (16 Jul 2024 08:07)  POCT Blood Glucose.: 289 mg/dL (15 Jul 2024 21:28)  POCT Blood Glucose.: 129 mg/dL (15 Jul 2024 17:20)      07-16    134<L>  |  99  |  20  ----------------------------<  177<H>  3.9   |  21<L>  |  1.01    eGFR: 78    Ca    9.2      07-16  Mg     1.90     07-15  Phos  3.5     07-15        A1C with Estimated Average Glucose Result: 9.4 % (07-13-24 @ 08:57)  A1C with Estimated Average Glucose Result: 9.3 % (07-12-24 @ 22:27)  A1C with Estimated Average Glucose Result: 7.4 % (10-17-23 @ 05:59)      Thyroid Function Tests:  07-16 @ 06:12 TSH 9.68 FreeT4 1.0 T3 -- Anti TPO -- Anti Thyroglobulin Ab -- TSI --  07-15 @ 17:14 TSH 7.15 FreeT4 -- T3 -- Anti TPO <10.0 Anti Thyroglobulin Ab -- TSI --

## 2024-07-17 LAB
ANION GAP SERPL CALC-SCNC: 13 MMOL/L — SIGNIFICANT CHANGE UP (ref 7–14)
BUN SERPL-MCNC: 23 MG/DL — SIGNIFICANT CHANGE UP (ref 7–23)
CALCIUM SERPL-MCNC: 9.4 MG/DL — SIGNIFICANT CHANGE UP (ref 8.4–10.5)
CHLORIDE SERPL-SCNC: 102 MMOL/L — SIGNIFICANT CHANGE UP (ref 98–107)
CO2 SERPL-SCNC: 20 MMOL/L — LOW (ref 22–31)
CREAT SERPL-MCNC: 1.03 MG/DL — SIGNIFICANT CHANGE UP (ref 0.5–1.3)
EGFR: 76 ML/MIN/1.73M2 — SIGNIFICANT CHANGE UP
GLUCOSE BLDC GLUCOMTR-MCNC: 169 MG/DL — HIGH (ref 70–99)
GLUCOSE BLDC GLUCOMTR-MCNC: 175 MG/DL — HIGH (ref 70–99)
GLUCOSE BLDC GLUCOMTR-MCNC: 194 MG/DL — HIGH (ref 70–99)
GLUCOSE BLDC GLUCOMTR-MCNC: 257 MG/DL — HIGH (ref 70–99)
GLUCOSE SERPL-MCNC: 229 MG/DL — HIGH (ref 70–99)
HCT VFR BLD CALC: 34.9 % — LOW (ref 39–50)
HGB BLD-MCNC: 11.4 G/DL — LOW (ref 13–17)
MAGNESIUM SERPL-MCNC: 1.7 MG/DL — SIGNIFICANT CHANGE UP (ref 1.6–2.6)
MCHC RBC-ENTMCNC: 20.2 PG — LOW (ref 27–34)
MCHC RBC-ENTMCNC: 32.7 GM/DL — SIGNIFICANT CHANGE UP (ref 32–36)
MCV RBC AUTO: 61.9 FL — LOW (ref 80–100)
NRBC # BLD: 0 /100 WBCS — SIGNIFICANT CHANGE UP (ref 0–0)
NRBC # FLD: 0 K/UL — SIGNIFICANT CHANGE UP (ref 0–0)
PHOSPHATE SERPL-MCNC: 3.6 MG/DL — SIGNIFICANT CHANGE UP (ref 2.5–4.5)
PLATELET # BLD AUTO: 312 K/UL — SIGNIFICANT CHANGE UP (ref 150–400)
POTASSIUM SERPL-MCNC: 4 MMOL/L — SIGNIFICANT CHANGE UP (ref 3.5–5.3)
POTASSIUM SERPL-SCNC: 4 MMOL/L — SIGNIFICANT CHANGE UP (ref 3.5–5.3)
RBC # BLD: 5.64 M/UL — SIGNIFICANT CHANGE UP (ref 4.2–5.8)
RBC # FLD: 19.7 % — HIGH (ref 10.3–14.5)
SODIUM SERPL-SCNC: 135 MMOL/L — SIGNIFICANT CHANGE UP (ref 135–145)
WBC # BLD: 8.19 K/UL — SIGNIFICANT CHANGE UP (ref 3.8–10.5)
WBC # FLD AUTO: 8.19 K/UL — SIGNIFICANT CHANGE UP (ref 3.8–10.5)

## 2024-07-17 PROCEDURE — 99232 SBSQ HOSP IP/OBS MODERATE 35: CPT

## 2024-07-17 RX ORDER — INSULIN LISPRO 100 [IU]/ML
7 INJECTION, SOLUTION SUBCUTANEOUS
Refills: 0 | Status: DISCONTINUED | OUTPATIENT
Start: 2024-07-17 | End: 2024-07-18

## 2024-07-17 RX ORDER — INSULIN GLARGINE 100 [IU]/ML
16 INJECTION, SOLUTION SUBCUTANEOUS AT BEDTIME
Refills: 0 | Status: DISCONTINUED | OUTPATIENT
Start: 2024-07-17 | End: 2024-07-19

## 2024-07-17 RX ADMIN — Medication 1000 MICROGRAM(S): at 11:47

## 2024-07-17 RX ADMIN — LOSARTAN POTASSIUM 50 MILLIGRAM(S): 100 TABLET, FILM COATED ORAL at 05:32

## 2024-07-17 RX ADMIN — TICAGRELOR 90 MILLIGRAM(S): 90 TABLET ORAL at 05:32

## 2024-07-17 RX ADMIN — INSULIN LISPRO 6 UNIT(S): 100 INJECTION, SOLUTION SUBCUTANEOUS at 12:11

## 2024-07-17 RX ADMIN — INSULIN LISPRO 1: 100 INJECTION, SOLUTION SUBCUTANEOUS at 07:46

## 2024-07-17 RX ADMIN — HEPARIN SODIUM 5000 UNIT(S): 50 INJECTION, SOLUTION INTRAVENOUS at 17:37

## 2024-07-17 RX ADMIN — INSULIN LISPRO 7 UNIT(S): 100 INJECTION, SOLUTION SUBCUTANEOUS at 17:15

## 2024-07-17 RX ADMIN — INSULIN GLARGINE 16 UNIT(S): 100 INJECTION, SOLUTION SUBCUTANEOUS at 22:10

## 2024-07-17 RX ADMIN — HEPARIN SODIUM 5000 UNIT(S): 50 INJECTION, SOLUTION INTRAVENOUS at 05:32

## 2024-07-17 RX ADMIN — Medication 1 TABLET(S): at 21:07

## 2024-07-17 RX ADMIN — TICAGRELOR 90 MILLIGRAM(S): 90 TABLET ORAL at 17:35

## 2024-07-17 RX ADMIN — VENLAFAXINE 37.5 MILLIGRAM(S): 37.5 CAPSULE, EXTENDED RELEASE ORAL at 11:45

## 2024-07-17 RX ADMIN — ATORVASTATIN CALCIUM 40 MILLIGRAM(S): 20 TABLET, FILM COATED ORAL at 21:07

## 2024-07-17 RX ADMIN — Medication 20 MILLIGRAM(S): at 11:47

## 2024-07-17 RX ADMIN — INSULIN LISPRO 1: 100 INJECTION, SOLUTION SUBCUTANEOUS at 22:10

## 2024-07-17 RX ADMIN — INSULIN LISPRO 1: 100 INJECTION, SOLUTION SUBCUTANEOUS at 17:16

## 2024-07-17 RX ADMIN — ASPIRIN 81 MILLIGRAM(S): 325 TABLET, FILM COATED ORAL at 12:27

## 2024-07-17 RX ADMIN — INSULIN LISPRO 6 UNIT(S): 100 INJECTION, SOLUTION SUBCUTANEOUS at 07:46

## 2024-07-17 RX ADMIN — Medication 1 MILLIGRAM(S): at 12:27

## 2024-07-17 RX ADMIN — INSULIN LISPRO 1: 100 INJECTION, SOLUTION SUBCUTANEOUS at 12:11

## 2024-07-17 NOTE — PROGRESS NOTE ADULT - ASSESSMENT
Pt is a 73 yo M with a PMH of VA stenosis s/p R VA stent in 2016 on ASA/plavix, bilateral cerebellar infarcts, HTN, T2DM, HLD, BPPV R ICA stenosis s/p stent in 10/2023, admitted for EP eval for bifascicular block after presenting to [facility] following an episode of presyncope. Endocrinology consulted for inpatient glycemic control.      Pt's fasting blood glucose was 186 this AM (7/16/204) and 255 before lunch. He has required 37 units of insulin in the last 24 hours, and glucose has persistently remained above goal range on 15 Lantus at bedtime and 6 Admelog with low correctional scale.     PCP was contacted on 7/16/2024 for elucidation on outpatient medication regimen for T2DM. Most recent chart note was received which contained up-to-date med rec. Notably, per PCP note pt is NOT on 2 DPP-4 inhibitors as previously thought, however his outpatient glycemic control is still suboptimal and his regimen should be changed to promote better adherence.        #T2DM   A1c: 9.4%  Home meds (updated with records from pcp):  Glimeperide 4 mg PO x2 with breakfast or first meal of day, Jardiance 25 mg PO daily, Tradjenta 5 mg PO daily  Endocrinologist: Dr. Roxanne Choudhury (PCP)    INPATIENT RECOMMENDATIONS:  - additional 4 units of sliding scale given in the past 24 hours  - Increase Lantus to 16 units at bedtime  - Increase Admelog to 7 units premeal TID (HOLD IF NPO)  - continue low admelog correctional scale premeal   - continue low admelog correctional scale at bedtime    DISCHARGE RECOMMENDATIONS:  - Recommend pt to start Metformin 500 BID x1 week then increase to 1000 BID with food  - Resume Jardiance 25 mg PO daily  - Please check coverage/ prescriptions for Trulicity 0.75 subq 1x/week. If not covered, please let endocrinology know so a different agent can be suggested.  - Stop DPP-4 inhibitor (Tradjenta) IF Pt is able to obtain prescription for GLP-1 agonist.  - Stop Glimepiride.  - Education for GLP-1 self administration  - OUT-PATIENT FOLLOW UP: Patient should follow up with Endocrinology office ([phone]) at [address] [place] [address] [address], [place] [address].    #TSH >10  - Low clinical suspicion for thyroid disease. Likely subclinical hypothyroidism iso acute illness  - Recheck TSH and free T4 in 4 weeks after discharge  - Can follow up outpatient    #HLD  - INPATIENT: - patient on Atorvastatin 40 mg PO daily   - OUTPATIENT: patient on Rosuvastatin 40 mg PO daily and Ezetimibe 10 mg PO daily  - goal LDL in diabetes mellitus is <70    #HTN  - INPATIENT: patient on Losartan 50 mg   - OUTPATIENT: patient on Losartan 100 mg   - goal BP in diabetes mellitus is <130/80  - defer to primary team for management   Pt is a 73 yo M with a PMH of VA stenosis s/p R VA stent in 2016 on ASA/plavix, bilateral cerebellar infarcts, HTN, T2DM, HLD, BPPV R ICA stenosis s/p stent in 10/2023, admitted for EP eval for bifascicular block after presenting to [facility] following an episode of presyncope. Endocrinology consulted for inpatient glycemic control.      Pt's fasting blood glucose was 194 this AM (7/17/204) and 169 before lunch. He has required 37 units of insulin in the last 24 hours, and glucose has persistently remained above goal range on 15 Lantus at bedtime and 6 Admelog with low correctional scale.     PCP was contacted on 7/16/2024 for elucidation on outpatient medication regimen for T2DM. Most recent chart note was received which contained up-to-date med rec. Notably, per PCP note pt is NOT on 2 DPP-4 inhibitors as previously thought, however his outpatient glycemic control is still suboptimal and his regimen should be changed to promote better adherence.        #T2DM   A1c: 9.4%  Home meds (updated with records from pcp):  Glimeperide 4 mg PO x2 with breakfast or first meal of day, Jardiance 25 mg PO daily, Tradjenta 5 mg PO daily  Endocrinologist: Dr. Roxanne Choudhury (PCP)    INPATIENT RECOMMENDATIONS:  - additional 4 units of sliding scale given in the past 24 hours  - Increase Lantus to 16 units at bedtime  - Increase Admelog to 7 units premeal TID (HOLD IF NPO)  - continue low admelog correctional scale premeal   - continue low admelog correctional scale at bedtime    DISCHARGE RECOMMENDATIONS:  - Recommend pt to start Metformin 500 BID x1 week then increase to 1000 BID with food  - Resume Jardiance 25 mg PO daily  - Please check coverage/ prescriptions for Trulicity 0.75 subq 1x/week. If not covered, please let endocrinology know so a different agent can be suggested.  - Stop DPP-4 inhibitor (Tradjenta) IF Pt is able to obtain prescription for GLP-1 agonist.  - Stop Glimepiride.  - Education for GLP-1 self administration  - OUT-PATIENT FOLLOW UP: Patient should follow up with Endocrinology office ([phone]) at [address] [place] [address] [address], [place] [address].    #TSH >10  - Low clinical suspicion for thyroid disease. Likely subclinical hypothyroidism iso acute illness  - Recheck TSH and free T4 in 4 weeks after discharge  - Can follow up outpatient    #HLD  - INPATIENT: - patient on Atorvastatin 40 mg PO daily   - OUTPATIENT: patient on Rosuvastatin 40 mg PO daily and Ezetimibe 10 mg PO daily  - goal LDL in diabetes mellitus is <70    #HTN  - INPATIENT: patient on Losartan 50 mg   - OUTPATIENT: patient on Losartan 100 mg   - goal BP in diabetes mellitus is <130/80  - defer to primary team for management   Pt is a 71 yo M with a PMH of VA stenosis s/p R VA stent in 2016 on ASA/plavix, bilateral cerebellar infarcts, HTN, T2DM, HLD, BPPV R ICA stenosis s/p stent in 10/2023, admitted for EP eval for bifascicular block after presenting to [facility] following an episode of presyncope. Endocrinology consulted for inpatient glycemic control.      Pt's fasting blood glucose was 194 this AM (7/17/204) and 169 before lunch. He has required 37 units of insulin in the last 24 hours, and glucose has persistently remained above goal range on 15 Lantus at bedtime and 6 Admelog with low correctional scale.     PCP was contacted on 7/16/2024 for elucidation on outpatient medication regimen for T2DM. Most recent chart note was received which contained up-to-date med rec. Notably, per PCP note pt is NOT on 2 DPP-4 inhibitors as previously thought, however his outpatient glycemic control is still suboptimal and his regimen should be changed to promote better adherence.        #T2DM   A1c: 9.4%  Home meds (updated with records from pcp):  Glimepiride 4 mg PO x2 with breakfast or first meal of day, Jardiance 25 mg PO daily, Tradjenta 5 mg PO daily  Endocrinologist: Dr. Roxanne Choudhury (PCP)    INPATIENT RECOMMENDATIONS:  - additional 4 units of sliding scale given in the past 24 hours  - Increase Lantus to 16 units at bedtime  - Increase Admelog to 7 units premeal TID (HOLD IF NPO)  - continue low admelog correctional scale premeal   - continue low admelog correctional scale at bedtime    DISCHARGE RECOMMENDATIONS:  - Recommend pt to start Metformin 500 BID x1 week then increase to 1000 BID with food  - Resume Jardiance 25 mg PO daily  - Please check coverage/ prescriptions for Trulicity 0.75 subq 1x/week. If not covered, please let endocrinology know so a different agent can be suggested.  - Stop DPP-4 inhibitor (Tradjenta) IF Pt is able to obtain prescription for GLP-1 agonist.  - Stop Glimepiride.  - Education for GLP-1 self administration  - OUT-PATIENT FOLLOW UP: Patient should follow up with Endocrinology office ([phone]) at [address] [place] [address] [address], [place] [address].    #TSH >10  - Low clinical suspicion for thyroid disease. Likely subclinical hypothyroidism iso acute illness  - Recheck TSH and free T4 in 4 weeks after discharge  - Can follow up outpatient    #HLD  - INPATIENT: - patient on Atorvastatin 40 mg PO daily   - OUTPATIENT: patient on Rosuvastatin 40 mg PO daily and Ezetimibe 10 mg PO daily  - goal LDL in diabetes mellitus is <70    #HTN  - INPATIENT: patient on Losartan 50 mg   - OUTPATIENT: patient on Losartan 100 mg   - goal BP in diabetes mellitus is <130/80  - defer to primary team for management

## 2024-07-17 NOTE — PROGRESS NOTE ADULT - PROBLEM SELECTOR PLAN 2
- Dizziness w/ positional changes.   - Most likely central etiology d/t hx of CVA and stenosis.   - Started on Meclizine 25mg BID at [facility]. Adjusted to 25mg TID PRN.   - Seen by Neurology 7/12:---> Dysequilibrium due to b/l cerebellar infarcts in addition to known intermittent positional peripheral vertigo potentially acutely worsened due to other active medical issues. Low suspicion for new central etiology. Advising to "c/w ASA 81mg, Brilinta 90 mg q12h, Atorvastatin 40mg QHS. Continue Meclizine 25mg and titrate up as necessary. Upon discharge, PT should F/U with either Dr. Salazar or Dr. Black: [phone] [address] [place] [address]"  - Further imaging as noted above. And MRA head/neck ordered as well.

## 2024-07-17 NOTE — PROGRESS NOTE ADULT - ASSESSMENT
Patient is a 72M with a PMH of VA stenosis s/p R VA stent in 2016 on ASA/plavix, bilateral cerebellar infarcts, HTN, DM, HLD, R ICA stenosis s/p stent in 10/2023 who initially presented to [facility] s/p syncope.  Transferred to [facility] for EP eval for bifascicular block. CTA head/neck w/ concer  Further details as noted below.

## 2024-07-17 NOTE — PROGRESS NOTE ADULT - PROBLEM SELECTOR PLAN 3
- Pt endorsing worsening abdominal distension and weight loss unintentional.   - Abs US w/ fatty liver otherwise no abnormality.   - CT A/P w/o contrast: No malignancy or masses noted. 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

## 2024-07-17 NOTE — PROGRESS NOTE ADULT - SUBJECTIVE AND OBJECTIVE BOX
[facility] Division of Hospital Medicine  Princess Adame MD   Available via MS Teams  In house pager [phone]    Patient is a 73y old  Male who presents with a chief complaint of [facility]---> Transferred to [facility] for EP eval for bifascicular block. (13 Jul 2024 14:46)    SUBJECTIVE / OVERNIGHT EVENTS:   - No acute events overnight.   - CT head and neck resulted, showing some progression of mod/severe L vertebral artery stenosis which may be cause of pt's worsening dizziness. Neurosurgery consulted; advising MRA head/neck. Ordered.   - Pt stated he is overall doing well, only had transient episode of dizziness overnight when he changed positions.     ADDITIONAL REVIEW OF SYSTEMS:  CONSTITUTIONAL:  No weakness, fevers or chills  EYES/ENT:  No visual changes;  No vertigo or throat pain   NECK:  No pain or stiffness  RESPIRATORY:  No cough, wheezing, hemoptysis; No shortness of breath  CARDIOVASCULAR:  No chest pain or palpitations  GASTROINTESTINAL: Abdominal distension; No abdominal or epigastric pain. No nausea, vomiting, or hematemesis; No diarrhea or constipation. No melena or hematochezia.  GENITOURINARY:  No dysuria, frequency or hematuria  MUSCULOSKELETAL:  FROM all extremities, normal strength, No calf tenderness  NEUROLOGICAL:  dizziness, vertigo most pronounced w/ position changes   SKIN:  No itching, rashes    MEDICATIONS  (STANDING):  aspirin  chewable 81 milliGRAM(s) Oral daily  atorvastatin 40 milliGRAM(s) Oral at bedtime  cyanocobalamin 1000 MICROGram(s) Oral daily  dextrose 5%. 1000 milliLiter(s) (50 mL/Hr) IV Continuous <Continuous>  dextrose 5%. 1000 milliLiter(s) (100 mL/Hr) IV Continuous <Continuous>  dextrose 50% Injectable 25 Gram(s) IV Push once  dextrose 50% Injectable 25 Gram(s) IV Push once  dextrose 50% Injectable 12.5 Gram(s) IV Push once  dextrose Oral Gel 15 Gram(s) Oral once  famotidine    Tablet 20 milliGRAM(s) Oral daily  folic acid 1 milliGRAM(s) Oral daily  glucagon  Injectable 1 milliGRAM(s) IntraMuscular once  glucagon  Injectable 1 milliGRAM(s) IntraMuscular once  heparin   Injectable 5000 Unit(s) SubCutaneous every 12 hours  insulin glargine Injectable (LANTUS) 15 Unit(s) SubCutaneous at bedtime  insulin lispro (ADMELOG) corrective regimen sliding scale   SubCutaneous at bedtime  insulin lispro (ADMELOG) corrective regimen sliding scale   SubCutaneous three times a day before meals  insulin lispro Injectable (ADMELOG) 6 Unit(s) SubCutaneous three times a day before meals  losartan 50 milliGRAM(s) Oral daily  senna 1 Tablet(s) Oral at bedtime  ticagrelor 90 milliGRAM(s) Oral every 12 hours  venlafaxine 37.5 milliGRAM(s) Oral daily    MEDICATIONS  (PRN):  artificial  tears Solution 1 Drop(s) Both EYES three times a day PRN Dry Eyes  meclizine 25 milliGRAM(s) Oral every 8 hours PRN Dizziness      I&O's Summary    17 Jul 2024 07:01  -  17 Jul 2024 14:40  --------------------------------------------------------  IN: 0 mL / OUT: 500 mL / NET: -500 mL        PHYSICAL EXAM:  Vital Signs Last 24 Hrs  T(C): 37 (17 Jul 2024 14:13), Max: 37 (16 Jul 2024 18:19)  T(F): 98.6 (17 Jul 2024 14:13), Max: 98.6 (16 Jul 2024 18:19)  HR: 85 (17 Jul 2024 14:13) (73 - 97)  BP: 113/75 (17 Jul 2024 14:13) (113/75 - 139/88)  BP(mean): --  RR: 17 (17 Jul 2024 14:13) (16 - 18)  SpO2: 99% (17 Jul 2024 14:13) (97% - 100%)    Parameters below as of 17 Jul 2024 14:13  Patient On (Oxygen Delivery Method): room air      CONSTITUTIONAL: NAD, well-developed, well-groomed  EYES: PERRLA; conjunctiva and sclera clear  ENMT: Moist oral mucosa, no pharyngeal injection or exudates; normal dentition  NECK: Supple, no palpable masses; no thyromegaly  RESPIRATORY: Normal respiratory effort; lungs are clear to auscultation bilaterally  CARDIOVASCULAR: Regular rate and rhythm, normal S1 and S2, no murmur/rub/gallop; No lower extremity edema; Peripheral pulses are 2+ bilaterally  ABDOMEN: Nontender to palpation, normoactive bowel sounds, no rebound/guarding; No hepatosplenomegaly  MUSCULOSKELETAL:  Normal gait; no clubbing or cyanosis of digits; no joint swelling or tenderness to palpation  PSYCH: A+O to person, place, and time; affect appropriate  NEUROLOGY: CN 2-12 are intact and symmetric; no gross sensory deficits, strength is good in UE and LE.   SKIN: No rashes; no palpable lesions    LABS:                        11.4   8.19  )-----------( 312      ( 17 Jul 2024 05:30 )             34.9     07-17    135  |  102  |  23  ----------------------------<  229<H>  4.0   |  20<L>  |  1.03    Ca    9.4      17 Jul 2024 05:30  Phos  3.6     07-17  Mg     1.70     07-17            Urinalysis Basic - ( 17 Jul 2024 05:30 )    Color: x / Appearance: x / SG: x / pH: x  Gluc: 229 mg/dL / Ketone: x  / Bili: x / Urobili: x   Blood: x / Protein: x / Nitrite: x   Leuk Esterase: x / RBC: x / WBC x   Sq Epi: x / Non Sq Epi: x / Bacteria: x            RADIOLOGY & ADDITIONAL TESTS:  New Results Reviewed Today: [x]

## 2024-07-17 NOTE — PROGRESS NOTE ADULT - PROBLEM SELECTOR PLAN 6
- A1c 9.3% 7/13/24.   - Lantus 10U QHS 7/14. Per endo INCREASE to 15U QHS.   - ADMELOG 3 units with meals 3x/day. Per endo INCREASE to 5U TID. Low dose ISS premeal and bedtime.   - FS improved.   - Endo consulted for recs on discharge and pt will need to establish care w/ endo.    Discharge recs per endo:   - Start Metformin: 500mg BID x 1 week, then 1000mg BID.   - Resume Jardiance 25mg PO QD.   - Check coverage for Trulicity 0.75 subq 1x/week. (covered by insurance)  - STOP Tradjenta and STOP Glimepiride.     # TSH high and free T4 normal. Subclinical hypothyroid.

## 2024-07-17 NOTE — PROGRESS NOTE ADULT - SUBJECTIVE AND OBJECTIVE BOX
Interval Events: No acute overnight events. Pt is tolerating PO, however still doesn't have much of an appetite and is not finishing his meals. Pt's fasting blood glucose was 194 this AM (7/16/204) and 169 before lunch. Per primary team, Trulicity is covered by patient's insurance. No nausea/vomiting. No hypoglycemia symptoms. Denies fevers, chills, CP, SOB, Abdominal pain, constipation, Diarrhea.        MEDICATIONS  (STANDING):  aspirin  chewable 81 milliGRAM(s) Oral daily  atorvastatin 40 milliGRAM(s) Oral at bedtime  cyanocobalamin 1000 MICROGram(s) Oral daily  dextrose 5%. 1000 milliLiter(s) (50 mL/Hr) IV Continuous <Continuous>  dextrose 5%. 1000 milliLiter(s) (100 mL/Hr) IV Continuous <Continuous>  dextrose 50% Injectable 25 Gram(s) IV Push once  dextrose 50% Injectable 25 Gram(s) IV Push once  dextrose 50% Injectable 12.5 Gram(s) IV Push once  dextrose Oral Gel 15 Gram(s) Oral once  famotidine    Tablet 20 milliGRAM(s) Oral daily  folic acid 1 milliGRAM(s) Oral daily  glucagon  Injectable 1 milliGRAM(s) IntraMuscular once  glucagon  Injectable 1 milliGRAM(s) IntraMuscular once  heparin   Injectable 5000 Unit(s) SubCutaneous every 12 hours  insulin glargine Injectable (LANTUS) 15 Unit(s) SubCutaneous at bedtime  insulin lispro (ADMELOG) corrective regimen sliding scale   SubCutaneous at bedtime  insulin lispro (ADMELOG) corrective regimen sliding scale   SubCutaneous three times a day before meals  insulin lispro Injectable (ADMELOG) 6 Unit(s) SubCutaneous three times a day before meals  losartan 50 milliGRAM(s) Oral daily  senna 1 Tablet(s) Oral at bedtime  ticagrelor 90 milliGRAM(s) Oral every 12 hours  venlafaxine 37.5 milliGRAM(s) Oral daily    MEDICATIONS  (PRN):  artificial  tears Solution 1 Drop(s) Both EYES three times a day PRN Dry Eyes  meclizine 25 milliGRAM(s) Oral every 8 hours PRN Dizziness      Allergies  No Known Allergies  Intolerances          ================PHYSICAL EXAM======================  VITALS: T(C): 37 (07-17-24 @ 14:13)  T(F): 98.6 (07-17-24 @ 14:13), Max: 98.6 (07-16-24 @ 18:19)  HR: 85 (07-17-24 @ 14:13) (73 - 97)  BP: 113/75 (07-17-24 @ 14:13) (113/75 - 139/88)  RR:  (16 - 18)  SpO2:  (97% - 100%)  Wt(kg): --  GENERAL: NAD, appears comfortable  EYES: No proptosis, no lid lag, anicteric  HEENT:  Atraumatic, Normocephalic, moist mucous membranes  RESPIRATORY: nonlabored respirations, no wheezing  PSYCH: Alert and awake  ===================================================    CAPILLARY BLOOD GLUCOSE      POCT Blood Glucose.: 169 mg/dL (17 Jul 2024 12:05)  POCT Blood Glucose.: 194 mg/dL (17 Jul 2024 07:23)  POCT Blood Glucose.: 252 mg/dL (16 Jul 2024 21:29)  POCT Blood Glucose.: 188 mg/dL (16 Jul 2024 16:42)      07-17    135  |  102  |  23  ----------------------------<  229<H>  4.0   |  20<L>  |  1.03    eGFR: 76    Ca    9.4      07-17  Mg     1.70     07-17  Phos  3.6     07-17        A1C with Estimated Average Glucose Result: 9.4 % (07-13-24 @ 08:57)  A1C with Estimated Average Glucose Result: 9.3 % (07-12-24 @ 22:27)  A1C with Estimated Average Glucose Result: 7.4 % (10-17-23 @ 05:59)      Thyroid Function Tests:  07-16 @ 06:12 TSH 9.68 FreeT4 1.0 T3 -- Anti TPO -- Anti Thyroglobulin Ab -- TSI --  07-15 @ 17:14 TSH 7.15 FreeT4 -- T3 -- Anti TPO <10.0 Anti Thyroglobulin Ab -- TSI --

## 2024-07-17 NOTE — PROGRESS NOTE ADULT - PROBLEM SELECTOR PLAN 1
- Transferred from [facility] for EP eval. EKG w/ bifascicular block.   - pt w/ hx of CVA, carotid stenosis s/p stents.   - s/p ILR placement w/ EP on 7/12/24. Has tele appt w/ them on 7/25 (see EP note).   - TTE performed, pending read.   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   - Ordered the following on 7/15: CTA head and neck. Showing some progression of L vertebral artery stenosis; will consult neurosurgery for any further recs. Advising MRA head/neck, ordered.   - Per prior charts, no longer on Fludrocortisone. SBP range from 130s, to 140s-150s. Will keep off for now.   - Home Losartan 100mg QD decreased to 50mg QD 7/12/24. SBPs in 150. Will keep at current regimen since pt has hx of stenosis, slightly higher pressures to allow continued perfusion.

## 2024-07-18 LAB
ANION GAP SERPL CALC-SCNC: 15 MMOL/L — HIGH (ref 7–14)
BUN SERPL-MCNC: 20 MG/DL — SIGNIFICANT CHANGE UP (ref 7–23)
CALCIUM SERPL-MCNC: 9 MG/DL — SIGNIFICANT CHANGE UP (ref 8.4–10.5)
CHLORIDE SERPL-SCNC: 103 MMOL/L — SIGNIFICANT CHANGE UP (ref 98–107)
CO2 SERPL-SCNC: 20 MMOL/L — LOW (ref 22–31)
CREAT SERPL-MCNC: 0.92 MG/DL — SIGNIFICANT CHANGE UP (ref 0.5–1.3)
EGFR: 87 ML/MIN/1.73M2 — SIGNIFICANT CHANGE UP
GLUCOSE BLDC GLUCOMTR-MCNC: 153 MG/DL — HIGH (ref 70–99)
GLUCOSE BLDC GLUCOMTR-MCNC: 161 MG/DL — HIGH (ref 70–99)
GLUCOSE BLDC GLUCOMTR-MCNC: 166 MG/DL — HIGH (ref 70–99)
GLUCOSE BLDC GLUCOMTR-MCNC: 169 MG/DL — HIGH (ref 70–99)
GLUCOSE BLDC GLUCOMTR-MCNC: 214 MG/DL — HIGH (ref 70–99)
GLUCOSE SERPL-MCNC: 163 MG/DL — HIGH (ref 70–99)
HCT VFR BLD CALC: 35.2 % — LOW (ref 39–50)
HGB BLD-MCNC: 11.1 G/DL — LOW (ref 13–17)
MAGNESIUM SERPL-MCNC: 1.8 MG/DL — SIGNIFICANT CHANGE UP (ref 1.6–2.6)
MCHC RBC-ENTMCNC: 19.8 PG — LOW (ref 27–34)
MCHC RBC-ENTMCNC: 31.5 GM/DL — LOW (ref 32–36)
MCV RBC AUTO: 62.7 FL — LOW (ref 80–100)
NRBC # BLD: 0 /100 WBCS — SIGNIFICANT CHANGE UP (ref 0–0)
NRBC # FLD: 0 K/UL — SIGNIFICANT CHANGE UP (ref 0–0)
PHOSPHATE SERPL-MCNC: 3.6 MG/DL — SIGNIFICANT CHANGE UP (ref 2.5–4.5)
PLATELET # BLD AUTO: 285 K/UL — SIGNIFICANT CHANGE UP (ref 150–400)
POTASSIUM SERPL-MCNC: 3.9 MMOL/L — SIGNIFICANT CHANGE UP (ref 3.5–5.3)
POTASSIUM SERPL-SCNC: 3.9 MMOL/L — SIGNIFICANT CHANGE UP (ref 3.5–5.3)
RBC # BLD: 5.61 M/UL — SIGNIFICANT CHANGE UP (ref 4.2–5.8)
RBC # FLD: 20.7 % — HIGH (ref 10.3–14.5)
SODIUM SERPL-SCNC: 138 MMOL/L — SIGNIFICANT CHANGE UP (ref 135–145)
WBC # BLD: 7.66 K/UL — SIGNIFICANT CHANGE UP (ref 3.8–10.5)
WBC # FLD AUTO: 7.66 K/UL — SIGNIFICANT CHANGE UP (ref 3.8–10.5)

## 2024-07-18 PROCEDURE — 99232 SBSQ HOSP IP/OBS MODERATE 35: CPT

## 2024-07-18 PROCEDURE — 99232 SBSQ HOSP IP/OBS MODERATE 35: CPT | Mod: GC

## 2024-07-18 PROCEDURE — 70549 MR ANGIOGRAPH NECK W/O&W/DYE: CPT | Mod: 26

## 2024-07-18 PROCEDURE — 70551 MRI BRAIN STEM W/O DYE: CPT | Mod: 26

## 2024-07-18 PROCEDURE — 70544 MR ANGIOGRAPHY HEAD W/O DYE: CPT | Mod: 26,59

## 2024-07-18 RX ORDER — ACETAMINOPHEN 325 MG
650 TABLET ORAL EVERY 6 HOURS
Refills: 0 | Status: DISCONTINUED | OUTPATIENT
Start: 2024-07-18 | End: 2024-07-19

## 2024-07-18 RX ORDER — INSULIN LISPRO 100 [IU]/ML
8 INJECTION, SOLUTION SUBCUTANEOUS
Refills: 0 | Status: DISCONTINUED | OUTPATIENT
Start: 2024-07-18 | End: 2024-07-19

## 2024-07-18 RX ADMIN — INSULIN LISPRO 1: 100 INJECTION, SOLUTION SUBCUTANEOUS at 17:16

## 2024-07-18 RX ADMIN — INSULIN LISPRO 8 UNIT(S): 100 INJECTION, SOLUTION SUBCUTANEOUS at 17:17

## 2024-07-18 RX ADMIN — HEPARIN SODIUM 5000 UNIT(S): 50 INJECTION, SOLUTION INTRAVENOUS at 17:18

## 2024-07-18 RX ADMIN — INSULIN GLARGINE 16 UNIT(S): 100 INJECTION, SOLUTION SUBCUTANEOUS at 22:15

## 2024-07-18 RX ADMIN — ASPIRIN 81 MILLIGRAM(S): 325 TABLET, FILM COATED ORAL at 11:22

## 2024-07-18 RX ADMIN — TICAGRELOR 90 MILLIGRAM(S): 90 TABLET ORAL at 05:16

## 2024-07-18 RX ADMIN — INSULIN LISPRO 2: 100 INJECTION, SOLUTION SUBCUTANEOUS at 12:15

## 2024-07-18 RX ADMIN — Medication 1 TABLET(S): at 21:27

## 2024-07-18 RX ADMIN — Medication 20 MILLIGRAM(S): at 11:22

## 2024-07-18 RX ADMIN — INSULIN LISPRO 7 UNIT(S): 100 INJECTION, SOLUTION SUBCUTANEOUS at 08:48

## 2024-07-18 RX ADMIN — VENLAFAXINE 37.5 MILLIGRAM(S): 37.5 CAPSULE, EXTENDED RELEASE ORAL at 11:22

## 2024-07-18 RX ADMIN — HEPARIN SODIUM 5000 UNIT(S): 50 INJECTION, SOLUTION INTRAVENOUS at 05:17

## 2024-07-18 RX ADMIN — Medication 1 MILLIGRAM(S): at 11:22

## 2024-07-18 RX ADMIN — INSULIN LISPRO 1: 100 INJECTION, SOLUTION SUBCUTANEOUS at 08:49

## 2024-07-18 RX ADMIN — LOSARTAN POTASSIUM 50 MILLIGRAM(S): 100 TABLET, FILM COATED ORAL at 05:16

## 2024-07-18 RX ADMIN — ATORVASTATIN CALCIUM 40 MILLIGRAM(S): 20 TABLET, FILM COATED ORAL at 21:27

## 2024-07-18 RX ADMIN — Medication 650 MILLIGRAM(S): at 17:53

## 2024-07-18 RX ADMIN — TICAGRELOR 90 MILLIGRAM(S): 90 TABLET ORAL at 17:18

## 2024-07-18 RX ADMIN — INSULIN LISPRO 7 UNIT(S): 100 INJECTION, SOLUTION SUBCUTANEOUS at 12:16

## 2024-07-18 RX ADMIN — Medication 1000 MICROGRAM(S): at 11:22

## 2024-07-18 NOTE — PROGRESS NOTE ADULT - PROBLEM SELECTOR PLAN 9
scds
DVT ppx: Heparin subq q12h  Diet: DASH and Carb consistent   Activity: Ambulate w/ assistance, fall precaution   Dispo: pending PT eval, will benefit from vestibular PT on discharge as well.
scds
DVT ppx: SCDs (need to speak to EP before chemical AC)  Diet: DASH and Carb consistent   Activity: Ambulate w/ assistance, fall precaution   Dispo: pending PT eval, will benefit from vestibular PT on discharge as well
DVT ppx: Heparin subq q12h  Diet: DASH and Carb consistent   Activity: Ambulate w/ assistance, fall precaution   Dispo: pending PT eval, will benefit from vestibular PT on discharge as well
DVT ppx: Heparin subq q12h  Diet: DASH and Carb consistent   Activity: Ambulate w/ assistance, fall precaution   Dispo: pending PT eval, will benefit from vestibular PT on discharge as well

## 2024-07-18 NOTE — PROGRESS NOTE ADULT - ATTENDING COMMENTS
73M DM2 history of CVA HbA1c 9.4%  Inpatient agree with increase basal bolus insulin as outlined.  On dc would like to optimize regimen, adding Trulicity.  Would need teaching for GLP1 injection prior to dc.    Dayo Thomas MD  Division of Endocrinology  Pager: [phone]    If after 6PM or before 9AM, or on weekends/holidays, please call endocrine answering service for assistance ([phone]).  For nonurgent matters email [email or web address] for assistance.
73 yo M with a PMH of VA stenosis s/p R VA stent in 2016 on ASA/plavix, bilateral cerebellar infarcts, HTN, T2DM, HLD, BPPV R ICA stenosis s/p stent in 10/2023, admitted for EP eval for bifascicular block. Endo following for DM2. Adjust basal bolus insulin doses slightly. Repeat TFTs outpatient.
73M DM2 history of CVA HbA1c 9.4%  Inpatient agree with increase basal bolus insulin as outlined.  On dc would like to optimize regimen check coverage for Trulicity.  To finalize meds for dc.  Would need teaching for GLP1 injection once coverage clarified.    Dayo Thomas MD  Division of Endocrinology  Pager: [phone]    If after 6PM or before 9AM, or on weekends/holidays, please call endocrine answering service for assistance ([phone]).  For nonurgent matters email [email or web address] for assistance.

## 2024-07-18 NOTE — PROGRESS NOTE ADULT - PROBLEM SELECTOR PLAN 6
- A1c 9.3% 7/13/24.   - Lantus 10U QHS 7/14. Per endo INCREASE to 15U QHS.   - ADMELOG 3 units with meals 3x/day. Per endo INCREASE to 5U TID. Low dose ISS premeal and bedtime.   - FS improved.   - Endo consulted for recs on discharge and pt will need to establish care w/ endo.    Discharge recs per endo:   - Start Metformin: 500mg BID x 1 week, then 1000mg BID.   - Resume Jardiance 25mg PO QD.   - Check coverage for Trulicity 0.75 subq 1x/week. (covered by insurance)  - STOP Tradjenta and STOP Glimepiride.     # TSH high and free T4 normal.   - Subclinical hypothyroid.

## 2024-07-18 NOTE — PROGRESS NOTE ADULT - PROBLEM SELECTOR PLAN 1
- Transferred from [facility] for EP eval. EKG w/ bifascicular block.   - pt w/ hx of CVA, carotid stenosis s/p stents.   - s/p ILR placement w/ EP on 7/12/24. Has tele appt w/ them on 7/25 (see EP note).   - TTE performed, pending read.   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   - Ordered the following on 7/15: CTA head and neck. Showing some progression of L vertebral artery stenosis; will consult neurosurgery for any further recs. Advising MRA head/neck, ordered.   - Per prior charts, no longer on Fludrocortisone. SBP range from 130s, to 140s-150s. Will keep off for now.   - Home Losartan 100mg QD decreased to 50mg QD 7/12/24. SBPs in 150. Will keep at current regimen since pt has hx of stenosis, slightly higher pressures to allow continued perfusion.    OF NOTE,   - Pt's on lower bp inpatient, syncope may have been contributed by low neural perfusion pressures.   - Also on Glimepiride, syncope may have been hypoglycemia. Med will be d/mechelle on discharge.

## 2024-07-18 NOTE — DIETITIAN INITIAL EVALUATION ADULT - ADD RECOMMEND
1) Recommend add Glucerna 2x daily (440kcals, 20g protein) to provide optimal nutrition.   2) Encourage PO intake and honor food preferences within therapeutic diet restrictions as able.   3) Monitor PO intake, Labs, weights, BMs, and skin integrity.   4) RD to remain available for further nutritional interventions as indicated. 
PACU

## 2024-07-18 NOTE — PROGRESS NOTE ADULT - PROBLEM SELECTOR PLAN 3
- Pt endorsing worsening abdominal distension and weight loss unintentional.   - Abs US w/ fatty liver otherwise no abnormality.   - CT A/P w/o contrast: No malignancy or masses noted. 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  - Results discussed w/ pt.

## 2024-07-18 NOTE — DIETITIAN INITIAL EVALUATION ADULT - PERTINENT MEDS FT
MEDICATIONS  (STANDING):  aspirin  chewable 81 milliGRAM(s) Oral daily  atorvastatin 40 milliGRAM(s) Oral at bedtime  cyanocobalamin 1000 MICROGram(s) Oral daily  dextrose 5%. 1000 milliLiter(s) (100 mL/Hr) IV Continuous <Continuous>  dextrose 5%. 1000 milliLiter(s) (50 mL/Hr) IV Continuous <Continuous>  dextrose 50% Injectable 12.5 Gram(s) IV Push once  dextrose 50% Injectable 25 Gram(s) IV Push once  dextrose 50% Injectable 25 Gram(s) IV Push once  dextrose Oral Gel 15 Gram(s) Oral once  famotidine    Tablet 20 milliGRAM(s) Oral daily  folic acid 1 milliGRAM(s) Oral daily  glucagon  Injectable 1 milliGRAM(s) IntraMuscular once  glucagon  Injectable 1 milliGRAM(s) IntraMuscular once  heparin   Injectable 5000 Unit(s) SubCutaneous every 12 hours  insulin glargine Injectable (LANTUS) 16 Unit(s) SubCutaneous at bedtime  insulin lispro (ADMELOG) corrective regimen sliding scale   SubCutaneous at bedtime  insulin lispro (ADMELOG) corrective regimen sliding scale   SubCutaneous three times a day before meals  insulin lispro Injectable (ADMELOG) 7 Unit(s) SubCutaneous three times a day before meals  losartan 50 milliGRAM(s) Oral daily  senna 1 Tablet(s) Oral at bedtime  ticagrelor 90 milliGRAM(s) Oral every 12 hours  venlafaxine 37.5 milliGRAM(s) Oral daily    MEDICATIONS  (PRN):  artificial  tears Solution 1 Drop(s) Both EYES three times a day PRN Dry Eyes  meclizine 25 milliGRAM(s) Oral every 8 hours PRN Dizziness

## 2024-07-18 NOTE — PROGRESS NOTE ADULT - PROBLEM SELECTOR PLAN 5
Decreased dose of losartan from 100 to 50.  Holding amlodipine   F/u orthostatics
- Decreased dose of Losartan from 100mg QD to 50mg QD.  Holding home Amlodipine.  - Orthostatics not done yet.  - Will c/w current bp regimen, bps are acceptable. May discharge WITHOUT Amlodipine.
- Decreased dose of Losartan from 100mg QD to 50mg QD.  Holding home Amlodipine.  - Orthostatics not done yet.  - Will c/w current bp regimen, bps are acceptable. May discharge WITHOUT Amlodipine and lower dose of Amlodipine. Will cont to monitor bps.
- Decreased dose of Losartan from 100mg QD to 50mg QD.  Holding home Amlodipine.  - Orthostatics not done yet.  - Will c/w current bp regimen, bps are acceptable.
Decreased dose of losartan from 100 to 50.  Holding amlodipine   F/u orthostatics
- Decreased dose of Losartan from 100mg QD to 50mg QD.  Holding home Amlodipine.  - Orthostatics not done yet.  - Will c/w current bp regimen, bps are acceptable. May discharge WITHOUT Amlodipine. Will cont to monitor bps.

## 2024-07-18 NOTE — PROGRESS NOTE ADULT - PROBLEM SELECTOR PLAN 7
- Continue with home Atorvastatin 40mg QD.
lipitor
lipitor
- Continue with home Atorvastatin 40mg QD.
- Continue with home Atorvastatin 40mg QD
- Continue with home Atorvastatin 40mg QD.

## 2024-07-18 NOTE — PROGRESS NOTE ADULT - PROBLEM SELECTOR PLAN 2
- Dizziness w/ positional changes.   - Most likely central etiology d/t hx of CVA and stenosis.   - Started on Meclizine 25mg BID at [facility]. Adjusted to 25mg TID PRN.   - Seen by Neurology 7/12:---> Dysequilibrium due to b/l cerebellar infarcts in addition to known intermittent positional peripheral vertigo potentially acutely worsened due to other active medical issues. Low suspicion for new central etiology. Advising to "c/w ASA 81mg, Brilinta 90 mg q12h, Atorvastatin 40mg QHS. Continue Meclizine 25mg and titrate up as necessary. Upon discharge, PT should F/U with either Dr. Salazar or Dr. Black: [phone] [address] [place] [address]"  - Further imaging as noted above. And MRA head/neck ordered.

## 2024-07-18 NOTE — PROGRESS NOTE ADULT - PROBLEM SELECTOR PROBLEM 4
History of CVA in adulthood

## 2024-07-18 NOTE — PROGRESS NOTE ADULT - ASSESSMENT
Pt is a 73 yo M with a PMH of VA stenosis s/p R VA stent in 2016 on ASA/plavix, bilateral cerebellar infarcts, HTN, T2DM, HLD, BPPV R ICA stenosis s/p stent in 10/2023, admitted for EP eval for bifascicular block after presenting to [facility] following an episode of presyncope. Endocrinology consulted for inpatient glycemic control.      Pt's fasting blood glucose was 161 this AM (7/18/204) and 214 before lunch. He has required 42 units of insulin in the last 24 hours. Fasting glucose was within goal however his preprandial glucose has persistently remained above goal range on 7 Admelog with low correctional scale.      #T2DM   A1c: 9.4%  Home meds (updated with records from pcp):  Glimepiride 4 mg PO x2 with breakfast or first meal of day, Jardiance 25 mg PO daily, Tradjenta 5 mg PO daily  Endocrinologist: Dr. Roxanne Choudhury (PCP)    INPATIENT RECOMMENDATIONS:  - additional 5 units of sliding scale given in the past 24 hours  - Continue Lantus 16 units at bedtime  - Increase Admelog to 8 units premeal TID (HOLD IF NPO)  - continue low admelog correctional scale premeal   - continue low admelog correctional scale at bedtime    DISCHARGE RECOMMENDATIONS:  - Recommend pt to start Metformin 500 BID x1 week then increase to 1000 BID with food  - Resume Jardiance 25 mg PO daily  - Trulicity 0.75 subq 1x/week is covered by insurance.   - Stop DPP-4 inhibitor (Tradjenta) IF Pt is able to obtain prescription for GLP-1 agonist.  - Stop Glimepiride.  - Education for GLP-1 self administration  - OUT-PATIENT FOLLOW UP: Patient should follow up with Endocrinology office ([phone]) at [address] [place] [address] [address] [facility], [place] [address].    #TSH >10  - Low clinical suspicion for thyroid disease. Likely subclinical hypothyroidism iso acute illness  - Recheck TSH and free T4 in 4 weeks after discharge  - Can follow up outpatient    #HLD  - INPATIENT: patient on Atorvastatin 40 mg PO daily   - OUTPATIENT: patient on Rosuvastatin 40 mg PO daily and Ezetimibe 10 mg PO daily  - goal LDL in diabetes mellitus is <70    #HTN  - INPATIENT: patient on Losartan 50 mg   - OUTPATIENT: patient on Losartan 100 mg   - goal BP in diabetes mellitus is <130/80  - defer to primary team for management     Pt is a 71 yo M with a PMH of VA stenosis s/p R VA stent in 2016 on ASA/plavix, bilateral cerebellar infarcts, HTN, T2DM, HLD, BPPV R ICA stenosis s/p stent in 10/2023, admitted for EP eval for bifascicular block after presenting to [facility] following an episode of presyncope. Endocrinology consulted for inpatient glycemic control.      Pt's fasting blood glucose was 161 this AM (7/18/204) and 214 before lunch. He has required 42 units of insulin in the last 24 hours. Fasting glucose was within goal however his preprandial glucose has persistently remained above goal range on 7 Admelog with low correctional scale.      #T2DM   A1c: 9.4%  Home meds (updated with records from pcp):  Glimepiride 4 mg PO x2 with breakfast or first meal of day, Jardiance 25 mg PO daily, Tradjenta 5 mg PO daily  Endocrinologist: Dr. Roxanne Choudhury (PCP)    INPATIENT RECOMMENDATIONS:  - additional 5 units of sliding scale given in the past 24 hours  - Continue Lantus 16 units at bedtime  - Increase Admelog to 8 units premeal TID (HOLD IF NPO)  - continue low admelog correctional scale premeal   - continue low admelog correctional scale at bedtime    DISCHARGE RECOMMENDATIONS:  - Recommend pt to start Metformin 500 mg BID x1 week then increase to 1000 mg BID with food  - Resume Jardiance 25 mg PO daily  - Trulicity 0.75 mg subq 1x/week is covered by insurance.   - Stop DPP-4 inhibitor (Tradjenta) IF Pt is able to obtain prescription for GLP-1 agonist.  - Stop Glimepiride.  - Education for GLP-1 self administration needed prior to discharge  - OUT-PATIENT FOLLOW UP: Patient should follow up with Endocrinology office ([phone]) at [address] [place] [address] [address] [facility], [place] [address].    #TSH >10  - Low clinical suspicion for thyroid disease. Likely subclinical hypothyroidism iso acute illness  - Recheck TSH and free T4 in 4 weeks after discharge  - Can follow up outpatient    #HLD  - INPATIENT: patient on Atorvastatin 40 mg PO daily   - OUTPATIENT: patient on Rosuvastatin 40 mg PO daily and Ezetimibe 10 mg PO daily  - goal LDL in diabetes mellitus is <70    #HTN  - INPATIENT: patient on Losartan 50 mg   - OUTPATIENT: patient on Losartan 100 mg   - goal BP in diabetes mellitus is <130/80  - defer to primary team for management

## 2024-07-18 NOTE — DIETITIAN INITIAL EVALUATION ADULT - PROBLEM SELECTOR PLAN 2
CT head as per above  Patient reports dizziness upon change in position - consider BPPV  R to L nystagmus noted on exam  Started on meclizine at [facility], will continue for now   F/u CT head results  Will consult Neurology

## 2024-07-18 NOTE — DIETITIAN INITIAL EVALUATION ADULT - PROBLEM SELECTOR PLAN 1
Patient was transferred from [facility] for EP eval.  EKG shows bifascicular block.      Transfer documents incomplete, unclear what workup was done at [facility]    EP on board - plan for ILR today  Unclear if cardiac was the primary cause of patient's syncope    EKG shows bifascicular black - appears mostly unchanged from 10/23  Continue Telemetry Monitoring   Will send for CT Head, TTE ordered    Last TTE 10/23.  EF 52%.  No diastolic dysfunction noted    Will also send for carotid doppler as patient has a history of ICA stenosis,   Patient noted to have drop of BP on orthostatics.  Chart review shows he was once taking fludrocortisone.    After ICA stent last year patient had a drop of BP, required fludrocortisone on DC.    No longer taking fludrocortisone.    Will decrease dose of losartan from 100 to 50.  Try orthostatics again tomorrow     PT Evaluation

## 2024-07-18 NOTE — DIETITIAN INITIAL EVALUATION ADULT - PERTINENT LABORATORY DATA
07-18    138  |  103  |  20  ----------------------------<  163<H>  3.9   |  20<L>  |  0.92    Ca    9.0      18 Jul 2024 05:50  Phos  3.6     07-18  Mg     1.80     07-18    POCT Blood Glucose.: 214 mg/dL (07-18-24 @ 11:49)  A1C with Estimated Average Glucose Result: 9.4 % (07-13-24 @ 08:57)  A1C with Estimated Average Glucose Result: 9.3 % (07-12-24 @ 22:27)  A1C with Estimated Average Glucose Result: 7.4 % (10-17-23 @ 05:59)

## 2024-07-18 NOTE — DIETITIAN INITIAL EVALUATION ADULT - NS FNS DIET ORDER
Diet, Consistent Carbohydrate w/Evening Snack:   DASH/TLC {Sodium & Cholesterol Restricted} (DASH) (07-15-24 @ 17:57) [Active]

## 2024-07-18 NOTE — PROGRESS NOTE ADULT - SUBJECTIVE AND OBJECTIVE BOX
Interval Events: No acute overnight events. Pt is tolerating PO, however still doesn't have much of an appetite and is not finishing his meals. Pt's fasting blood glucose was 161 this AM (7/18/204) and 214 before lunch. Per primary team, Trulicity is covered by patient's insurance. No nausea/vomiting. No hypoglycemia symptoms. Denies fevers, chills, CP, SOB, Abdominal pain, constipation, Diarrhea.    MEDICATIONS  (STANDING):  aspirin  chewable 81 milliGRAM(s) Oral daily  atorvastatin 40 milliGRAM(s) Oral at bedtime  cyanocobalamin 1000 MICROGram(s) Oral daily  dextrose 5%. 1000 milliLiter(s) (50 mL/Hr) IV Continuous <Continuous>  dextrose 5%. 1000 milliLiter(s) (100 mL/Hr) IV Continuous <Continuous>  dextrose 50% Injectable 25 Gram(s) IV Push once  dextrose 50% Injectable 25 Gram(s) IV Push once  dextrose 50% Injectable 12.5 Gram(s) IV Push once  dextrose Oral Gel 15 Gram(s) Oral once  famotidine    Tablet 20 milliGRAM(s) Oral daily  folic acid 1 milliGRAM(s) Oral daily  glucagon  Injectable 1 milliGRAM(s) IntraMuscular once  glucagon  Injectable 1 milliGRAM(s) IntraMuscular once  heparin   Injectable 5000 Unit(s) SubCutaneous every 12 hours  insulin glargine Injectable (LANTUS) 16 Unit(s) SubCutaneous at bedtime  insulin lispro (ADMELOG) corrective regimen sliding scale   SubCutaneous at bedtime  insulin lispro (ADMELOG) corrective regimen sliding scale   SubCutaneous three times a day before meals  insulin lispro Injectable (ADMELOG) 8 Unit(s) SubCutaneous three times a day before meals  losartan 50 milliGRAM(s) Oral daily  senna 1 Tablet(s) Oral at bedtime  ticagrelor 90 milliGRAM(s) Oral every 12 hours  venlafaxine 37.5 milliGRAM(s) Oral daily    MEDICATIONS  (PRN):  artificial  tears Solution 1 Drop(s) Both EYES three times a day PRN Dry Eyes  meclizine 25 milliGRAM(s) Oral every 8 hours PRN Dizziness      Allergies    No Known Allergies    Intolerances          ================PHYSICAL EXAM======================  VITALS: T(C): 36.5 (07-18-24 @ 08:50)  T(F): 97.7 (07-18-24 @ 08:50), Max: 97.9 (07-17-24 @ 17:30)  HR: 81 (07-18-24 @ 08:50) (81 - 90)  BP: 121/73 (07-18-24 @ 08:50) (116/69 - 166/88)  RR:  (17 - 18)  SpO2:  (98% - 99%)  Wt(kg): --  GENERAL: NAD, appears comfortable  EYES: No proptosis, no lid lag, anicteric  HEENT:  Atraumatic, Normocephalic, moist mucous membranes  RESPIRATORY: nonlabored respirations, no wheezing  PSYCH: Alert and awake  ===================================================    CAPILLARY BLOOD GLUCOSE      POCT Blood Glucose.: 214 mg/dL (18 Jul 2024 11:49)  POCT Blood Glucose.: 169 mg/dL (18 Jul 2024 08:46)  POCT Blood Glucose.: 161 mg/dL (18 Jul 2024 07:23)  POCT Blood Glucose.: 257 mg/dL (17 Jul 2024 21:55)  POCT Blood Glucose.: 175 mg/dL (17 Jul 2024 17:08)      07-18    138  |  103  |  20  ----------------------------<  163<H>  3.9   |  20<L>  |  0.92    eGFR: 87    Ca    9.0      07-18  Mg     1.80     07-18  Phos  3.6     07-18        A1C with Estimated Average Glucose Result: 9.4 % (07-13-24 @ 08:57)  A1C with Estimated Average Glucose Result: 9.3 % (07-12-24 @ 22:27)  A1C with Estimated Average Glucose Result: 7.4 % (10-17-23 @ 05:59)      Thyroid Function Tests:  07-16 @ 06:12 TSH 9.68 FreeT4 1.0 T3 -- Anti TPO -- Anti Thyroglobulin Ab -- TSI --  07-15 @ 17:14 TSH 7.15 FreeT4 -- T3 -- Anti TPO <10.0 Anti Thyroglobulin Ab -- TSI --

## 2024-07-18 NOTE — PROGRESS NOTE ADULT - PROBLEM SELECTOR PLAN 8
- Continue with home Pepcid 20mg QD.
pepcid
- Continue with home Pepcid 20mg QD
pepcid
- Continue with home Pepcid 20mg QD.
- Continue with home Pepcid 20mg QD.

## 2024-07-18 NOTE — PROGRESS NOTE ADULT - SUBJECTIVE AND OBJECTIVE BOX
[facility] Division of Hospital Medicine  Princess Adame MD   Available via MS Teams  In house pager [phone]    SUBJECTIVE / OVERNIGHT EVENTS:    ADDITIONAL REVIEW OF SYSTEMS:    MEDICATIONS  (STANDING):  aspirin  chewable 81 milliGRAM(s) Oral daily  atorvastatin 40 milliGRAM(s) Oral at bedtime  cyanocobalamin 1000 MICROGram(s) Oral daily  dextrose 5%. 1000 milliLiter(s) (50 mL/Hr) IV Continuous <Continuous>  dextrose 5%. 1000 milliLiter(s) (100 mL/Hr) IV Continuous <Continuous>  dextrose 50% Injectable 25 Gram(s) IV Push once  dextrose 50% Injectable 25 Gram(s) IV Push once  dextrose 50% Injectable 12.5 Gram(s) IV Push once  dextrose Oral Gel 15 Gram(s) Oral once  famotidine    Tablet 20 milliGRAM(s) Oral daily  folic acid 1 milliGRAM(s) Oral daily  glucagon  Injectable 1 milliGRAM(s) IntraMuscular once  glucagon  Injectable 1 milliGRAM(s) IntraMuscular once  heparin   Injectable 5000 Unit(s) SubCutaneous every 12 hours  insulin glargine Injectable (LANTUS) 16 Unit(s) SubCutaneous at bedtime  insulin lispro (ADMELOG) corrective regimen sliding scale   SubCutaneous at bedtime  insulin lispro (ADMELOG) corrective regimen sliding scale   SubCutaneous three times a day before meals  insulin lispro Injectable (ADMELOG) 7 Unit(s) SubCutaneous three times a day before meals  losartan 50 milliGRAM(s) Oral daily  senna 1 Tablet(s) Oral at bedtime  ticagrelor 90 milliGRAM(s) Oral every 12 hours  venlafaxine 37.5 milliGRAM(s) Oral daily    MEDICATIONS  (PRN):  artificial  tears Solution 1 Drop(s) Both EYES three times a day PRN Dry Eyes  meclizine 25 milliGRAM(s) Oral every 8 hours PRN Dizziness      I&O's Summary    17 Jul 2024 07:01  -  18 Jul 2024 07:00  --------------------------------------------------------  IN: 0 mL / OUT: 500 mL / NET: -500 mL        PHYSICAL EXAM:  Vital Signs Last 24 Hrs  T(C): 36.5 (18 Jul 2024 08:50), Max: 36.6 (17 Jul 2024 17:30)  T(F): 97.7 (18 Jul 2024 08:50), Max: 97.9 (17 Jul 2024 17:30)  HR: 81 (18 Jul 2024 08:50) (81 - 90)  BP: 121/73 (18 Jul 2024 08:50) (116/69 - 166/88)  BP(mean): --  RR: 18 (18 Jul 2024 08:50) (17 - 18)  SpO2: 98% (18 Jul 2024 08:50) (98% - 99%)    Parameters below as of 18 Jul 2024 08:50  Patient On (Oxygen Delivery Method): room air      CONSTITUTIONAL: NAD, well-developed, well-groomed  EYES: PERRLA; conjunctiva and sclera clear  ENMT: Moist oral mucosa, no pharyngeal injection or exudates; normal dentition  NECK: Supple, no palpable masses; no thyromegaly  RESPIRATORY: Normal respiratory effort; lungs are clear to auscultation bilaterally  CARDIOVASCULAR: Regular rate and rhythm, normal S1 and S2, no murmur/rub/gallop; No lower extremity edema; Peripheral pulses are 2+ bilaterally  ABDOMEN: Nontender to palpation, normoactive bowel sounds, no rebound/guarding; No hepatosplenomegaly  MUSCULOSKELETAL:  Normal gait; no clubbing or cyanosis of digits; no joint swelling or tenderness to palpation  PSYCH: A+O to person, place, and time; affect appropriate  NEUROLOGY: CN 2-12 are intact and symmetric; no gross sensory deficits   SKIN: No rashes; no palpable lesions    LABS:                        11.1   7.66  )-----------( 285      ( 18 Jul 2024 05:50 )             35.2     07-18    138  |  103  |  20  ----------------------------<  163<H>  3.9   |  20<L>  |  0.92    Ca    9.0      18 Jul 2024 05:50  Phos  3.6     07-18  Mg     1.80     07-18            Urinalysis Basic - ( 18 Jul 2024 05:50 )    Color: x / Appearance: x / SG: x / pH: x  Gluc: 163 mg/dL / Ketone: x  / Bili: x / Urobili: x   Blood: x / Protein: x / Nitrite: x   Leuk Esterase: x / RBC: x / WBC x   Sq Epi: x / Non Sq Epi: x / Bacteria: x            RADIOLOGY & ADDITIONAL TESTS:  New Results Reviewed Today:   New Imaging Personally Reviewed Today:  New Electrocardiogram Personally Reviewed Today:  Prior or Outpatient Records Reviewed Today:    COMMUNICATION:  Care Discussed with Consultants/Other Providers and Details of Discussion:  Discussions with Patient/Family:  PCP Communication:

## 2024-07-18 NOTE — DIETITIAN INITIAL EVALUATION ADULT - OTHER INFO
Per chart review, Patient is a 72M with a PMH of VA stenosis s/p R VA stent in 2016 on ASA/plavix, bilateral cerebellar infarcts, HTN, DM, HLD, R ICA stenosis s/p stent in 10/2023 who initially presented to [facility] s/p syncope.  Transferred to [facility] for EP eval for bifascicular block. CTA head/neck w/ concer  Further details as noted below.     Patient seen at bedside. Reports his appetite remains poor in hospital, only had scramble eggs this morning at breakfast. PO <50% in hospital as per pt. Pt noted with abdominal distension. Pt states his abdominal is worsening now which prevents him to eat. 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 Pt  Denies chewing and swallowing difficulties. Denies any GI distress (nausea/vomiting/diarrhea/constipation.) LBM today as per pt. Constipation resolved in hospital as per pt, pt noted on bowel regimen. Pt's labs notable with elevated A1c 9.4% , POCT  in hospital. Endo is following. Provided pt with handout Carbohydrate Counting for People with Diabetes. Discussed carbohydrate sources, carbohydrate portions, protein sources, mixed meals, and nutrition label reading. Stressed importance of balanced meals with adequate protein and fiber. Pt was informed of current A1c, goal A1c, and goal fingerstick range. Encouraged pt with small frequent meals to increase Oral intake. Pt is amenable to receive Glucerna 2x daily (440kcals, 20g protein) to provide optimal nutrition. RD to remain available for further nutritional interventions as indicated.

## 2024-07-18 NOTE — DIETITIAN INITIAL EVALUATION ADULT - ORAL INTAKE PTA/DIET HISTORY
Patient reports poor appetite for more than 2months due to early satiety. Pt reports he is bloated after eating and he gets full easily, consuming only two small meals daily. In addition, pt report 1 yr of chronic constipation takes colace and other laxative to promote bowel movement. Pt reports watch his diet for CHO but it's hard for him, states "consumes Filipino food which contains carbohydrate". As per pt, he lives with his dementia wife, reports he needs take care of his wife.

## 2024-07-18 NOTE — PROGRESS NOTE ADULT - NUTRITIONAL ASSESSMENT
This patient has been assessed with a concern for Malnutrition and has been determined to have a diagnosis/diagnoses of Severe protein-calorie malnutrition.    This patient is being managed with:   Diet Consistent Carbohydrate w/Evening Snack-  DASH/TLC {Sodium & Cholesterol Restricted} (DASH)  Entered: Jul 15 2024  5:58PM

## 2024-07-18 NOTE — PROGRESS NOTE ADULT - ASSESSMENT
Patient is a 72M with a PMH of VA stenosis s/p R VA stent in 2016 on ASA/plavix, bilateral cerebellar infarcts, HTN, DM, HLD, R ICA stenosis s/p stent in 10/2023 who initially presented to [facility] s/p syncope.  Transferred to [facility] for EP eval for bifascicular block. CTA head/neck w/ concern of progressed vertebral artery stenosis.  Further details as noted below.  done

## 2024-07-18 NOTE — DIETITIAN INITIAL EVALUATION ADULT - MUSCLE MASS (LOSS OF MUSCLE)
· Hgb 5 9 on arrival likely 2/2 upper GI bleed with recent melena  · Transfuse 2 units prbc's  · Serial H&H  · GI evaluation Temples.../Clavicles...

## 2024-07-18 NOTE — PROGRESS NOTE ADULT - TIME BILLING
- Ordering, reviewing, and interpreting labs, testing, and imaging.  - Independently obtaining a review of systems and performing a physical exam  - Reviewing consultant documentation/recommendations in addition to discussing plan of care with consultants.  - Counselling and educating patient and family regarding interpretation of aforementioned items and plan of care.
Preparing to see the patient including review of tests and other providers' notes, confirming history with patient/family member, performing medical examination and evaluation, counseling and educating the patient/family/caregiver, ordering medications, tests and procedures, communicating with other health care professionals, documenting clinical information in the EMR, independently interpreting results and communicating results to the patient/family/caregiver, care coordination

## 2024-07-18 NOTE — PROGRESS NOTE ADULT - PROBLEM SELECTOR PLAN 4
- Hx of vertebral stent. Continuing w/ ASA and Brilinta as noted above.  - Neurology consulted 7/12/24, advised to c/w current regimen.   - Neurosurgery also consulted as noted above.
- Hx of vertebral stent. Continuing w/ ASA and Brilinta as noted above.  - Neurology consulted 7/12/24, advised to c/w current regimen.   - Neurosurgery also consulted as noted above.
- Hx of vertebral stent. Continuing w/ ASA and Brilinta as noted above.
Continue asa/brilinta  Hx of vertebral stent
- Hx of vertebral stent. Continuing w/ ASA and Brilinta as noted above.  - Neurology consulted 7/12/24, advised to c/w current regimen.   - Neurosurgery also consulted as noted above, following.
Continue asa/brilinta  Hx of vertebral stent

## 2024-07-19 ENCOUNTER — TRANSCRIPTION ENCOUNTER (OUTPATIENT)
Age: 74
End: 2024-07-19

## 2024-07-19 VITALS
TEMPERATURE: 98 F | RESPIRATION RATE: 17 BRPM | OXYGEN SATURATION: 100 % | DIASTOLIC BLOOD PRESSURE: 54 MMHG | SYSTOLIC BLOOD PRESSURE: 130 MMHG | HEART RATE: 62 BPM

## 2024-07-19 LAB
ANION GAP SERPL CALC-SCNC: 13 MMOL/L — SIGNIFICANT CHANGE UP (ref 7–14)
BASOPHILS # BLD AUTO: 0.06 K/UL — SIGNIFICANT CHANGE UP (ref 0–0.2)
BASOPHILS NFR BLD AUTO: 0.8 % — SIGNIFICANT CHANGE UP (ref 0–2)
BUN SERPL-MCNC: 25 MG/DL — HIGH (ref 7–23)
CALCIUM SERPL-MCNC: 9.5 MG/DL — SIGNIFICANT CHANGE UP (ref 8.4–10.5)
CHLORIDE SERPL-SCNC: 102 MMOL/L — SIGNIFICANT CHANGE UP (ref 98–107)
CO2 SERPL-SCNC: 22 MMOL/L — SIGNIFICANT CHANGE UP (ref 22–31)
CREAT SERPL-MCNC: 1.04 MG/DL — SIGNIFICANT CHANGE UP (ref 0.5–1.3)
EGFR: 75 ML/MIN/1.73M2 — SIGNIFICANT CHANGE UP
EOSINOPHIL # BLD AUTO: 0.49 K/UL — SIGNIFICANT CHANGE UP (ref 0–0.5)
EOSINOPHIL NFR BLD AUTO: 6.3 % — HIGH (ref 0–6)
GLUCOSE BLDC GLUCOMTR-MCNC: 177 MG/DL — HIGH (ref 70–99)
GLUCOSE BLDC GLUCOMTR-MCNC: 182 MG/DL — HIGH (ref 70–99)
GLUCOSE SERPL-MCNC: 196 MG/DL — HIGH (ref 70–99)
HCT VFR BLD CALC: 36 % — LOW (ref 39–50)
HGB BLD-MCNC: 11.5 G/DL — LOW (ref 13–17)
IANC: 3.95 K/UL — SIGNIFICANT CHANGE UP (ref 1.8–7.4)
IMM GRANULOCYTES NFR BLD AUTO: 0.3 % — SIGNIFICANT CHANGE UP (ref 0–0.9)
LYMPHOCYTES # BLD AUTO: 2.79 K/UL — SIGNIFICANT CHANGE UP (ref 1–3.3)
LYMPHOCYTES # BLD AUTO: 35.6 % — SIGNIFICANT CHANGE UP (ref 13–44)
MAGNESIUM SERPL-MCNC: 2 MG/DL — SIGNIFICANT CHANGE UP (ref 1.6–2.6)
MCHC RBC-ENTMCNC: 19.7 PG — LOW (ref 27–34)
MCHC RBC-ENTMCNC: 31.9 GM/DL — LOW (ref 32–36)
MCV RBC AUTO: 61.6 FL — LOW (ref 80–100)
MONOCYTES # BLD AUTO: 0.52 K/UL — SIGNIFICANT CHANGE UP (ref 0–0.9)
MONOCYTES NFR BLD AUTO: 6.6 % — SIGNIFICANT CHANGE UP (ref 2–14)
NEUTROPHILS # BLD AUTO: 3.95 K/UL — SIGNIFICANT CHANGE UP (ref 1.8–7.4)
NEUTROPHILS NFR BLD AUTO: 50.4 % — SIGNIFICANT CHANGE UP (ref 43–77)
NRBC # BLD: 0 /100 WBCS — SIGNIFICANT CHANGE UP (ref 0–0)
NRBC # FLD: 0 K/UL — SIGNIFICANT CHANGE UP (ref 0–0)
PHOSPHATE SERPL-MCNC: 3.9 MG/DL — SIGNIFICANT CHANGE UP (ref 2.5–4.5)
PLATELET # BLD AUTO: 338 K/UL — SIGNIFICANT CHANGE UP (ref 150–400)
POTASSIUM SERPL-MCNC: 4.1 MMOL/L — SIGNIFICANT CHANGE UP (ref 3.5–5.3)
POTASSIUM SERPL-SCNC: 4.1 MMOL/L — SIGNIFICANT CHANGE UP (ref 3.5–5.3)
RBC # BLD: 5.84 M/UL — HIGH (ref 4.2–5.8)
RBC # FLD: 19.9 % — HIGH (ref 10.3–14.5)
SODIUM SERPL-SCNC: 137 MMOL/L — SIGNIFICANT CHANGE UP (ref 135–145)
WBC # BLD: 7.83 K/UL — SIGNIFICANT CHANGE UP (ref 3.8–10.5)
WBC # FLD AUTO: 7.83 K/UL — SIGNIFICANT CHANGE UP (ref 3.8–10.5)

## 2024-07-19 PROCEDURE — 99232 SBSQ HOSP IP/OBS MODERATE 35: CPT

## 2024-07-19 PROCEDURE — 99239 HOSP IP/OBS DSCHRG MGMT >30: CPT

## 2024-07-19 RX ORDER — ATORVASTATIN CALCIUM 20 MG/1
1 TABLET, FILM COATED ORAL
Qty: 30 | Refills: 0
Start: 2024-07-19 | End: 2024-08-17

## 2024-07-19 RX ORDER — LOSARTAN POTASSIUM 100 MG/1
1 TABLET, FILM COATED ORAL
Qty: 30 | Refills: 0
Start: 2024-07-19 | End: 2024-08-17

## 2024-07-19 RX ORDER — MECLIZINE HCL 25 MG
1 TABLET ORAL
Qty: 45 | Refills: 0
Start: 2024-07-19 | End: 2024-08-02

## 2024-07-19 RX ORDER — METFORMIN HYDROCHLORIDE 850 MG/1
1 TABLET, FILM COATED ORAL
Qty: 60 | Refills: 0
Start: 2024-07-19 | End: 2024-08-17

## 2024-07-19 RX ADMIN — TICAGRELOR 90 MILLIGRAM(S): 90 TABLET ORAL at 05:22

## 2024-07-19 RX ADMIN — Medication 20 MILLIGRAM(S): at 12:30

## 2024-07-19 RX ADMIN — INSULIN LISPRO 1: 100 INJECTION, SOLUTION SUBCUTANEOUS at 12:31

## 2024-07-19 RX ADMIN — INSULIN LISPRO 8 UNIT(S): 100 INJECTION, SOLUTION SUBCUTANEOUS at 07:56

## 2024-07-19 RX ADMIN — Medication 1000 MICROGRAM(S): at 12:31

## 2024-07-19 RX ADMIN — Medication 1 MILLIGRAM(S): at 12:30

## 2024-07-19 RX ADMIN — ASPIRIN 81 MILLIGRAM(S): 325 TABLET, FILM COATED ORAL at 12:30

## 2024-07-19 RX ADMIN — INSULIN LISPRO 1: 100 INJECTION, SOLUTION SUBCUTANEOUS at 07:55

## 2024-07-19 RX ADMIN — INSULIN LISPRO 8 UNIT(S): 100 INJECTION, SOLUTION SUBCUTANEOUS at 12:32

## 2024-07-19 RX ADMIN — HEPARIN SODIUM 5000 UNIT(S): 50 INJECTION, SOLUTION INTRAVENOUS at 05:22

## 2024-07-19 RX ADMIN — VENLAFAXINE 37.5 MILLIGRAM(S): 37.5 CAPSULE, EXTENDED RELEASE ORAL at 12:31

## 2024-07-19 RX ADMIN — LOSARTAN POTASSIUM 50 MILLIGRAM(S): 100 TABLET, FILM COATED ORAL at 05:22

## 2024-07-19 NOTE — DISCHARGE NOTE NURSING/CASE MANAGEMENT/SOCIAL WORK - NSDCPEFALRISK_GEN_ALL_CORE
For information on Fall & Injury Prevention, visit: [email or web address] OR  [email or web address] OR  https://www.cdc.gov/steadi/patient.html

## 2024-07-19 NOTE — DISCHARGE NOTE PROVIDER - NSDCFUADDAPPT_GEN_ALL_CORE_FT
Follow up with neurology within 1-2 weeks of discharge. You may follow up at [address] [facility] [address] WITH either Dr. Salazar or Dr. Black: [phone] [address] [place] [address]"    Follow up with PCP within 1-2 weeks of discharge. If you are in need of a general medicine physician and post-discharge medical follow-up for further care/recommendations you may contact the [facility] for an appointment at [phone].     Follow up with endocrinology within 1-2 weeks of discharge. Please call for an appointment: [address] [place] [address] - [address].  Phone: [phone].    Follow up with neurology within 1-2 weeks of discharge. You may follow up at [address] [facility] [address] WITH either Dr. Salazar or Dr. Black: [phone] [address] [place] [address]"    Follow up with PCP within 1-2 weeks of discharge. If you are in need of a general medicine physician and post-discharge medical follow-up for further care/recommendations you may contact the [facility] for an appointment at [phone].     Follow up with endocrinology within 1-2 weeks of discharge. Please call for an appointment: [address] [facility] [place] [address] - [address].  Phone: [phone].     Follow up with electrophysiology team within 1-2 weeks of discharge. You have an appointment 7/25  1. Follow up with neurology within 1-2 weeks of discharge. You may follow up at [address] [facility] [address] WITH either Dr. Salazar or Dr. Black: [phone] [address] [place] [address]"    2. Follow up with neurosurgery Dr. Niño in 1-2 weeks.     3. Follow up with PCP within 1-2 weeks of discharge. If you are in need of a general medicine physician and post-discharge medical follow-up for further care/recommendations you may contact the [facility] for an appointment at [phone].     4. Follow up with endocrinology within 1-2 weeks of discharge. Please call for an appointment: [address] [place] [address] - [address].  Phone: [phone].     5. Follow up with electrophysiology team within 1-2 weeks of discharge. You have a telephone appointment on 7/25/24 at 9.20 am. Dr. Sunshine Lezama.     APPTS ARE READY TO BE MADE: [x ] YES    Best Family or Patient Contact (if needed): call patient     Additional Information about above appointments (if needed):    1: Neurology as noted above. 1-2 weeks.   2: Neurosurgery as noted above. 1-2 weeks.   3: Endocrinology as noted above, 1 week (started new meds, needs sooner follow up)    Other comments or requests:    1. Follow up with neurology within 1-2 weeks of discharge. You may follow up at [address] [facility] [address] WITH either Dr. Salazar or Dr. Black: [phone] [address] [place] [address]"  2. Follow up with neurosurgery Dr. Nioñ in 1-2 weeks.  3. Follow up with PCP within 1-2 weeks of discharge. If you are in need of a general medicine physician and post-discharge medical follow-up for further care/recommendations you may contact the [facility] for an appointment at [phone].  4. Follow up with endocrinology within 1-2 weeks of discharge. Please call for an appointment: [address] [place] [address] - [address].  Phone: [phone].  5. Follow up with electrophysiology team within 1-2 weeks of discharge. You have a telephone appointment on 7/25/24 at 9.20 am. Dr. Sunshine Lezama.    APPTS ARE READY TO BE MADE: [X] YES    Best Family or Patient Contact (if needed): call patient     Additional Information about above appointments:    1: Neurology as noted above. 1-2 weeks.   2: Neurosurgery as noted above. 1-2 weeks.   3: Endocrinology as noted above, 1 week (started new meds, needs sooner follow up)    Other comments or requests:   Appointment was scheduled by our team on the patient's behalf through the provider's office. Patient has a hfu with Dr. JENNIFER SEN,[facility] 7/31/24 at 11:15AM.[address] [facility]  [address]  Appointment was scheduled in [facility]. Patient has an HF[facility]CP [facility] at 1PM.[address] [facility], [facility]  [address]  Prior to outreaching the patient, it was visible that the patient has secured a follow up appointment which was not scheduled by our team. Patient has an appointment with ZENIA BERGERON 8/12/24 at 12PM.[address] [facility]  [address]  Prior to outreaching the patient, it was visible that the patient has secured a follow up appointment which was not scheduled by our team. Patient has an TEB appointment 8/13/24 at 9am.

## 2024-07-19 NOTE — PROGRESS NOTE ADULT - PROBLEM SELECTOR PROBLEM 3
Abdominal distension
Hyperlipidemia
Hyperlipidemia
Abdominal distension
Hyperlipidemia
Hyperlipidemia
Abdominal distension

## 2024-07-19 NOTE — DISCHARGE NOTE NURSING/CASE MANAGEMENT/SOCIAL WORK - NURSING SECTION COMPLETE
Subjective   Patient ID: Keri is a 16 year old female.    Chief Complaint   Patient presents with   • Vaginal Itching        HPI    She is a patient who came in today for an evaluation mainly because of vaginal itching and discharge she has had for the past few days she does not have any pelvic pain denies any dysuria any fever chills associated with the symptoms  She is sexually active and describes her last menstrual period to be April 23  She was not on any antibiotics in the recent past      Review of Systems   Constitutional: Negative.    Eyes: Negative.    Respiratory: Negative.    Cardiovascular: Negative.    Gastrointestinal: Negative.    Genitourinary: Positive for vaginal discharge. Negative for decreased urine volume, difficulty urinating, dyspareunia, dysuria, genital sores, hematuria, menstrual problem, pelvic pain, urgency, vaginal bleeding and vaginal pain.   Neurological: Negative.    Psychiatric/Behavioral: Negative.        Current Medications    AMOXICILLIN-CLAVULANATE (AUGMENTIN) 875-125 MG PER TABLET    Take 1 tablet by mouth every 12 hours. Take with food.    MEDROXYPROGESTERONE (DEPO-PROVERA) 150 MG/ML INJECTABLE SUSPENSION           Relevant past med history  No past medical history on file.    No past surgical history on file.  Social History     Socioeconomic History   • Marital status: Single     Spouse name: Not on file   • Number of children: Not on file   • Years of education: Not on file   • Highest education level: Not on file   Occupational History   • Not on file   Social Needs   • Financial resource strain: Not on file   • Food insecurity:     Worry: Not on file     Inability: Not on file   • Transportation needs:     Medical: Not on file     Non-medical: Not on file   Tobacco Use   • Smoking status: Never Smoker   • Smokeless tobacco: Never Used   Substance and Sexual Activity   • Alcohol use: Never     Frequency: Never   • Drug use: Not Currently   • Sexual activity: Not Currently    Lifestyle   • Physical activity:     Days per week: Not on file     Minutes per session: Not on file   • Stress: Not on file   Relationships   • Social connections:     Talks on phone: Not on file     Gets together: Not on file     Attends Confucianism service: Not on file     Active member of club or organization: Not on file     Attends meetings of clubs or organizations: Not on file     Relationship status: Not on file   • Intimate partner violence:     Fear of current or ex partner: Not on file     Emotionally abused: Not on file     Physically abused: Not on file     Forced sexual activity: Not on file   Other Topics Concern   • Not on file   Social History Narrative   • Not on file       ALLERGIES:   Allergen Reactions   • Dust Mite Extract Other (See Comments)     No family history on file.    Examination  Objective   Visit Vitals  /70   Pulse 80   Temp 98.1 °F (36.7 °C)   Resp 18   Ht 5' 3\" (1.6 m)   Wt 90.7 kg (200 lb)   SpO2 99%   BMI 35.43 kg/m²     Physical Exam   Constitutional: She is oriented to person, place, and time.   Cardiovascular: Normal rate, regular rhythm, normal heart sounds and intact distal pulses.   Pulmonary/Chest: Effort normal.   Abdominal: Soft. Bowel sounds are normal.   Genitourinary:   Genitourinary Comments: Pelvic examination is done in presence of my medical assistant it was showing scanty whitish discharge in the vaginal vault there is no cervical motion or adnexal tenderness cultures are taken    Neurological: She is alert and oriented to person, place, and time.   Psychiatric: She has a normal mood and affect. Her behavior is normal. Thought content normal.   Vitals reviewed.      Assessment   Problem List Items Addressed This Visit     None      Visit Diagnoses     Vaginal discharge    -  Primary    Relevant Orders    CHLAMYDIA/GC BY NUCLEIC ACID AMPLIFICATION    VAGINAL PATHOGENS        Plan is discussed with the patient we will review the culture results and further  Patient/Caregiver provided printed discharge information. treatment to be given to her accordingly based on all the culture results are  If symptoms persist or if there are any worsening of symptoms to return to clinic or go to emergency room  Patient verbalized understanding

## 2024-07-19 NOTE — DISCHARGE NOTE PROVIDER - NSDCCPCAREPLAN_GEN_ALL_CORE_FT
PRINCIPAL DISCHARGE DIAGNOSIS  Diagnosis: Syncope  Assessment and Plan of Treatment: You were transferred from [facility] for evaluation. You underwent a loop recorder placement on 7/12/24. Your echocardiogram was normal. Your carotid doppler showed occlusion of Left  vertebral artery. You underwent CTA of the head and neck which showed possible worsening of your stenosis. You underwent an MRI which showed stability. Neurosurgery has recommended aspirin and brilinta therapy and outpatient follow up with Dr. Niño in 1-2 weeks   We adjusted your home Losartan. We have provided you with an outpatient vestibular therapy script. We also stopped your Glimepiride. You may continue Meclizine for vertigo.      SECONDARY DISCHARGE DIAGNOSES  Diagnosis: Diabetes mellitus  Assessment and Plan of Treatment: Monitor finger sticks pre-meal and bedtime, low salt, fat and carbohydrate diet, minimize glucose intake.  Exercise daily for at least 30 minutes and weight loss.  Follow up with primary care physician and endocrinologist for routine Hemoglobin A1C checks and management.  Follow up with your ophthalmologist for routine yearly vision exams.   - Start Metformin: 500mg twice a day for 1 week, then 1000mg twice a day after that. Resume Jardiance 25mg daily. Start new medication Trulicity. STOP Tradjenta and STOP Glimepiride.    Diagnosis: Abdominal pain  Assessment and Plan of Treatment: You were complaining of worsening abdominal distension and weight loss. Your abdominal ultrasound/CT scan did not show any abnormalities other than fatt liver. Your CT scan did not show anything of concern.

## 2024-07-19 NOTE — DISCHARGE NOTE PROVIDER - PROVIDER TOKENS
FREE:[LAST:[NEURO, PCP AND ENDOCRINE FOLLOW UP],PHONE:[(   )    -],FAX:[(   )    -]] FREE:[LAST:[NEURO, PCP AND ENDOCRINE FOLLOW UP],PHONE:[(   )    -],FAX:[(   )    -]],PROVIDER:[TOKEN:[[id number]]] PROVIDER:[TOKEN:[[id number]]],PROVIDER:[TOKEN:[[id number]],ESTABLISHEDPATIENT:[T]],FREE:[LAST:[NEURO, PCP AND ENDOCRINE FOLLOW UP],PHONE:[(   )    -],FAX:[(   )    -]],PROVIDER:[TOKEN:[[id number]],ESTABLISHEDPATIENT:[T]]

## 2024-07-19 NOTE — DISCHARGE NOTE NURSING/CASE MANAGEMENT/SOCIAL WORK - PATIENT PORTAL LINK FT
You can access the FollowMyHealth Patient Portal offered by [facility] by registering at the following website: [email or web address]. By joining [employer]’s FollowMyHealth portal, you will also be able to view your health information using other applications (apps) compatible with our system.

## 2024-07-19 NOTE — DISCHARGE NOTE PROVIDER - NSDCFUSCHEDAPPT_GEN_ALL_CORE_FT
[facility] Physician [facility] [phone]  Scheduled Appointment: 07/25/2024     [facility]  ELECTROPH [phone]  Scheduled Appointment: 07/25/2024    Dustin Niño  [facility]  NEUROSURG [address] [facility]  Scheduled Appointment: 07/31/2024    Debo Lopez  [facility]  INTMED [address]  Scheduled Appointment: 07/31/2024    [facility]  ENDOCRIN [address]  Scheduled Appointment: 08/12/2024    [facility]  ELECTROPH [phone]  Scheduled Appointment: 08/13/2024

## 2024-07-19 NOTE — DISCHARGE NOTE PROVIDER - CARE PROVIDERS DIRECT ADDRESSES
,DirectAddress_Unknown ,DirectAddress_Unknown,[email or web address],DirectAddress_Unknown,DirectAddress_Unknown,DirectAddress_Unknown

## 2024-07-19 NOTE — PROGRESS NOTE ADULT - ASSESSMENT
Pt is a 73 yo M with a PMH of VA stenosis s/p R VA stent in 2016 on ASA/plavix, bilateral cerebellar infarcts, HTN, T2DM, HLD, BPPV R ICA stenosis s/p stent in 10/2023, admitted for EP eval for bifascicular block after presenting to [facility] following an episode of presyncope. Endocrinology consulted for inpatient glycemic control.      Pt's fasting blood glucose was 161 this AM (7/18/204) and 214 before lunch. He has required 42 units of insulin in the last 24 hours. Fasting glucose was within goal however his preprandial glucose has persistently remained above goal range on 7 Admelog with low correctional scale.      #T2DM   A1c: 9.4%  Home meds (updated with records from pcp):  Glimepiride 4 mg PO x2 with breakfast or first meal of day, Jardiance 25 mg PO daily, Tradjenta 5 mg PO daily  Endocrinologist: Dr. Roxanne Choudhury (PCP)    INPATIENT RECOMMENDATIONS:  - additional 5 units of sliding scale given in the past 24 hours  - Continue Lantus 16 units at bedtime  - Increase Admelog to 8 units premeal TID (HOLD IF NPO)  - continue low admelog correctional scale premeal   - continue low admelog correctional scale at bedtime    DISCHARGE RECOMMENDATIONS:  - Recommend pt to start Metformin 500 mg BID x1 week then increase to 1000 mg BID with food  - Resume Jardiance 25 mg PO daily  - Trulicity 0.75 mg subq 1x/week is covered by insurance.   - Stop DPP-4 inhibitor (Tradjenta) IF Pt is able to obtain prescription for GLP-1 agonist.  - Stop Glimepiride.  - Education for GLP-1 self administration needed prior to discharge  - OUT-PATIENT FOLLOW UP: Patient should follow up with Endocrinology office ([phone]) at [address] [place] [address] [address] [facility], [place] [address].    #TSH >10  - Low clinical suspicion for thyroid disease. Likely subclinical hypothyroidism iso acute illness  - Recheck TSH and free T4 in 4 weeks after discharge  - Can follow up outpatient    #HLD  - INPATIENT: patient on Atorvastatin 40 mg PO daily   - OUTPATIENT: patient on Rosuvastatin 40 mg PO daily and Ezetimibe 10 mg PO daily  - goal LDL in diabetes mellitus is <70    #HTN  - INPATIENT: patient on Losartan 50 mg   - OUTPATIENT: patient on Losartan 100 mg   - goal BP in diabetes mellitus is <130/80  - defer to primary team for management     Pt is a 73 yo M with a PMH of VA stenosis s/p R VA stent in 2016 on ASA/plavix, bilateral cerebellar infarcts, HTN, T2DM, HLD, BPPV R ICA stenosis s/p stent in 10/2023, admitted for EP eval for bifascicular block after presenting to [facility] following an episode of presyncope. Endocrinology consulted for inpatient glycemic control.       #T2DM   A1c: 9.4%  Home meds (updated with records from pcp):  Glimepiride 4 mg PO x2 with breakfast or first meal of day, Jardiance 25 mg PO daily, Tradjenta 5 mg PO daily  Endocrinologist: Dr. Roxanne Choudhury (PCP)      DISCHARGE RECOMMENDATIONS:  - Recommend pt to start Metformin 500 mg BID x1 week then increase to 1000 mg BID with food  - Resume Jardiance 25 mg PO daily  - Trulicity 0.75 mg subq 1x/week is covered by insurance. Increase to 1.5 mg after 1 month  - Stop DPP-4 inhibitor (Tradjenta) IF Pt is able to obtain prescription for GLP-1 agonist.  - Stop Glimepiride.  - Education for GLP-1 self administration needed prior to discharge  - OUT-PATIENT FOLLOW UP: Patient should follow up with Endocrinology office ([phone]) at [address] [place] [address] [address] [facility], [place] [address].    #TSH >10  - Low clinical suspicion for thyroid disease. Likely subclinical hypothyroidism iso acute illness  - Recheck TSH and free T4 in 4 weeks after discharge  - Can follow up outpatient    #HLD  - INPATIENT: patient on Atorvastatin 40 mg PO daily   - OUTPATIENT: patient on Rosuvastatin 40 mg PO daily and Ezetimibe 10 mg PO daily  - goal LDL in diabetes mellitus is <70    #HTN  - INPATIENT: patient on Losartan 50 mg   - OUTPATIENT: patient on Losartan 100 mg   - goal BP in diabetes mellitus is <130/80  - defer to primary team for management

## 2024-07-19 NOTE — DISCHARGE NOTE PROVIDER - NSDCMRMEDTOKEN_GEN_ALL_CORE_FT
aspirin 81 mg oral tablet, chewable: 1 tab(s) orally once a day  Brilinta (ticagrelor) 90 mg oral tablet: 1 tab(s) orally 2 times a day  Calcium 600-Vit D3 500 Softgel 600 mg (1500 mg)-500 unit: 1 tab qd po  folic acid 1 mg oral tablet: 1 tab(s) orally once a day  hydrOXYzine hydrochloride 50 mg oral tablet: 1 tab(s) orally once a day  Januvia 25 mg oral tablet: 1 tab(s) orally once a day  Jardiance 10 mg oral tablet: 1 tab(s) orally once a day  leflunomide 20 mg oral tablet: 1 tab(s) orally  Lipitor 20 mg oral tablet: 1 tab(s) orally once a day  Lubricant Eye Drops ophthalmic solution: 1 drop(s) in each eye prn as needed for  dry eyes  omeprazole 40 mg oral delayed release capsule: 1 cap(s) orally once a day  polyethylene glycol 3350 oral powder for reconstitution: 17 gram(s) orally 2 times a day  senna leaf extract oral tablet: 1 tab(s) orally once a day (at bedtime)  Trulicity Pen 0.75 mg/0.5 mL subcutaneous solution: 0.75 milligram(s) subcutaneously once a week  venlafaxine 37.5 mg oral tablet: 1 tab(s) orally once a day  Vitamin B-12 1000 mcg oral tablet: 1 tab(s) orally once a day   aspirin 81 mg oral tablet, chewable: 1 tab(s) orally once a day  atorvastatin 40 mg oral tablet: 1 tab(s) orally once a day (at bedtime)  Brilinta (ticagrelor) 90 mg oral tablet: 1 tab(s) orally 2 times a day  Calcium 600-Vit D3 500 Softgel 600 mg (1500 mg)-500 unit: 1 tab qd po  folic acid 1 mg oral tablet: 1 tab(s) orally once a day  hydrOXYzine hydrochloride 50 mg oral tablet: 1 tab(s) orally once a day  Januvia 25 mg oral tablet: 1 tab(s) orally once a day  losartan 50 mg oral tablet: 1 tab(s) orally once a day  Lubricant Eye Drops ophthalmic solution: 1 drop(s) in each eye prn as needed for  dry eyes  meclizine 25 mg oral tablet: 1 tab(s) orally every 8 hours as needed for Dizziness  metFORMIN 500 mg oral tablet: 1 tab(s) orally 2 times a day 1 tablet twice a day for 7 days, then increase to 2 tablets twice a day. Dispense:  106 tablets  omeprazole 40 mg oral delayed release capsule: 1 cap(s) orally once a day  polyethylene glycol 3350 oral powder for reconstitution: 17 gram(s) orally 2 times a day  senna leaf extract oral tablet: 1 tab(s) orally once a day (at bedtime)  Trulicity Pen 0.75 mg/0.5 mL subcutaneous solution: 0.75 milligram(s) subcutaneously once a week  venlafaxine 37.5 mg oral tablet: 1 tab(s) orally once a day  Vitamin B-12 1000 mcg oral tablet: 1 tab(s) orally once a day   aspirin 81 mg oral tablet, chewable: 1 tab(s) orally once a day  atorvastatin 40 mg oral tablet: 1 tab(s) orally once a day (at bedtime)  Brilinta (ticagrelor) 90 mg oral tablet: 1 tab(s) orally 2 times a day  Calcium 600-Vit D3 500 Softgel 600 mg (1500 mg)-500 unit: 1 tab qd po  folic acid 1 mg oral tablet: 1 tab(s) orally once a day  hydrOXYzine hydrochloride 50 mg oral tablet: 1 tab(s) orally once a day  Januvia 25 mg oral tablet: 1 tab(s) orally once a day  losartan 50 mg oral tablet: 1 tab(s) orally once a day  Lubricant Eye Drops ophthalmic solution: 1 drop(s) in each eye prn as needed for  dry eyes  meclizine 25 mg oral tablet: 1 tab(s) orally every 8 hours as needed for Dizziness  metFORMIN 500 mg oral tablet: 1 tab(s) orally 2 times a day 1 tablet twice a day for 7 days, then increase to 2 tablets twice a day. Dispense:  106 tablets  omeprazole 40 mg oral delayed release capsule: 1 cap(s) orally once a day  polyethylene glycol 3350 oral powder for reconstitution: 17 gram(s) orally 2 times a day  senna leaf extract oral tablet: 1 tab(s) orally once a day (at bedtime)  Trulicity Pen 0.75 mg/0.5 mL subcutaneous solution: 0.75 milligram(s) subcutaneously once a week  venlafaxine 37.5 mg oral tablet: 1 tab(s) orally once a day  Vestibular therapy 2-3x/week: or as covered by insurance.  Vitamin B-12 1000 mcg oral tablet: 1 tab(s) orally once a day

## 2024-07-19 NOTE — PROGRESS NOTE ADULT - PROVIDER SPECIALTY LIST ADULT
Endocrinology
Hospitalist
Endocrinology
Hospitalist

## 2024-07-19 NOTE — DISCHARGE NOTE PROVIDER - HOSPITAL COURSE
72M with a PMH of VA stenosis s/p R VA stent in 2016 on ASA/plavix, bilateral cerebellar infarcts, HTN, DM, HLD, R ICA stenosis s/p stent in 10/2023 who initially presented to [facility] s/p syncope.  Transferred to [facility] for EP eval for bifascicular block.  Patient reports that he got up from bed 4 days ago and was making breakfast for himself and his wife.  Patient states that he choked a little on some tea and had mild cough.  Next thing he remembers he was on the floor.  Unknown how long he was down for.  Patient states that when he woke up he had pain in his bilateral shoulders - specifically posteriorly behind his shoulder blades.  Denies history of CP or palpitations prior to syncope.  Patient notes that over the last month he has had worsening dizziness.  Feels the sensation of the room spinning but also notes dizziness on change of position (worse when looking right to left.)  Patient notes history of multiple syncope in the past and has had multiple strokes.  On ROS, patient admits to poor appetite lately and abdominal swelling.  No other complaints.     Hospital course as detailed below:   1. Syncope.   - Transferred from [facility] for EP eval. s/p ILR placement w/ EP on 7/12/24. Has tele appt w/ them on 7/25 (see EP note).   - TTE 7/12/24, overall uneremarkable.   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   - CTA head and neck w/ IV contrast. Showing some progression of L vertebral artery stenosis. Neurosurgery advised MRA head + neck.   - s/p MRA head and neck showing stable areas of stenosis. Per neurosurgery pt may f/u outpt.   - Needs to f/u with EP and Neurosurgery.     OF NOTE,   - Pt's on lower bp inpatient, syncope may have been contributed by low neural perfusion pressures. Bp meds adjusted as noted below.   - Also on Glimepiride, syncope may have been hypoglycemia. Med will be d/mechelle on discharge.    2. Vertigo.    Dizziness w/ positional changes. Most likely central etiology d/t hx of CVA and stenosis.   - Started on Meclizine 25mg BID at [facility]. Continue.   - Seen by Neurology 7/12:---> Dysequilibrium due to b/l cerebellar infarcts in addition to known intermittent positional peripheral vertigo potentially acutely worsened due to other active medical issues. Low suspicion for new central etiology. Advising to "c/w ASA 81mg, Brilinta 90 mg q12h, Atorvastatin 40mg QHS. Continue Meclizine 25mg and titrate up as necessary. Upon discharge, PT should F/U with either Dr. Salazar or Dr. Black: [phone] [address] [place] [address]"    3. Abdominal distension:   - Pt endorsing worsening abdominal distension and weight loss unintentional.   - Abs US w/ fatty liver otherwise no abnormality.   - CT A/P w/o contrast: No malignancy or masses noted. 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  - Results discussed w/ pt.    4. Hx of CVA  - Hx of vertebral stent. Continuing w/ ASA and Brilinta as noted above.  - Neurology consulted 7/12/24, advised to c/w current regimen. Neurosurgery also consulted as noted above.    5. HTN   - Decreased dose of Losartan from 100mg QD to 50mg QD.  Holding home Amlodipine.  - Will discharge on Losartan 50mg QD. STOP Losartan 100mg QD and Amlodipine.     6. Diabetes mellitus.   - A1c 9.3% 7/13/24.   Discharge recs per endo:   - Start Metformin: 500mg BID x 1 week, then 1000mg BID.   - Resume Jardiance 25mg PO QD.   - Check coverage for Trulicity 0.75 subq 1x/week. (covered by insurance and sent)  - STOP Tradjenta and STOP Glimepiride.     # TSH high and free T4 normal.   - Subclinical hypothyroid.    7. HLD: Atorvastatin increased to 80mg QD.     8. GERD: Continue with home Omeprazole. (correction to prior progress notes, pt is on PPI at home, not on Pepcid).     Pt is stable for discharge to home. Needs outpt vestibular PT.

## 2024-07-19 NOTE — DISCHARGE NOTE PROVIDER - ATTENDING DISCHARGE PHYSICAL EXAMINATION:
CONSTITUTIONAL: NAD, well-developed, well-groomed  EYES: PERRLA; conjunctiva and sclera clear  ENMT: Moist oral mucosa, no pharyngeal injection or exudates; normal dentition  NECK: Supple, no palpable masses; no thyromegaly  RESPIRATORY: Normal respiratory effort; lungs are clear to auscultation bilaterally  CARDIOVASCULAR: Regular rate and rhythm, normal S1 and S2, no murmur/rub/gallop; No lower extremity edema; Peripheral pulses are 2+ bilaterally  ABDOMEN: Nontender to palpation, normoactive bowel sounds, no rebound/guarding; No hepatosplenomegaly  MUSCULOSKELETAL:  Normal gait; no clubbing or cyanosis of digits; no joint swelling or tenderness to palpation  PSYCH: A+O to person, place, and time; affect appropriate  NEUROLOGY: CN 2-12 are intact and symmetric; no gross sensory deficits, vertigo w/ positional changes   SKIN: No rashes; no palpable lesions

## 2024-07-19 NOTE — PROGRESS NOTE ADULT - SUBJECTIVE AND OBJECTIVE BOX
HPI:    Pt is a 71 yo M with a PMH of VA stenosis s/p R VA stent in 2016 on ASA/plavix, bilateral cerebellar infarcts, HTN, T2DM, HLD, BPPV R ICA stenosis s/p stent in 10/2023, admitted for EP eval for bifascicular block after presenting to [facility] following an episode of presyncope. Endocrinology consulted for inpatient glycemic control.       planned for dc    MEDICATIONS  (STANDING):  aspirin  chewable 81 milliGRAM(s) Oral daily  atorvastatin 40 milliGRAM(s) Oral at bedtime  cyanocobalamin 1000 MICROGram(s) Oral daily  dextrose 5%. 1000 milliLiter(s) (50 mL/Hr) IV Continuous <Continuous>  dextrose 5%. 1000 milliLiter(s) (100 mL/Hr) IV Continuous <Continuous>  dextrose 50% Injectable 25 Gram(s) IV Push once  dextrose 50% Injectable 25 Gram(s) IV Push once  dextrose 50% Injectable 12.5 Gram(s) IV Push once  dextrose Oral Gel 15 Gram(s) Oral once  famotidine    Tablet 20 milliGRAM(s) Oral daily  folic acid 1 milliGRAM(s) Oral daily  glucagon  Injectable 1 milliGRAM(s) IntraMuscular once  glucagon  Injectable 1 milliGRAM(s) IntraMuscular once  heparin   Injectable 5000 Unit(s) SubCutaneous every 12 hours  insulin glargine Injectable (LANTUS) 16 Unit(s) SubCutaneous at bedtime  insulin lispro (ADMELOG) corrective regimen sliding scale   SubCutaneous at bedtime  insulin lispro (ADMELOG) corrective regimen sliding scale   SubCutaneous three times a day before meals  insulin lispro Injectable (ADMELOG) 8 Unit(s) SubCutaneous three times a day before meals  losartan 50 milliGRAM(s) Oral daily  senna 1 Tablet(s) Oral at bedtime  ticagrelor 90 milliGRAM(s) Oral every 12 hours  venlafaxine 37.5 milliGRAM(s) Oral daily    MEDICATIONS  (PRN):  acetaminophen     Tablet .. 650 milliGRAM(s) Oral every 6 hours PRN Mild Pain (1 - 3), Moderate Pain (4 - 6)  artificial  tears Solution 1 Drop(s) Both EYES three times a day PRN Dry Eyes  meclizine 25 milliGRAM(s) Oral every 8 hours PRN Dizziness      Allergies    No Known Allergies    Intolerances        Review of Systems:  Constitutional: No fever, good appetite/po intake  Eyes: No blurry vision, diplopia  Neuro: No tremors  HEENT: No pain  Cardiovascular: No chest pain, palpitations  Respiratory: No SOB, no cough  GI: No nausea, vomiting,   : No dysuria, hematuria  Skin: no rash  Psych: no depression  Endocrine: no polyuria, polydipsia  Hem/lymph: no swelling  Osteoporosis: no fractures    ALL OTHER SYSTEMS REVIEWED AND NEGATIVE    PHYSICAL EXAM:  VITALS: T(C): 36.6 (07-19-24 @ 01:30)  T(F): 97.9 (07-19-24 @ 01:30), Max: 97.9 (07-19-24 @ 01:30)  HR: 67 (07-19-24 @ 05:20) (67 - 86)  BP: 133/80 (07-19-24 @ 05:20) (133/80 - 139/93)  RR:  (18 - 18)  SpO2:  (96% - 96%)  Wt(kg): --  GENERAL: NAD, well-groomed, well-developed  EYES: No proptosis, no lid lag, anicteric  HEENT:  Atraumatic, normocephalic, moist mucous membranes  RESPIRATORY: nonlabored respirations, no wheezing  PSYCH: Alert and oriented x 3, normal affect, normal mood    POCT Blood Glucose.: 182 mg/dL (07-19-24 @ 12:23)  POCT Blood Glucose.: 177 mg/dL (07-19-24 @ 07:19)  POCT Blood Glucose.: 166 mg/dL (07-18-24 @ 22:07)  POCT Blood Glucose.: 153 mg/dL (07-18-24 @ 16:50)  POCT Blood Glucose.: 214 mg/dL (07-18-24 @ 11:49)  POCT Blood Glucose.: 169 mg/dL (07-18-24 @ 08:46)  POCT Blood Glucose.: 161 mg/dL (07-18-24 @ 07:23)  POCT Blood Glucose.: 257 mg/dL (07-17-24 @ 21:55)  POCT Blood Glucose.: 175 mg/dL (07-17-24 @ 17:08)  POCT Blood Glucose.: 169 mg/dL (07-17-24 @ 12:05)  POCT Blood Glucose.: 194 mg/dL (07-17-24 @ 07:23)                            11.5   7.83  )-----------( 338      ( 19 Jul 2024 05:39 )             36.0       07-19    137  |  102  |  25<H>  ----------------------------<  196<H>  4.1   |  22  |  1.04    eGFR: 75    Ca    9.5      07-19  Mg     2.00     07-19  Phos  3.9     07-19        Thyroid Function Tests:  07-16 @ 06:12 TSH 9.68 FreeT4 1.0 T3 -- Anti TPO -- Anti Thyroglobulin Ab -- TSI --  07-15 @ 17:14 TSH 7.15 FreeT4 -- T3 -- Anti TPO <10.0 Anti Thyroglobulin Ab -- TSI --      07-16 Chol -- Direct LDL -- LDL calculated -- HDL -- Trig 222<H>, 07-13 Chol 127 Direct LDL -- LDL calculated 20 HDL 32<L> Trig 377<H>    Radiology:

## 2024-07-19 NOTE — DISCHARGE NOTE NURSING/CASE MANAGEMENT/SOCIAL WORK - NSDCFUADDAPPT_GEN_ALL_CORE_FT
1. Follow up with neurology within 1-2 weeks of discharge. You may follow up at [address] [facility] [address] WITH either Dr. Salazar or Dr. Black: [phone] [address] [place] [address]"    2. Follow up with neurosurgery Dr. Niño in 1-2 weeks.     3. Follow up with PCP within 1-2 weeks of discharge. If you are in need of a general medicine physician and post-discharge medical follow-up for further care/recommendations you may contact the [facility] for an appointment at [phone].     4. Follow up with endocrinology within 1-2 weeks of discharge. Please call for an appointment: [address] [facility] [place] [address] - [address].  Phone: [phone].     5. Follow up with electrophysiology team within 1-2 weeks of discharge. You have a telephone appointment on 7/25/24 at 9.20 am. Dr. Sunshine Lezama.     APPTS ARE READY TO BE MADE: [x ] YES    Best Family or Patient Contact (if needed): call patient     Additional Information about above appointments (if needed):    1: Neurology as noted above. 1-2 weeks.   2: Neurosurgery as noted above. 1-2 weeks.   3: Endocrinology as noted above, 1 week (started new meds, needs sooner follow up)    Other comments or requests:

## 2024-07-19 NOTE — DISCHARGE NOTE PROVIDER - CARE PROVIDER_API CALL
NEURO, PCP AND ENDOCRINE FOLLOW UP,   Phone: (   )    -  Fax: (   )    -  Follow Up Time:    NEURO, PCP AND ENDOCRINE FOLLOW UP,   Phone: (   )    -  Fax: (   )    -  Follow Up Time:     Dustin Niño  Neurosurgery  [address], [address]  [place] [address]  Phone: [phone]  Fax: [phone]  Follow Up Time:    Dustin Niño  Neurosurgery  [address], [address]  [place] [address]  Phone: [phone]  Fax: [phone]  Follow Up Time:     Angelo Salazar  Neurology  [address], [address]  [place] [address]  Phone: [phone]  Fax: [phone]  Established Patient  Follow Up Time:     NEURO, PCP AND ENDOCRINE FOLLOW UP,   Phone: (   )    -  Fax: (   )    -  Follow Up Time:     Sunshine Lezama  Cardiac Electrophysiology  [address] [place] [address], [address]  [place] [address]  Phone: [phone]  Fax: [phone]  Established Patient  Follow Up Time:

## 2024-07-19 NOTE — CHART NOTE - NSCHARTNOTEFT_GEN_A_CORE
MRI shows no strokes, no flow through L vertebral artery, no dissection. Can continue DAPT and follow up outpatient with Dr. Niño in 1-2 weeks     < from: MR Angio Neck w/wo IV Cont (07.18.24 @ 16:04) >      There is a three-vessel arch. Mild luminal narrowing in the left common   carotid artery with preservation of flow signal. A stent is noted in the   right carotid bulb. Foci of reduced flow signal at the proximal and   distal ends of the right carotid stent, likely artifactual. Left carotid   bulb is within normal limits. The remainder of the bilateral ICAs   demonstrate the appropriate flow signal to the skull base. The right ICA   is tortuous.    The right vertebral artery has a normal origin and demonstrates the   appropriate flow signal to the skull base.    Nonvisualization of the left vertebral artery origin. No flow signal   observed within the left vertebral artery. Minimal opacification of the   left V2 and V4 segments with intraluminal contrast.      IMPRESSION:    No significant flow signal within the left vertebral artery and minimal   opacification of the left V2 and V4 segments with intraluminal contrast.   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 A   intraluminal occlusion is not entirely excluded.

## 2024-07-25 ENCOUNTER — APPOINTMENT (OUTPATIENT)
Dept: ELECTROPHYSIOLOGY | Facility: CLINIC | Age: 74
End: 2024-07-25

## 2024-07-31 ENCOUNTER — APPOINTMENT (OUTPATIENT)
Dept: INTERNAL MEDICINE | Facility: CLINIC | Age: 74
End: 2024-07-31

## 2024-07-31 ENCOUNTER — APPOINTMENT (OUTPATIENT)
Dept: NEUROSURGERY | Facility: CLINIC | Age: 74
End: 2024-07-31
Payer: MEDICARE

## 2024-07-31 VITALS
DIASTOLIC BLOOD PRESSURE: 88 MMHG | SYSTOLIC BLOOD PRESSURE: 158 MMHG | HEART RATE: 92 BPM | WEIGHT: 170 LBS | HEIGHT: 66 IN | OXYGEN SATURATION: 95 % | BODY MASS INDEX: 27.32 KG/M2

## 2024-07-31 DIAGNOSIS — I63.112: ICD-10-CM

## 2024-07-31 DIAGNOSIS — I65.29 OCCLUSION AND STENOSIS OF UNSPECIFIED CAROTID ARTERY: ICD-10-CM

## 2024-07-31 DIAGNOSIS — I65.09 OCCLUSION AND STENOSIS OF UNSPECIFIED VERTEBRAL ARTERY: ICD-10-CM

## 2024-07-31 PROCEDURE — 99215 OFFICE O/P EST HI 40 MIN: CPT

## 2024-07-31 RX ORDER — ASPIRIN 325 MG/1
325 TABLET, FILM COATED ORAL
Qty: 90 | Refills: 1 | Status: ACTIVE | COMMUNITY
Start: 2024-07-31

## 2024-07-31 NOTE — HISTORY OF PRESENT ILLNESS
[FreeTextEntry1] : CORTNEY SERNA is a 73 year old male with PMH of HTN, HLD, DM 2, prior bilateral cerebellar infarcts with no residual deficits, bilateral vertebral artery stenosis, L vert occlusion, (s/p Right vertebral artery stent at [facility] 3/28/2016 on ASA/Plavix), bilateral cataracts 2012, depression, rheumatoid arthritis who was sent to ED from outpatient ophthalmology office, Dr. Dawit Lawrence, for 3 week history of right eye vision changes believed to be embolic in nature. Reports periods of blackness in R lower visual field. Per ophtho, the right eye visual field deficit found on VF testing is possibly due to embolic retinal ischemia and recommended patient come to [facility] for stroke work-up. Of note, neurology had seen patient in 11/2021 for acute vestibular syndrome. Neuroimaging at the time did not show new findings, including MRI brain. At the time of neurological exam at [facility], patient was not complaining of significant visual field loss, and on visual field testing, did not have gross visual field cut. CTA/CTH with no acute infarct. MRI brain showed no evidence of acute infarct, hemorrhage, or mass lesion. Chronic lacunar infarct in the medial right cerebellum and in the left perisylvian cortex. High grade severe 70% R ICA stenosis on CTA.   On 10/19/23, he underwent cerebral angiogram and right ICA angioplasty and stenting. On Aspirin 81mg daily and Brilinta 90mg BID. Discharged home with home care/visiting nurse services.  On 7/12/24, he was transferred to [facility] from [facility] s/p syncope, worsening dizziness. CTA largely similar to prior with minimal flow in hypoplastic L vert. No acute infarcts on MRI. No indication for angio or further neuro vascular intervention, continue current medical management, plan to continue DAPT and follow up outpatient for MRA NOVA. Discharged home on 7/19/24.  Today, patient presents for post-hospital follow up. He was previously lost to follow up due to taking care of his wife who has dementia. He underwent bilateral carotid doppler ultrasound on 7/13/24 at his cardiology office.

## 2024-07-31 NOTE — PATIENT PROFILE ADULT - INTERNATIONAL TRAVEL
History and Physical  [facility]  [facility] [address]  [place], [place]  [address] [phone]   Fax [phone]  Alma Rivers  2026              41 y.o.  Chief Complaint   Patient presents with    Annual Exam       Patient's last menstrual period was 2024 (exact date).             Primary Care Physician: No primary care provider on file.    The patient was seen and examined. She has no chief complaint today and is here for her annual exam.  Her bowels are regular. There are no voiding complaints. She denies any bloating.  She denies vaginal discharge and was counseled on STD's and the need for barrier contraception.     HPI : Alma Rivers is a 41 y.o. female     Annual exam    Periods occur every month, lasting 5 days long. Previously saw DR Stoll in October after completing benign endometrial biopsy.  Desires to continue to monitor periods and will return if she changes her mind.  Periods remain the same.     ________________________________________________________________________  OB History    Para Term  AB Living   2 2 2 0 0 2   SAB IAB Ectopic Molar Multiple Live Births   0 0 0 0 0 2      # Outcome Date GA Lbr Matheus/2nd Weight Sex Delivery Anes PTL Lv   2 Term 09 39w5d  3.459 kg (7 lb 10 oz) M Vag-Spont   XIAO      Name: Sebastien      Apgar1: 8  Apgar5: 9   1 Term 06 39w0d  2.835 kg (6 lb 4 oz) F    XIAO      Name: Cami      Past Medical History:   Diagnosis Date    Anxiety     Kidney stones     Nephrolithiasis 2021    Vision loss of right eye                                                                    Past Surgical History:   Procedure Laterality Date    WISDOM TOOTH EXTRACTION       Family History   Problem Relation Age of Onset    Thyroid Disease Mother     High Blood Pressure Father     Diabetes Father     Migraines Father     High Cholesterol Father     Heart Disease Father     High Blood Pressure Brother     
No

## 2024-07-31 NOTE — REASON FOR VISIT
[Post Hospitalization] : a post hospitalization visit [FreeTextEntry1] : [facility] admission 7/12/24 - 7/19/24 s/p Right internal carotid artery stent placement 10/19/23

## 2024-07-31 NOTE — PHYSICAL EXAM
[Person] : oriented to person [Place] : oriented to place [Time] : oriented to time [General Appearance - In No Acute Distress] : in no acute distress [Abnormal Walk] : normal gait

## 2024-07-31 NOTE — ASSESSMENT
[FreeTextEntry1] : IMPRESSION: 73M with PMH of HTN, HLD, DM 2, prior ischemic infarcts with no residual deficits, b/l VA stenosis, L vert occlusion, (s/p R VA stent at [facility] 3/28/2016 on ASA/Plavix), b/l cataracts 2012, depression, RA p/w 3 weeks h/o R eye vision changes believed to be embolic in nature per ophtho. No acute infarcts on brain imaging. CTA with high grade severe >70% R ICA stenosis s/p R ICA stent placement 10/19/23. On ASA 81, Brilinta 90 BID.   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 Angio in 2023 showed the R vert stent to be widely patent, with no flow limiting stenosis throughout the dominant R vert and basilar system. Patient presented with syncope on 712/24, CTA largely similar to prior with minimal flow in hypoplastic L vert. No flow through L vertebral artery, no dissection. No acute infarcts on MRI. No indication for angio or further neuro vascular intervention, continue current medical management with DAPT -ASA and Brilinta, can f/u outpatient for MRA NOVA.  Bilateral carotid doppler ultrasound 7/13/24 shows patent R ICA stent, no significant L carotid stenosis.  Patient reports a syncopal episode this morning, otherwise doing neurologically well.    PLAN: Discontinue Brilinta (more than 6 months from stenting) Increase Aspirin to 325mg daily from 81mg daily MRA head/neck non con NOVA in 3 months - October 2024 (1 year post-R ICA stent) Follow up visit after to review imaging Bilateral carotid doppler ultrasound moving forward after that Follow up with vascular neurology per discharge instructions - Dr. Angelo Salazar Continue follow up with cardiology/EP per discharge instructions Hospital discharge instructions printed and provided to patient

## 2024-07-31 NOTE — HISTORY OF PRESENT ILLNESS
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 3/28/2016 on ASA/Plavix), bilateral cataracts 2012, depression, rheumatoid arthritis who was sent to ED from outpatient ophthalmology office, Dr. Dawit Lawrence, for 3 week history of right eye vision changes believed to be embolic in nature. Reports periods of blackness in R lower visual field. Per ophtho, the right eye visual field deficit found on VF testing is possibly due to embolic retinal ischemia and recommended patient come to [facility] for stroke work-up. Of note, neurology had seen patient in 11/2021 for acute vestibular syndrome. Neuroimaging at the time did not show new findings, including MRI brain. At the time of neurological exam at [facility], patient was not complaining of significant visual field loss, and on visual field testing, did not have gross visual field cut. CTA/CTH with no acute infarct. MRI brain showed no evidence of acute infarct, hemorrhage, or mass lesion. Chronic lacunar infarct in the medial right cerebellum and in the left perisylvian cortex. High grade severe 70% R ICA stenosis on CTA.   On 10/19/23, he underwent cerebral angiogram and right ICA angioplasty and stenting. On Aspirin 81mg daily and Brilinta 90mg BID. Discharged home with home care/visiting nurse services.  On 7/12/24, he was transferred to [facility] from [facility] s/p syncope, worsening dizziness. CTA largely similar to prior with minimal flow in hypoplastic L vert. No acute infarcts on MRI. No indication for angio or further neuro vascular intervention, continue current medical management, plan to continue DAPT and follow up outpatient for MRA NOVA. Discharged home on 7/19/24.  Today, patient presents for post-hospital follow up. He was previously lost to follow up due to taking care of his wife who has dementia. He underwent bilateral carotid doppler ultrasound on 7/13/24 at his cardiology office.

## 2024-07-31 NOTE — ASSESSMENT
[FreeTextEntry1] : IMPRESSION: 73M with PMH of HTN, HLD, DM 2, prior ischemic infarcts with no residual deficits, b/l VA stenosis, L vert occlusion, (s/p R VA stent at [facility] 3/28/2016 on ASA/Plavix), b/l cataracts 2012, depression, RA p/w 3 weeks h/o R eye vision changes believed to be embolic in nature per ophtho. No acute infarcts on brain imaging. CTA with high grade severe >70% R ICA stenosis s/p R ICA stent placement 10/19/23. On ASA 81, Brilinta 90 BID.   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 Angio in 2023 showed the R vert stent to be widely patent, with no flow limiting stenosis throughout the dominant R vert and basilar system. Patient presented with syncope on 712/24, CTA largely similar to prior with minimal flow in hypoplastic L vert. No flow through L vertebral artery, no dissection. No acute infarcts on MRI. No indication for angio or further neuro vascular intervention, continue current medical management with DAPT -ASA and Brilinta, can f/u outpatient for MRA NOVA.  Bilateral carotid doppler ultrasound 7/13/24 shows patent R ICA stent, no significant L carotid stenosis.  Patient reports a syncopal episode this morning, otherwise doing neurologically well.    PLAN: Discontinue Brilinta (more than 6 months from stenting) Increase Aspirin to 325mg daily from 81mg daily MRA head/neck non con NOVA in 3 months - October 2024 (1 year post-R ICA stent) Follow up visit after to review imaging Bilateral carotid doppler ultrasound moving forward after that Follow up with vascular neurology per discharge instructions - Dr. Angelo Salazar Continue follow up with cardiology/EP per discharge instructions Hospital discharge instructions printed and provided to patient

## 2024-07-31 NOTE — REASON FOR VISIT
[Post Hospitalization] : a post hospitalization visit [FreeTextEntry1] : [facility] admission 7/12/24 - 7/19/24 s/p Right internal carotid artery stent placement 10/19/23

## 2024-08-12 ENCOUNTER — LABORATORY RESULT (OUTPATIENT)
Age: 74
End: 2024-08-12

## 2024-08-12 ENCOUNTER — APPOINTMENT (OUTPATIENT)
Dept: ENDOCRINOLOGY | Facility: CLINIC | Age: 74
End: 2024-08-12
Payer: MEDICARE

## 2024-08-12 VITALS
OXYGEN SATURATION: 97 % | WEIGHT: 172 LBS | SYSTOLIC BLOOD PRESSURE: 140 MMHG | HEIGHT: 66 IN | HEART RATE: 105 BPM | BODY MASS INDEX: 27.64 KG/M2 | DIASTOLIC BLOOD PRESSURE: 80 MMHG

## 2024-08-12 DIAGNOSIS — I10 ESSENTIAL (PRIMARY) HYPERTENSION: ICD-10-CM

## 2024-08-12 DIAGNOSIS — E11.9 TYPE 2 DIABETES MELLITUS W/OUT COMPLICATIONS: ICD-10-CM

## 2024-08-12 DIAGNOSIS — R79.89 OTHER SPECIFIED ABNORMAL FINDINGS OF BLOOD CHEMISTRY: ICD-10-CM

## 2024-08-12 DIAGNOSIS — E78.5 HYPERLIPIDEMIA, UNSPECIFIED: ICD-10-CM

## 2024-08-12 PROCEDURE — 99204 OFFICE O/P NEW MOD 45 MIN: CPT

## 2024-08-12 PROCEDURE — 99214 OFFICE O/P EST MOD 30 MIN: CPT

## 2024-08-12 NOTE — ASSESSMENT
[FreeTextEntry1] : The patient is a 73 year old male being seen in the office today for evaluation of diabetes. PMH of HTN, HLD, DM2, VA stenosis s/p R VA stent in 2016, bilateral cerebellar infarcts, BPPV R ICA stenosis s/p stent in 10/2023. Recent admission to the hospital for EP eval for bifascicular block after presenting to [facility] following an episode of presyncope. Endocrinology consulted during hospitalization. PCP: Dr. Roxanne Choudhury   T2DM dx in 2016 A1c: 9.4% 7/2024 Current home regimen:  - Metformin 1000 mg BID with food - Trulicity 0.75 mg once a week completed 4 doses, reports tolerating well  - Jardiance 25 mg PO daily -  Also taking Januvia 25mg once a day - Plan: Increase Trulicity to 1.5mg once a week, stop Januvia (should not be on GLp-1 and DPP4i at same time). Continue Metformin 1,000mg BID, continue Jardiance 25mg daily.  - BG monitoring: check 1-2 times a day in staggered manner  Nephropathy screening: check urine ACR  Retinopathy screening: recommend yearly dilated eye exam  Foot exam/podiatrist: recommend podiatry evaluation  - Counseling: We discussed diabetes foot care, long term complications of diabetes including but not limited to neuropathy, nephropathy, retinopathy and cardiovascular disease and the benefits of good glycemic control in preventing said complications. We discussed the risks and benefits of diabetes medications and/or insulin as relevant for today, prevention and management of hypoglycemia, importance of medication compliance and blood glucose monitoring. - Discussed diabetic diet, decreased intake of simple/processed sugars, increase intake of whole foods with protein and fiber. Increase physical activity as tolerated with cardiovascular exercise 150 min/per week consistently and resistance exercise three days per week.  #TSH >10 while in hospital  - Check TFT's  #HLD - Atorvastatin 40 mg PO daily - Check lipid profile in a few months  #HTN - Losartan 50 mg daily  - BP elevated today, recheck next visit, if still elevated consider increasing dose of Losartan   Follow up with me in 3 months Has 6 month follow up appointment with Dr. Salmeron scheduled

## 2024-08-12 NOTE — PHYSICAL EXAM
[TextEntry] : GENERAL: NAD, well-developed HEENT: Atraumatic, Normocephalic RESPIRATORY: Clear to auscultation bilaterally; No rales, rhonchi, wheezing, or rubs CARDIOVASCULAR: Regular rate and rhythm; No murmurs; no peripheral edema GI: Soft, nontender, non distended, normal bowel sounds SKIN: Dry, intact, No rashes or lesions MUSCULOSKELETAL: Full range of motion, normal strength Thyroid normal in size and supple

## 2024-08-12 NOTE — HISTORY OF PRESENT ILLNESS
[FreeTextEntry1] : The patient is a 73 year old male being seen in the office today for evaluation of diabetes. PMH of HTN, HLD, DM2, VA stenosis s/p R VA stent in 2016, bilateral cerebellar infarcts, BPPV R ICA stenosis s/p stent in 10/2023. Recent admission to the hospital for EP eval for bifascicular block after presenting to [facility] following an episode of presyncope. Endocrinology consulted during hospitalization. PCP: Dr. Roxanne Choudhury   T2DM dx in 2016 A1c: 9.4% 7/2024  Current home regimen:  - Metformin 1000 mg BID with food - Trulicity 0.75 mg once a week completed 4 doses, reports tolerating well  - Jardiance 25 mg PO daily -  Also taking Januvia 25mg once a day Past medications: glimepiride, tradjenta  Diabetes complications: Microvascular: [-] neuropathy, [-] nephropathy, [?] retinopathy Macrovascular: [-] CAD, [+] CVA, [-] PAD Denies DKA hx  Last retinopathy screening: saw optho 8-9 months ago, unsure retinopathy   Checks fingerstick once a day in AM fasting, forgot glucometer, reports AM fasting BG is 180's Hypoglycemia events: denies  Diet: has been trying to cut carbs but states it is difficult as his daughter in law cooks food for the household  No juices or sodas Exercise: denies Steroid intake: denies  Family history of diabetes: none Social history: lives with family  Chronic right eye blurry vision, reports polydypsia and polyuria  #TSH >10 while in hospital  - Reports occasional constipation, denies recent weight changes, cold or heat intolerance   #HLD - Prior to hospitalization patient was taking Rosuvastatin 40 mg PO daily and Ezetimibe 10 mg PO, states he is no longer taking ezetimibe, states rosuvastatin was switched to Atorvastatin 40 mg PO daily  #HTN - Losartan 50 mg daily    PERTINENT LABS:  11.5 7.83 )-----------( 338 ( 19 Jul 2024 05:39 ) 36.0   07-19  137 | 102 | 25<H> ----------------------------< 196<H> 4.1 | 22 | 1.04  eGFR: 75  Ca 9.5 07-19 Mg 2.00 07-19 Phos 3.9 07-19    Thyroid Function Tests: 07-16 @ 06:12 TSH 9.68 FreeT4 1.0 T3 -- Anti TPO -- Anti Thyroglobulin Ab -- TSI -- 07-15 @ 17:14 TSH 7.15 FreeT4 -- T3 -- Anti TPO <10.0 Anti Thyroglobulin Ab -- TSI --   07-16 Chol -- Direct LDL -- LDL calculated -- HDL -- Trig 222<H>, 07-13 Chol 127 Direct LDL -- LDL calculated 20 HDL 32<L> Trig 377<H>

## 2024-08-12 NOTE — REVIEW OF SYSTEMS
[TextEntry] : Review of Systems: Constitutional: No fever Eyes: No blurry vision Neuro: No tremors HEENT: No pain Cardiovascular: No chest pain, palpitations Respiratory: No SOB, no cough GI: No nausea, vomiting, abdominal pain : No dysuria Skin: no rash Psych: no depression Endocrine: + polydipsia, polyuria, and chronic right eye blurry vision

## 2024-08-13 ENCOUNTER — APPOINTMENT (OUTPATIENT)
Dept: ELECTROPHYSIOLOGY | Facility: CLINIC | Age: 74
End: 2024-08-13
Payer: MEDICARE

## 2024-08-13 ENCOUNTER — NON-APPOINTMENT (OUTPATIENT)
Age: 74
End: 2024-08-13

## 2024-08-13 PROCEDURE — 93298 REM INTERROG DEV EVAL SCRMS: CPT

## 2024-08-14 LAB
CREAT SPEC-SCNC: 84 MG/DL
MICROALBUMIN 24H UR DL<=1MG/L-MCNC: 7.5 MG/DL
MICROALBUMIN/CREAT 24H UR-RTO: 89 MG/G
TSH SERPL-ACNC: 4.59 UIU/ML
VIT B12 SERPL-MCNC: 1060 PG/ML

## 2024-08-15 RX ORDER — DULAGLUTIDE 1.5 MG/.5ML
1.5 INJECTION, SOLUTION SUBCUTANEOUS
Qty: 3 | Refills: 3 | Status: ACTIVE | COMMUNITY
Start: 2024-08-15 | End: 1900-01-01

## 2024-09-17 ENCOUNTER — NON-APPOINTMENT (OUTPATIENT)
Age: 74
End: 2024-09-17

## 2024-09-17 ENCOUNTER — APPOINTMENT (OUTPATIENT)
Dept: ELECTROPHYSIOLOGY | Facility: CLINIC | Age: 74
End: 2024-09-17
Payer: MEDICARE

## 2024-09-17 PROCEDURE — 93298 REM INTERROG DEV EVAL SCRMS: CPT

## 2024-10-22 ENCOUNTER — APPOINTMENT (OUTPATIENT)
Dept: ELECTROPHYSIOLOGY | Facility: CLINIC | Age: 74
End: 2024-10-22
Payer: MEDICARE

## 2024-10-22 ENCOUNTER — NON-APPOINTMENT (OUTPATIENT)
Age: 74
End: 2024-10-22

## 2024-10-22 PROCEDURE — 93298 REM INTERROG DEV EVAL SCRMS: CPT

## 2024-10-24 ENCOUNTER — APPOINTMENT (OUTPATIENT)
Dept: ELECTROPHYSIOLOGY | Facility: CLINIC | Age: 74
End: 2024-10-24

## 2024-11-11 ENCOUNTER — APPOINTMENT (OUTPATIENT)
Dept: ENDOCRINOLOGY | Facility: CLINIC | Age: 74
End: 2024-11-11
Payer: MEDICARE

## 2024-11-11 ENCOUNTER — APPOINTMENT (OUTPATIENT)
Dept: ENDOCRINOLOGY | Facility: CLINIC | Age: 74
End: 2024-11-11

## 2024-11-11 ENCOUNTER — NON-APPOINTMENT (OUTPATIENT)
Age: 74
End: 2024-11-11

## 2024-11-11 VITALS
SYSTOLIC BLOOD PRESSURE: 140 MMHG | DIASTOLIC BLOOD PRESSURE: 82 MMHG | BODY MASS INDEX: 26.36 KG/M2 | HEIGHT: 66 IN | WEIGHT: 164 LBS | OXYGEN SATURATION: 97 % | HEART RATE: 83 BPM

## 2024-11-11 DIAGNOSIS — E11.9 TYPE 2 DIABETES MELLITUS W/OUT COMPLICATIONS: ICD-10-CM

## 2024-11-11 DIAGNOSIS — E78.5 HYPERLIPIDEMIA, UNSPECIFIED: ICD-10-CM

## 2024-11-11 DIAGNOSIS — I10 ESSENTIAL (PRIMARY) HYPERTENSION: ICD-10-CM

## 2024-11-11 DIAGNOSIS — R79.89 OTHER SPECIFIED ABNORMAL FINDINGS OF BLOOD CHEMISTRY: ICD-10-CM

## 2024-11-11 LAB — HBA1C MFR BLD HPLC: 6.9

## 2024-11-11 PROCEDURE — 99214 OFFICE O/P EST MOD 30 MIN: CPT

## 2024-11-11 PROCEDURE — 83036 HEMOGLOBIN GLYCOSYLATED A1C: CPT | Mod: QW

## 2024-11-11 RX ORDER — EMPAGLIFLOZIN 25 MG/1
25 TABLET, FILM COATED ORAL
Qty: 90 | Refills: 1 | Status: ACTIVE | COMMUNITY
Start: 2024-11-11 | End: 1900-01-01

## 2024-11-11 RX ORDER — DULAGLUTIDE 1.5 MG/.5ML
1.5 INJECTION, SOLUTION SUBCUTANEOUS
Qty: 1 | Refills: 0 | Status: ACTIVE | COMMUNITY
Start: 2024-11-11 | End: 1900-01-01

## 2024-11-11 RX ORDER — METFORMIN HYDROCHLORIDE 500 MG/1
500 TABLET, COATED ORAL
Qty: 360 | Refills: 1 | Status: ACTIVE | COMMUNITY
Start: 2024-11-11 | End: 1900-01-01

## 2024-11-26 ENCOUNTER — APPOINTMENT (OUTPATIENT)
Dept: ELECTROPHYSIOLOGY | Facility: CLINIC | Age: 74
End: 2024-11-26

## 2024-12-18 ENCOUNTER — APPOINTMENT (OUTPATIENT)
Dept: NEUROSURGERY | Facility: CLINIC | Age: 74
End: 2024-12-18

## 2024-12-31 ENCOUNTER — NON-APPOINTMENT (OUTPATIENT)
Age: 74
End: 2024-12-31

## 2024-12-31 ENCOUNTER — APPOINTMENT (OUTPATIENT)
Dept: ELECTROPHYSIOLOGY | Facility: CLINIC | Age: 74
End: 2024-12-31
Payer: MEDICARE

## 2024-12-31 PROCEDURE — 93298 REM INTERROG DEV EVAL SCRMS: CPT

## 2025-02-04 ENCOUNTER — APPOINTMENT (OUTPATIENT)
Dept: ELECTROPHYSIOLOGY | Facility: CLINIC | Age: 75
End: 2025-02-04

## 2025-02-27 ENCOUNTER — APPOINTMENT (OUTPATIENT)
Dept: ENDOCRINOLOGY | Facility: CLINIC | Age: 75
End: 2025-02-27
Payer: MEDICARE

## 2025-02-27 VITALS
HEIGHT: 66 IN | DIASTOLIC BLOOD PRESSURE: 78 MMHG | SYSTOLIC BLOOD PRESSURE: 120 MMHG | WEIGHT: 154 LBS | BODY MASS INDEX: 24.75 KG/M2 | OXYGEN SATURATION: 98 % | HEART RATE: 72 BPM

## 2025-02-27 DIAGNOSIS — R79.89 OTHER SPECIFIED ABNORMAL FINDINGS OF BLOOD CHEMISTRY: ICD-10-CM

## 2025-02-27 DIAGNOSIS — E78.5 HYPERLIPIDEMIA, UNSPECIFIED: ICD-10-CM

## 2025-02-27 DIAGNOSIS — E11.9 TYPE 2 DIABETES MELLITUS W/OUT COMPLICATIONS: ICD-10-CM

## 2025-02-27 DIAGNOSIS — I10 ESSENTIAL (PRIMARY) HYPERTENSION: ICD-10-CM

## 2025-02-27 PROCEDURE — 99215 OFFICE O/P EST HI 40 MIN: CPT

## 2025-02-27 PROCEDURE — 83036 HEMOGLOBIN GLYCOSYLATED A1C: CPT | Mod: QW

## 2025-02-27 PROCEDURE — G2211 COMPLEX E/M VISIT ADD ON: CPT

## 2025-03-06 ENCOUNTER — APPOINTMENT (OUTPATIENT)
Dept: ELECTROPHYSIOLOGY | Facility: CLINIC | Age: 75
End: 2025-03-06

## 2025-03-07 LAB — HBA1C MFR BLD HPLC: 6.8

## 2025-03-17 ENCOUNTER — RX RENEWAL (OUTPATIENT)
Age: 75
End: 2025-03-17

## 2025-03-22 ENCOUNTER — INPATIENT (INPATIENT)
Facility: HOSPITAL | Age: 75
LOS: 6 days | Discharge: HOME | End: 2025-03-29
Attending: HOSPITALIST | Admitting: HOSPITALIST
Payer: MEDICARE

## 2025-03-22 VITALS
TEMPERATURE: 98 F | WEIGHT: 154.1 LBS | DIASTOLIC BLOOD PRESSURE: 73 MMHG | OXYGEN SATURATION: 100 % | SYSTOLIC BLOOD PRESSURE: 117 MMHG | HEART RATE: 101 BPM | RESPIRATION RATE: 18 BRPM

## 2025-03-22 DIAGNOSIS — R11.2 NAUSEA WITH VOMITING, UNSPECIFIED: ICD-10-CM

## 2025-03-22 DIAGNOSIS — I65.09 OCCLUSION AND STENOSIS OF UNSPECIFIED VERTEBRAL ARTERY: Chronic | ICD-10-CM

## 2025-03-22 LAB
ALBUMIN SERPL ELPH-MCNC: 4.5 G/DL — SIGNIFICANT CHANGE UP (ref 3.3–5)
ALP SERPL-CCNC: 76 U/L — SIGNIFICANT CHANGE UP (ref 40–120)
ALT FLD-CCNC: 13 U/L — SIGNIFICANT CHANGE UP (ref 4–41)
ANION GAP SERPL CALC-SCNC: 14 MMOL/L — SIGNIFICANT CHANGE UP (ref 7–14)
APPEARANCE UR: CLEAR — SIGNIFICANT CHANGE UP
APTT BLD: 32.8 SEC — SIGNIFICANT CHANGE UP (ref 24.5–35.6)
AST SERPL-CCNC: 16 U/L — SIGNIFICANT CHANGE UP (ref 4–40)
BACTERIA # UR AUTO: NEGATIVE /HPF — SIGNIFICANT CHANGE UP
BASOPHILS # BLD AUTO: 0.03 K/UL — SIGNIFICANT CHANGE UP (ref 0–0.2)
BASOPHILS NFR BLD AUTO: 0.4 % — SIGNIFICANT CHANGE UP (ref 0–2)
BILIRUB SERPL-MCNC: 0.4 MG/DL — SIGNIFICANT CHANGE UP (ref 0.2–1.2)
BILIRUB UR-MCNC: NEGATIVE — SIGNIFICANT CHANGE UP
BLOOD GAS VENOUS COMPREHENSIVE RESULT: SIGNIFICANT CHANGE UP
BLOOD GAS VENOUS COMPREHENSIVE RESULT: SIGNIFICANT CHANGE UP
BUN SERPL-MCNC: 18 MG/DL — SIGNIFICANT CHANGE UP (ref 7–23)
CALCIUM SERPL-MCNC: 9.6 MG/DL — SIGNIFICANT CHANGE UP (ref 8.4–10.5)
CAST: 0 /LPF — SIGNIFICANT CHANGE UP (ref 0–4)
CHLORIDE SERPL-SCNC: 103 MMOL/L — SIGNIFICANT CHANGE UP (ref 98–107)
CK MB BLD-MCNC: 3.1 % — HIGH (ref 0–2.5)
CK MB BLD-MCNC: 3.1 % — HIGH (ref 0–2.5)
CK MB CFR SERPL CALC: 2.1 NG/ML — SIGNIFICANT CHANGE UP
CK MB CFR SERPL CALC: 2.2 NG/ML — SIGNIFICANT CHANGE UP
CK SERPL-CCNC: 68 U/L — SIGNIFICANT CHANGE UP (ref 30–200)
CK SERPL-CCNC: 71 U/L — SIGNIFICANT CHANGE UP (ref 30–200)
CO2 SERPL-SCNC: 21 MMOL/L — LOW (ref 22–31)
COLOR SPEC: YELLOW — SIGNIFICANT CHANGE UP
CREAT SERPL-MCNC: 0.94 MG/DL — SIGNIFICANT CHANGE UP (ref 0.5–1.3)
DIFF PNL FLD: NEGATIVE — SIGNIFICANT CHANGE UP
EGFR: 84 ML/MIN/1.73M2 — SIGNIFICANT CHANGE UP
EGFR: 84 ML/MIN/1.73M2 — SIGNIFICANT CHANGE UP
EOSINOPHIL # BLD AUTO: 0.18 K/UL — SIGNIFICANT CHANGE UP (ref 0–0.5)
EOSINOPHIL NFR BLD AUTO: 2.4 % — SIGNIFICANT CHANGE UP (ref 0–6)
GLUCOSE BLDC GLUCOMTR-MCNC: 96 MG/DL — SIGNIFICANT CHANGE UP (ref 70–99)
GLUCOSE SERPL-MCNC: 101 MG/DL — HIGH (ref 70–99)
GLUCOSE UR QL: 500 MG/DL
HCT VFR BLD CALC: 38.5 % — LOW (ref 39–50)
HGB BLD-MCNC: 12.2 G/DL — LOW (ref 13–17)
IANC: 5.33 K/UL — SIGNIFICANT CHANGE UP (ref 1.8–7.4)
IMM GRANULOCYTES NFR BLD AUTO: 0.3 % — SIGNIFICANT CHANGE UP (ref 0–0.9)
INR BLD: 0.91 RATIO — SIGNIFICANT CHANGE UP (ref 0.85–1.16)
KETONES UR-MCNC: NEGATIVE MG/DL — SIGNIFICANT CHANGE UP
LEUKOCYTE ESTERASE UR-ACNC: NEGATIVE — SIGNIFICANT CHANGE UP
LIDOCAIN IGE QN: 49 U/L — SIGNIFICANT CHANGE UP (ref 7–60)
LYMPHOCYTES # BLD AUTO: 1.55 K/UL — SIGNIFICANT CHANGE UP (ref 1–3.3)
LYMPHOCYTES # BLD AUTO: 20.4 % — SIGNIFICANT CHANGE UP (ref 13–44)
MAGNESIUM SERPL-MCNC: 1.6 MG/DL — SIGNIFICANT CHANGE UP (ref 1.6–2.6)
MCHC RBC-ENTMCNC: 20.9 PG — LOW (ref 27–34)
MCHC RBC-ENTMCNC: 31.7 G/DL — LOW (ref 32–36)
MCV RBC AUTO: 66 FL — LOW (ref 80–100)
MONOCYTES # BLD AUTO: 0.47 K/UL — SIGNIFICANT CHANGE UP (ref 0–0.9)
MONOCYTES NFR BLD AUTO: 6.2 % — SIGNIFICANT CHANGE UP (ref 2–14)
NEUTROPHILS # BLD AUTO: 5.33 K/UL — SIGNIFICANT CHANGE UP (ref 1.8–7.4)
NEUTROPHILS NFR BLD AUTO: 70.3 % — SIGNIFICANT CHANGE UP (ref 43–77)
NITRITE UR-MCNC: NEGATIVE — SIGNIFICANT CHANGE UP
NRBC # BLD AUTO: 0 K/UL — SIGNIFICANT CHANGE UP (ref 0–0)
NRBC # FLD: 0 K/UL — SIGNIFICANT CHANGE UP (ref 0–0)
NRBC BLD AUTO-RTO: 0 /100 WBCS — SIGNIFICANT CHANGE UP (ref 0–0)
NT-PROBNP SERPL-SCNC: <36 PG/ML — SIGNIFICANT CHANGE UP
PH UR: 6 — SIGNIFICANT CHANGE UP (ref 5–8)
PHOSPHATE SERPL-MCNC: 2.5 MG/DL — SIGNIFICANT CHANGE UP (ref 2.5–4.5)
PLATELET # BLD AUTO: 300 K/UL — SIGNIFICANT CHANGE UP (ref 150–400)
POTASSIUM SERPL-MCNC: 4.2 MMOL/L — SIGNIFICANT CHANGE UP (ref 3.5–5.3)
POTASSIUM SERPL-SCNC: 4.2 MMOL/L — SIGNIFICANT CHANGE UP (ref 3.5–5.3)
PROT SERPL-MCNC: 7.6 G/DL — SIGNIFICANT CHANGE UP (ref 6–8.3)
PROT UR-MCNC: NEGATIVE MG/DL — SIGNIFICANT CHANGE UP
PROTHROM AB SERPL-ACNC: 10.6 SEC — SIGNIFICANT CHANGE UP (ref 9.9–13.4)
RBC # BLD: 5.83 M/UL — HIGH (ref 4.2–5.8)
RBC # FLD: 18.6 % — HIGH (ref 10.3–14.5)
RBC CASTS # UR COMP ASSIST: 0 /HPF — SIGNIFICANT CHANGE UP (ref 0–4)
SODIUM SERPL-SCNC: 138 MMOL/L — SIGNIFICANT CHANGE UP (ref 135–145)
SP GR SPEC: 1.01 — SIGNIFICANT CHANGE UP (ref 1–1.03)
SQUAMOUS # UR AUTO: 0 /HPF — SIGNIFICANT CHANGE UP (ref 0–5)
TROPONIN T, HIGH SENSITIVITY RESULT: 9 NG/L — SIGNIFICANT CHANGE UP
TROPONIN T, HIGH SENSITIVITY RESULT: 9 NG/L — SIGNIFICANT CHANGE UP
UROBILINOGEN FLD QL: 0.2 MG/DL — SIGNIFICANT CHANGE UP (ref 0.2–1)
WBC # BLD: 7.58 K/UL — SIGNIFICANT CHANGE UP (ref 3.8–10.5)
WBC # FLD AUTO: 7.58 K/UL — SIGNIFICANT CHANGE UP (ref 3.8–10.5)
WBC UR QL: 0 /HPF — SIGNIFICANT CHANGE UP (ref 0–5)

## 2025-03-22 PROCEDURE — 71046 X-RAY EXAM CHEST 2 VIEWS: CPT | Mod: 26

## 2025-03-22 PROCEDURE — 76705 ECHO EXAM OF ABDOMEN: CPT | Mod: 26

## 2025-03-22 PROCEDURE — 71046 X-RAY EXAM CHEST 2 VIEWS: CPT | Mod: 26,77

## 2025-03-22 PROCEDURE — 99285 EMERGENCY DEPT VISIT HI MDM: CPT

## 2025-03-22 PROCEDURE — 74177 CT ABD & PELVIS W/CONTRAST: CPT | Mod: 26

## 2025-03-22 PROCEDURE — 99223 1ST HOSP IP/OBS HIGH 75: CPT

## 2025-03-22 RX ORDER — ONDANSETRON HCL/PF 4 MG/2 ML
4 VIAL (ML) INJECTION ONCE
Refills: 0 | Status: COMPLETED | OUTPATIENT
Start: 2025-03-22 | End: 2025-03-22

## 2025-03-22 RX ORDER — MAGNESIUM, ALUMINUM HYDROXIDE 200-200 MG
30 TABLET,CHEWABLE ORAL ONCE
Refills: 0 | Status: COMPLETED | OUTPATIENT
Start: 2025-03-22 | End: 2025-03-22

## 2025-03-22 RX ADMIN — Medication 20 MILLIGRAM(S): at 15:02

## 2025-03-22 RX ADMIN — Medication 500 MILLILITER(S): at 19:22

## 2025-03-22 RX ADMIN — Medication 1000 MILLILITER(S): at 15:04

## 2025-03-22 RX ADMIN — Medication 4 MILLIGRAM(S): at 15:02

## 2025-03-22 RX ADMIN — Medication 30 MILLILITER(S): at 15:00

## 2025-03-23 DIAGNOSIS — E11.65 TYPE 2 DIABETES MELLITUS WITH HYPERGLYCEMIA: ICD-10-CM

## 2025-03-23 DIAGNOSIS — R13.10 DYSPHAGIA, UNSPECIFIED: ICD-10-CM

## 2025-03-23 DIAGNOSIS — R94.31 ABNORMAL ELECTROCARDIOGRAM [ECG] [EKG]: ICD-10-CM

## 2025-03-23 DIAGNOSIS — E87.20 ACIDOSIS, UNSPECIFIED: ICD-10-CM

## 2025-03-23 DIAGNOSIS — R10.9 UNSPECIFIED ABDOMINAL PAIN: ICD-10-CM

## 2025-03-23 DIAGNOSIS — R63.4 ABNORMAL WEIGHT LOSS: ICD-10-CM

## 2025-03-23 DIAGNOSIS — Z79.899 OTHER LONG TERM (CURRENT) DRUG THERAPY: ICD-10-CM

## 2025-03-23 DIAGNOSIS — I63.9 CEREBRAL INFARCTION, UNSPECIFIED: ICD-10-CM

## 2025-03-23 DIAGNOSIS — Z29.9 ENCOUNTER FOR PROPHYLACTIC MEASURES, UNSPECIFIED: ICD-10-CM

## 2025-03-23 LAB
ANION GAP SERPL CALC-SCNC: 12 MMOL/L — SIGNIFICANT CHANGE UP (ref 7–14)
BASOPHILS # BLD AUTO: 0.05 K/UL — SIGNIFICANT CHANGE UP (ref 0–0.2)
BASOPHILS NFR BLD AUTO: 0.6 % — SIGNIFICANT CHANGE UP (ref 0–2)
BLOOD GAS VENOUS COMPREHENSIVE RESULT: SIGNIFICANT CHANGE UP
BUN SERPL-MCNC: 15 MG/DL — SIGNIFICANT CHANGE UP (ref 7–23)
CALCIUM SERPL-MCNC: 8.8 MG/DL — SIGNIFICANT CHANGE UP (ref 8.4–10.5)
CHLORIDE SERPL-SCNC: 107 MMOL/L — SIGNIFICANT CHANGE UP (ref 98–107)
CO2 SERPL-SCNC: 20 MMOL/L — LOW (ref 22–31)
CREAT SERPL-MCNC: 0.92 MG/DL — SIGNIFICANT CHANGE UP (ref 0.5–1.3)
EGFR: 86 ML/MIN/1.73M2 — SIGNIFICANT CHANGE UP
EGFR: 86 ML/MIN/1.73M2 — SIGNIFICANT CHANGE UP
EOSINOPHIL # BLD AUTO: 0.22 K/UL — SIGNIFICANT CHANGE UP (ref 0–0.5)
EOSINOPHIL NFR BLD AUTO: 2.8 % — SIGNIFICANT CHANGE UP (ref 0–6)
FERRITIN SERPL-MCNC: 61 NG/ML — SIGNIFICANT CHANGE UP (ref 30–400)
GAS PNL BLDV: SIGNIFICANT CHANGE UP
GLUCOSE BLDC GLUCOMTR-MCNC: 101 MG/DL — HIGH (ref 70–99)
GLUCOSE BLDC GLUCOMTR-MCNC: 75 MG/DL — SIGNIFICANT CHANGE UP (ref 70–99)
GLUCOSE BLDC GLUCOMTR-MCNC: 85 MG/DL — SIGNIFICANT CHANGE UP (ref 70–99)
GLUCOSE BLDC GLUCOMTR-MCNC: 92 MG/DL — SIGNIFICANT CHANGE UP (ref 70–99)
GLUCOSE SERPL-MCNC: 92 MG/DL — SIGNIFICANT CHANGE UP (ref 70–99)
HCT VFR BLD CALC: 35.3 % — LOW (ref 39–50)
HGB BLD-MCNC: 11 G/DL — LOW (ref 13–17)
IANC: 5.55 K/UL — SIGNIFICANT CHANGE UP (ref 1.8–7.4)
IMM GRANULOCYTES NFR BLD AUTO: 0.4 % — SIGNIFICANT CHANGE UP (ref 0–0.9)
IRON SATN MFR SERPL: 28 % — SIGNIFICANT CHANGE UP (ref 14–50)
IRON SATN MFR SERPL: 90 UG/DL — SIGNIFICANT CHANGE UP (ref 45–165)
LYMPHOCYTES # BLD AUTO: 1.63 K/UL — SIGNIFICANT CHANGE UP (ref 1–3.3)
LYMPHOCYTES # BLD AUTO: 20.5 % — SIGNIFICANT CHANGE UP (ref 13–44)
MAGNESIUM SERPL-MCNC: 1.7 MG/DL — SIGNIFICANT CHANGE UP (ref 1.6–2.6)
MCHC RBC-ENTMCNC: 20.7 PG — LOW (ref 27–34)
MCHC RBC-ENTMCNC: 31.2 G/DL — LOW (ref 32–36)
MCV RBC AUTO: 66.4 FL — LOW (ref 80–100)
MONOCYTES # BLD AUTO: 0.46 K/UL — SIGNIFICANT CHANGE UP (ref 0–0.9)
MONOCYTES NFR BLD AUTO: 5.8 % — SIGNIFICANT CHANGE UP (ref 2–14)
NEUTROPHILS # BLD AUTO: 5.55 K/UL — SIGNIFICANT CHANGE UP (ref 1.8–7.4)
NEUTROPHILS NFR BLD AUTO: 69.9 % — SIGNIFICANT CHANGE UP (ref 43–77)
NRBC # BLD AUTO: 0 K/UL — SIGNIFICANT CHANGE UP (ref 0–0)
NRBC # FLD: 0 K/UL — SIGNIFICANT CHANGE UP (ref 0–0)
NRBC BLD AUTO-RTO: 0 /100 WBCS — SIGNIFICANT CHANGE UP (ref 0–0)
PHOSPHATE SERPL-MCNC: 2.4 MG/DL — LOW (ref 2.5–4.5)
PLATELET # BLD AUTO: 277 K/UL — SIGNIFICANT CHANGE UP (ref 150–400)
POTASSIUM SERPL-MCNC: 4.1 MMOL/L — SIGNIFICANT CHANGE UP (ref 3.5–5.3)
POTASSIUM SERPL-SCNC: 4.1 MMOL/L — SIGNIFICANT CHANGE UP (ref 3.5–5.3)
RBC # BLD: 5.32 M/UL — SIGNIFICANT CHANGE UP (ref 4.2–5.8)
RBC # FLD: 18 % — HIGH (ref 10.3–14.5)
SODIUM SERPL-SCNC: 139 MMOL/L — SIGNIFICANT CHANGE UP (ref 135–145)
TIBC SERPL-MCNC: 316 UG/DL — SIGNIFICANT CHANGE UP (ref 220–430)
TSH SERPL-MCNC: 3.56 UIU/ML — SIGNIFICANT CHANGE UP (ref 0.27–4.2)
UIBC SERPL-MCNC: 226 UG/DL — SIGNIFICANT CHANGE UP (ref 110–370)
WBC # BLD: 7.94 K/UL — SIGNIFICANT CHANGE UP (ref 3.8–10.5)
WBC # FLD AUTO: 7.94 K/UL — SIGNIFICANT CHANGE UP (ref 3.8–10.5)

## 2025-03-23 PROCEDURE — 99222 1ST HOSP IP/OBS MODERATE 55: CPT | Mod: GC

## 2025-03-23 PROCEDURE — 99232 SBSQ HOSP IP/OBS MODERATE 35: CPT

## 2025-03-23 RX ORDER — SODIUM CHLORIDE 9 G/1000ML
1000 INJECTION, SOLUTION INTRAVENOUS
Refills: 0 | Status: DISCONTINUED | OUTPATIENT
Start: 2025-03-23 | End: 2025-03-29

## 2025-03-23 RX ORDER — SOD PHOS DI, MONO/K PHOS MONO 250 MG
1 TABLET ORAL ONCE
Refills: 0 | Status: COMPLETED | OUTPATIENT
Start: 2025-03-23 | End: 2025-03-23

## 2025-03-23 RX ORDER — MELATONIN 5 MG
3 TABLET ORAL AT BEDTIME
Refills: 0 | Status: DISCONTINUED | OUTPATIENT
Start: 2025-03-23 | End: 2025-03-29

## 2025-03-23 RX ORDER — DEXTROSE 50 % IN WATER 50 %
25 SYRINGE (ML) INTRAVENOUS ONCE
Refills: 0 | Status: DISCONTINUED | OUTPATIENT
Start: 2025-03-23 | End: 2025-03-29

## 2025-03-23 RX ORDER — ASPIRIN 325 MG
81 TABLET ORAL DAILY
Refills: 0 | Status: DISCONTINUED | OUTPATIENT
Start: 2025-03-23 | End: 2025-03-29

## 2025-03-23 RX ORDER — FERROUS SULFATE 137(45) MG
1 TABLET, EXTENDED RELEASE ORAL
Refills: 0 | DISCHARGE

## 2025-03-23 RX ORDER — INSULIN LISPRO 100 U/ML
INJECTION, SOLUTION INTRAVENOUS; SUBCUTANEOUS AT BEDTIME
Refills: 0 | Status: DISCONTINUED | OUTPATIENT
Start: 2025-03-23 | End: 2025-03-29

## 2025-03-23 RX ORDER — DEXTROSE 50 % IN WATER 50 %
12.5 SYRINGE (ML) INTRAVENOUS ONCE
Refills: 0 | Status: DISCONTINUED | OUTPATIENT
Start: 2025-03-23 | End: 2025-03-29

## 2025-03-23 RX ORDER — ATORVASTATIN CALCIUM 80 MG/1
40 TABLET, FILM COATED ORAL AT BEDTIME
Refills: 0 | Status: DISCONTINUED | OUTPATIENT
Start: 2025-03-23 | End: 2025-03-29

## 2025-03-23 RX ORDER — DEXTROSE 50 % IN WATER 50 %
15 SYRINGE (ML) INTRAVENOUS ONCE
Refills: 0 | Status: DISCONTINUED | OUTPATIENT
Start: 2025-03-23 | End: 2025-03-29

## 2025-03-23 RX ORDER — INSULIN LISPRO 100 U/ML
INJECTION, SOLUTION INTRAVENOUS; SUBCUTANEOUS
Refills: 0 | Status: DISCONTINUED | OUTPATIENT
Start: 2025-03-23 | End: 2025-03-29

## 2025-03-23 RX ORDER — LOSARTAN POTASSIUM 100 MG/1
100 TABLET, FILM COATED ORAL DAILY
Refills: 0 | Status: DISCONTINUED | OUTPATIENT
Start: 2025-03-23 | End: 2025-03-29

## 2025-03-23 RX ORDER — LOSARTAN POTASSIUM 100 MG/1
1 TABLET, FILM COATED ORAL
Refills: 0 | DISCHARGE

## 2025-03-23 RX ORDER — INFLUENZA A VIRUS A/IDAHO/07/2018 (H1N1) ANTIGEN (MDCK CELL DERIVED, PROPIOLACTONE INACTIVATED, INFLUENZA A VIRUS A/INDIANA/08/2018 (H3N2) ANTIGEN (MDCK CELL DERIVED, PROPIOLACTONE INACTIVATED), INFLUENZA B VIRUS B/SINGAPORE/INFTT-16-0610/2016 ANTIGEN (MDCK CELL DERIVED, PROPIOLACTONE INACTIVATED), INFLUENZA B VIRUS B/IOWA/06/2017 ANTIGEN (MDCK CELL DERIVED, PROPIOLACTONE INACTIVATED) 15; 15; 15; 15 UG/.5ML; UG/.5ML; UG/.5ML; UG/.5ML
0.5 INJECTION, SUSPENSION INTRAMUSCULAR ONCE
Refills: 0 | Status: DISCONTINUED | OUTPATIENT
Start: 2025-03-23 | End: 2025-03-29

## 2025-03-23 RX ORDER — ACETAMINOPHEN 500 MG/5ML
650 LIQUID (ML) ORAL EVERY 6 HOURS
Refills: 0 | Status: DISCONTINUED | OUTPATIENT
Start: 2025-03-23 | End: 2025-03-29

## 2025-03-23 RX ORDER — GLUCAGON 3 MG/1
1 POWDER NASAL ONCE
Refills: 0 | Status: DISCONTINUED | OUTPATIENT
Start: 2025-03-23 | End: 2025-03-29

## 2025-03-23 RX ORDER — EMPAGLIFLOZIN 25 MG/1
1 TABLET, FILM COATED ORAL
Refills: 0 | DISCHARGE

## 2025-03-23 RX ORDER — HYDROMORPHONE/SOD CHLOR,ISO/PF 2 MG/10 ML
0.5 SYRINGE (ML) INJECTION EVERY 4 HOURS
Refills: 0 | Status: DISCONTINUED | OUTPATIENT
Start: 2025-03-23 | End: 2025-03-27

## 2025-03-23 RX ORDER — ONDANSETRON HCL/PF 4 MG/2 ML
4 VIAL (ML) INJECTION EVERY 8 HOURS
Refills: 0 | Status: DISCONTINUED | OUTPATIENT
Start: 2025-03-23 | End: 2025-03-29

## 2025-03-23 RX ORDER — DULAGLUTIDE 4.5 MG/.5ML
1.5 INJECTION, SOLUTION SUBCUTANEOUS
Refills: 0 | DISCHARGE

## 2025-03-23 RX ORDER — MAGNESIUM OXIDE 400 MG
800 TABLET ORAL ONCE
Refills: 0 | Status: COMPLETED | OUTPATIENT
Start: 2025-03-23 | End: 2025-03-23

## 2025-03-23 RX ORDER — VENLAFAXINE HYDROCHLORIDE 37.5 MG/1
37.5 CAPSULE, EXTENDED RELEASE ORAL DAILY
Refills: 0 | Status: DISCONTINUED | OUTPATIENT
Start: 2025-03-23 | End: 2025-03-29

## 2025-03-23 RX ORDER — METFORMIN HYDROCHLORIDE 850 MG/1
2 TABLET ORAL
Refills: 0 | DISCHARGE

## 2025-03-23 RX ORDER — FERROUS SULFATE 137(45) MG
325 TABLET, EXTENDED RELEASE ORAL DAILY
Refills: 0 | Status: DISCONTINUED | OUTPATIENT
Start: 2025-03-23 | End: 2025-03-29

## 2025-03-23 RX ORDER — POLYETHYLENE GLYCOL 3350 17 G/17G
17 POWDER, FOR SOLUTION ORAL
Refills: 0 | Status: DISCONTINUED | OUTPATIENT
Start: 2025-03-23 | End: 2025-03-29

## 2025-03-23 RX ADMIN — VENLAFAXINE HYDROCHLORIDE 37.5 MILLIGRAM(S): 37.5 CAPSULE, EXTENDED RELEASE ORAL at 11:38

## 2025-03-23 RX ADMIN — Medication 100 MILLILITER(S): at 03:14

## 2025-03-23 RX ADMIN — Medication 325 MILLIGRAM(S): at 21:43

## 2025-03-23 RX ADMIN — ATORVASTATIN CALCIUM 40 MILLIGRAM(S): 80 TABLET, FILM COATED ORAL at 21:43

## 2025-03-23 RX ADMIN — Medication 40 MILLIGRAM(S): at 19:07

## 2025-03-23 RX ADMIN — Medication 81 MILLIGRAM(S): at 12:10

## 2025-03-23 RX ADMIN — Medication 800 MILLIGRAM(S): at 19:04

## 2025-03-23 RX ADMIN — Medication 1 PACKET(S): at 11:36

## 2025-03-24 LAB
A1C WITH ESTIMATED AVERAGE GLUCOSE RESULT: 6.3 % — HIGH (ref 4–5.6)
ANION GAP SERPL CALC-SCNC: 13 MMOL/L — SIGNIFICANT CHANGE UP (ref 7–14)
BUN SERPL-MCNC: 12 MG/DL — SIGNIFICANT CHANGE UP (ref 7–23)
CALCIUM SERPL-MCNC: 9.1 MG/DL — SIGNIFICANT CHANGE UP (ref 8.4–10.5)
CHLORIDE SERPL-SCNC: 105 MMOL/L — SIGNIFICANT CHANGE UP (ref 98–107)
CO2 SERPL-SCNC: 21 MMOL/L — LOW (ref 22–31)
CREAT SERPL-MCNC: 0.9 MG/DL — SIGNIFICANT CHANGE UP (ref 0.5–1.3)
CULTURE RESULTS: SIGNIFICANT CHANGE UP
EGFR: 89 ML/MIN/1.73M2 — SIGNIFICANT CHANGE UP
EGFR: 89 ML/MIN/1.73M2 — SIGNIFICANT CHANGE UP
ESTIMATED AVERAGE GLUCOSE: 134 — SIGNIFICANT CHANGE UP
GLUCOSE BLDC GLUCOMTR-MCNC: 72 MG/DL — SIGNIFICANT CHANGE UP (ref 70–99)
GLUCOSE BLDC GLUCOMTR-MCNC: 75 MG/DL — SIGNIFICANT CHANGE UP (ref 70–99)
GLUCOSE BLDC GLUCOMTR-MCNC: 80 MG/DL — SIGNIFICANT CHANGE UP (ref 70–99)
GLUCOSE BLDC GLUCOMTR-MCNC: 87 MG/DL — SIGNIFICANT CHANGE UP (ref 70–99)
GLUCOSE SERPL-MCNC: 66 MG/DL — LOW (ref 70–99)
HCT VFR BLD CALC: 36.2 % — LOW (ref 39–50)
HGB BLD-MCNC: 11.4 G/DL — LOW (ref 13–17)
MAGNESIUM SERPL-MCNC: 1.8 MG/DL — SIGNIFICANT CHANGE UP (ref 1.6–2.6)
MCHC RBC-ENTMCNC: 21 PG — LOW (ref 27–34)
MCHC RBC-ENTMCNC: 31.5 G/DL — LOW (ref 32–36)
MCV RBC AUTO: 66.8 FL — LOW (ref 80–100)
NRBC # BLD AUTO: 0 K/UL — SIGNIFICANT CHANGE UP (ref 0–0)
NRBC # FLD: 0 K/UL — SIGNIFICANT CHANGE UP (ref 0–0)
NRBC BLD AUTO-RTO: 0 /100 WBCS — SIGNIFICANT CHANGE UP (ref 0–0)
PHOSPHATE SERPL-MCNC: 2.6 MG/DL — SIGNIFICANT CHANGE UP (ref 2.5–4.5)
PLATELET # BLD AUTO: 276 K/UL — SIGNIFICANT CHANGE UP (ref 150–400)
POTASSIUM SERPL-MCNC: 3.9 MMOL/L — SIGNIFICANT CHANGE UP (ref 3.5–5.3)
POTASSIUM SERPL-SCNC: 3.9 MMOL/L — SIGNIFICANT CHANGE UP (ref 3.5–5.3)
RBC # BLD: 5.42 M/UL — SIGNIFICANT CHANGE UP (ref 4.2–5.8)
RBC # FLD: 17.5 % — HIGH (ref 10.3–14.5)
SODIUM SERPL-SCNC: 139 MMOL/L — SIGNIFICANT CHANGE UP (ref 135–145)
SPECIMEN SOURCE: SIGNIFICANT CHANGE UP
TSH SERPL-MCNC: 3.5 UIU/ML — SIGNIFICANT CHANGE UP (ref 0.27–4.2)
WBC # BLD: 6.83 K/UL — SIGNIFICANT CHANGE UP (ref 3.8–10.5)
WBC # FLD AUTO: 6.83 K/UL — SIGNIFICANT CHANGE UP (ref 3.8–10.5)

## 2025-03-24 PROCEDURE — 99232 SBSQ HOSP IP/OBS MODERATE 35: CPT | Mod: GC

## 2025-03-24 PROCEDURE — 99232 SBSQ HOSP IP/OBS MODERATE 35: CPT

## 2025-03-24 RX ORDER — POLYETHYLENE GLYCOL-3350 AND ELECTROLYTES 236; 6.74; 5.86; 2.97; 22.74 G/274.31G; G/274.31G; G/274.31G; G/274.31G; G/274.31G
4000 POWDER, FOR SOLUTION ORAL ONCE
Refills: 0 | Status: COMPLETED | OUTPATIENT
Start: 2025-03-24 | End: 2025-03-24

## 2025-03-24 RX ADMIN — Medication 325 MILLIGRAM(S): at 11:48

## 2025-03-24 RX ADMIN — Medication 40 MILLIGRAM(S): at 18:32

## 2025-03-24 RX ADMIN — Medication 40 MILLIGRAM(S): at 05:35

## 2025-03-24 RX ADMIN — LOSARTAN POTASSIUM 100 MILLIGRAM(S): 100 TABLET, FILM COATED ORAL at 05:35

## 2025-03-24 RX ADMIN — ATORVASTATIN CALCIUM 40 MILLIGRAM(S): 80 TABLET, FILM COATED ORAL at 21:37

## 2025-03-24 RX ADMIN — POLYETHYLENE GLYCOL 3350 17 GRAM(S): 17 POWDER, FOR SOLUTION ORAL at 05:35

## 2025-03-24 RX ADMIN — Medication 81 MILLIGRAM(S): at 11:48

## 2025-03-24 RX ADMIN — VENLAFAXINE HYDROCHLORIDE 37.5 MILLIGRAM(S): 37.5 CAPSULE, EXTENDED RELEASE ORAL at 11:48

## 2025-03-24 RX ADMIN — POLYETHYLENE GLYCOL-3350 AND ELECTROLYTES 4000 MILLILITER(S): 236; 6.74; 5.86; 2.97; 22.74 POWDER, FOR SOLUTION ORAL at 18:30

## 2025-03-25 LAB
ANION GAP SERPL CALC-SCNC: 17 MMOL/L — HIGH (ref 7–14)
BUN SERPL-MCNC: 10 MG/DL — SIGNIFICANT CHANGE UP (ref 7–23)
CALCIUM SERPL-MCNC: 8.6 MG/DL — SIGNIFICANT CHANGE UP (ref 8.4–10.5)
CHLORIDE SERPL-SCNC: 104 MMOL/L — SIGNIFICANT CHANGE UP (ref 98–107)
CO2 SERPL-SCNC: 19 MMOL/L — LOW (ref 22–31)
CREAT SERPL-MCNC: 0.9 MG/DL — SIGNIFICANT CHANGE UP (ref 0.5–1.3)
EGFR: 89 ML/MIN/1.73M2 — SIGNIFICANT CHANGE UP
EGFR: 89 ML/MIN/1.73M2 — SIGNIFICANT CHANGE UP
GLUCOSE BLDC GLUCOMTR-MCNC: 158 MG/DL — HIGH (ref 70–99)
GLUCOSE BLDC GLUCOMTR-MCNC: 75 MG/DL — SIGNIFICANT CHANGE UP (ref 70–99)
GLUCOSE BLDC GLUCOMTR-MCNC: 78 MG/DL — SIGNIFICANT CHANGE UP (ref 70–99)
GLUCOSE BLDC GLUCOMTR-MCNC: 81 MG/DL — SIGNIFICANT CHANGE UP (ref 70–99)
GLUCOSE BLDC GLUCOMTR-MCNC: 90 MG/DL — SIGNIFICANT CHANGE UP (ref 70–99)
GLUCOSE SERPL-MCNC: 68 MG/DL — LOW (ref 70–99)
HCT VFR BLD CALC: 34.4 % — LOW (ref 39–50)
HCT VFR BLD CALC: 38.8 % — LOW (ref 39–50)
HGB BLD-MCNC: 10.8 G/DL — LOW (ref 13–17)
HGB BLD-MCNC: 11.9 G/DL — LOW (ref 13–17)
MAGNESIUM SERPL-MCNC: 1.8 MG/DL — SIGNIFICANT CHANGE UP (ref 1.6–2.6)
MCHC RBC-ENTMCNC: 20.5 PG — LOW (ref 27–34)
MCHC RBC-ENTMCNC: 20.8 PG — LOW (ref 27–34)
MCHC RBC-ENTMCNC: 30.7 G/DL — LOW (ref 32–36)
MCHC RBC-ENTMCNC: 31.4 G/DL — LOW (ref 32–36)
MCV RBC AUTO: 65.4 FL — LOW (ref 80–100)
MCV RBC AUTO: 68 FL — LOW (ref 80–100)
NRBC # BLD AUTO: 0 K/UL — SIGNIFICANT CHANGE UP (ref 0–0)
NRBC # BLD AUTO: 0 K/UL — SIGNIFICANT CHANGE UP (ref 0–0)
NRBC # FLD: 0 K/UL — SIGNIFICANT CHANGE UP (ref 0–0)
NRBC # FLD: 0 K/UL — SIGNIFICANT CHANGE UP (ref 0–0)
NRBC BLD AUTO-RTO: 0 /100 WBCS — SIGNIFICANT CHANGE UP (ref 0–0)
NRBC BLD AUTO-RTO: 0 /100 WBCS — SIGNIFICANT CHANGE UP (ref 0–0)
PHOSPHATE SERPL-MCNC: 2.2 MG/DL — LOW (ref 2.5–4.5)
PLATELET # BLD AUTO: 266 K/UL — SIGNIFICANT CHANGE UP (ref 150–400)
PLATELET # BLD AUTO: 283 K/UL — SIGNIFICANT CHANGE UP (ref 150–400)
POTASSIUM SERPL-MCNC: 3.8 MMOL/L — SIGNIFICANT CHANGE UP (ref 3.5–5.3)
POTASSIUM SERPL-SCNC: 3.8 MMOL/L — SIGNIFICANT CHANGE UP (ref 3.5–5.3)
RBC # BLD: 5.26 M/UL — SIGNIFICANT CHANGE UP (ref 4.2–5.8)
RBC # BLD: 5.71 M/UL — SIGNIFICANT CHANGE UP (ref 4.2–5.8)
RBC # FLD: 17.7 % — HIGH (ref 10.3–14.5)
RBC # FLD: 18.9 % — HIGH (ref 10.3–14.5)
SODIUM SERPL-SCNC: 140 MMOL/L — SIGNIFICANT CHANGE UP (ref 135–145)
WBC # BLD: 6.66 K/UL — SIGNIFICANT CHANGE UP (ref 3.8–10.5)
WBC # BLD: 7.63 K/UL — SIGNIFICANT CHANGE UP (ref 3.8–10.5)
WBC # FLD AUTO: 6.66 K/UL — SIGNIFICANT CHANGE UP (ref 3.8–10.5)
WBC # FLD AUTO: 7.63 K/UL — SIGNIFICANT CHANGE UP (ref 3.8–10.5)

## 2025-03-25 PROCEDURE — 43239 EGD BIOPSY SINGLE/MULTIPLE: CPT

## 2025-03-25 PROCEDURE — 88305 TISSUE EXAM BY PATHOLOGIST: CPT | Mod: 26

## 2025-03-25 PROCEDURE — 45385 COLONOSCOPY W/LESION REMOVAL: CPT | Mod: GC

## 2025-03-25 PROCEDURE — 99233 SBSQ HOSP IP/OBS HIGH 50: CPT

## 2025-03-25 RX ORDER — SODIUM CHLORIDE 9 G/1000ML
1000 INJECTION, SOLUTION INTRAVENOUS
Refills: 0 | Status: DISCONTINUED | OUTPATIENT
Start: 2025-03-25 | End: 2025-03-28

## 2025-03-25 RX ORDER — POLYETHYLENE GLYCOL-3350 AND ELECTROLYTES 236; 6.74; 5.86; 2.97; 22.74 G/274.31G; G/274.31G; G/274.31G; G/274.31G; G/274.31G
2000 POWDER, FOR SOLUTION ORAL ONCE
Refills: 0 | Status: COMPLETED | OUTPATIENT
Start: 2025-03-25 | End: 2025-03-25

## 2025-03-25 RX ORDER — DEXTROSE 50 % IN WATER 50 %
25 SYRINGE (ML) INTRAVENOUS ONCE
Refills: 0 | Status: COMPLETED | OUTPATIENT
Start: 2025-03-25 | End: 2025-03-25

## 2025-03-25 RX ORDER — SOD PHOS DI, MONO/K PHOS MONO 250 MG
1 TABLET ORAL ONCE
Refills: 0 | Status: COMPLETED | OUTPATIENT
Start: 2025-03-25 | End: 2025-03-25

## 2025-03-25 RX ADMIN — SODIUM CHLORIDE 75 MILLILITER(S): 9 INJECTION, SOLUTION INTRAVENOUS at 21:05

## 2025-03-25 RX ADMIN — Medication 25 GRAM(S): at 12:35

## 2025-03-25 RX ADMIN — POLYETHYLENE GLYCOL-3350 AND ELECTROLYTES 2000 MILLILITER(S): 236; 6.74; 5.86; 2.97; 22.74 POWDER, FOR SOLUTION ORAL at 10:39

## 2025-03-25 RX ADMIN — Medication 1 PACKET(S): at 22:28

## 2025-03-25 RX ADMIN — Medication 40 MILLIGRAM(S): at 05:39

## 2025-03-25 RX ADMIN — ATORVASTATIN CALCIUM 40 MILLIGRAM(S): 80 TABLET, FILM COATED ORAL at 21:06

## 2025-03-26 DIAGNOSIS — E87.8 OTHER DISORDERS OF ELECTROLYTE AND FLUID BALANCE, NOT ELSEWHERE CLASSIFIED: ICD-10-CM

## 2025-03-26 DIAGNOSIS — K92.2 GASTROINTESTINAL HEMORRHAGE, UNSPECIFIED: ICD-10-CM

## 2025-03-26 LAB
ANION GAP SERPL CALC-SCNC: 13 MMOL/L — SIGNIFICANT CHANGE UP (ref 7–14)
BLD GP AB SCN SERPL QL: NEGATIVE — SIGNIFICANT CHANGE UP
BUN SERPL-MCNC: 9 MG/DL — SIGNIFICANT CHANGE UP (ref 7–23)
CALCIUM SERPL-MCNC: 8.1 MG/DL — LOW (ref 8.4–10.5)
CHLORIDE SERPL-SCNC: 106 MMOL/L — SIGNIFICANT CHANGE UP (ref 98–107)
CO2 SERPL-SCNC: 20 MMOL/L — LOW (ref 22–31)
CREAT SERPL-MCNC: 0.79 MG/DL — SIGNIFICANT CHANGE UP (ref 0.5–1.3)
EGFR: 92 ML/MIN/1.73M2 — SIGNIFICANT CHANGE UP
EGFR: 92 ML/MIN/1.73M2 — SIGNIFICANT CHANGE UP
GLUCOSE BLDC GLUCOMTR-MCNC: 100 MG/DL — HIGH (ref 70–99)
GLUCOSE BLDC GLUCOMTR-MCNC: 104 MG/DL — HIGH (ref 70–99)
GLUCOSE BLDC GLUCOMTR-MCNC: 105 MG/DL — HIGH (ref 70–99)
GLUCOSE BLDC GLUCOMTR-MCNC: 147 MG/DL — HIGH (ref 70–99)
GLUCOSE SERPL-MCNC: 96 MG/DL — SIGNIFICANT CHANGE UP (ref 70–99)
HCT VFR BLD CALC: 27.1 % — LOW (ref 39–50)
HCT VFR BLD CALC: 28.6 % — LOW (ref 39–50)
HCT VFR BLD CALC: 30.6 % — LOW (ref 39–50)
HGB BLD-MCNC: 8.7 G/DL — LOW (ref 13–17)
HGB BLD-MCNC: 9.1 G/DL — LOW (ref 13–17)
HGB BLD-MCNC: 9.7 G/DL — LOW (ref 13–17)
MAGNESIUM SERPL-MCNC: 1.5 MG/DL — LOW (ref 1.6–2.6)
MCHC RBC-ENTMCNC: 21 PG — LOW (ref 27–34)
MCHC RBC-ENTMCNC: 21.3 PG — LOW (ref 27–34)
MCHC RBC-ENTMCNC: 21.4 PG — LOW (ref 27–34)
MCHC RBC-ENTMCNC: 31.7 G/DL — LOW (ref 32–36)
MCHC RBC-ENTMCNC: 31.8 G/DL — LOW (ref 32–36)
MCHC RBC-ENTMCNC: 32.1 G/DL — SIGNIFICANT CHANGE UP (ref 32–36)
MCV RBC AUTO: 66.1 FL — LOW (ref 80–100)
MCV RBC AUTO: 66.3 FL — LOW (ref 80–100)
MCV RBC AUTO: 67.5 FL — LOW (ref 80–100)
NRBC # BLD AUTO: 0 K/UL — SIGNIFICANT CHANGE UP (ref 0–0)
NRBC # FLD: 0 K/UL — SIGNIFICANT CHANGE UP (ref 0–0)
NRBC BLD AUTO-RTO: 0 /100 WBCS — SIGNIFICANT CHANGE UP (ref 0–0)
PHOSPHATE SERPL-MCNC: 3.1 MG/DL — SIGNIFICANT CHANGE UP (ref 2.5–4.5)
PLATELET # BLD AUTO: 235 K/UL — SIGNIFICANT CHANGE UP (ref 150–400)
PLATELET # BLD AUTO: 261 K/UL — SIGNIFICANT CHANGE UP (ref 150–400)
PLATELET # BLD AUTO: 277 K/UL — SIGNIFICANT CHANGE UP (ref 150–400)
POTASSIUM SERPL-MCNC: 3.4 MMOL/L — LOW (ref 3.5–5.3)
POTASSIUM SERPL-SCNC: 3.4 MMOL/L — LOW (ref 3.5–5.3)
RBC # BLD: 4.09 M/UL — LOW (ref 4.2–5.8)
RBC # BLD: 4.33 M/UL — SIGNIFICANT CHANGE UP (ref 4.2–5.8)
RBC # BLD: 4.53 M/UL — SIGNIFICANT CHANGE UP (ref 4.2–5.8)
RBC # FLD: 17.5 % — HIGH (ref 10.3–14.5)
RBC # FLD: 17.5 % — HIGH (ref 10.3–14.5)
RBC # FLD: 17.8 % — HIGH (ref 10.3–14.5)
RH IG SCN BLD-IMP: NEGATIVE — SIGNIFICANT CHANGE UP
SODIUM SERPL-SCNC: 139 MMOL/L — SIGNIFICANT CHANGE UP (ref 135–145)
WBC # BLD: 5.96 K/UL — SIGNIFICANT CHANGE UP (ref 3.8–10.5)
WBC # BLD: 6.74 K/UL — SIGNIFICANT CHANGE UP (ref 3.8–10.5)
WBC # BLD: 6.84 K/UL — SIGNIFICANT CHANGE UP (ref 3.8–10.5)
WBC # FLD AUTO: 5.96 K/UL — SIGNIFICANT CHANGE UP (ref 3.8–10.5)
WBC # FLD AUTO: 6.74 K/UL — SIGNIFICANT CHANGE UP (ref 3.8–10.5)
WBC # FLD AUTO: 6.84 K/UL — SIGNIFICANT CHANGE UP (ref 3.8–10.5)

## 2025-03-26 PROCEDURE — 99232 SBSQ HOSP IP/OBS MODERATE 35: CPT

## 2025-03-26 PROCEDURE — 99232 SBSQ HOSP IP/OBS MODERATE 35: CPT | Mod: GC

## 2025-03-26 RX ORDER — POLYETHYLENE GLYCOL-3350 AND ELECTROLYTES 236; 6.74; 5.86; 2.97; 22.74 G/274.31G; G/274.31G; G/274.31G; G/274.31G; G/274.31G
4000 POWDER, FOR SOLUTION ORAL ONCE
Refills: 0 | Status: COMPLETED | OUTPATIENT
Start: 2025-03-26 | End: 2025-03-26

## 2025-03-26 RX ORDER — MAGNESIUM SULFATE 500 MG/ML
2 SYRINGE (ML) INJECTION ONCE
Refills: 0 | Status: COMPLETED | OUTPATIENT
Start: 2025-03-26 | End: 2025-03-26

## 2025-03-26 RX ADMIN — LOSARTAN POTASSIUM 100 MILLIGRAM(S): 100 TABLET, FILM COATED ORAL at 09:46

## 2025-03-26 RX ADMIN — Medication 325 MILLIGRAM(S): at 09:45

## 2025-03-26 RX ADMIN — SODIUM CHLORIDE 75 MILLILITER(S): 9 INJECTION, SOLUTION INTRAVENOUS at 10:20

## 2025-03-26 RX ADMIN — Medication 100 MILLIEQUIVALENT(S): at 12:04

## 2025-03-26 RX ADMIN — Medication 1000 MILLILITER(S): at 05:56

## 2025-03-26 RX ADMIN — POLYETHYLENE GLYCOL-3350 AND ELECTROLYTES 4000 MILLILITER(S): 236; 6.74; 5.86; 2.97; 22.74 POWDER, FOR SOLUTION ORAL at 14:15

## 2025-03-26 RX ADMIN — Medication 40 MILLIGRAM(S): at 05:28

## 2025-03-26 RX ADMIN — Medication 100 MILLIEQUIVALENT(S): at 10:10

## 2025-03-26 RX ADMIN — VENLAFAXINE HYDROCHLORIDE 37.5 MILLIGRAM(S): 37.5 CAPSULE, EXTENDED RELEASE ORAL at 09:46

## 2025-03-26 RX ADMIN — Medication 81 MILLIGRAM(S): at 09:46

## 2025-03-26 RX ADMIN — ATORVASTATIN CALCIUM 40 MILLIGRAM(S): 80 TABLET, FILM COATED ORAL at 21:08

## 2025-03-26 RX ADMIN — Medication 25 GRAM(S): at 10:09

## 2025-03-26 RX ADMIN — POLYETHYLENE GLYCOL 3350 17 GRAM(S): 17 POWDER, FOR SOLUTION ORAL at 05:28

## 2025-03-26 RX ADMIN — Medication 40 MILLIGRAM(S): at 17:38

## 2025-03-27 LAB
ANION GAP SERPL CALC-SCNC: 14 MMOL/L — SIGNIFICANT CHANGE UP (ref 7–14)
BUN SERPL-MCNC: 4 MG/DL — LOW (ref 7–23)
CALCIUM SERPL-MCNC: 8.5 MG/DL — SIGNIFICANT CHANGE UP (ref 8.4–10.5)
CHLORIDE SERPL-SCNC: 108 MMOL/L — HIGH (ref 98–107)
CO2 SERPL-SCNC: 20 MMOL/L — LOW (ref 22–31)
CREAT SERPL-MCNC: 0.78 MG/DL — SIGNIFICANT CHANGE UP (ref 0.5–1.3)
EGFR: 92 ML/MIN/1.73M2 — SIGNIFICANT CHANGE UP
EGFR: 92 ML/MIN/1.73M2 — SIGNIFICANT CHANGE UP
GLUCOSE BLDC GLUCOMTR-MCNC: 107 MG/DL — HIGH (ref 70–99)
GLUCOSE BLDC GLUCOMTR-MCNC: 119 MG/DL — HIGH (ref 70–99)
GLUCOSE BLDC GLUCOMTR-MCNC: 159 MG/DL — HIGH (ref 70–99)
GLUCOSE BLDC GLUCOMTR-MCNC: 95 MG/DL — SIGNIFICANT CHANGE UP (ref 70–99)
GLUCOSE SERPL-MCNC: 80 MG/DL — SIGNIFICANT CHANGE UP (ref 70–99)
HCT VFR BLD CALC: 24.6 % — LOW (ref 39–50)
HCT VFR BLD CALC: 26.8 % — LOW (ref 39–50)
HGB BLD-MCNC: 7.9 G/DL — LOW (ref 13–17)
HGB BLD-MCNC: 8.6 G/DL — LOW (ref 13–17)
MAGNESIUM SERPL-MCNC: 1.9 MG/DL — SIGNIFICANT CHANGE UP (ref 1.6–2.6)
MCHC RBC-ENTMCNC: 21.1 PG — LOW (ref 27–34)
MCHC RBC-ENTMCNC: 21.2 PG — LOW (ref 27–34)
MCHC RBC-ENTMCNC: 32.1 G/DL — SIGNIFICANT CHANGE UP (ref 32–36)
MCHC RBC-ENTMCNC: 32.1 G/DL — SIGNIFICANT CHANGE UP (ref 32–36)
MCV RBC AUTO: 65.8 FL — LOW (ref 80–100)
MCV RBC AUTO: 66 FL — LOW (ref 80–100)
NRBC # BLD AUTO: 0 K/UL — SIGNIFICANT CHANGE UP (ref 0–0)
NRBC # BLD AUTO: 0 K/UL — SIGNIFICANT CHANGE UP (ref 0–0)
NRBC # FLD: 0 K/UL — SIGNIFICANT CHANGE UP (ref 0–0)
NRBC # FLD: 0 K/UL — SIGNIFICANT CHANGE UP (ref 0–0)
NRBC BLD AUTO-RTO: 0 /100 WBCS — SIGNIFICANT CHANGE UP (ref 0–0)
NRBC BLD AUTO-RTO: 0 /100 WBCS — SIGNIFICANT CHANGE UP (ref 0–0)
PHOSPHATE SERPL-MCNC: 2.6 MG/DL — SIGNIFICANT CHANGE UP (ref 2.5–4.5)
PLATELET # BLD AUTO: 252 K/UL — SIGNIFICANT CHANGE UP (ref 150–400)
PLATELET # BLD AUTO: 260 K/UL — SIGNIFICANT CHANGE UP (ref 150–400)
POTASSIUM SERPL-MCNC: 3.7 MMOL/L — SIGNIFICANT CHANGE UP (ref 3.5–5.3)
POTASSIUM SERPL-SCNC: 3.7 MMOL/L — SIGNIFICANT CHANGE UP (ref 3.5–5.3)
RBC # BLD: 3.74 M/UL — LOW (ref 4.2–5.8)
RBC # BLD: 4.06 M/UL — LOW (ref 4.2–5.8)
RBC # FLD: 17.3 % — HIGH (ref 10.3–14.5)
RBC # FLD: 17.4 % — HIGH (ref 10.3–14.5)
SODIUM SERPL-SCNC: 142 MMOL/L — SIGNIFICANT CHANGE UP (ref 135–145)
WBC # BLD: 5.71 K/UL — SIGNIFICANT CHANGE UP (ref 3.8–10.5)
WBC # BLD: 6.74 K/UL — SIGNIFICANT CHANGE UP (ref 3.8–10.5)
WBC # FLD AUTO: 5.71 K/UL — SIGNIFICANT CHANGE UP (ref 3.8–10.5)
WBC # FLD AUTO: 6.74 K/UL — SIGNIFICANT CHANGE UP (ref 3.8–10.5)

## 2025-03-27 PROCEDURE — 99232 SBSQ HOSP IP/OBS MODERATE 35: CPT | Mod: GC

## 2025-03-27 PROCEDURE — 99232 SBSQ HOSP IP/OBS MODERATE 35: CPT

## 2025-03-27 RX ORDER — HYDROMORPHONE/SOD CHLOR,ISO/PF 2 MG/10 ML
0.5 SYRINGE (ML) INJECTION EVERY 4 HOURS
Refills: 0 | Status: DISCONTINUED | OUTPATIENT
Start: 2025-03-27 | End: 2025-03-28

## 2025-03-27 RX ADMIN — VENLAFAXINE HYDROCHLORIDE 37.5 MILLIGRAM(S): 37.5 CAPSULE, EXTENDED RELEASE ORAL at 12:00

## 2025-03-27 RX ADMIN — Medication 40 MILLIGRAM(S): at 11:59

## 2025-03-27 RX ADMIN — Medication 81 MILLIGRAM(S): at 11:59

## 2025-03-27 RX ADMIN — POLYETHYLENE GLYCOL 3350 17 GRAM(S): 17 POWDER, FOR SOLUTION ORAL at 17:09

## 2025-03-27 RX ADMIN — ATORVASTATIN CALCIUM 40 MILLIGRAM(S): 80 TABLET, FILM COATED ORAL at 21:27

## 2025-03-27 RX ADMIN — Medication 325 MILLIGRAM(S): at 11:59

## 2025-03-27 RX ADMIN — LOSARTAN POTASSIUM 100 MILLIGRAM(S): 100 TABLET, FILM COATED ORAL at 11:59

## 2025-03-27 RX ADMIN — Medication 40 MILLIGRAM(S): at 06:20

## 2025-03-28 LAB
ANION GAP SERPL CALC-SCNC: 10 MMOL/L — SIGNIFICANT CHANGE UP (ref 7–14)
BLD GP AB SCN SERPL QL: NEGATIVE — SIGNIFICANT CHANGE UP
BUN SERPL-MCNC: 3 MG/DL — LOW (ref 7–23)
CALCIUM SERPL-MCNC: 8.6 MG/DL — SIGNIFICANT CHANGE UP (ref 8.4–10.5)
CHLORIDE SERPL-SCNC: 110 MMOL/L — HIGH (ref 98–107)
CO2 SERPL-SCNC: 22 MMOL/L — SIGNIFICANT CHANGE UP (ref 22–31)
CREAT SERPL-MCNC: 0.84 MG/DL — SIGNIFICANT CHANGE UP (ref 0.5–1.3)
EGFR: 90 ML/MIN/1.73M2 — SIGNIFICANT CHANGE UP
EGFR: 90 ML/MIN/1.73M2 — SIGNIFICANT CHANGE UP
GLUCOSE BLDC GLUCOMTR-MCNC: 104 MG/DL — HIGH (ref 70–99)
GLUCOSE BLDC GLUCOMTR-MCNC: 144 MG/DL — HIGH (ref 70–99)
GLUCOSE BLDC GLUCOMTR-MCNC: 160 MG/DL — HIGH (ref 70–99)
GLUCOSE BLDC GLUCOMTR-MCNC: 195 MG/DL — HIGH (ref 70–99)
GLUCOSE SERPL-MCNC: 94 MG/DL — SIGNIFICANT CHANGE UP (ref 70–99)
HCT VFR BLD CALC: 21.7 % — LOW (ref 39–50)
HCT VFR BLD CALC: 25.1 % — LOW (ref 39–50)
HGB BLD-MCNC: 7 G/DL — CRITICAL LOW (ref 13–17)
HGB BLD-MCNC: 8 G/DL — LOW (ref 13–17)
MAGNESIUM SERPL-MCNC: 1.8 MG/DL — SIGNIFICANT CHANGE UP (ref 1.6–2.6)
MCHC RBC-ENTMCNC: 21.1 PG — LOW (ref 27–34)
MCHC RBC-ENTMCNC: 21.3 PG — LOW (ref 27–34)
MCHC RBC-ENTMCNC: 31.9 G/DL — LOW (ref 32–36)
MCHC RBC-ENTMCNC: 32.3 G/DL — SIGNIFICANT CHANGE UP (ref 32–36)
MCV RBC AUTO: 66 FL — LOW (ref 80–100)
MCV RBC AUTO: 66.2 FL — LOW (ref 80–100)
NRBC # BLD AUTO: 0 K/UL — SIGNIFICANT CHANGE UP (ref 0–0)
NRBC # BLD AUTO: 0 K/UL — SIGNIFICANT CHANGE UP (ref 0–0)
NRBC # FLD: 0 K/UL — SIGNIFICANT CHANGE UP (ref 0–0)
NRBC # FLD: 0 K/UL — SIGNIFICANT CHANGE UP (ref 0–0)
NRBC BLD AUTO-RTO: 0 /100 WBCS — SIGNIFICANT CHANGE UP (ref 0–0)
NRBC BLD AUTO-RTO: 0 /100 WBCS — SIGNIFICANT CHANGE UP (ref 0–0)
PHOSPHATE SERPL-MCNC: 3.2 MG/DL — SIGNIFICANT CHANGE UP (ref 2.5–4.5)
PLATELET # BLD AUTO: 234 K/UL — SIGNIFICANT CHANGE UP (ref 150–400)
PLATELET # BLD AUTO: 276 K/UL — SIGNIFICANT CHANGE UP (ref 150–400)
POTASSIUM SERPL-MCNC: 3.6 MMOL/L — SIGNIFICANT CHANGE UP (ref 3.5–5.3)
POTASSIUM SERPL-SCNC: 3.6 MMOL/L — SIGNIFICANT CHANGE UP (ref 3.5–5.3)
RBC # BLD: 3.29 M/UL — LOW (ref 4.2–5.8)
RBC # BLD: 3.79 M/UL — LOW (ref 4.2–5.8)
RBC # FLD: 17.5 % — HIGH (ref 10.3–14.5)
RBC # FLD: 17.6 % — HIGH (ref 10.3–14.5)
RH IG SCN BLD-IMP: NEGATIVE — SIGNIFICANT CHANGE UP
SODIUM SERPL-SCNC: 142 MMOL/L — SIGNIFICANT CHANGE UP (ref 135–145)
WBC # BLD: 4.89 K/UL — SIGNIFICANT CHANGE UP (ref 3.8–10.5)
WBC # BLD: 5.5 K/UL — SIGNIFICANT CHANGE UP (ref 3.8–10.5)
WBC # FLD AUTO: 4.89 K/UL — SIGNIFICANT CHANGE UP (ref 3.8–10.5)
WBC # FLD AUTO: 5.5 K/UL — SIGNIFICANT CHANGE UP (ref 3.8–10.5)

## 2025-03-28 PROCEDURE — 99232 SBSQ HOSP IP/OBS MODERATE 35: CPT

## 2025-03-28 PROCEDURE — 99232 SBSQ HOSP IP/OBS MODERATE 35: CPT | Mod: GC

## 2025-03-28 RX ORDER — MAGNESIUM OXIDE 400 MG
400 TABLET ORAL ONCE
Refills: 0 | Status: COMPLETED | OUTPATIENT
Start: 2025-03-28 | End: 2025-03-28

## 2025-03-28 RX ADMIN — VENLAFAXINE HYDROCHLORIDE 37.5 MILLIGRAM(S): 37.5 CAPSULE, EXTENDED RELEASE ORAL at 12:41

## 2025-03-28 RX ADMIN — INSULIN LISPRO 1: 100 INJECTION, SOLUTION INTRAVENOUS; SUBCUTANEOUS at 12:27

## 2025-03-28 RX ADMIN — ATORVASTATIN CALCIUM 40 MILLIGRAM(S): 80 TABLET, FILM COATED ORAL at 21:06

## 2025-03-28 RX ADMIN — Medication 400 MILLIGRAM(S): at 09:11

## 2025-03-28 RX ADMIN — Medication 325 MILLIGRAM(S): at 08:23

## 2025-03-28 RX ADMIN — Medication 40 MILLIGRAM(S): at 17:51

## 2025-03-28 RX ADMIN — LOSARTAN POTASSIUM 100 MILLIGRAM(S): 100 TABLET, FILM COATED ORAL at 09:24

## 2025-03-28 RX ADMIN — Medication 40 MILLIEQUIVALENT(S): at 09:11

## 2025-03-28 RX ADMIN — POLYETHYLENE GLYCOL 3350 17 GRAM(S): 17 POWDER, FOR SOLUTION ORAL at 17:51

## 2025-03-28 RX ADMIN — POLYETHYLENE GLYCOL 3350 17 GRAM(S): 17 POWDER, FOR SOLUTION ORAL at 06:01

## 2025-03-28 RX ADMIN — Medication 40 MILLIGRAM(S): at 06:01

## 2025-03-28 RX ADMIN — Medication 81 MILLIGRAM(S): at 08:23

## 2025-03-29 ENCOUNTER — TRANSCRIPTION ENCOUNTER (OUTPATIENT)
Age: 75
End: 2025-03-29

## 2025-03-29 VITALS
RESPIRATION RATE: 17 BRPM | TEMPERATURE: 97 F | DIASTOLIC BLOOD PRESSURE: 74 MMHG | SYSTOLIC BLOOD PRESSURE: 144 MMHG | HEART RATE: 76 BPM | OXYGEN SATURATION: 100 %

## 2025-03-29 LAB
ANION GAP SERPL CALC-SCNC: 12 MMOL/L — SIGNIFICANT CHANGE UP (ref 7–14)
BUN SERPL-MCNC: 6 MG/DL — LOW (ref 7–23)
CALCIUM SERPL-MCNC: 9.1 MG/DL — SIGNIFICANT CHANGE UP (ref 8.4–10.5)
CHLORIDE SERPL-SCNC: 108 MMOL/L — HIGH (ref 98–107)
CO2 SERPL-SCNC: 22 MMOL/L — SIGNIFICANT CHANGE UP (ref 22–31)
CREAT SERPL-MCNC: 0.99 MG/DL — SIGNIFICANT CHANGE UP (ref 0.5–1.3)
EGFR: 79 ML/MIN/1.73M2 — SIGNIFICANT CHANGE UP
EGFR: 79 ML/MIN/1.73M2 — SIGNIFICANT CHANGE UP
GLUCOSE BLDC GLUCOMTR-MCNC: 121 MG/DL — HIGH (ref 70–99)
GLUCOSE BLDC GLUCOMTR-MCNC: 121 MG/DL — HIGH (ref 70–99)
GLUCOSE SERPL-MCNC: 104 MG/DL — HIGH (ref 70–99)
HCT VFR BLD CALC: 24.6 % — LOW (ref 39–50)
HGB BLD-MCNC: 7.7 G/DL — LOW (ref 13–17)
MAGNESIUM SERPL-MCNC: 1.7 MG/DL — SIGNIFICANT CHANGE UP (ref 1.6–2.6)
MCHC RBC-ENTMCNC: 20.9 PG — LOW (ref 27–34)
MCHC RBC-ENTMCNC: 31.3 G/DL — LOW (ref 32–36)
MCV RBC AUTO: 66.8 FL — LOW (ref 80–100)
NRBC # BLD AUTO: 0 K/UL — SIGNIFICANT CHANGE UP (ref 0–0)
NRBC # FLD: 0 K/UL — SIGNIFICANT CHANGE UP (ref 0–0)
NRBC BLD AUTO-RTO: 0 /100 WBCS — SIGNIFICANT CHANGE UP (ref 0–0)
PHOSPHATE SERPL-MCNC: 4 MG/DL — SIGNIFICANT CHANGE UP (ref 2.5–4.5)
PLATELET # BLD AUTO: 254 K/UL — SIGNIFICANT CHANGE UP (ref 150–400)
POTASSIUM SERPL-MCNC: 3.8 MMOL/L — SIGNIFICANT CHANGE UP (ref 3.5–5.3)
POTASSIUM SERPL-SCNC: 3.8 MMOL/L — SIGNIFICANT CHANGE UP (ref 3.5–5.3)
RBC # BLD: 3.68 M/UL — LOW (ref 4.2–5.8)
RBC # FLD: 17.8 % — HIGH (ref 10.3–14.5)
SODIUM SERPL-SCNC: 142 MMOL/L — SIGNIFICANT CHANGE UP (ref 135–145)
WBC # BLD: 6.27 K/UL — SIGNIFICANT CHANGE UP (ref 3.8–10.5)
WBC # FLD AUTO: 6.27 K/UL — SIGNIFICANT CHANGE UP (ref 3.8–10.5)

## 2025-03-29 PROCEDURE — 99239 HOSP IP/OBS DSCHRG MGMT >30: CPT

## 2025-03-29 RX ORDER — ACETAMINOPHEN 500 MG/5ML
2 LIQUID (ML) ORAL
Qty: 0 | Refills: 0 | DISCHARGE
Start: 2025-03-29

## 2025-03-29 RX ADMIN — Medication 81 MILLIGRAM(S): at 11:03

## 2025-03-29 RX ADMIN — VENLAFAXINE HYDROCHLORIDE 37.5 MILLIGRAM(S): 37.5 CAPSULE, EXTENDED RELEASE ORAL at 11:03

## 2025-03-29 RX ADMIN — Medication 325 MILLIGRAM(S): at 11:03

## 2025-03-29 RX ADMIN — LOSARTAN POTASSIUM 100 MILLIGRAM(S): 100 TABLET, FILM COATED ORAL at 05:35

## 2025-03-29 RX ADMIN — Medication 40 MILLIGRAM(S): at 05:34

## 2025-03-31 LAB — SURGICAL PATHOLOGY STUDY: SIGNIFICANT CHANGE UP

## 2025-04-02 ENCOUNTER — APPOINTMENT (OUTPATIENT)
Dept: ELECTROPHYSIOLOGY | Facility: CLINIC | Age: 75
End: 2025-04-02

## 2025-04-11 ENCOUNTER — NON-APPOINTMENT (OUTPATIENT)
Age: 75
End: 2025-04-11

## 2025-04-21 ENCOUNTER — APPOINTMENT (OUTPATIENT)
Dept: GASTROENTEROLOGY | Facility: CLINIC | Age: 75
End: 2025-04-21

## 2025-04-22 ENCOUNTER — APPOINTMENT (OUTPATIENT)
Dept: GASTROENTEROLOGY | Facility: CLINIC | Age: 75
End: 2025-04-22

## 2025-04-25 ENCOUNTER — APPOINTMENT (OUTPATIENT)
Dept: MRI IMAGING | Facility: CLINIC | Age: 75
End: 2025-04-25

## 2025-04-30 ENCOUNTER — APPOINTMENT (OUTPATIENT)
Dept: NEUROSURGERY | Facility: CLINIC | Age: 75
End: 2025-04-30

## 2025-04-30 DIAGNOSIS — I65.29 OCCLUSION AND STENOSIS OF UNSPECIFIED CAROTID ARTERY: ICD-10-CM

## 2025-05-06 ENCOUNTER — OUTPATIENT (OUTPATIENT)
Dept: OUTPATIENT SERVICES | Facility: HOSPITAL | Age: 75
LOS: 1 days | End: 2025-05-06
Payer: MEDICARE

## 2025-05-06 DIAGNOSIS — I63.112 CEREBRAL INFARCTION DUE TO EMBOLISM OF LEFT VERTEBRAL ARTERY: ICD-10-CM

## 2025-05-06 DIAGNOSIS — I65.09 OCCLUSION AND STENOSIS OF UNSPECIFIED VERTEBRAL ARTERY: Chronic | ICD-10-CM

## 2025-05-06 PROCEDURE — 70547 MR ANGIOGRAPHY NECK W/O DYE: CPT

## 2025-05-06 PROCEDURE — 70544 MR ANGIOGRAPHY HEAD W/O DYE: CPT | Mod: 26

## 2025-05-06 PROCEDURE — 70547 MR ANGIOGRAPHY NECK W/O DYE: CPT | Mod: 26

## 2025-05-06 PROCEDURE — 70544 MR ANGIOGRAPHY HEAD W/O DYE: CPT

## 2025-05-08 ENCOUNTER — RX RENEWAL (OUTPATIENT)
Age: 75
End: 2025-05-08

## 2025-05-14 ENCOUNTER — APPOINTMENT (OUTPATIENT)
Dept: NEUROSURGERY | Facility: CLINIC | Age: 75
End: 2025-05-14
Payer: MEDICARE

## 2025-05-14 ENCOUNTER — NON-APPOINTMENT (OUTPATIENT)
Age: 75
End: 2025-05-14

## 2025-05-14 VITALS
WEIGHT: 150 LBS | DIASTOLIC BLOOD PRESSURE: 62 MMHG | OXYGEN SATURATION: 98 % | RESPIRATION RATE: 15 BRPM | HEIGHT: 65 IN | SYSTOLIC BLOOD PRESSURE: 108 MMHG | BODY MASS INDEX: 24.99 KG/M2 | HEART RATE: 89 BPM

## 2025-05-14 DIAGNOSIS — I65.29 OCCLUSION AND STENOSIS OF UNSPECIFIED CAROTID ARTERY: ICD-10-CM

## 2025-05-14 DIAGNOSIS — I65.09 OCCLUSION AND STENOSIS OF UNSPECIFIED VERTEBRAL ARTERY: ICD-10-CM

## 2025-05-14 PROCEDURE — 99215 OFFICE O/P EST HI 40 MIN: CPT

## 2025-05-14 NOTE — PROGRESS NOTE ADULT - PROBLEM/PLAN-1
Reviewed colonoscopy report and consult notes from Dr. Fahrenbach   Planning for repeat CT prior to possible repeat colonoscopy   Pt denies any blood in his stool currently  He and wife understand plan  F/U with GI   
DISPLAY PLAN FREE TEXT

## 2025-05-23 ENCOUNTER — APPOINTMENT (OUTPATIENT)
Dept: ENDOCRINOLOGY | Facility: CLINIC | Age: 75
End: 2025-05-23

## 2025-05-23 VITALS
SYSTOLIC BLOOD PRESSURE: 122 MMHG | HEART RATE: 80 BPM | DIASTOLIC BLOOD PRESSURE: 80 MMHG | HEIGHT: 65 IN | OXYGEN SATURATION: 98 % | WEIGHT: 150 LBS | BODY MASS INDEX: 24.99 KG/M2

## 2025-05-23 DIAGNOSIS — I10 ESSENTIAL (PRIMARY) HYPERTENSION: ICD-10-CM

## 2025-05-23 DIAGNOSIS — E11.9 TYPE 2 DIABETES MELLITUS W/OUT COMPLICATIONS: ICD-10-CM

## 2025-05-23 DIAGNOSIS — R79.89 OTHER SPECIFIED ABNORMAL FINDINGS OF BLOOD CHEMISTRY: ICD-10-CM

## 2025-05-23 DIAGNOSIS — E78.5 HYPERLIPIDEMIA, UNSPECIFIED: ICD-10-CM

## 2025-05-23 PROCEDURE — 83036 HEMOGLOBIN GLYCOSYLATED A1C: CPT | Mod: QW

## 2025-05-23 PROCEDURE — G2211 COMPLEX E/M VISIT ADD ON: CPT

## 2025-05-23 PROCEDURE — 99214 OFFICE O/P EST MOD 30 MIN: CPT

## 2025-05-26 LAB — HBA1C MFR BLD HPLC: 6.2

## 2025-06-02 ENCOUNTER — APPOINTMENT (OUTPATIENT)
Dept: SPINE | Facility: CLINIC | Age: 75
End: 2025-06-02

## 2025-06-02 ENCOUNTER — NON-APPOINTMENT (OUTPATIENT)
Age: 75
End: 2025-06-02

## 2025-07-10 NOTE — PATIENT PROFILE ADULT. - NUTRITION PROFILE
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 Patient denies symptoms this AM with BP 87/54, 92/55, noting historically, he has always had lower than normal BP. Lasix held due to low BP, continue TubiGrip for R LE edema. All questions and concerns were addressed at this time.    Objective:     Current Facility-Administered Medications   Medication Dose Route Frequency    [Held by provider] furosemide (LASIX) tablet 40 mg  40 mg Oral Daily    acetaminophen (TYLENOL) tablet 650 mg  650 mg Oral 4x Daily    aspirin EC tablet 81 mg  81 mg Oral Daily    atorvastatin (LIPITOR) tablet 40 mg  40 mg Oral Nightly    gabapentin (NEURONTIN) capsule 100 mg  100 mg Oral Nightly    glucose chewable tablet 16 g  4 tablet Oral PRN    ipratropium 0.5 mg-albuterol 2.5 mg (DUONEB) nebulizer solution 1 Dose  1 Dose Nebulization Q6H PRN    magnesium oxide (MAG-OX) tablet 400 mg  400 mg Oral TID PRN    melatonin tablet 6 mg  6 mg Oral Nightly PRN    metoprolol succinate (TOPROL XL) extended release tablet 25 mg  25 mg Oral Daily    oxyCODONE-acetaminophen (PERCOCET) 5-325 MG per tablet 1 tablet  1 tablet Oral Q4H PRN    pramipexole (MIRAPEX) tablet 0.5 mg  0.5 mg Oral Nightly    sodium chloride flush 0.9 % injection 5-40 mL  5-40 mL IntraVENous PRN    tamsulosin (FLOMAX) capsule 0.4 mg  0.4 mg Oral QHS    traMADol (ULTRAM) tablet 50 mg  50 mg Oral Q6H PRN    acetaminophen (TYLENOL) tablet 650 mg  650 mg Oral Q4H PRN    polyethylene glycol (GLYCOLAX) packet 17 g  17 g Oral Daily PRN    sennosides-docusate sodium (SENOKOT-S) 8.6-50 MG tablet 1 tablet  1 tablet Oral QHS    sodium phosphate (FLEET) rectal  Monitor incisions/wounds for adequate healing. Wound care coordinator consulted as needed. Frequent turning while in bed and repositioning in chair. Monitor for skin breakdown or erythema.  -Psychiatric: Monitor for signs and symptoms of depression.  Monitor appetite. Monitor for depression anxiety as the patient is adjusting to disability. Monitor and adjust medications as needed.  -Nutrition: Continue current diet and adjust as needed and as tolerated. Consult dietitian for nutritional assessment and recommendations.  -ID: Patient will be monitored closely for any signs or symptoms of infection, such as elevated white blood cell count, increased temperature, signs of UTI.    -Pain: The patient will be monitored closely for signs and symptoms of pain. Pain regimen will be adjusted as needed.  -Cognitive function/mental status: Has the potential for residual deficits in orientation, processing, attention, thought organization, problem solving, reasoning, word retrieval, and memory given recent injury. Therefore, patient may require continued routine follow up primarily by Speech Language Pathology in addition to Occupational Therapy to address potential underlying deficits and working on higher level-cognitive function with compensatory strategies as indicated.  -Deep venous thromboembolism: patient is at increased risk for thromboembolic event given recent injury, new mobility limitations and current hospitalization. Therefore, maintain on mechanical DVT prophylaxis and encourage progressive out of mobility while continuing routine monitoring for potential thromboembolic events.  -Bowel management: increased potential for recurrent constipation given mobility limitations, current hospitalization, and medication use. Will need to continue routine monitoring of bowel function with appropriate adjustment in bowel regimen as indicated to ensure adequate bowel management.  Last BM (including prior to admit):  no indicators present

## 2025-08-04 ENCOUNTER — RX RENEWAL (OUTPATIENT)
Age: 75
End: 2025-08-04

## 2025-09-17 ENCOUNTER — APPOINTMENT (OUTPATIENT)
Dept: ENDOCRINOLOGY | Facility: CLINIC | Age: 75
End: 2025-09-17

## (undated) DEVICE — BIOPSY FORCEP RADIAL JAW 4 STANDARD WITH NEEDLE

## (undated) DEVICE — LUBRICATING JELLY HR ONE SHOT 3G

## (undated) DEVICE — TUBING IV SET GRAVITY 3Y 100" MACRO

## (undated) DEVICE — SNARE LESIONHUNTER ROTAT NITNL COLD 10MM

## (undated) DEVICE — DRSG 2X2

## (undated) DEVICE — SALIVA EJECTOR (BLUE)

## (undated) DEVICE — UNDERPAD LINEN SAVER 17 X 24"

## (undated) DEVICE — PACK IV START WITH CHG

## (undated) DEVICE — ELCTR ECG CONDUCTIVE ADHESIVE

## (undated) DEVICE — DRSG BANDAID 0.75X3"

## (undated) DEVICE — BITE BLOCK ADULT 20 X 27MM (GREEN)

## (undated) DEVICE — GOWN LG

## (undated) DEVICE — BIOPSY FORCEP COLD DISP

## (undated) DEVICE — TUBING MEDI-VAC W MAXIGRIP CONNECTORS 1/4"X6'

## (undated) DEVICE — POLY TRAP ETRAP

## (undated) DEVICE — DENTURE CUP PINK

## (undated) DEVICE — CONTAINER FORMALIN 80ML YELLOW

## (undated) DEVICE — BASIN EMESIS 10IN GRADUATED MAUVE

## (undated) DEVICE — CATH IV SAFE BC 22G X 1" (BLUE)

## (undated) DEVICE — CLAMP BX HOT RAD JAW 3

## (undated) DEVICE — DRSG CURITY GAUZE SPONGE 4 X 4" 12-PLY NON-STERILE